# Patient Record
Sex: MALE | Race: WHITE | NOT HISPANIC OR LATINO | Employment: FULL TIME | URBAN - NONMETROPOLITAN AREA
[De-identification: names, ages, dates, MRNs, and addresses within clinical notes are randomized per-mention and may not be internally consistent; named-entity substitution may affect disease eponyms.]

---

## 2017-01-25 ENCOUNTER — APPOINTMENT (OUTPATIENT)
Dept: OCCUPATIONAL MEDICINE | Facility: OTHER | Age: 42
End: 2017-01-25

## 2017-01-25 PROCEDURE — 99000 SPECIMEN HANDLING OFFICE-LAB: CPT

## 2017-02-10 ENCOUNTER — AMBULATORY - GICH (OUTPATIENT)
Dept: SCHEDULING | Facility: OTHER | Age: 42
End: 2017-02-10

## 2017-10-02 ENCOUNTER — HISTORY (OUTPATIENT)
Dept: FAMILY MEDICINE | Facility: OTHER | Age: 42
End: 2017-10-02

## 2017-10-02 ENCOUNTER — OFFICE VISIT - GICH (OUTPATIENT)
Dept: FAMILY MEDICINE | Facility: OTHER | Age: 42
End: 2017-10-02

## 2017-10-02 DIAGNOSIS — J06.9 ACUTE UPPER RESPIRATORY INFECTION: ICD-10-CM

## 2017-12-27 NOTE — PROGRESS NOTES
"Patient Information     Patient Name MRN Sex DOB Goodell, Dennis J [id number] Male 1975      Progress Notes by Zheng Chakraborty MD at 10/2/2017  9:45 AM     Author:  Zheng Chakraborty MD Service:  (none) Author Type:  Physician     Filed:  10/2/2017  9:57 AM Encounter Date:  10/2/2017 Status:  Signed     :  Zheng Chakraborty MD (Physician)            SUBJECTIVE:  Dennis J Goodell is a 42 y.o. male here for cold symptoms. Patient reports the last 2 weeks he has had head congestion, chest congestion, productive cough and sore throat. He's had no fevers or chills he has a history of testicular cancer and is worried that his immune system is decreased. He states that he \"gets pneumonia once or twice a year.\"    Allergies:  No Known Allergies    ROS:    As above otherwise ROS is unremarkable.    OBJECTIVE:  /68  Pulse 86  Temp 97.4  F (36.3  C) (Tympanic)  Ht 1.791 m (5' 10.5\")  Wt 76.5 kg (168 lb 9.6 oz)  BMI 23.85 kg/m2    EXAM:  General Appearance: Pleasant, alert, appropriate appearance for age. No acute distress  Head: Normal. Normocephalic, atraumatic.  Eyes: PERRL, EOMI  Ears: Normal TM's bilaterally. Normal auditory canals and external ears.   OroPharynx: Dental hygiene adequate. Normal buccal mucosa. Normal pharynx.  Neck: Supple, no masses or nodes, no lymphadenopathy.  No thyromegaly.  Lungs: Normal chest wall and respirations. Clear to auscultation, no wheezes or crackles.  Cardiovascular: Regular rate and rhythm. S1, S2, no murmurs.  Musculoskeletal: No edema.  Skin: no concerning or new rashes.    ASSESSEMENT AND PLAN:    Reggie was seen today for cough.    Diagnoses and all orders for this visit:    Upper respiratory tract infection, unspecified type  -     azithromycin (ZITHROMAX) 250 mg tablet; Take 500 mg (2 tabs) by mouth on day 1, then 250 mg (1 tab) daily for days 2-5.     his exam is overall reassuring. However given the duration of symptoms and his history will treat with " azithromycin for 5 days. Encouraged handwashing, fluids, salt water gargles and cough drops with zinc.    He is due for a repeat pneumonia shot as it has been greater than 5 years and he'll follow up for that as well as a flu shot.      Bert Chakraborty MD    This document was prepared using voice generated software.  While every attempt was made for accuracy, grammatical errors may exist.

## 2017-12-30 NOTE — NURSING NOTE
Patient Information     Patient Name MRN Sex DOB Goodell, Dennis J [id number] Male 1975      Nursing Note by Tamiko Tatum at 10/2/2017  9:45 AM     Author:  Tamiko Tatum Service:  (none) Author Type:  (none)     Filed:  10/2/2017  9:52 AM Encounter Date:  10/2/2017 Status:  Signed     :  Tamiko Tatum            Patient presents today with complaints of cough, chest and nasal congestion, and fatigue that has been present for 1-2 weeks.  Tamiko Tatum LPN .............10/2/2017  9:44 AM

## 2018-01-08 ENCOUNTER — COMMUNICATION - GICH (OUTPATIENT)
Dept: FAMILY MEDICINE | Facility: OTHER | Age: 43
End: 2018-01-08

## 2018-01-08 DIAGNOSIS — F34.1 DYSTHYMIC DISORDER: ICD-10-CM

## 2018-01-09 ENCOUNTER — COMMUNICATION - GICH (OUTPATIENT)
Dept: FAMILY MEDICINE | Facility: OTHER | Age: 43
End: 2018-01-09

## 2018-01-10 ENCOUNTER — COMMUNICATION - GICH (OUTPATIENT)
Dept: FAMILY MEDICINE | Facility: OTHER | Age: 43
End: 2018-01-10

## 2018-01-10 DIAGNOSIS — I73.00 RAYNAUD'S SYNDROME WITHOUT GANGRENE: ICD-10-CM

## 2018-01-10 DIAGNOSIS — F34.1 DYSTHYMIC DISORDER: ICD-10-CM

## 2018-01-11 ENCOUNTER — COMMUNICATION - GICH (OUTPATIENT)
Dept: FAMILY MEDICINE | Facility: OTHER | Age: 43
End: 2018-01-11

## 2018-01-12 ENCOUNTER — COMMUNICATION - GICH (OUTPATIENT)
Dept: FAMILY MEDICINE | Facility: OTHER | Age: 43
End: 2018-01-12

## 2018-01-16 ENCOUNTER — OFFICE VISIT - GICH (OUTPATIENT)
Dept: FAMILY MEDICINE | Facility: OTHER | Age: 43
End: 2018-01-16

## 2018-01-16 ENCOUNTER — HISTORY (OUTPATIENT)
Dept: FAMILY MEDICINE | Facility: OTHER | Age: 43
End: 2018-01-16

## 2018-01-16 DIAGNOSIS — I73.00 RAYNAUD'S SYNDROME WITHOUT GANGRENE: ICD-10-CM

## 2018-01-16 ASSESSMENT — ANXIETY QUESTIONNAIRES
1. FEELING NERVOUS, ANXIOUS, OR ON EDGE: SEVERAL DAYS
6. BECOMING EASILY ANNOYED OR IRRITABLE: NOT AT ALL
5. BEING SO RESTLESS THAT IT IS HARD TO SIT STILL: NOT AT ALL
7. FEELING AFRAID AS IF SOMETHING AWFUL MIGHT HAPPEN: NOT AT ALL
4. TROUBLE RELAXING: NOT AT ALL
3. WORRYING TOO MUCH ABOUT DIFFERENT THINGS: NOT AT ALL
2. NOT BEING ABLE TO STOP OR CONTROL WORRYING: NOT AT ALL
GAD7 TOTAL SCORE: 1

## 2018-01-16 ASSESSMENT — PATIENT HEALTH QUESTIONNAIRE - PHQ9: SUM OF ALL RESPONSES TO PHQ QUESTIONS 1-9: 0

## 2018-01-27 VITALS
HEART RATE: 86 BPM | HEIGHT: 71 IN | SYSTOLIC BLOOD PRESSURE: 110 MMHG | BODY MASS INDEX: 23.6 KG/M2 | TEMPERATURE: 97.4 F | DIASTOLIC BLOOD PRESSURE: 68 MMHG | WEIGHT: 168.6 LBS

## 2018-02-06 ENCOUNTER — TRANSFERRED RECORDS (OUTPATIENT)
Dept: HEALTH INFORMATION MANAGEMENT | Facility: OTHER | Age: 43
End: 2018-02-06

## 2018-02-09 ENCOUNTER — DOCUMENTATION ONLY (OUTPATIENT)
Dept: FAMILY MEDICINE | Facility: OTHER | Age: 43
End: 2018-02-09

## 2018-02-09 VITALS
BODY MASS INDEX: 23.94 KG/M2 | SYSTOLIC BLOOD PRESSURE: 122 MMHG | DIASTOLIC BLOOD PRESSURE: 80 MMHG | WEIGHT: 171 LBS | HEIGHT: 71 IN

## 2018-02-09 PROBLEM — L70.9 ACNE, MILD: Status: ACTIVE | Noted: 2018-02-09

## 2018-02-09 PROBLEM — I73.00 RAYNAUD PHENOMENON: Status: ACTIVE | Noted: 2018-02-09

## 2018-02-09 PROBLEM — F34.1 DYSTHYMIC DISORDER: Status: ACTIVE | Noted: 2018-02-09

## 2018-02-09 RX ORDER — ALBUTEROL SULFATE 90 UG/1
2 AEROSOL, METERED RESPIRATORY (INHALATION) EVERY 4 HOURS PRN
COMMUNITY
Start: 2015-12-20 | End: 2018-10-12

## 2018-02-09 RX ORDER — ALPRAZOLAM 0.5 MG
TABLET ORAL
COMMUNITY
Start: 2017-08-07 | End: 2019-01-07

## 2018-02-09 RX ORDER — NIFEDIPINE 90 MG/1
90 TABLET, EXTENDED RELEASE ORAL DAILY
COMMUNITY
Start: 2016-10-04 | End: 2018-04-29

## 2018-02-09 RX ORDER — OMEGA-3 FATTY ACIDS/FISH OIL 300-1000MG
200 CAPSULE ORAL 4 TIMES DAILY PRN
COMMUNITY
End: 2018-09-23

## 2018-02-09 RX ORDER — PSEUDOEPHEDRINE HCL 30 MG
30 TABLET ORAL EVERY 8 HOURS PRN
COMMUNITY
Start: 2014-12-17 | End: 2018-09-23

## 2018-02-09 RX ORDER — AZITHROMYCIN 250 MG/1
TABLET, FILM COATED ORAL
COMMUNITY
Start: 2017-10-02 | End: 2018-04-29

## 2018-02-09 RX ORDER — NAPROXEN SODIUM 220 MG
440 TABLET ORAL 2 TIMES DAILY WITH MEALS
COMMUNITY
End: 2018-09-23

## 2018-02-09 RX ORDER — CLONAZEPAM 0.5 MG/1
TABLET ORAL
COMMUNITY
Start: 2017-08-31 | End: 2018-04-29

## 2018-02-11 ASSESSMENT — ANXIETY QUESTIONNAIRES: GAD7 TOTAL SCORE: 1

## 2018-02-11 ASSESSMENT — PATIENT HEALTH QUESTIONNAIRE - PHQ9: SUM OF ALL RESPONSES TO PHQ QUESTIONS 1-9: 0

## 2018-02-12 NOTE — TELEPHONE ENCOUNTER
Patient Information     Patient Name MRN Sex DOB Goodell, Dennis J [id number] Male 1975      Telephone Encounter by Genie Bonilla MD at 2018 12:46 PM     Author:  Genie Bonilla MD Service:  (none) Author Type:  Physician     Filed:  2018 12:47 PM Encounter Date:  2018 Status:  Signed     :  Genie Bonilla MD (Physician)            I believe he is seeing psychiatry now for medication management.  Refer prescription there please.  Genie Sutton MD

## 2018-02-12 NOTE — TELEPHONE ENCOUNTER
Patient Information     Patient Name MRN Sex DOB Goodell, Dennis J [id number] Male 1975      Telephone Encounter by Delores Garza RN at 2018 12:19 PM     Author:  Delores Garza RN Service:  (none) Author Type:  NURS- Registered Nurse     Filed:  2018 12:29 PM Encounter Date:  2018 Status:  Signed     :  Delores Garza RN (NURS- Registered Nurse)            Message left for patient requesting a return call to clarify/verify refill request and to schedule an annual OV with PCP.     This is a Refill request from: [facility]  Name of Medication: (PRISTIQ) 100 mg extended release tablet  Quantity requested: 90  Last fill date: 10/6/17 per pharmacy  Due for refill: located in history with a discontinue date of 10/2/17-patient states no longer taking  Last visit with OUMOU FERRELL was on: 2016   Diagnosis r/t this medication request: Dysthymic disorder    PHQ Depression Screening 2016 10/2/2017   Date of PHQ exam (doc flow) 2016 10/2/2017   1. Lack of interest/pleasure 2 - More than half the days -   2. Feeling down/depressed 2 - More than half the days -   PHQ-2 TOTAL SCORE 4 -   3. Trouble sleeping 2 - More than half the days -   4. Decreased energy 2 - More than half the days -   5. Appetite change 1 - Several days -   6. Feelings of failure 1 - Several days -   7. Trouble concentrating 2 - More than half the days -   8. Activity level 2 - More than half the days -   9. Hurting yourself 0 - Not at all -   PHQ-9 TOTAL SCORE 14 -   PHQ-9 Severity Level moderate -   Functional Impairment extremely difficult -   Some recent data might be hidden            Unable to complete prescription refill per RN Medication Refill Policy.................... Delores Garza RN ....................  2018   12:20 PM

## 2018-02-12 NOTE — TELEPHONE ENCOUNTER
Patient Information     Patient Name MRN Sex DOB Goodell, Dennis J [id number] Male 1975      Telephone Encounter by Genie Jackson at 2018  3:00 PM     Author:  Genie Jackson Service:  (none) Author Type:  (none)     Filed:  2018  3:01 PM Encounter Date:  2018 Status:  Signed     :  Genie Jackson            Patient requesting refill for Pristique, please call.

## 2018-02-13 NOTE — TELEPHONE ENCOUNTER
Patient Information     Patient Name MRN Sex DOB Goodell, Dennis J [id number] Male 1975      Telephone Encounter by Fiona Buitrago at 2018  2:27 PM     Author:  Fiona Buitrago Service:  (none) Author Type:  (none)     Filed:  2018  2:29 PM Encounter Date:  2018 Status:  Signed     :  Fiona Buitrago            Patient wants a work  in with [facility] as soon as possible for a med refill. He said she would work him in, please call.

## 2018-02-13 NOTE — PATIENT INSTRUCTIONS
Patient Information     Patient Name MRN Sex DOB Goodell, Dennis J [id number] Male 1975      Patient Instructions by Genie Bonilla MD at 2018 12:36 PM     Author:  Genie Bonilla MD  Service:  (none) Author Type:  Physician     Filed:  2018 12:36 PM  Encounter Date:  2018 Status:  Addendum     :  Genie Bonilla MD (Physician)        Related Notes: Original Note by Genie Bonilla MD (Physician) filed at 2018 12:36 PM               Index Related topics   Raynaud's Disease   ________________________________________________________________________  THRASHER POINTS    Raynaud s disease is a problem with your blood vessels that temporarily causes decreased blood flow to your hands or feet.    To reduce symptoms, protect yourself from cold and keep all parts of your body warm and dry.    Exercising and stopping smoking can also help reduce symptoms.  ________________________________________________________________________  What is Raynaud's disease?  Raynaud's disease is a problem that affects your blood vessels when you get cold or feel stressed. Your blood vessels get narrower, which means that less blood flows to your skin. It most often affects hands and feet, but can affect other areas such as the nose and ears.  Women over the age of 30 are most likely to have the problem, but it can affect anyone.  What is the cause?  Most people who have this disease have what is called primary Raynaud s. That means the cause is not known.  You may also have Raynaud s along with other diseases or conditions, such as:    Scleroderma, which is thickening and hardening of the skin and other body tissues    Conditions that cause your immune system (your body s defense against infection) to attack your own tissue, such as lupus and Sjögren s syndrome    Rheumatoid arthritis, which is pain, stiffness, swelling, and loss of movement in your joints    Hardening of the  arteries or other problems that affect blood flow    High blood pressure in the lungs    Surgery or injury, such as frostbite (freezing of skin and tissues just below the skin), or a broken wrist or foot  Raynaud s disease may also be caused by:    Smoking cigarettes    Being exposed to vinyl chloride on the job    Activities or tasks that cause repeated stress to the hands including typing, playing piano, or regular use of machinery such as chain saws, jackhammers, and vibrating drills    Some medicines for heart problems, cancer treatment, or migraine headaches  What are the symptoms?  Symptoms may include:    Changes in skin color from pale to blue to red    Changes in skin temperature making the area feel cooler  It is common for the area to throb or feel numb, tingly, or painful as it warms up again or as stress is relieved. Each attack may last a few minutes to an hour.  In severe cases, your skin may develop painful sores or turn black.  How is it diagnosed?  Your healthcare provider will ask about your symptoms and medical history and examine you. You may have blood tests. You may have tests or scans to check for other diseases or conditions that may cause your symptoms.  How is it treated?  Your healthcare provider may recommend that you:    Protect yourself from cold and keep all parts of your body warm and dry. When you are outdoors in the winter, wear scarves, warm socks, boots, and mittens or gloves. Make sure your wrists are covered and that the material keeps your hands dry. When you are indoors, wear socks and comfortable shoes or slippers. When you take food out of the refrigerator or freezer, use mittens, oven mitts, or potholders. Avoid very cold air conditioning.    Avoid getting cuts, bruises, and other injuries to the areas affected by Raynaud's.    If you smoke, try to quit. When you smoke, even less blood will reach your fingers. Talk to your healthcare provider about ways to quit  smoking.    Limit caffeine, which can make Raynaud s worse for some people.    Learn ways to manage stress, which can decrease blood flow to your hands and feet. Relaxation techniques or biofeedback training may help.    Stay physically active as advised by your provider. Wiggle your fingers and toes often to increase blood flow.  If you have severe symptoms, your provider may prescribe medicine to improve blood flow. You may also need medicine to put on your skin to heal sores. If you are pregnant or are trying to become pregnant, talk with your healthcare provider before taking these medicines.  How can I take care of myself?  Follow the full course of treatment prescribed by your healthcare provider. In addition:    Take care of your health. Try to get at least 7 to 9 hours of sleep each night. If you want to drink alcohol, ask your healthcare provider how much is safe for you to drink.    If you are taking a medicine that seems to be causing Raynaud's disease or making your existing Raynaud s disease worse, let your provider know. Your provider may need to change your medicine or dosage.  Ask your provider:    How and when you will get your test results    How long it will take to recover    If there are activities you should avoid and when you can return to your normal activities    How to take care of yourself at home    What symptoms or problems you should watch for and what to do if you have them  Make sure you know when you should come back for a checkup. Keep all appointments for provider visits or tests.  You can get more information from:    National [place] of Arthritis and Musculoskeletal and Skin Diseases  989.901.9920  http://www.niams.nih.gov  Developed by [employer]  Adult Advisor 2017.2 published by [employer]  Last modified: 2017-03-10  Last reviewed: 2017-03-02  This content is reviewed periodically and is subject to change as new health information becomes available. The information is  intended to inform and educate and is not a replacement for medical evaluation, advice, diagnosis or treatment by a healthcare professional.  References   Adult Advisor 2017.2 Index    Copyright   2017 [employer], a division of [employer] All rights reserved.

## 2018-02-13 NOTE — TELEPHONE ENCOUNTER
Patient Information     Patient Name MRN Sex DOB Goodell, Dennis J [id number] Male 1975      Telephone Encounter by Patt Song at 2018  1:41 PM     Author:  Patt Song Service:  (none) Author Type:  (none)     Filed:  2018  1:42 PM Encounter Date:  2018 Status:  Signed     :  Patt Song            Patient called back and wanted to speak with you in regards to some additional questions he has in regards to his medication.  He is wondering if you would be willing to give him a call back.  Thank you!     Patt Song ....................  2018   1:41 PM

## 2018-02-13 NOTE — TELEPHONE ENCOUNTER
Patient Information     Patient Name MRN Sex DOB Goodell, Dennis J [id number] Male 1975      Telephone Encounter by Genie Bonilla MD at 2018  1:26 PM     Author:  Genie Bonilla MD Service:  (none) Author Type:  Physician     Filed:  2018  1:27 PM Encounter Date:  2018 Status:  Signed     :  Genie Bonilla MD (Physician)            Hasn't been seen in over a year.  Genie Sutton MD

## 2018-02-13 NOTE — TELEPHONE ENCOUNTER
Patient Information     Patient Name MRN Sex DOB Goodell, Dennis J [id number] Male 1975      Telephone Encounter by Janet Harding at 2018  2:22 PM     Author:  Janet Harding Service:  (none) Author Type:  (none)     Filed:  2018  2:22 PM Encounter Date:  2018 Status:  Signed     :  Janet Harding            After patients name and date of birth verified, patient given below  Information.  Janet Harding LPN 2018 2:22 PM........2018 2:22 PM

## 2018-02-13 NOTE — TELEPHONE ENCOUNTER
Patient Information     Patient Name MRN Sex DOB Goodell, Dennis J [id number] Male 1975      Telephone Encounter by Alva Greene MD at 2018  4:32 PM     Author:  Alva Greene MD Service:  (none) Author Type:  Physician     Filed:  2018  4:33 PM Encounter Date:  2018 Status:  Signed     :  Alva Greene MD (Physician)            HE is [facility] patient .

## 2018-02-13 NOTE — TELEPHONE ENCOUNTER
Patient Information     Patient Name MRN Sex DOB Goodell, Dennis J [id number] Male 1975      Telephone Encounter by Ines Armenta at 2018  3:41 PM     Author:  Ines Armenta Service:  (none) Author Type:  (none)     Filed:  2018  3:41 PM Encounter Date:  2018 Status:  Signed     :  Ines Armenta            See phone note.  Ines Armenta LPN.................. 2018 3:41 PM

## 2018-02-13 NOTE — TELEPHONE ENCOUNTER
"Patient Information     Patient Name MRN Sex DOB Goodell, Dennis J [id number] Male 1975      Telephone Encounter by Delores Garza RN at 2018 11:53 AM     Author:  Delores Garza RN Service:  (none) Author Type:  NURS- Registered Nurse     Filed:  2018 11:56 AM Encounter Date:  2018 Status:  Signed     :  Delores Garza RN (NURS- Registered Nurse)            Patient calls, \" I am wondering why my refill requests have been denied'. Will route to Dr. Mary Murguia as requested by patient.     Pt requests physician consideration and a callback today please      Delores Garza RN ....................  2018   11:55 AM          "

## 2018-02-13 NOTE — TELEPHONE ENCOUNTER
Patient Information     Patient Name MRN Sex DOB Goodell, Dennis J [id number] Male 1975      Telephone Encounter by Ines Armenta at 2018  3:37 PM     Author:  Ines Armenta Service:  (none) Author Type:  (none)     Filed:  2018  3:40 PM Encounter Date:  2018 Status:  Signed     :  Ines Armenta            Spoke with patient in regards to his prescription for Pristique. He states he has been on it for years. he states that if it's filled at [facility], he qualifies for the community care act, and saves a significant amount of money. He will check to see if [facility] can fill, and if he will still qualify for the program. He will call back if there is any problems. Patient states community care act.     Ines Armenta LPN.................. 2018 3:37 PM

## 2018-02-13 NOTE — TELEPHONE ENCOUNTER
"Patient Information     Patient Name MRN Sex DOB Goodell, Dennis J [id number] Male 1975      Telephone Encounter by Delores Garza RN at 2018  9:12 AM     Author:  Delores Garza RN Service:  (none) Author Type:  NURS- Registered Nurse     Filed:  2018  9:51 AM Encounter Date:  1/10/2018 Status:  Signed     :  Delores Garza RN (NURS- Registered Nurse)            Please refer to 18 telephone encounter. Contacted pharmacy who verified patient is qualified for community care act if filled by an in house provider. Message left for patient requesting a return call to update and remind he is due for an annual office visit with Genie Ferrell MD. Will also send reminder letter.     Patient returns call and updated. Patient states he has, \" only 3 days remaining on Pristiq. I hope she will fill this. Her name is bottle. It will cost me like $400-500 more if I can't get it through community care'. Patient did agree to be transferred to scheduling to set up next available OV.       This is a Refill request from: [facility]  Name of Medication: Pristiq 100 mg  Quantity requested: 90  Last fill date: located in history with a discontinued date of 10/2/17. Patient states no longer taking  Due for refill: yes  Diagnosis r/t this medication request: Depression/Anxiety    Name of Medication: NIFEdipine (PROCARDIA XL) 90 mg Extended-Release tablet  Quantity requested: 90 x 3  Last fill date: 10/04/2016  Due for refill: yes  Diagnosis r/t this medication request: Raynaud's    Last visit with GENIE FERRELL was on: 2016        Unable to complete prescription refill per RN Medication Refill Policy.................... Delores Garza RN ....................  2018   9:14 AM          "

## 2018-02-13 NOTE — TELEPHONE ENCOUNTER
"Patient Information     Patient Name MRN Sex DOB Goodell, Dennis J [id number] Male 1975      Telephone Encounter by Delores Garza RN at 2018  2:04 PM     Author:  Delores Garza RN Service:  (none) Author Type:  NURS- Registered Nurse     Filed:  2018  2:13 PM Encounter Date:  2018 Status:  Signed     :  Delores Garza RN (NURS- Registered Nurse)            Returned call as requested. Patient requesting clarification of reminder letter sent today. \"Does that mean she won't refill my medications until I see her?  I want to let you know I was able to get an appointment with Dr. Mary Murguia for the . This writer reassured patient PCP will fill medications needed to get him through until seen. This writer also encouraged patient to call back anytime with any questions. \"Thank you so much for your time'. Delores Garza RN ....................  2018   2:10 PM           "

## 2018-02-13 NOTE — NURSING NOTE
Patient Information     Patient Name MRN Sex DOB Goodell, Dennis J [id number] Male 1975      Nursing Note by Ines Armenta at 2018 11:00 AM     Author:  Ines Armenta Service:  (none) Author Type:  (none)     Filed:  2018 11:18 AM Encounter Date:  2018 Status:  Signed     :  Ines Armenta            Patient presents to the clinic today for a med check. He would like both hands checked today.       Ines Armenta ....................  2018   11:06 AM

## 2018-04-11 DIAGNOSIS — M77.02 MEDIAL EPICONDYLITIS OF ELBOW, LEFT: Primary | ICD-10-CM

## 2018-04-25 ENCOUNTER — HOSPITAL ENCOUNTER (OUTPATIENT)
Dept: OCCUPATIONAL THERAPY | Facility: OTHER | Age: 43
Setting detail: THERAPIES SERIES
End: 2018-04-25
Attending: NURSE PRACTITIONER
Payer: COMMERCIAL

## 2018-04-25 PROCEDURE — 97035 APP MDLTY 1+ULTRASOUND EA 15: CPT | Mod: GO

## 2018-04-25 PROCEDURE — 40000125 ZZHC STATISTIC OT OUTPT VISIT

## 2018-04-25 PROCEDURE — 97140 MANUAL THERAPY 1/> REGIONS: CPT | Mod: GO

## 2018-04-25 PROCEDURE — 97110 THERAPEUTIC EXERCISES: CPT | Mod: GO

## 2018-04-25 PROCEDURE — 97165 OT EVAL LOW COMPLEX 30 MIN: CPT | Mod: GO

## 2018-04-25 NOTE — PROGRESS NOTES
[facility]          OUTPATIENT OCCUPATIONAL THERAPY  EVALUATION  PLAN OF TREATMENT FOR OUTPATIENT REHABILITATION  (COMPLETE FOR INITIAL CLAIMS ONLY)  Patient's Last Name, First Name, M.I.  YOB: 1975  Goodell,Dennis J                        Provider's Name  [facility] Medical Record No.  [id number]                               Onset Date:   January 2018      Start of Care Date:     04/25/18   Type:     ___PT   _X_OT   ___SLP Medical Diagnosis:     (P) R medial epicondylitis/UCL strain                           OT Diagnosis:     Decreased performance with ADLs  Visits from SOC:  1   _________________________________________________________________________________  Plan of Treatment/Functional Goals:  ROM, Orthotic fitting/training, Manual therapy, Stretching, Strengthening, Self care/Home management, Coordination training  Electrical stimulation, Hot/cold packs, Iontophoresis, Ultrasound  10% ketoprofen with Phonophoresis, 4% dexamethasone with iontophoresis               Goals  Goal Identifier: STG 1   Goal Description: Patient will report improved ability to open a tight new jar from current rating of 3 to a rating of 7/10 according to the PSFS  Target Date: 06/20/18     Goal Identifier: STG 2   Goal Description: Patient will report improved ability to carry a grocery bag (10#) or more from current rating of 3 to a rating of 7/10 according to the PSFS  Target Date: 06/20/18     Goal Identifier: STG 3   Goal Description: Patient will report improved ability to rake the lawn from a current rating of 2 to at least 7/10 according to the PSFS  Target Date: 06/20/18     Goal Identifier: STG 4   Goal Description: Patient will rate improved participation in social activities with friends or family from a current rating og 3 (moderate interference) to 1-2 (none or slight interference)  as measred by the [employer]  Target Date: 06/20/18     Goal Identifier: LTG 1   Goal Description: Patient will demonstrate pain free objective improvements in strength and ROM as measured by standardized testing   Target Date: 07/18/18                                                    Predicted Duration of Therapy Intervention (days/wks): 8 weeks   Luciana Green OTR/L         I CERTIFY THE NEED FOR THESE SERVICES FURNISHED UNDER        THIS PLAN OF TREATMENT AND WHILE UNDER MY CARE .             Physician Signature               Date    X_____________________________________________________             ,     ,                 Referring Physician: DONTRELL Hanley      Initial Assessment        See Epic Evaluation      Start Of Care Date: 04/25/18

## 2018-04-25 NOTE — PROGRESS NOTES
04/25/18 1300   Quick Adds   Type of Visit Initial Outpatient Occupational Therapy Evaluation   General Information   Start Of Care Date 04/25/18   Referring Physician DONTRELL Hanley    Orders Date 04/19/18   Medical Diagnosis R medial epicondylitis/UCL strain    Surgical/Medical History Reviewed Yes   Additional Occupational Profile Info/Pertinent History of Current Problem Patient is a 42 year old male who has been referred to OT with R medial elbow pain.  He reports that this has been bothering him for a few months now.  Patient reports that he has a wrist brace, but does not always wear it.  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    Role/Living Environment   Patient/family Goals Statement Patient reports he would like to be able to use his arm again without pain    Pain   Patient currently in pain Yes   Pain location R elbow medial    Pain rating 3-4/10   Pain description Throbbing;Sharp;Deep;Burning  (radiating )   Fall Risk Screen   Fall screen completed by OT   Have you fallen 2 or more times in the past year? Yes   Have you fallen and had an injury in the past year? No   Is patient a fall risk? No   Range of Motion (ROM)   ROM Comments bilateral elbow flexion WNL  bilateral elbow extension slightly hyperextends    Left Wrist Flexion ROM   Left Wrist Flexion AROM - degrees 75   Left Wrist Extension ROM   Left Wrist Extension AROM - degrees 72   Right Wrist Flexion ROM   Right Wrist Flexion AROM - degrees 70   Right Wrist Extension ROM   Right Wrist Extension AROM - degrees 61   Hand Strength   Hand Dominance Right   Left Hand  (pounds) 107 pounds   Right Hand  (pounds) 96 pounds   Left Lateral Pinch (pounds) 18 pounds   Right Lateral Pinch (pounds) 17 pounds   Left Three Point Pinch (pounds) 15 pounds   Right Three Point Pinch (pounds) 15 pounds   Hand Strength Comments Pain with R hand testing    Planned Therapy Interventions   Planned Therapy  Interventions ROM;Orthotic fitting/training;Manual therapy;Stretching;Strengthening;Self care/Home management;Coordination training   Planned Modalities Electrical stimulation;Hot/cold packs;Iontophoresis;Ultrasound   Intervention Comments 10% ketoprofen with Phonophoresis, 4% dexamethasone with iontophoresis    Adult OT Eval Goals   OT Eval Goals (Adult) 1;2;3;4;5   OT Goal 1   Goal Identifier STG 1    Goal Description Patient will report improved ability to open a tight new jar from current rating of 3 to a rating of 7/10 according to the PSFS   Target Date 06/20/18   OT Goal 2   Goal Identifier STG 2    Goal Description Patient will report improved ability to carry a grocery bag (10#) or more from current rating of 3 to a rating of 7/10 according to the PSFS   Target Date 06/20/18   OT Goal 3   Goal Identifier STG 3    Goal Description Patient will report improved ability to rake the lawn from a current rating of 2 to at least 7/10 according to the PSFS   Target Date 06/20/18   OT Goal 4   Goal Identifier STG 4    Goal Description Patient will rate improved participation in social activities with friends or family from a current rating og 3 (moderate interference) to 1-2 (none or slight interference) as measred by the [employer]ASH   Target Date 06/20/18   OT Goal 5   Goal Identifier LTG 1    Goal Description Patient will demonstrate pain free objective improvements in strength and ROM as measured by standardized testing    Target Date 07/18/18   Clinical Impression   Criteria for Skilled Therapeutic Interventions Met Yes, treatment indicated   OT Diagnosis Decreased performance with ADLs    Influenced by the following impairments decreased strength, increased pain, decreased ROM    Assessment of Occupational Performance 1-3 Performance Deficits   Identified Performance Deficits home management, work tasks   Clinical Decision Making (Complexity) Low complexity   Predicted Duration of Therapy Intervention (days/wks) 8  weeks    Risks and Benefits of Treatment have been explained. Yes   Patient, Family & other staff in agreement with plan of care Yes   Clinical Impression Comments Patient demonstrates knowledge and acceptance to current treatment techniques.  He is an active participant in the therapuetic process.    Total Evaluation Time   Total Evaluation Time 15

## 2018-04-26 ENCOUNTER — HOSPITAL ENCOUNTER (OUTPATIENT)
Dept: OCCUPATIONAL THERAPY | Facility: OTHER | Age: 43
Setting detail: THERAPIES SERIES
End: 2018-04-26
Attending: NURSE PRACTITIONER
Payer: COMMERCIAL

## 2018-04-26 PROCEDURE — 97035 APP MDLTY 1+ULTRASOUND EA 15: CPT | Mod: GO

## 2018-04-26 PROCEDURE — 97140 MANUAL THERAPY 1/> REGIONS: CPT | Mod: GO

## 2018-04-26 PROCEDURE — 40000125 ZZHC STATISTIC OT OUTPT VISIT

## 2018-04-26 PROCEDURE — 97110 THERAPEUTIC EXERCISES: CPT | Mod: GO

## 2018-04-29 ENCOUNTER — OFFICE VISIT (OUTPATIENT)
Dept: FAMILY MEDICINE | Facility: OTHER | Age: 43
End: 2018-04-29
Payer: COMMERCIAL

## 2018-04-29 VITALS
WEIGHT: 175 LBS | HEART RATE: 80 BPM | DIASTOLIC BLOOD PRESSURE: 92 MMHG | RESPIRATION RATE: 16 BRPM | TEMPERATURE: 98.3 F | BODY MASS INDEX: 24.75 KG/M2 | SYSTOLIC BLOOD PRESSURE: 126 MMHG

## 2018-04-29 DIAGNOSIS — J02.0 STREP THROAT: Primary | ICD-10-CM

## 2018-04-29 DIAGNOSIS — R07.0 THROAT PAIN: ICD-10-CM

## 2018-04-29 LAB
DEPRECATED S PYO AG THROAT QL EIA: ABNORMAL
SPECIMEN SOURCE: ABNORMAL

## 2018-04-29 PROCEDURE — 99213 OFFICE O/P EST LOW 20 MIN: CPT | Performed by: FAMILY MEDICINE

## 2018-04-29 PROCEDURE — 87880 STREP A ASSAY W/OPTIC: CPT | Performed by: FAMILY MEDICINE

## 2018-04-29 RX ORDER — CLONAZEPAM 1 MG/1
0.5 TABLET ORAL
COMMUNITY
Start: 2018-01-16 | End: 2019-03-20

## 2018-04-29 RX ORDER — AMOXICILLIN 500 MG/1
500 CAPSULE ORAL 3 TIMES DAILY
Qty: 30 CAPSULE | Refills: 0 | Status: SHIPPED | OUTPATIENT
Start: 2018-04-29 | End: 2018-07-31

## 2018-04-29 RX ORDER — NIFEDIPINE 90 MG/1
90 TABLET, EXTENDED RELEASE ORAL
COMMUNITY
Start: 2018-01-16 | End: 2019-03-20

## 2018-04-29 RX ORDER — DESVENLAFAXINE 100 MG/1
100 TABLET, EXTENDED RELEASE ORAL DAILY
COMMUNITY
Start: 2018-01-12

## 2018-04-29 ASSESSMENT — PAIN SCALES - GENERAL: PAINLEVEL: MODERATE PAIN (4)

## 2018-04-29 ASSESSMENT — ENCOUNTER SYMPTOMS
COUGH: 1
SORE THROAT: 1

## 2018-04-29 NOTE — PROGRESS NOTES
SUBJECTIVE:   Dennis J Goodell is a 42 year old male who presents to clinic today for the following health issues:sore throat     Pharyngitis    This is a new problem. The current episode started more than 2 days ago. The problem has been gradually worsening. There has been no fever. Associated symptoms include ear pain and cough. Pertinent negatives include no ear discharge. He has had exposure to strep. He has had no exposure to mono. Exposure to: at the day care [facility] 2 days ago .         Patient Active Problem List    Diagnosis Date Noted     Acne, mild 02/09/2018     Priority: Medium     Dysthymic disorder 02/09/2018     Priority: Medium     Raynaud phenomenon 02/09/2018     Priority: Medium     Benzodiazepine contract exists 11/27/2016     Priority: Medium     History of primary testicular cancer 08/12/2016     Priority: Medium     Drug abuse, opioid type 07/21/2016     Priority: Medium     Carpal tunnel syndrome 03/20/2013     Priority: Medium     Alcohol abuse 07/25/2012     Priority: Medium     Cervicalgia 11/01/2010     Priority: Medium     Past Medical History:   Diagnosis Date     Dysthymic disorder     No Comments Provided     Encounter for other administrative examinations     11/27/2016     Malignant neoplasm of testis (H)     2005,teratoma     Raynaud's syndrome without gangrene     No Comments Provided     Uncomplicated alcohol abuse     7/25/2012      Past Surgical History:   Procedure Laterality Date     OTHER SURGICAL HISTORY      ,HERNIA REPAIR     OTHER SURGICAL HISTORY      12/30/2005,659334,OTHER,Right     OTHER SURGICAL HISTORY      03/25/08,[id number],OTHER,Left     OTHER SURGICAL HISTORY      9/05/08,[id number],OTHER     OTHER SURGICAL HISTORY      9/05/08,[id number],OTHER,at [facility]. 37 lymph nodes excised     Social History     Social History Narrative    He is , 4  girls.     Current Outpatient Prescriptions   Medication Sig Dispense Refill     clonazePAM  (KLONOPIN) 1 MG tablet Take 0.5 mg by mouth       desvenlafaxine succinate (PRISTIQ) 100 MG 24 hr tablet Take 100 mg by mouth       NIFEdipine ER osmotic (PROCARDIA XL) 90 MG 24 hr tablet Take 90 mg by mouth       albuterol (PROAIR HFA/PROVENTIL HFA/VENTOLIN HFA) 108 (90 BASE) MCG/ACT Inhaler Inhale 2 puffs into the lungs every 4 hours as needed       ALPRAZolam (XANAX) 0.5 MG tablet TK 1 T PO UP TO TID       aspirin-acetaminophen-caffeine (EXCEDRIN MIGRAINE) 250-250-65 MG per tablet Take 2 tablets by mouth every 6 hours as needed for headaches Max acetaminophen dose: 4000 mg in 24 hours       ibuprofen 200 MG capsule Take 200 mg by mouth 4 times daily as needed       naproxen sodium (ANAPROX) 220 MG tablet Take 440 mg by mouth 2 times daily (with meals)       pseudoePHEDrine (SUDAFED) 30 MG tablet Take 30 mg by mouth every 8 hours as needed for congestion       [DISCONTINUED] clonazePAM (KLONOPIN) 0.5 MG tablet TK TWO TS PO TID       [DISCONTINUED] NIFEdipine ER osmotic (PROCARDIA XL) 90 MG 24 hr tablet Take 90 mg by mouth daily Before a meal  Indications: Raynaud's Phenomenon       No Known Allergies    Review of Systems   HENT: Positive for ear pain and sore throat. Negative for ear discharge.    Respiratory: Positive for cough.         OBJECTIVE:     BP (!) 126/92 (BP Location: Right arm, Patient Position: Sitting, Cuff Size: Adult Regular)  Pulse 80  Temp 98.3  F (36.8  C) (Tympanic)  Resp 16  Wt 175 lb (79.4 kg)  BMI 24.75 kg/m2  Body mass index is 24.75 kg/(m^2).  Physical Exam   Constitutional: He appears well-developed.   HENT:   Mouth/Throat: Posterior oropharyngeal edema and posterior oropharyngeal erythema present. No oropharyngeal exudate.       Diagnostic Test Results:  Results for orders placed or performed in visit on 04/29/18 (from the past 24 hour(s))   Rapid strep screen   Result Value Ref Range    Specimen Description Throat     Rapid Strep A Screen (A)      POSITIVE: Group A Streptococcal  antigen detected by immunoassay.       ASSESSMENT/PLAN:           ICD-10-CM    1. Strep throat J02.0 amoxicillin (AMOXIL) 500 MG capsule   2. Throat pain R07.0 Rapid strep screen     Fu if not improving     Samir Melton MD  [facility]

## 2018-04-29 NOTE — NURSING NOTE
Patient presents to clinic for complaint of sore throat and swollen neck. These symptoms started 3 days ago. Patient would like to be tested for strep.  Gigi Sethi LPN...... 10:40 AM 4/29/2018

## 2018-04-29 NOTE — MR AVS SNAPSHOT
After Visit Summary   4/29/2018    Dennis J Goodell    MRN: [id number]           Patient Information     Date Of Birth          1975        Visit Information        Provider Department      4/29/2018 10:30 AM Samir Melton MD [facility]        Today's Diagnoses     Strep throat    -  1    Throat pain           Follow-ups after your visit        Your next 10 appointments already scheduled     Apr 30, 2018  9:45 AM CDT   [facility], OT   [facility]    [address] [address]   [phone]            May 03, 2018  9:45 AM CDT   Treatment with Luciana Green, OT   [facility]    [address] [address]   [phone]            May 07, 2018  9:45 AM CDT   Treatment with Luciana Green, OT   [facility]    [address] [address]   [phone]            May 10, 2018  9:45 AM CDT   Treatment with Luciana Green, OT   [facility]    [address] [address]   [phone]              Who to contact     If you have questions or need follow up information about today's clinic visit or your schedule please contact [facility] directly at [phone].  Normal or non-critical lab and imaging results will be communicated to you by MyChart, letter or phone within 4 business days after the clinic has received the results. If you do not hear from us within 7 days, please contact the clinic through [employer] or phone. If you have a critical or abnormal lab result, we will notify you by phone as soon as possible.  Submit refill requests through [employer] or call your pharmacy and they will forward the refill request to us. Please allow 3 business days  "for your refill to be completed.          Additional Information About Your Visit        [employer] Information     [employer] lets you send messages to your doctor, view your test results, renew your prescriptions, schedule appointments and more. To sign up, go to [email or web address] . Click on \"Log in\" on the left side of the screen, which will take you to the Welcome page. Then click on \"Sign up Now\" on the right side of the page.     You will be asked to enter the access code listed below, as well as some personal information. Please follow the directions to create your username and password.     Your access code is: [email or web address]  Expires: 2018 11:14 AM     Your access code will  in 90 days. If you need help or a new code, please call your [facility] clinic or [phone].        Care EveryWhere ID     This is your Care EveryWhere ID. This could be used by other organizations to access your [facility] medical records  [id number]        Your Vitals Were     Pulse Temperature Respirations BMI (Body Mass Index)          80 98.3  F (36.8  C) (Tympanic) 16 24.75 kg/m2         Blood Pressure from Last 3 Encounters:   18 (!) 126/92   18 122/80   10/02/17 110/68    Weight from Last 3 Encounters:   18 175 lb (79.4 kg)   18 171 lb (77.6 kg)   10/02/17 168 lb 9.6 oz (76.5 kg)              We Performed the Following     Rapid strep screen          Today's Medication Changes          These changes are accurate as of 18 11:14 AM.  If you have any questions, ask your nurse or doctor.               Start taking these medicines.        Dose/Directions    amoxicillin 500 MG capsule   Commonly known as:  AMOXIL   Used for:  Strep throat   Started by:  Samir Melton MD        Dose:  500 mg   Take 1 capsule (500 mg) by mouth 3 times daily   Quantity:  30 capsule   Refills:  0         These medicines have changed or have updated prescriptions.        Dose/Directions    clonazePAM 1 MG tablet "   Commonly known as:  klonoPIN   This may have changed:  Another medication with the same name was removed. Continue taking this medication, and follow the directions you see here.   Changed by:  Samir Melton MD        Dose:  0.5 mg   Take 0.5 mg by mouth   Refills:  0       NIFEdipine ER osmotic 90 MG 24 hr tablet   Commonly known as:  PROCARDIA XL   This may have changed:  Another medication with the same name was removed. Continue taking this medication, and follow the directions you see here.   Changed by:  Samir Melton MD        Dose:  90 mg   Take 90 mg by mouth   Refills:  0         Stop taking these medicines if you haven't already. Please contact your care team if you have questions.     azithromycin 250 MG tablet   Commonly known as:  ZITHROMAX   Stopped by:  Samir Melton MD                Where to get your medicines      These medications were sent to [facility] [address] [place], [place] - [address]  [address], [facility] [address]     Phone:  [phone]     amoxicillin 500 MG capsule                Primary Care Provider Office Phone # Fax #    Genie RAMIREZ Donny Murguia [phone] [address] [facility] [address]        Equal Access to Services     [facility] : Hadii kristen amanda hadasho Soomaali, waaxda luqadaha, qaybta kaalmada adeegyada, shannon de jesus. So [facility] [phone].    ATENCIÓN: Si habla español, tiene a sprague disposición servicios gratuitos de asistencia lingüística. Llame al [phone].    We comply with applicable federal civil rights laws and [place] laws. We do not discriminate on the basis of race, color, national origin, age, disability, sex, sexual orientation, or gender identity.            Thank you!     Thank you for choosing [facility] AND [facility]  for your care. Our goal is always to provide you with excellent care. Hearing back from our patients  is one way we can continue to improve our services. Please take a few minutes to complete the written survey that you may receive in the mail after your visit with us. Thank you!             Your Updated Medication List - Protect others around you: Learn how to safely use, store and throw away your medicines at www.disposemymeds.org.          This list is accurate as of 4/29/18 11:14 AM.  Always use your most recent med list.                   Brand Name Dispense Instructions for use Diagnosis    albuterol 108 (90 Base) MCG/ACT Inhaler    PROAIR HFA/PROVENTIL HFA/VENTOLIN HFA     Inhale 2 puffs into the lungs every 4 hours as needed        ALPRAZolam 0.5 MG tablet    XANAX     TK 1 T PO UP TO TID        amoxicillin 500 MG capsule    AMOXIL    30 capsule    Take 1 capsule (500 mg) by mouth 3 times daily    Strep throat       aspirin-acetaminophen-caffeine 250-250-65 MG per tablet    EXCEDRIN MIGRAINE     Take 2 tablets by mouth every 6 hours as needed for headaches Max acetaminophen dose: 4000 mg in 24 hours        clonazePAM 1 MG tablet    klonoPIN     Take 0.5 mg by mouth        desvenlafaxine succinate 100 MG 24 hr tablet    PRISTIQ     Take 100 mg by mouth        ibuprofen 200 MG capsule      Take 200 mg by mouth 4 times daily as needed        naproxen sodium 220 MG tablet    ANAPROX     Take 440 mg by mouth 2 times daily (with meals)        NIFEdipine ER osmotic 90 MG 24 hr tablet    PROCARDIA XL     Take 90 mg by mouth        SUDAFED 30 MG tablet   Generic drug:  pseudoePHEDrine      Take 30 mg by mouth every 8 hours as needed for congestion

## 2018-05-01 ENCOUNTER — TELEPHONE (OUTPATIENT)
Dept: FAMILY MEDICINE | Facility: OTHER | Age: 43
End: 2018-05-01

## 2018-05-01 DIAGNOSIS — J02.0 STREP THROAT: Primary | ICD-10-CM

## 2018-05-01 RX ORDER — PREDNISONE 20 MG/1
20 TABLET ORAL 2 TIMES DAILY
Qty: 10 TABLET | Refills: 0 | Status: SHIPPED | OUTPATIENT
Start: 2018-05-01 | End: 2018-09-23

## 2018-05-01 NOTE — TELEPHONE ENCOUNTER
Spoke with patient and notified per Genie Sutton MD.Donna Kwon LPN......................5/1/2018 12:55 PM

## 2018-05-01 NOTE — TELEPHONE ENCOUNTER
Spoke with patient. He states that he was seen in [facility] on 4/29/18, and was started on Amoxicillin for strep. He states it throat is still very swollen, and is wondering if he could get a prescription for Prednisone?    Ines Armenta LPN.................. 5/1/2018 11:50 AM

## 2018-05-04 ENCOUNTER — HOSPITAL ENCOUNTER (OUTPATIENT)
Dept: OCCUPATIONAL THERAPY | Facility: OTHER | Age: 43
Setting detail: THERAPIES SERIES
End: 2018-05-04
Attending: NURSE PRACTITIONER
Payer: COMMERCIAL

## 2018-05-04 PROCEDURE — 97035 APP MDLTY 1+ULTRASOUND EA 15: CPT | Mod: GO

## 2018-05-04 PROCEDURE — 97110 THERAPEUTIC EXERCISES: CPT | Mod: GO

## 2018-05-04 PROCEDURE — 97140 MANUAL THERAPY 1/> REGIONS: CPT | Mod: GO

## 2018-05-04 PROCEDURE — 40000125 ZZHC STATISTIC OT OUTPT VISIT

## 2018-07-05 ENCOUNTER — RECORDS - HEALTHEAST (OUTPATIENT)
Dept: LAB | Facility: CLINIC | Age: 43
End: 2018-07-05

## 2018-07-09 LAB
QTF INTERPRETATION: NORMAL
QTF MITOGEN - NIL: >10 IU/ML
QTF NIL: 0.09 IU/ML
QTF RESULT: NEGATIVE
QTF TB ANTIGEN - NIL: -0.03 IU/ML

## 2018-07-26 DIAGNOSIS — Z85.47 HISTORY OF PRIMARY TESTICULAR CANCER: Primary | ICD-10-CM

## 2018-07-31 ENCOUNTER — OFFICE VISIT (OUTPATIENT)
Dept: FAMILY MEDICINE | Facility: OTHER | Age: 43
End: 2018-07-31
Attending: PHYSICIAN ASSISTANT
Payer: COMMERCIAL

## 2018-07-31 ENCOUNTER — HOSPITAL ENCOUNTER (OUTPATIENT)
Dept: GENERAL RADIOLOGY | Facility: OTHER | Age: 43
Discharge: HOME | End: 2018-07-31
Attending: UROLOGY | Admitting: PHYSICIAN ASSISTANT
Payer: COMMERCIAL

## 2018-07-31 VITALS
SYSTOLIC BLOOD PRESSURE: 112 MMHG | HEIGHT: 70 IN | TEMPERATURE: 98.5 F | HEART RATE: 82 BPM | DIASTOLIC BLOOD PRESSURE: 60 MMHG | WEIGHT: 172.4 LBS | BODY MASS INDEX: 24.68 KG/M2 | RESPIRATION RATE: 18 BRPM

## 2018-07-31 DIAGNOSIS — J02.0 ACUTE STREPTOCOCCAL PHARYNGITIS: Primary | ICD-10-CM

## 2018-07-31 DIAGNOSIS — R07.0 THROAT PAIN: ICD-10-CM

## 2018-07-31 DIAGNOSIS — Z85.47 HISTORY OF PRIMARY TESTICULAR CANCER: ICD-10-CM

## 2018-07-31 DIAGNOSIS — Z20.818 STREP THROAT EXPOSURE: ICD-10-CM

## 2018-07-31 LAB
AFP SERPL-MCNC: 3.3 UG/L (ref 0–8)
ALBUMIN UR-MCNC: NEGATIVE MG/DL
APPEARANCE UR: CLEAR
BACTERIA SPEC CULT: NORMAL
BILIRUB UR QL STRIP: NEGATIVE
COLOR UR AUTO: YELLOW
DEPRECATED S PYO AG THROAT QL EIA: NORMAL
GLUCOSE UR STRIP-MCNC: NEGATIVE MG/DL
HGB UR QL STRIP: NEGATIVE
KETONES UR STRIP-MCNC: NEGATIVE MG/DL
LEUKOCYTE ESTERASE UR QL STRIP: NEGATIVE
NITRATE UR QL: NEGATIVE
PH UR STRIP: 6.5 PH (ref 5–9)
SOURCE: NORMAL
SP GR UR STRIP: 1.02 (ref 1–1.03)
SPECIMEN SOURCE: NORMAL
SPECIMEN SOURCE: NORMAL
UROBILINOGEN UR STRIP-ACNC: 0.2 EU/DL (ref 0.2–1)

## 2018-07-31 PROCEDURE — 81003 URINALYSIS AUTO W/O SCOPE: CPT | Performed by: UROLOGY

## 2018-07-31 PROCEDURE — 36415 COLL VENOUS BLD VENIPUNCTURE: CPT | Performed by: UROLOGY

## 2018-07-31 PROCEDURE — 82105 ALPHA-FETOPROTEIN SERUM: CPT | Performed by: UROLOGY

## 2018-07-31 PROCEDURE — 99213 OFFICE O/P EST LOW 20 MIN: CPT | Performed by: PHYSICIAN ASSISTANT

## 2018-07-31 PROCEDURE — 84702 CHORIONIC GONADOTROPIN TEST: CPT | Performed by: UROLOGY

## 2018-07-31 PROCEDURE — 87880 STREP A ASSAY W/OPTIC: CPT | Performed by: PHYSICIAN ASSISTANT

## 2018-07-31 PROCEDURE — 71046 X-RAY EXAM CHEST 2 VIEWS: CPT

## 2018-07-31 RX ORDER — AMOXICILLIN 500 MG/1
500 CAPSULE ORAL 2 TIMES DAILY
Qty: 20 CAPSULE | Refills: 0 | Status: SHIPPED | OUTPATIENT
Start: 2018-07-31 | End: 2018-08-10

## 2018-07-31 ASSESSMENT — PAIN SCALES - GENERAL: PAINLEVEL: NO PAIN (0)

## 2018-07-31 NOTE — PATIENT INSTRUCTIONS
Sore throat, presumed strep positive based on presentation and exposure  Rapid strep is negative  Chest xray today negative, Urinalysis negative - cancer screening  Start amoxicillin 500 mg oral tablet, take twice daily for 10 days  Rest and fluids  Symptomatic treatments: salt water gargles, humidified air, ibuprofen as directed for pain or fever, OTC  throat lozenges, OTC throat sprays, warm fluids, cold soft foods.   Can return to clinic if symptoms persist past 7 days for possible mono screen  Return to clinic if symptoms persist or worsen  Seek immediate care for     Fever as directed by your healthcare provider    New or worse ear pain, sinus pain, or headache    Painful lumps in the back of neck    Stiff neck    Lymph nodes that get larger    Can t swallow liquids, a lot of drooling, or can t open mouth wide due to throat pain    Signs of dehydration, such as very dark urine or no urine, sunken eyes, dizziness    Trouble breathing or noisy breathing    Muffled voice    New rash      Pharyngitis: Strep (Presumed)    You have pharyngitis (sore throat). The healthcare staff think your sore throat is caused by streptococcus (strep) bacteria. This is often called strep throat. Strep throat can cause throat pain that is worse when swallowing, aching all over, headache, and fever. The infection is contagious. It may be spread by coughing, kissing, or touching others after touching your mouth or nose. Antibiotic medicine is given to treat the infection.  Home care    Rest at home. Drink plenty of fluids so you won t get dehydrated.    Stay home from work or school for the first 2 days of taking the antibiotics. After this time, you will not be contagious. You can then return to work or school if you are feeling better.     Take the antibiotic medicine for the full 10 days, even when you feel better. This is very important to make sure the infection is fully treated. It is also important to prevent medicine-resistant  germs from growing. If you were given an antibiotic shot, no more antibiotics are needed.    You may use acetaminophen or ibuprofen to control pain or fever, unless another medicine was prescribed for this. If you have chronic liver or kidney disease or ever had a stomach ulcer or GI bleeding, talk with your healthcare provider before using these medicines.    Use throat lozenges or a throat-numbing spray to help reduce throat pain. Gargling with warm salt water can also help reduce throat pain. Dissolve 1/2 teaspoon of salt in 1 glass of warm water.     Don t eat salty or spicy foods. These can irritate the throat.  Follow-up care  Follow up with your healthcare provider or our staff if you don't get better over the next week.  When to seek medical advice  Call your healthcare provider right away if any of these occur:    Fever as directed by your healthcare provider    New or worse ear pain, sinus pain, or headache    Painful lumps in the back of neck    Stiff neck    Lymph nodes that get larger    Can t swallow liquids, a lot of drooling, or can t open mouth wide due to throat pain    Signs of dehydration, such as very dark urine or no urine, sunken eyes, dizziness    Trouble breathing or noisy breathing    Muffled voice    New rash  Prevention  Here are steps you can take to help prevent an infection:    Keep good hand washing habits.    Don t have close contact with people who have sore throats, colds, or other upper respiratory infections.    Don t smoke, and stay away from secondhand smoke.    Stay up to date with of your vaccines.  Date Last Reviewed: 11/1/2017 2000-2017 [employer]. [address] [place] [address], [place] [address]. All rights reserved. This information is not intended as a substitute for professional medical care. Always follow your healthcare professional's instructions.

## 2018-07-31 NOTE — PROGRESS NOTES
"Nursing Notes:   Inés Schroeder LPN  7/31/2018 10:39 AM  Signed  Patient presents to the clinic for wanting strep testing. States strep has been running through his . Hasn't been feeling well the past couple days. Woke up this morning with a fever of 102.  Inés Schroeder LPN............. July 31, 2018 10:16 AM     SUBJECTIVE:  Dennis J Goodell is a 42 year old male who presents with a 1 day day history of fever. When he woke up this AM he had swollen glands.   Max temperature was 102 degrees    Associated symptoms: body aches, sweats, chills, fatigue, joint aches  He has history of testicular cancer with 9 weeks of chemo in 2009.   He is only on depression and anxiety medications  He has no history of ticks and doesn't suspect tick born illness  Exposures: a niece had strep, and she is also in day care in their home  Treatments: tylenol and ibuprofen this AM    ROS:  Review Of Systems  Skin: negative  Eyes: negative  Ears/Nose/Throat: swollen glands, hoarse  Respiratory: denies cough  Cardiovascular: negative  Gastrointestinal: negative  Genitourinary: negative  Musculoskeletal: body aches    Results for orders placed or performed in visit on 07/31/18   Rapid strep screen   Result Value Ref Range    Specimen Description Throat     Rapid Strep A Screen       Negative presumptive for Group A Beta Streptococcus   Beta strep group A culture   Result Value Ref Range    Specimen Description Throat     Culture Micro       Canceled, Test credited  Test canceled - Lab  error           EXAM:  /60 (BP Location: Right arm, Patient Position: Sitting, Cuff Size: Adult Regular)  Pulse 82  Temp 98.5  F (36.9  C) (Tympanic)  Resp 18  Ht 5' 10\" (1.778 m)  Wt 172 lb 6.4 oz (78.2 kg)  BMI 24.74 kg/m2     General: alert and active, NAD  Skin: no rashes  Eyes: conjunctiva is clear.    HENT: Head is atraumatic, normocephalic. Ears: pearly gray. Nose: clear rhinorrhea.  Mouth: normal mucosa. " Neck: soft, no adenopathy.   Cardiac: normal RR, no murmur  Respiratory: normal respiration, clear to ausculation  Abdomen: soft, non tender, normal bowel sounds    Results for orders placed or performed in visit on 07/31/18 (from the past 24 hour(s))   Rapid strep screen   Result Value Ref Range    Specimen Description Throat     Rapid Strep A Screen       Negative presumptive for Group A Beta Streptococcus   Beta strep group A culture   Result Value Ref Range    Specimen Description Throat     Culture Micro       Canceled, Test credited  Test canceled - Lab  error           ASSESSMENT/PLAN:   (J02.0) Acute streptococcal pharyngitis  (primary encounter diagnosis)  (Z20.818) Strep throat exposure    Plan: amoxicillin (AMOXIL) 500 MG capsule      (R07.0) Throat pain  Plan: Rapid strep screen, Beta strep group A culture    Rapid strep test is negative  Household exposures and marked erythema with fever. Will treat as a presumed strep positive    RX per EPIC   Discussed symptomatic treatments per AVS  Return to clinic if symptoms persist or worsen  Patient received verbal and written instruction including review of warning signs  Rosa Moreau PA-C on 7/31/2018 at 2:47 PM

## 2018-07-31 NOTE — MR AVS SNAPSHOT
After Visit Summary   7/31/2018    Dennis J Goodell    MRN: [id number]           Patient Information     Date Of Birth          1975        Visit Information        Provider Department      7/31/2018 10:15 AM Rosa Moreau PA-C [facility]        Today's Diagnoses     Acute streptococcal pharyngitis    -  1    Throat pain        Strep throat exposure          Care Instructions    Sore throat, presumed strep positive based on presentation and exposure  Rapid strep is negative  Chest xray today negative, Urinalysis negative - cancer screening  Start amoxicillin 500 mg oral tablet, take twice daily for 10 days  Rest and fluids  Symptomatic treatments: salt water gargles, humidified air, ibuprofen as directed for pain or fever, OTC  throat lozenges, OTC throat sprays, warm fluids, cold soft foods.   Can return to clinic if symptoms persist past 7 days for possible mono screen  Return to clinic if symptoms persist or worsen  Seek immediate care for     Fever as directed by your healthcare provider    New or worse ear pain, sinus pain, or headache    Painful lumps in the back of neck    Stiff neck    Lymph nodes that get larger    Can t swallow liquids, a lot of drooling, or can t open mouth wide due to throat pain    Signs of dehydration, such as very dark urine or no urine, sunken eyes, dizziness    Trouble breathing or noisy breathing    Muffled voice    New rash      Pharyngitis: Strep (Presumed)    You have pharyngitis (sore throat). The healthcare staff think your sore throat is caused by streptococcus (strep) bacteria. This is often called strep throat. Strep throat can cause throat pain that is worse when swallowing, aching all over, headache, and fever. The infection is contagious. It may be spread by coughing, kissing, or touching others after touching your mouth or nose. Antibiotic medicine is given to treat the infection.  Home care    Rest at home. Drink plenty of fluids  so you won t get dehydrated.    Stay home from work or school for the first 2 days of taking the antibiotics. After this time, you will not be contagious. You can then return to work or school if you are feeling better.     Take the antibiotic medicine for the full 10 days, even when you feel better. This is very important to make sure the infection is fully treated. It is also important to prevent medicine-resistant germs from growing. If you were given an antibiotic shot, no more antibiotics are needed.    You may use acetaminophen or ibuprofen to control pain or fever, unless another medicine was prescribed for this. If you have chronic liver or kidney disease or ever had a stomach ulcer or GI bleeding, talk with your healthcare provider before using these medicines.    Use throat lozenges or a throat-numbing spray to help reduce throat pain. Gargling with warm salt water can also help reduce throat pain. Dissolve 1/2 teaspoon of salt in 1 glass of warm water.     Don t eat salty or spicy foods. These can irritate the throat.  Follow-up care  Follow up with your healthcare provider or our staff if you don't get better over the next week.  When to seek medical advice  Call your healthcare provider right away if any of these occur:    Fever as directed by your healthcare provider    New or worse ear pain, sinus pain, or headache    Painful lumps in the back of neck    Stiff neck    Lymph nodes that get larger    Can t swallow liquids, a lot of drooling, or can t open mouth wide due to throat pain    Signs of dehydration, such as very dark urine or no urine, sunken eyes, dizziness    Trouble breathing or noisy breathing    Muffled voice    New rash  Prevention  Here are steps you can take to help prevent an infection:    Keep good hand washing habits.    Don t have close contact with people who have sore throats, colds, or other upper respiratory infections.    Don t smoke, and stay away from secondhand  "smoke.    Stay up to date with of your vaccines.  Date Last Reviewed: 11/1/2017 2000-2017 [employer]. [address] [place] [address], [place] [address]. All rights reserved. This information is not intended as a substitute for professional medical care. Always follow your healthcare professional's instructions.                Follow-ups after your visit        Follow-up notes from your care team     Return if symptoms worsen or fail to improve.      Your next 10 appointments already scheduled     Aug 24, 2018 11:30 AM CDT   Return Visit with Satish Harmon MD   [facility]    [address]  [place] [address] [phone]              Who to contact     If you have questions or need follow up information about today's clinic visit or your schedule please contact [facility] directly at [phone].  Normal or non-critical lab and imaging results will be communicated to you by MyChart, letter or phone within 4 business days after the clinic has received the results. If you do not hear from us within 7 days, please contact the clinic through MyChart or phone. If you have a critical or abnormal lab result, we will notify you by phone as soon as possible.  Submit refill requests through [employer] or call your pharmacy and they will forward the refill request to us. Please allow 3 business days for your refill to be completed.          Additional Information About Your Visit        Care EveryWhere ID     This is your Care EveryWhere ID. This could be used by other organizations to access your [place] medical records  [id number]        Your Vitals Were     Pulse Temperature Respirations Height BMI (Body Mass Index)       82 98.5  F (36.9  C) (Tympanic) 18 5' 10\" (1.778 m) 24.74 kg/m2        Blood Pressure from Last 3 Encounters:   07/31/18 112/60   04/29/18 (!) 126/92   01/16/18 122/80    Weight from Last 3 Encounters: "   07/31/18 172 lb 6.4 oz (78.2 kg)   04/29/18 175 lb (79.4 kg)   01/16/18 171 lb (77.6 kg)              We Performed the Following     Beta strep group A culture     Rapid strep screen          Today's Medication Changes          These changes are accurate as of 7/31/18 10:53 AM.  If you have any questions, ask your nurse or doctor.               These medicines have changed or have updated prescriptions.        Dose/Directions    * amoxicillin 500 MG capsule   Commonly known as:  AMOXIL   This may have changed:  Another medication with the same name was added. Make sure you understand how and when to take each.   Used for:  Strep throat   Changed by:  Rosa Moraeu PA-C        Dose:  500 mg   Take 1 capsule (500 mg) by mouth 3 times daily   Quantity:  30 capsule   Refills:  0       * amoxicillin 500 MG capsule   Commonly known as:  AMOXIL   This may have changed:  You were already taking a medication with the same name, and this prescription was added. Make sure you understand how and when to take each.   Used for:  Strep throat exposure, Acute streptococcal pharyngitis   Changed by:  Rosa Moreau PA-C        Dose:  500 mg   Take 1 capsule (500 mg) by mouth 2 times daily for 10 days   Quantity:  20 capsule   Refills:  0       * Notice:  This list has 2 medication(s) that are the same as other medications prescribed for you. Read the directions carefully, and ask your doctor or other care provider to review them with you.         Where to get your medicines      These medications were sent to [id number] - [place], [place] - [address]  [address], [facility] [address]     Phone:  [phone]     amoxicillin 500 MG capsule                Primary Care Provider Office Phone # Fax #    Genie Murguia [phone] [address]  [facility] [address]        Equal Access to Services     NADEEM WEAVER AH: Eros perez  Angiekris, daishada luqadaha, qajamta kaaljojo caraballo, shannon lama chitrapapo lasiripina liza. So [facility] [phone].    ATENCIÓN: Si franco kline, tiene a sprague disposición servicios gratuitos de asistencia lingüística. Piedad al [phone].    We comply with applicable federal civil rights laws and [place] laws. We do not discriminate on the basis of race, color, national origin, age, disability, sex, sexual orientation, or gender identity.            Thank you!     Thank you for choosing [facility] AND [facility]  for your care. Our goal is always to provide you with excellent care. Hearing back from our patients is one way we can continue to improve our services. Please take a few minutes to complete the written survey that you may receive in the mail after your visit with us. Thank you!             Your Updated Medication List - Protect others around you: Learn how to safely use, store and throw away your medicines at www.disposemymeds.org.          This list is accurate as of 7/31/18 10:53 AM.  Always use your most recent med list.                   Brand Name Dispense Instructions for use Diagnosis    albuterol 108 (90 Base) MCG/ACT Inhaler    PROAIR HFA/PROVENTIL HFA/VENTOLIN HFA     Inhale 2 puffs into the lungs every 4 hours as needed        ALPRAZolam 0.5 MG tablet    XANAX     TK 1 T PO UP TO TID        * amoxicillin 500 MG capsule    AMOXIL    30 capsule    Take 1 capsule (500 mg) by mouth 3 times daily    Strep throat       * amoxicillin 500 MG capsule    AMOXIL    20 capsule    Take 1 capsule (500 mg) by mouth 2 times daily for 10 days    Strep throat exposure, Acute streptococcal pharyngitis       aspirin-acetaminophen-caffeine 250-250-65 MG per tablet    EXCEDRIN MIGRAINE     Take 2 tablets by mouth every 6 hours as needed for headaches Max acetaminophen dose: 4000 mg in 24 hours        clonazePAM 1 MG tablet    klonoPIN     Take 0.5 mg by mouth        desvenlafaxine succinate 100 MG 24 hr  tablet    PRISTIQ     Take 100 mg by mouth        ibuprofen 200 MG capsule      Take 200 mg by mouth 4 times daily as needed        naproxen sodium 220 MG tablet    ANAPROX     Take 440 mg by mouth 2 times daily (with meals)        NIFEdipine ER osmotic 90 MG 24 hr tablet    PROCARDIA XL     Take 90 mg by mouth        predniSONE 20 MG tablet    DELTASONE    10 tablet    Take 1 tablet (20 mg) by mouth 2 times daily    Strep throat       SUDAFED 30 MG tablet   Generic drug:  pseudoePHEDrine      Take 30 mg by mouth every 8 hours as needed for congestion        * Notice:  This list has 2 medication(s) that are the same as other medications prescribed for you. Read the directions carefully, and ask your doctor or other care provider to review them with you.

## 2018-07-31 NOTE — NURSING NOTE
Patient presents to the clinic for wanting strep testing. States strep has been running through his . Hasn't been feeling well the past couple days. Woke up this morning with a fever of 102.  Inés Schroeder LPN............. July 31, 2018 10:16 AM

## 2018-08-01 LAB — HCG-TM SERPL-ACNC: <3 IU/L

## 2018-08-07 ENCOUNTER — TELEPHONE (OUTPATIENT)
Dept: UROLOGY | Facility: OTHER | Age: 43
End: 2018-08-07

## 2018-08-08 NOTE — TELEPHONE ENCOUNTER
After name and date of birth verified, patient notified that testicular tests where all negative  Janet Harding LPN.......8/8/2018 8:23 AM'

## 2018-08-27 ENCOUNTER — TELEPHONE (OUTPATIENT)
Dept: UROLOGY | Facility: OTHER | Age: 43
End: 2018-08-27

## 2018-08-27 ENCOUNTER — TELEPHONE (OUTPATIENT)
Dept: FAMILY MEDICINE | Facility: OTHER | Age: 43
End: 2018-08-27

## 2018-08-27 DIAGNOSIS — Z85.47 HISTORY OF PRIMARY TESTICULAR CANCER: Primary | ICD-10-CM

## 2018-08-27 NOTE — TELEPHONE ENCOUNTER
Pt missed appt on Friday, trying to get reschedule.  Pt needs note for missed work today due to high anxiety.

## 2018-08-27 NOTE — TELEPHONE ENCOUNTER
Writer spoke with RTN, he recommends that patient be seen before he orders anything.  Writer spoke with patient, patient does not agree and will be seeking a urologist in the [facility]  Janet Harding LPN.......8/27/2018 1:18 PM

## 2018-08-27 NOTE — TELEPHONE ENCOUNTER
Patient no longer feels it necessary for him to see a Urologist when PCJ does the same thing for him that he does. He would like to continue to see PCJ for his care regarding the cancer. He is coming in October 5th for a follow up with her. He would like to have a CT completed prior to that visit with her if possible. He states everything else he normally has done before meeting with her is done other than that one test.

## 2018-08-27 NOTE — TELEPHONE ENCOUNTER
Pt requesting a full body scan and want RTN to order.  Pt was to see RTN after recent lab and xray done and has cancelled numerous of time  Writer will speak with  RTN and ask for his recommendation  Janet Harding LPN.......8/27/2018 1:16 PM

## 2018-08-28 NOTE — TELEPHONE ENCOUNTER
Informed Reggie of order placed.  Hetal Fernandes............................... 8/28/2018 10:36 AM

## 2018-09-07 ENCOUNTER — HOSPITAL ENCOUNTER (OUTPATIENT)
Dept: CT IMAGING | Facility: OTHER | Age: 43
End: 2018-09-07
Attending: FAMILY MEDICINE
Payer: COMMERCIAL

## 2018-09-07 ENCOUNTER — HOSPITAL ENCOUNTER (OUTPATIENT)
Dept: CT IMAGING | Facility: OTHER | Age: 43
Discharge: HOME | End: 2018-09-07
Attending: FAMILY MEDICINE | Admitting: FAMILY MEDICINE
Payer: COMMERCIAL

## 2018-09-07 DIAGNOSIS — Z85.47 HISTORY OF PRIMARY TESTICULAR CANCER: ICD-10-CM

## 2018-09-07 PROCEDURE — 74177 CT ABD & PELVIS W/CONTRAST: CPT

## 2018-09-07 PROCEDURE — 70491 CT SOFT TISSUE NECK W/DYE: CPT

## 2018-09-07 PROCEDURE — 25500064 ZZH RX 255 OP 636: Performed by: FAMILY MEDICINE

## 2018-09-07 PROCEDURE — 71260 CT THORAX DX C+: CPT

## 2018-09-07 RX ADMIN — IOHEXOL 100 ML: 350 INJECTION, SOLUTION INTRAVENOUS at 10:24

## 2018-09-19 RX ORDER — NIFEDIPINE 90 MG/1
TABLET, EXTENDED RELEASE ORAL
Qty: 90 TABLET | Refills: 0 | OUTPATIENT
Start: 2018-09-19

## 2018-09-19 NOTE — TELEPHONE ENCOUNTER
Filled 1/16/18 #90 x 3 to [facility]. [facility] alerted. Unable to complete prescription refill per RNMedication Refill Policy.................... Delores Garza ....................  9/19/2018   9:49 AM      NIFEdipine (PROCARDIA XL) 90 mg Extended-Release tablet 90 tablet 3 1/16/2018  No   Sig: Take 1 tablet by mouth once daily before a meal. Indications: Raynaud's Phenomenon   Sent to pharmacy as: NIFEdipine ER 90 mg tablet,extended release 24 hr   Class: eRx   Route: Oral   E-Prescribing Status: Receipt confirmed by pharmacy (1/16/2018 11:28 AM CST)   Associated Diagnoses     Raynaud's phenomenon without gangrene  - Primary       Pharmacy     [facility], - [place], [place] - [address]

## 2018-09-23 ENCOUNTER — OFFICE VISIT (OUTPATIENT)
Dept: FAMILY MEDICINE | Facility: OTHER | Age: 43
End: 2018-09-23
Attending: FAMILY MEDICINE
Payer: COMMERCIAL

## 2018-09-23 VITALS
HEART RATE: 80 BPM | BODY MASS INDEX: 24.35 KG/M2 | SYSTOLIC BLOOD PRESSURE: 112 MMHG | WEIGHT: 169.7 LBS | TEMPERATURE: 97.7 F | DIASTOLIC BLOOD PRESSURE: 72 MMHG

## 2018-09-23 DIAGNOSIS — R07.0 THROAT PAIN: ICD-10-CM

## 2018-09-23 DIAGNOSIS — J06.9 VIRAL UPPER RESPIRATORY TRACT INFECTION: Primary | ICD-10-CM

## 2018-09-23 DIAGNOSIS — R05.9 COUGH: ICD-10-CM

## 2018-09-23 LAB
DEPRECATED S PYO AG THROAT QL EIA: NORMAL
DEPRECATED S PYO AG THROAT QL EIA: NORMAL
SPECIMEN SOURCE: NORMAL

## 2018-09-23 PROCEDURE — 99213 OFFICE O/P EST LOW 20 MIN: CPT | Performed by: FAMILY MEDICINE

## 2018-09-23 PROCEDURE — 87880 STREP A ASSAY W/OPTIC: CPT | Performed by: FAMILY MEDICINE

## 2018-09-23 RX ORDER — CODEINE PHOSPHATE AND GUAIFENESIN 10; 100 MG/5ML; MG/5ML
1-2 SOLUTION ORAL EVERY 4 HOURS PRN
Qty: 120 ML | Refills: 2 | Status: SHIPPED | OUTPATIENT
Start: 2018-09-23 | End: 2020-06-08

## 2018-09-23 ASSESSMENT — ANXIETY QUESTIONNAIRES
7. FEELING AFRAID AS IF SOMETHING AWFUL MIGHT HAPPEN: NOT AT ALL
IF YOU CHECKED OFF ANY PROBLEMS ON THIS QUESTIONNAIRE, HOW DIFFICULT HAVE THESE PROBLEMS MADE IT FOR YOU TO DO YOUR WORK, TAKE CARE OF THINGS AT HOME, OR GET ALONG WITH OTHER PEOPLE: NOT DIFFICULT AT ALL
6. BECOMING EASILY ANNOYED OR IRRITABLE: NOT AT ALL
3. WORRYING TOO MUCH ABOUT DIFFERENT THINGS: NOT AT ALL
GAD7 TOTAL SCORE: 0
5. BEING SO RESTLESS THAT IT IS HARD TO SIT STILL: NOT AT ALL
2. NOT BEING ABLE TO STOP OR CONTROL WORRYING: NOT AT ALL
1. FEELING NERVOUS, ANXIOUS, OR ON EDGE: NOT AT ALL

## 2018-09-23 ASSESSMENT — PATIENT HEALTH QUESTIONNAIRE - PHQ9: 5. POOR APPETITE OR OVEREATING: NOT AT ALL

## 2018-09-23 ASSESSMENT — PAIN SCALES - GENERAL: PAINLEVEL: WORST PAIN (10)

## 2018-09-23 NOTE — PROGRESS NOTES
SUBJECTIVE:   Dennis J Goodell is a 43 year old male who presents to clinic today for the following health issues: Cough cold    HPI Comments: Patient arrives here for cough cold.  Also reports eye itching ear pain chest cold sore throat lungs burn.  He denies any exposures.  States symptoms started on Thursday.  Is also been running a nose and losing his voice.  He is used over-the-counter medications but reports they do not work.    URI           Patient Active Problem List    Diagnosis Date Noted     Acne, mild 02/09/2018     Priority: Medium     Dysthymic disorder 02/09/2018     Priority: Medium     Raynaud phenomenon 02/09/2018     Priority: Medium     Benzodiazepine contract exists 11/27/2016     Priority: Medium     History of primary testicular cancer 08/12/2016     Priority: Medium     Drug abuse, opioid type 07/21/2016     Priority: Medium     Carpal tunnel syndrome 03/20/2013     Priority: Medium     Alcohol abuse 07/25/2012     Priority: Medium     Cervicalgia 11/01/2010     Priority: Medium     Past Medical History:   Diagnosis Date     Dysthymic disorder     No Comments Provided     Encounter for other administrative examinations     11/27/2016     Malignant neoplasm of testis (H)     2005,teratoma     Raynaud's syndrome without gangrene     No Comments Provided     Uncomplicated alcohol abuse     7/25/2012      Past Surgical History:   Procedure Laterality Date     OTHER SURGICAL HISTORY      ,HERNIA REPAIR     OTHER SURGICAL HISTORY      12/30/2005,763032,OTHER,Right     OTHER SURGICAL HISTORY      03/25/08,404671,OTHER,Left     OTHER SURGICAL HISTORY      9/05/08,934315,OTHER     OTHER SURGICAL HISTORY      9/05/08,069938,OTHER,at [facility]. 37 lymph nodes excised     Current Outpatient Prescriptions   Medication Sig Dispense Refill     guaiFENesin-codeine (ROBITUSSIN AC) 100-10 MG/5ML SOLN solution Take 5-10 mLs by mouth every 4 hours as needed for congestion 120 mL 2     NIFEdipine  ER osmotic (PROCARDIA XL) 90 MG 24 hr tablet Take 90 mg by mouth       albuterol (PROAIR HFA/PROVENTIL HFA/VENTOLIN HFA) 108 (90 BASE) MCG/ACT Inhaler Inhale 2 puffs into the lungs every 4 hours as needed       ALPRAZolam (XANAX) 0.5 MG tablet TK 1 T PO UP TO TID       aspirin-acetaminophen-caffeine (EXCEDRIN MIGRAINE) 250-250-65 MG per tablet Take 2 tablets by mouth every 6 hours as needed for headaches Max acetaminophen dose: 4000 mg in 24 hours       clonazePAM (KLONOPIN) 1 MG tablet Take 0.5 mg by mouth       desvenlafaxine succinate (PRISTIQ) 100 MG 24 hr tablet Take 100 mg by mouth       No Known Allergies    Review of Systems     OBJECTIVE:     /72 (BP Location: Right arm, Patient Position: Sitting, Cuff Size: Adult Regular)  Pulse 80  Temp 97.7  F (36.5  C) (Tympanic)  Wt 169 lb 11.2 oz (77 kg)  BMI 24.35 kg/m2  Body mass index is 24.35 kg/(m^2).  Physical Exam   Constitutional: He appears well-developed.   HENT:   Head: Normocephalic and atraumatic.   Right Ear: External ear normal.   Left Ear: External ear normal.   Mouth/Throat: Oropharynx is clear and moist. No oropharyngeal exudate.   Cardiovascular: Normal rate, regular rhythm and normal heart sounds.    No murmur heard.  Pulmonary/Chest: Effort normal and breath sounds normal. No respiratory distress.       Results for orders placed or performed in visit on 09/23/18   Strep, Rapid Screen   Result Value Ref Range    Specimen Description Throat     Rapid Strep A Screen       Negative presumptive for Group A Beta Streptococcus    Rapid Strep A Screen Internal QC OK          ASSESSMENT/PLAN:         1. Viral upper respiratory tract infection  Support    2. Throat pain    - Strep, Rapid Screen    3. Cough  Start  - guaiFENesin-codeine (ROBITUSSIN AC) 100-10 MG/5ML SOLN solution; Take 5-10 mLs by mouth every 4 hours as needed for congestion  Dispense: 120 mL; Refill: 2      Samir Melton MD  [facility]

## 2018-09-23 NOTE — MR AVS SNAPSHOT
After Visit Summary   9/23/2018    Dennis J Goodell    MRN: [id number]           Patient Information     Date Of Birth          1975        Visit Information        Provider Department      9/23/2018 11:45 AM Samir Melton MD [facility]        Today's Diagnoses     Throat pain    -  1    Cough           Follow-ups after your visit        Your next 10 appointments already scheduled     Oct 05, 2018  8:15 AM CDT   Office Visit with Genie Murguia MD   [facility] ([facility])    [address]  [place] [address] [phone]           Bring a current list of meds and any records pertaining to this visit. For Physicals, please bring immunization records and any forms needing to be filled out. Please arrive 10 minutes early to complete paperwork.              Who to contact     If you have questions or need follow up information about today's clinic visit or your schedule please contact [facility] directly at [phone].  Normal or non-critical lab and imaging results will be communicated to you by [employer], letter or phone within 4 business days after the clinic has received the results. If you do not hear from us within 7 days, please contact the clinic through [employer] or phone. If you have a critical or abnormal lab result, we will notify you by phone as soon as possible.  Submit refill requests through [employer] or call your pharmacy and they will forward the refill request to us. Please allow 3 business days for your refill to be completed.          Additional Information About Your Visit        [employer] Information     [employer] gives you secure access to your electronic health record. If you see a primary care provider, you can also send messages to your care team and make appointments. If you have questions, please call your primary care clinic.  If you do not have a primary care  provider, please call [phone] and they will assist you.        Care EveryWhere ID     This is your Care EveryWhere ID. This could be used by other organizations to access your [place] medical records  [id number]        Your Vitals Were     Pulse Temperature BMI (Body Mass Index)             80 97.7  F (36.5  C) (Tympanic) 24.35 kg/m2          Blood Pressure from Last 3 Encounters:   09/23/18 112/72   07/31/18 112/60   04/29/18 (!) 126/92    Weight from Last 3 Encounters:   09/23/18 169 lb 11.2 oz (77 kg)   07/31/18 172 lb 6.4 oz (78.2 kg)   04/29/18 175 lb (79.4 kg)              We Performed the Following     Strep, Rapid Screen          Today's Medication Changes          These changes are accurate as of 9/23/18 12:16 PM.  If you have any questions, ask your nurse or doctor.               Start taking these medicines.        Dose/Directions    guaiFENesin-codeine 100-10 MG/5ML Soln solution   Commonly known as:  ROBITUSSIN AC   Used for:  Cough   Started by:  Samir Melton MD        Dose:  1-2 tsp.   Take 5-10 mLs by mouth every 4 hours as needed for congestion   Quantity:  120 mL   Refills:  2            Where to get your medicines      Some of these will need a paper prescription and others can be bought over the counter.  Ask your nurse if you have questions.     Bring a paper prescription for each of these medications     guaiFENesin-codeine 100-10 MG/5ML Soln solution                Primary Care Provider Office Phone # Fax #    Genie RAMIREZ Donny Murguia [phone] [address] [facility] [address]        Equal Access to Services     [facility] OWEN : Hadtoby Yi, patricia new, shannon reynolds. So [facility] [phone].    ATENCIÓN: Si habla español, tiene a sprague disposición servicios gratuitos de asistencia lingüística. Llame al [phone].    We comply with applicable federal civil rights laws  and [place] laws. We do not discriminate on the basis of race, color, national origin, age, disability, sex, sexual orientation, or gender identity.            Thank you!     Thank you for choosing [facility]  for your care. Our goal is always to provide you with excellent care. Hearing back from our patients is one way we can continue to improve our services. Please take a few minutes to complete the written survey that you may receive in the mail after your visit with us. Thank you!             Your Updated Medication List - Protect others around you: Learn how to safely use, store and throw away your medicines at www.disposemymeds.org.          This list is accurate as of 9/23/18 12:16 PM.  Always use your most recent med list.                   Brand Name Dispense Instructions for use Diagnosis    albuterol 108 (90 Base) MCG/ACT inhaler    PROAIR HFA/PROVENTIL HFA/VENTOLIN HFA     Inhale 2 puffs into the lungs every 4 hours as needed        ALPRAZolam 0.5 MG tablet    XANAX     TK 1 T PO UP TO TID        aspirin-acetaminophen-caffeine 250-250-65 MG per tablet    EXCEDRIN MIGRAINE     Take 2 tablets by mouth every 6 hours as needed for headaches Max acetaminophen dose: 4000 mg in 24 hours        clonazePAM 1 MG tablet    klonoPIN     Take 0.5 mg by mouth        desvenlafaxine succinate 100 MG 24 hr tablet    PRISTIQ     Take 100 mg by mouth        guaiFENesin-codeine 100-10 MG/5ML Soln solution    ROBITUSSIN AC    120 mL    Take 5-10 mLs by mouth every 4 hours as needed for congestion    Cough       NIFEdipine ER osmotic 90 MG 24 hr tablet    PROCARDIA XL     Take 90 mg by mouth

## 2018-09-23 NOTE — NURSING NOTE
"Chief Complaint   Patient presents with     URI   Pt present to clinic today for throat pain, fevers and head cold. He has had this since Thursday.    Initial /72 (BP Location: Right arm, Patient Position: Sitting, Cuff Size: Adult Regular)  Pulse 80  Temp 97.7  F (36.5  C) (Tympanic)  Wt 169 lb 11.2 oz (77 kg)  BMI 24.35 kg/m2 Estimated body mass index is 24.35 kg/(m^2) as calculated from the following:    Height as of 7/31/18: 5' 10\" (1.778 m).    Weight as of this encounter: 169 lb 11.2 oz (77 kg).  Medication Reconciliation: complete    Holley Mendez LPN  "

## 2018-09-24 ASSESSMENT — PATIENT HEALTH QUESTIONNAIRE - PHQ9: SUM OF ALL RESPONSES TO PHQ QUESTIONS 1-9: 0

## 2018-09-24 ASSESSMENT — ANXIETY QUESTIONNAIRES: GAD7 TOTAL SCORE: 0

## 2018-10-12 DIAGNOSIS — R06.2 WHEEZING: Primary | ICD-10-CM

## 2018-10-16 RX ORDER — ALBUTEROL SULFATE 90 UG/1
AEROSOL, METERED RESPIRATORY (INHALATION)
Qty: 18 G | Refills: 0 | Status: SHIPPED | OUTPATIENT
Start: 2018-10-16 | End: 2021-04-28

## 2018-10-16 NOTE — TELEPHONE ENCOUNTER
[facility] requested refill VENTOLIN  (90 Base) MCG/ACT inhaler, patient doesn't have asthma as dx on chart, history shows he's used inhaler for cough.  Routing to PCP to address refill at this time.  Unable to complete prescription refill per RN Medication Refill Policy. Breann Méndez 10/16/2018 2:26 PM

## 2018-12-18 ENCOUNTER — ALLIED HEALTH/NURSE VISIT (OUTPATIENT)
Dept: FAMILY MEDICINE | Facility: OTHER | Age: 43
End: 2018-12-18
Attending: FAMILY MEDICINE
Payer: COMMERCIAL

## 2018-12-18 DIAGNOSIS — Z23 NEED FOR PROPHYLACTIC VACCINATION AND INOCULATION AGAINST INFLUENZA: Primary | ICD-10-CM

## 2018-12-18 PROCEDURE — 90686 IIV4 VACC NO PRSV 0.5 ML IM: CPT

## 2018-12-18 PROCEDURE — 90471 IMMUNIZATION ADMIN: CPT

## 2018-12-18 NOTE — PROGRESS NOTES
Injectable Influenza Immunization Documentation    1.  Is the person to be vaccinated sick today?   No    2. Does the person to be vaccinated have an allergy to a component   of the vaccine?   No  Egg Allergy Algorithm Link    3. Has the person to be vaccinated ever had a serious reaction   to influenza vaccine in the past?   No    4. Has the person to be vaccinated ever had Guillain-Barré syndrome?   No    Form completed by Emily Garg CMA ([facility])......................12/18/2018  1:15 PM

## 2019-01-07 ENCOUNTER — OFFICE VISIT (OUTPATIENT)
Dept: FAMILY MEDICINE | Facility: OTHER | Age: 44
End: 2019-01-07
Attending: FAMILY MEDICINE
Payer: COMMERCIAL

## 2019-01-07 VITALS
DIASTOLIC BLOOD PRESSURE: 80 MMHG | WEIGHT: 176 LBS | HEART RATE: 88 BPM | OXYGEN SATURATION: 98 % | BODY MASS INDEX: 25.25 KG/M2 | RESPIRATION RATE: 18 BRPM | TEMPERATURE: 97.6 F | SYSTOLIC BLOOD PRESSURE: 122 MMHG

## 2019-01-07 DIAGNOSIS — G56.22 CUBITAL TUNNEL SYNDROME ON LEFT: ICD-10-CM

## 2019-01-07 DIAGNOSIS — Z01.818 PRE-OP EXAM: Primary | ICD-10-CM

## 2019-01-07 LAB — HGB BLD-MCNC: 14.8 G/DL (ref 13.3–17.7)

## 2019-01-07 PROCEDURE — 85018 HEMOGLOBIN: CPT | Performed by: FAMILY MEDICINE

## 2019-01-07 PROCEDURE — 36415 COLL VENOUS BLD VENIPUNCTURE: CPT | Performed by: FAMILY MEDICINE

## 2019-01-07 PROCEDURE — 99214 OFFICE O/P EST MOD 30 MIN: CPT | Performed by: FAMILY MEDICINE

## 2019-01-07 RX ORDER — ALPRAZOLAM 1 MG
TABLET ORAL
Refills: 4 | COMMUNITY
Start: 2018-10-05 | End: 2022-11-18

## 2019-01-07 RX ORDER — CLONAZEPAM 1 MG/1
1 TABLET ORAL 3 TIMES DAILY
COMMUNITY
Start: 2018-01-16

## 2019-01-07 RX ORDER — NAPROXEN SODIUM 220 MG
440 TABLET ORAL
COMMUNITY
End: 2019-03-26

## 2019-01-07 ASSESSMENT — PATIENT HEALTH QUESTIONNAIRE - PHQ9: SUM OF ALL RESPONSES TO PHQ QUESTIONS 1-9: 8

## 2019-01-07 ASSESSMENT — PAIN SCALES - GENERAL: PAINLEVEL: NO PAIN (0)

## 2019-01-07 NOTE — PROGRESS NOTES
----------------- PREOPERATIVE EXAM ------------------  1/7/2019    SUBJECTIVE:  Dennis J Goodell is a 43 year old male here for preoperative optimization.    I was asked to see Dennis J Goodell by Dr. Aguilera  for preoperative evaluation and surgical clearance    Date of Surgery: 01/22  Type of Surgery: cubital tunnel release possible transposition   Hospital:  [facility]     HPI: Patient is being seen for preop.  Patient reports he will have a left CUTR and possible transposition of the ulnar nerve to his left arm.  Patient reports he is been having trouble over the last year.  Tingling going down his fingers.  He has been seen by neurology EMG done.  He wakes up with burning this in his hands and arms.  He denies any injury.  It has gotten quite worse over the last 3-4 months.  [id number]    Nursing Notes:   Mely Parker LPN  1/7/2019  4:14 PM  Signed  Patient presents in the clinic for a pre-op exam.  Jolene Parker LPN 1/7/2019 3:44 PM    Date of Surgery: 1/22/18   Type of Surgery: Left Arm Ulnar Nerve at Elbow  Surgeon: Dr. Samir Aguilera   Hospital:  [facility]   Fax: [phone]    Fever/Chills or other infectious symptoms in past month: no  >10lb weight loss in past two months: no  O2 SAT: 98%    Health Care Directive/Code status:  No  Hx of blood transfusions:   no  Td up to date:  yes  History of VRE/MRSA:  no     Preoperative Evaluation: Obstructive Sleep Apnea screening    S: Snore -  Do you snore loudly? (louder than talking or loud enough to be heard through closed doors)no  T: Tired - Do you often feel tired, fatigued, or sleepy during the daytime?no  O: Observed - Has anyone ever observed you stop breathing during your sleep?no  P: Pressure - Do you have or are you being treated for high blood pressure?no  B: BMI - BMI greater than 35kg/m2?no  A: Age - Age over 50 years old?no  N: Neck - Neck circumference greater than 40 cm?no  G: Gender - Gender:  "Male?yes    Total number of \"YES\" responses:  1    Scoring: Low risk of ROHINI 0-2  At Risk of ROHINI: >3 High Risk of ROHINI: 5-8    Mely Parker 1/7/2019 3:44 PM  Chief Complaint   Patient presents with     Pre-Op Exam       Initial /80 (BP Location: Right arm, Patient Position: Sitting, Cuff Size: Adult Regular)   Pulse 88   Temp 97.6  F (36.4  C) (Tympanic)   Resp 18   Wt 79.8 kg (176 lb)   SpO2 98%   BMI 25.25 kg/m    Estimated body mass index is 25.25 kg/m  as calculated from the following:    Height as of 7/31/18: 1.778 m (5' 10\").    Weight as of this encounter: 79.8 kg (176 lb).  Medication Reconciliation: complete    Mely Parker LPN    Patient Active Problem List    Diagnosis Date Noted     Pre-op exam 01/07/2019     Priority: Medium     Cubital tunnel syndrome on left 01/07/2019     Priority: Medium     Acne, mild 02/09/2018     Priority: Medium     Dysthymic disorder 02/09/2018     Priority: Medium     Raynaud phenomenon 02/09/2018     Priority: Medium     Benzodiazepine contract exists 11/27/2016     Priority: Medium     History of primary testicular cancer 08/12/2016     Priority: Medium     Drug abuse, opioid type (H) 07/21/2016     Priority: Medium     Carpal tunnel syndrome 03/20/2013     Priority: Medium     Alcohol abuse 07/25/2012     Priority: Medium     Cervicalgia 11/01/2010     Priority: Medium       Past Medical History:   Diagnosis Date     Dysthymic disorder     No Comments Provided     Encounter for other administrative examinations     11/27/2016     Malignant neoplasm of testis (H)     2005,teratoma     Raynaud's syndrome without gangrene     No Comments Provided     Uncomplicated alcohol abuse     7/25/2012       Past Surgical History:   Procedure Laterality Date     OTHER SURGICAL HISTORY      ,HERNIA REPAIR     OTHER SURGICAL HISTORY      12/30/2005,[id number],OTHER,Right     OTHER SURGICAL HISTORY      03/25/08,[id number],OTHER,Left     OTHER SURGICAL HISTORY      " [id number],OTHER     OTHER SURGICAL HISTORY      [id number],OTHER,at [facility]. 37 lymph nodes excised       Family History   Problem Relation Age of Onset     Other - See Comments Daughter         recurrent OM     Other - See Comments Other         Suicidality,maternal uncle       Social History     Tobacco Use     Smoking status: Never Smoker     Smokeless tobacco: Current User     Types: Chew     Tobacco comment: Quit smokin tin lasts 2 weeks   Substance Use Topics     Alcohol use: No     Alcohol/week: 4.8 oz     Comment: Alcoholic Drinks/day: sober for 35 days as of 18     Drug use: No       Current Outpatient Medications   Medication Sig Dispense Refill     ALPRAZolam (XANAX) 1 MG tablet TK ONE T PO QD PRF PANIC ATTACK  4     aspirin-acetaminophen-caffeine (EXCEDRIN MIGRAINE) 250-250-65 MG per tablet Take 2 tablets by mouth every 6 hours as needed for headaches Max acetaminophen dose: 4000 mg in 24 hours       clonazePAM (KLONOPIN) 1 MG tablet Take 1 mg by mouth 3 times daily       clonazePAM (KLONOPIN) 1 MG tablet Take 0.5 mg by mouth       desvenlafaxine succinate (PRISTIQ) 100 MG 24 hr tablet Take 100 mg by mouth       guaiFENesin-codeine (ROBITUSSIN AC) 100-10 MG/5ML SOLN solution Take 5-10 mLs by mouth every 4 hours as needed for congestion 120 mL 2     naproxen sodium (ANAPROX) 220 MG tablet Take 440 mg by mouth       NIFEdipine ER osmotic (PROCARDIA XL) 90 MG 24 hr tablet Take 90 mg by mouth       VENTOLIN  (90 Base) MCG/ACT inhaler INHALE 1-2 PUFFS EVERY 4-6 HOURS AS NEEDED 18 g 0       Allergies:  No Known Allergies    ROS:    Surgical:  patient denies previous complications from prior surgeries including but not limited to prolonged bleeding, anesthesia complications, dysrhythmias, surgical wound infections, or prolonged hospital stay.    Denies family hx of bleeding tendencies, anesthesia complications, or other problems with surgery.    Review of systems includes  daytime drowsiness frequent headaches night sweats and cold intolerances.  Please see copied review of systems.     -------------------------------------------------------------    PHYSICAL EXAM:  /80 (BP Location: Right arm, Patient Position: Sitting, Cuff Size: Adult Regular)   Pulse 88   Temp 97.6  F (36.4  C) (Tympanic)   Resp 18   Wt 79.8 kg (176 lb)   SpO2 98%   BMI 25.25 kg/m      EXAM:  General Appearance: Pleasant, alert, appropriate appearance for age. No acute distress  Head Exam: Normal. Normocephalic, atraumatic.  Eyes: PERRL, EOMI  Ears: Normal TM's bilaterally. Normal auditory canals and external ears.   OroPharynx: Normal buccal mucosa. Normal pharynx.  Neck: Supple, no masses or nodes, no lymphadenopathy.  No thyromegaly.  Lungs: Normal chest wall and respirations. Clear to auscultation, no wheezes or crackles.  Cardiovascular: Regular rate and rhythm. S1, S2, no murmurs.  Gastrointestinal: Soft, nontender, no abnormal masses or organomegaly. BS normal   Musculoskeletal: No edema.  Skin: no concerning or new rashes.  Neurologic Exam:No tremor.  Psychiatric Exam: Alert and oriented, appropriate affect.      Results for orders placed or performed in visit on 01/07/19   Hemoglobin   Result Value Ref Range    Hemoglobin 14.8 13.3 - 17.7 g/dL       ---------------------------------------------------------------    ASSESSEMENT AND PLAN:    (Z01.818) Pre-op exam  (primary encounter diagnosis)      (G56.22) Cubital tunnel syndrome on left        PRE OP RECOMMENDATIONS:  Patient is on chronic pain medications none   Patient is on antiplatlet/anticoagulation medication aleve and Excedrin migraine patient advised to stop 1 week before  Other medications that need adjustment perioperatively none     Other:  Patient was advised to call our office and the surgical services with any change in condition or new symptoms if they were to develop between today and their surgical date.  Especially any  cardiopulmonary symptoms or symptoms concerning for an infection.  Patient is medically cleared to proceed with surgery patient will be having surgery at [facility] fax number    Samir Melton 1/7/2019

## 2019-01-07 NOTE — NURSING NOTE
"Patient presents in the clinic for a pre-op exam.  Jolene Parker LPN 1/7/2019 3:44 PM    Date of Surgery: 1/22/18   Type of Surgery: Left Arm Ulnar Nerve at Elbow  Surgeon: Dr. Samir Aguilera   [facility]   Fax: [phone]    Fever/Chills or other infectious symptoms in past month: no  >10lb weight loss in past two months: no  O2 SAT: 98%    Health Care Directive/Code status:  No  Hx of blood transfusions:   no  Td up to date:  yes  History of VRE/MRSA:  no     Preoperative Evaluation: Obstructive Sleep Apnea screening    S: Snore -  Do you snore loudly? (louder than talking or loud enough to be heard through closed doors)no  T: Tired - Do you often feel tired, fatigued, or sleepy during the daytime?no  O: Observed - Has anyone ever observed you stop breathing during your sleep?no  P: Pressure - Do you have or are you being treated for high blood pressure?no  B: BMI - BMI greater than 35kg/m2?no  A: Age - Age over 50 years old?no  N: Neck - Neck circumference greater than 40 cm?no  G: Gender - Gender: Male?yes    Total number of \"YES\" responses:  1    Scoring: Low risk of ROHINI 0-2  At Risk of ROHINI: >3 High Risk of ROHINI: 5-8    Mely Parker 1/7/2019 3:44 PM  Chief Complaint   Patient presents with     Pre-Op Exam       Initial /80 (BP Location: Right arm, Patient Position: Sitting, Cuff Size: Adult Regular)   Pulse 88   Temp 97.6  F (36.4  C) (Tympanic)   Resp 18   Wt 79.8 kg (176 lb)   SpO2 98%   BMI 25.25 kg/m   Estimated body mass index is 25.25 kg/m  as calculated from the following:    Height as of 7/31/18: 1.778 m (5' 10\").    Weight as of this encounter: 79.8 kg (176 lb).  Medication Reconciliation: complete    Mely Parker LPN    "

## 2019-01-07 NOTE — LETTER
January 16, 2019      Dennis J Goodell  [address] [facility] [address]        Dear Mr.Goodell,    We are writing to inform you of your test results.    Your test results fall within the expected range(s) or remain unchanged from previous results.  Please continue with current treatment plan.    Resulted Orders   Hemoglobin   Result Value Ref Range    Hemoglobin 14.8 13.3 - 17.7 g/dL       If you have any questions or concerns, please call the clinic at the number listed above.       Sincerely,        Samir Melton MD

## 2019-03-20 ENCOUNTER — OFFICE VISIT (OUTPATIENT)
Dept: FAMILY MEDICINE | Facility: OTHER | Age: 44
End: 2019-03-20
Attending: FAMILY MEDICINE
Payer: COMMERCIAL

## 2019-03-20 VITALS
DIASTOLIC BLOOD PRESSURE: 76 MMHG | SYSTOLIC BLOOD PRESSURE: 112 MMHG | WEIGHT: 177 LBS | HEART RATE: 84 BPM | RESPIRATION RATE: 16 BRPM | HEIGHT: 70 IN | BODY MASS INDEX: 25.34 KG/M2

## 2019-03-20 DIAGNOSIS — Z85.47 HISTORY OF PRIMARY TESTICULAR CANCER: ICD-10-CM

## 2019-03-20 DIAGNOSIS — I73.00 RAYNAUD'S PHENOMENON WITHOUT GANGRENE: ICD-10-CM

## 2019-03-20 DIAGNOSIS — Z00.00 PHYSICAL EXAM, ANNUAL: Primary | ICD-10-CM

## 2019-03-20 DIAGNOSIS — F34.1 DYSTHYMIC DISORDER: ICD-10-CM

## 2019-03-20 LAB
ALBUMIN SERPL-MCNC: 4.6 G/DL (ref 3.5–5.7)
ALP SERPL-CCNC: 55 U/L (ref 34–104)
ALT SERPL W P-5'-P-CCNC: 10 U/L (ref 7–52)
ANION GAP SERPL CALCULATED.3IONS-SCNC: 7 MMOL/L (ref 3–14)
AST SERPL W P-5'-P-CCNC: 14 U/L (ref 13–39)
BILIRUB SERPL-MCNC: 0.3 MG/DL (ref 0.3–1)
BUN SERPL-MCNC: 26 MG/DL (ref 7–25)
CALCIUM SERPL-MCNC: 9.5 MG/DL (ref 8.6–10.3)
CHLORIDE SERPL-SCNC: 103 MMOL/L (ref 98–107)
CHOLEST SERPL-MCNC: 164 MG/DL
CO2 SERPL-SCNC: 28 MMOL/L (ref 21–31)
CREAT SERPL-MCNC: 1.55 MG/DL (ref 0.7–1.3)
GFR SERPL CREATININE-BSD FRML MDRD: 50 ML/MIN/{1.73_M2}
GLUCOSE SERPL-MCNC: 69 MG/DL (ref 70–105)
HDLC SERPL-MCNC: 36 MG/DL (ref 23–92)
LDLC SERPL CALC-MCNC: 103 MG/DL
NONHDLC SERPL-MCNC: 128 MG/DL
POTASSIUM SERPL-SCNC: 4.9 MMOL/L (ref 3.5–5.1)
PROT SERPL-MCNC: 7.3 G/DL (ref 6.4–8.9)
SODIUM SERPL-SCNC: 138 MMOL/L (ref 134–144)
TRIGL SERPL-MCNC: 123 MG/DL

## 2019-03-20 PROCEDURE — 80061 LIPID PANEL: CPT | Performed by: FAMILY MEDICINE

## 2019-03-20 PROCEDURE — 99396 PREV VISIT EST AGE 40-64: CPT | Performed by: FAMILY MEDICINE

## 2019-03-20 PROCEDURE — 36415 COLL VENOUS BLD VENIPUNCTURE: CPT | Performed by: FAMILY MEDICINE

## 2019-03-20 PROCEDURE — 80053 COMPREHEN METABOLIC PANEL: CPT | Performed by: FAMILY MEDICINE

## 2019-03-20 RX ORDER — NIFEDIPINE 90 MG/1
90 TABLET, EXTENDED RELEASE ORAL DAILY
Qty: 90 TABLET | Refills: 3 | Status: SHIPPED | OUTPATIENT
Start: 2019-03-20 | End: 2020-09-14

## 2019-03-20 ASSESSMENT — MIFFLIN-ST. JEOR: SCORE: 1714.12

## 2019-03-20 ASSESSMENT — PAIN SCALES - GENERAL: PAINLEVEL: NO PAIN (0)

## 2019-03-20 NOTE — NURSING NOTE
Patient presents to the clinic today for a px.   Medication Reconciliation: complete     Ines Armenta LPN.................. 3/20/2019 10:07 AM

## 2019-03-20 NOTE — PROGRESS NOTES
SUBJECTIVE:  Nursing Notes:   Ines Armenta LPN  3/20/2019 10:12 AM  Signed  Patient presents to the clinic today for a px.   Medication Reconciliation: complete     Ines Armenta LPN.................. 3/20/2019 10:07 AM    Dennis J Goodell is a 43 year old male who presents for physical     HPI: Dennis J Goodell is a 43 year old male presents for physical.  He gets reimbursed from his insurance for this visit and that is his main motivation for coming in today.    This summer will be three years since he stopped drinking.  Sees Sammy Stover for medication management - just yesterday and has follow up visits every 6 months.  This is for treatment of ongoing anxiety symptoms.  Sleep has been better since elbow surgery -his elbow surgery had been keeping him awake at night.    Left elbow cubital tunnel surgery in January.   Going back to work the end of this month.        Immunizations:  Reviewed and are up to date   Colon cancer screening:  Age 47      FH and PMHreviewed and updated as needed.          PROBLEM LIST:  Patient Active Problem List   Diagnosis     Acne, mild     Alcohol abuse     Benzodiazepine contract exists     Carpal tunnel syndrome     Dysthymic disorder     Drug abuse, opioid type (H)     History of primary testicular cancer     Cervicalgia     Raynaud phenomenon     Pre-op exam     Cubital tunnel syndrome on left     PAST MEDICAL HISTORY:  Past Medical History:   Diagnosis Date     Anxiety disorder 2012     Dysthymic disorder     No Comments Provided     Malignant neoplasm of testis (H) 2005 2005,teratoma     Raynaud's syndrome without gangrene     No Comments Provided     Uncomplicated alcohol abuse     7/25/2012     SURGICAL HISTORY:  Past Surgical History:   Procedure Laterality Date     DECOMPRESSION CUBITAL TUNNEL Left 01/2019     HERNIA REPAIR      ,HERNIA REPAIR     IR CHEST PORT PLACEMENT > 5 YRS OF AGE Left 04/01/2008     LYMPH NODE BIOPSY  09/05/2008     [facility].  37 lymph nodes excised     ORCHIECTOMY SCROTAL Right 2005    teratoma     OTHER SURGICAL HISTORY      08,[id number],OTHER     RELEASE CARPAL TUNNEL  2013       SOCIAL HISTORY:  Social History     Socioeconomic History     Marital status:      Spouse name: Jillian     Number of children: 4     Years of education: 13     Highest education level: Not on file   Occupational History     Not on file   Social Needs     Financial resource strain: Not on file     Food insecurity:     Worry: Not on file     Inability: Not on file     Transportation needs:     Medical: Not on file     Non-medical: Not on file   Tobacco Use     Smoking status: Never Smoker     Smokeless tobacco: Current User     Types: Chew     Tobacco comment: Quit smokin tin lasts 2 weeks   Substance and Sexual Activity     Alcohol use: No     Alcohol/week: 4.8 oz     Comment: Alcoholic Drinks/day: sober for 35 days as of 18     Drug use: No     Sexual activity: Yes     Partners: Female     Birth control/protection: Surgical   Lifestyle     Physical activity:     Days per week: Not on file     Minutes per session: Not on file     Stress: Not on file   Relationships     Social connections:     Talks on phone: Not on file     Gets together: Not on file     Attends [employer] service: Not on file     Active member of club or organization: Not on file     Attends meetings of clubs or organizations: Not on file     Relationship status: Not on file     Intimate partner violence:     Fear of current or ex partner: Not on file     Emotionally abused: Not on file     Physically abused: Not on file     Forced sexual activity: Not on file   Other Topics Concern     Parent/sibling w/ CABG, MI or angioplasty before 65F 55M? Not Asked   Social History Narrative    He is , 4  girls.     FAMILY HISTORY:  Family History   Problem Relation Age of Onset     Pulmonary Embolism Mother      Other - See Comments Daughter         recurrent OM      "Other - See Comments Other         Suicidality,maternal uncle     No Known Problems Father      CURRENT MEDICATIONS:   Current Outpatient Medications   Medication Sig Dispense Refill     NIFEdipine ER OSMOTIC (PROCARDIA XL) 90 MG 24 hr tablet Take 1 tablet (90 mg) by mouth daily 90 tablet 3     ALPRAZolam (XANAX) 1 MG tablet TK ONE T PO QD PRF PANIC ATTACK  4     aspirin-acetaminophen-caffeine (EXCEDRIN MIGRAINE) 250-250-65 MG per tablet Take 2 tablets by mouth every 6 hours as needed for headaches Max acetaminophen dose: 4000 mg in 24 hours       clonazePAM (KLONOPIN) 1 MG tablet Take 1 mg by mouth 3 times daily       desvenlafaxine succinate (PRISTIQ) 100 MG 24 hr tablet Take 100 mg by mouth       guaiFENesin-codeine (ROBITUSSIN AC) 100-10 MG/5ML SOLN solution Take 5-10 mLs by mouth every 4 hours as needed for congestion 120 mL 2     VENTOLIN  (90 Base) MCG/ACT inhaler INHALE 1-2 PUFFS EVERY 4-6 HOURS AS NEEDED 18 g 0     ALLERGIES:  Patient has no known allergies.    REVIEW OF SYSTEMS:  General: denies any general problems.  Eyes: denies problems - recent eye exam  Ears/Nose/Throat: denies problems; last dentist visit up to date last month   Cardiovascular: raynauds - takes calcium channel blocker   Respiratory: denies problems  Gastrointestinal: denies problems  Genitourinary: denies problems  Musculoskeletal: recent elbow surgery  ; massage therapy for neck pain - about 6 times last year    Takes nsaids for his neck symptoms   Skin: fingers with dry skin splits  Neurologic: deniesproblems  Psychiatric: chronic anxiety   Endocrine: denies problems  Heme/Lymphatic: denies problems  Allergic/Immunologic: denies problems  No flowsheet data found.   OBJECTIVE:  /76   Pulse 84   Resp 16   Ht 1.778 m (5' 10\")   Wt 80.3 kg (177 lb)   BMI 25.40 kg/m     Wt Readings from Last 3 Encounters:   03/20/19 80.3 kg (177 lb)   01/07/19 79.8 kg (176 lb)   09/23/18 77 kg (169 lb 11.2 oz)     EXAM:   General " Appearance:Pleasant, alert, appropriate appearance for age. No acute distress  Head Exam: Normal. Normocephalic, atraumatic.  Eye Exam:  Normal external eye, conjunctiva, lids, cornea. KEYANA.  Ear Exam: Normal TM's bilaterally.Normal auditory canals and external ears. Non-tender.  Nose Exam: Normal external nose, mucus membranes, and septum.  OroPharynx Exam:  Dental hygiene adequate. Normal buccal mucosa. Normal pharynx.  Neck Exam:Supple, no masses or nodes.  Thyroid Exam: No nodules or enlargement.  Chest/Respiratory Exam: Normal chest wall and respirations. Clear to auscultation.  Breast Exam: No dimpling, nipple retraction or discharge. Nomasses or nodes.  Cardiovascular Exam: Regular rate and rhythm. S1, S2, no murmur, click, gallop, or rubs.  Gastrointestinal Exam: Soft, non-tender, no masses or organomegaly.  Lymphatic Exam: Non-palpable nodes in neck, clavicular, axillary, or inguinal regions.  Musculoskeletal Exam: Back is straight and non-tender, full ROM of upper and lower extremities.  Foot Exam: Left and right foot: good pedal pulses, no lesions, nail hygiene good.  Skin: Splitting of skin of the end of his fingers  Neurologic Exam: Nonfocal, symmetric DTRs, normal gross motor, tone coordination and no tremor.  Psychiatric Exam: Alert and oriented - appropriate affect.    Office Visit on 01/07/2019   Component Date Value Ref Range Status     Hemoglobin 01/07/2019 14.8  13.3 - 17.7 g/dL Final         ASSESSMENT/PLAN:    ICD-10-CM    1. Physical exam, annual Z00.00 NIFEdipine ER OSMOTIC (PROCARDIA XL) 90 MG 24 hr tablet   2. Raynaud's phenomenon without gangrene I73.00 Lipid Panel     Lipid Panel   3. History of primary testicular cancer Z85.47 Comprehensive Metabolic Panel   4. Dysthymic disorder F34.1      Lipid panel and metabolic panel will be checked today.  Refill of nifedipine which she takes for raynauds phenomenon.  Encouraged him to discontinue chewing tobacco use.  He will continue regular  psychiatric care follow-up.  Discussed risk versus benefits of long-term follow-up imaging for his history of metastatic testicular cancer, in particular radiation exposure risk.    OUMOU PHILLIP....................  3/20/2019

## 2019-03-26 DIAGNOSIS — R79.89 ELEVATED SERUM CREATININE: Primary | ICD-10-CM

## 2020-01-14 ENCOUNTER — TRANSFERRED RECORDS (OUTPATIENT)
Dept: HEALTH INFORMATION MANAGEMENT | Facility: OTHER | Age: 45
End: 2020-01-14

## 2020-02-05 ENCOUNTER — TRANSFERRED RECORDS (OUTPATIENT)
Dept: HEALTH INFORMATION MANAGEMENT | Facility: OTHER | Age: 45
End: 2020-02-05

## 2020-03-02 ENCOUNTER — TRANSFERRED RECORDS (OUTPATIENT)
Dept: HEALTH INFORMATION MANAGEMENT | Facility: OTHER | Age: 45
End: 2020-03-02

## 2020-03-02 ENCOUNTER — HEALTH MAINTENANCE LETTER (OUTPATIENT)
Age: 45
End: 2020-03-02

## 2020-04-08 ENCOUNTER — TRANSFERRED RECORDS (OUTPATIENT)
Dept: HEALTH INFORMATION MANAGEMENT | Facility: OTHER | Age: 45
End: 2020-04-08

## 2020-04-08 LAB
ALT SERPL-CCNC: 20 U/L (ref 10–47)
AST SERPL-CCNC: 29 U/L (ref 11–38)
CREAT SERPL-MCNC: 0.8 MG/DL (ref 0.6–1.2)
GFR SERPL CREATININE-BSD FRML MDRD: >60 ML/MIN
GLUCOSE SERPL-MCNC: 92 MG/DL (ref 73–118)
POTASSIUM SERPL-SCNC: 4.5 MMOL/L (ref 3.6–5.1)

## 2020-05-19 ENCOUNTER — TRANSFERRED RECORDS (OUTPATIENT)
Dept: HEALTH INFORMATION MANAGEMENT | Facility: OTHER | Age: 45
End: 2020-05-19

## 2020-06-02 ENCOUNTER — TRANSFERRED RECORDS (OUTPATIENT)
Dept: HEALTH INFORMATION MANAGEMENT | Facility: OTHER | Age: 45
End: 2020-06-02

## 2020-06-08 ENCOUNTER — OFFICE VISIT (OUTPATIENT)
Dept: PEDIATRICS | Facility: OTHER | Age: 45
End: 2020-06-08
Attending: INTERNAL MEDICINE
Payer: COMMERCIAL

## 2020-06-08 VITALS
OXYGEN SATURATION: 95 % | WEIGHT: 170.6 LBS | HEIGHT: 71 IN | HEART RATE: 84 BPM | RESPIRATION RATE: 16 BRPM | TEMPERATURE: 98 F | BODY MASS INDEX: 23.88 KG/M2 | DIASTOLIC BLOOD PRESSURE: 72 MMHG | SYSTOLIC BLOOD PRESSURE: 118 MMHG

## 2020-06-08 DIAGNOSIS — K29.70 GASTRITIS WITHOUT BLEEDING, UNSPECIFIED CHRONICITY, UNSPECIFIED GASTRITIS TYPE: Primary | ICD-10-CM

## 2020-06-08 DIAGNOSIS — Z85.47 HISTORY OF TESTICULAR CANCER: ICD-10-CM

## 2020-06-08 DIAGNOSIS — F41.1 GAD (GENERALIZED ANXIETY DISORDER): ICD-10-CM

## 2020-06-08 PROCEDURE — 99214 OFFICE O/P EST MOD 30 MIN: CPT | Performed by: INTERNAL MEDICINE

## 2020-06-08 RX ORDER — SUCRALFATE 1 G/1
1 TABLET ORAL 4 TIMES DAILY
Qty: 300 TABLET | Refills: 1 | Status: SHIPPED | OUTPATIENT
Start: 2020-06-08 | End: 2021-04-28

## 2020-06-08 ASSESSMENT — ANXIETY QUESTIONNAIRES
7. FEELING AFRAID AS IF SOMETHING AWFUL MIGHT HAPPEN: SEVERAL DAYS
IF YOU CHECKED OFF ANY PROBLEMS ON THIS QUESTIONNAIRE, HOW DIFFICULT HAVE THESE PROBLEMS MADE IT FOR YOU TO DO YOUR WORK, TAKE CARE OF THINGS AT HOME, OR GET ALONG WITH OTHER PEOPLE: EXTREMELY DIFFICULT
3. WORRYING TOO MUCH ABOUT DIFFERENT THINGS: MORE THAN HALF THE DAYS
6. BECOMING EASILY ANNOYED OR IRRITABLE: MORE THAN HALF THE DAYS
2. NOT BEING ABLE TO STOP OR CONTROL WORRYING: MORE THAN HALF THE DAYS
5. BEING SO RESTLESS THAT IT IS HARD TO SIT STILL: MORE THAN HALF THE DAYS
GAD7 TOTAL SCORE: 14
1. FEELING NERVOUS, ANXIOUS, OR ON EDGE: NEARLY EVERY DAY

## 2020-06-08 ASSESSMENT — PATIENT HEALTH QUESTIONNAIRE - PHQ9
5. POOR APPETITE OR OVEREATING: MORE THAN HALF THE DAYS
SUM OF ALL RESPONSES TO PHQ QUESTIONS 1-9: 6

## 2020-06-08 ASSESSMENT — PAIN SCALES - GENERAL: PAINLEVEL: SEVERE PAIN (6)

## 2020-06-08 ASSESSMENT — MIFFLIN-ST. JEOR: SCORE: 1695.97

## 2020-06-08 NOTE — PATIENT INSTRUCTIONS
-- You should quit chewing tobacco   -- Obtain records from [place] including CT report, and doctors notes     -- Restart Nexium   -- Use sucralfate (Carafate) 2-3 times per day to allow stomach lining to heal   -- Antacids are okay to use as well as needed (eg Tums)   -- Avoid trigger foods (eg spicy foods, caffeine, alcohol -- see longer list below)   -- Avoid NSAIDs (eg ibuprofen/Advil, naproxen/Aleve)   -- Work on healthy weight   -- Reduce stress in your life   -- After your symptoms have improved (around 30 days) consider stopping omeprazole   -- If symptoms persist or worsen, return as we would consider obtaining endoscopy           Lifestyle Changes for Controlling GERD  When you have GERD, stomach acid feels as if it s backing up toward your mouth. Whether or not you take medicine to control your GERD, your symptoms can often be improved with lifestyle changes. Talk to your healthcare provider about the following suggestions. These suggestions may help you get relief from your symptoms.      Raise your head  Reflux is more likely to strike when you re lying down flat, because stomach fluid can flow backward more easily. Raising the head of your bed 4 to 6 inches can help. To do this:    Slide blocks or books under the legs at the head of your bed. Or, place a wedge under the mattress. Many "[employer] can make a suitable wedge for you. The wedge should run from your waist to the top of your head.    Don t just prop your head on several pillows. This increases pressure on your stomach. It can make GERD worse.  Watch your eating habits  Certain foods may increase the acid in your stomach or relax the lower esophageal sphincter. This makes GERD more likely. It s best to avoid the following if they cause you symptoms:    Coffee, tea, and carbonated drinks (with and without caffeine)    Fatty, fried, or spicy food    Mint, chocolate, onions, and tomatoes    Peppermint    Any other foods that seem to irritate your  stomach or cause you pain  Relieve the pressure  Tips include the following:    Eat smaller meals, even if you have to eat more often.    Don t lie down right after you eat. Wait a few hours for your stomach to empty.    Avoid tight belts and tight-fitting clothes.    Lose excess weight.  Tobacco and alcohol  Avoid smoking tobacco and drinking alcohol. They can make GERD symptoms worse.  Date Last Reviewed: 7/1/2016 2000-2017 [employer]. [address] [place] [address], [place] [address]. All rights reserved. This information is not intended as a substitute for professional medical care. Always follow your healthcare professional's instructions.

## 2020-06-08 NOTE — NURSING NOTE
"Chief Complaint   Patient presents with     Abdominal Pain     Was seen in [place] for same issue over the course of 5-6 months      Patient is here for abdominal pain that he has had for a while now. He was seen while in [place] for it over the course of 5-6 months. He traveled from [place] to be seen. He took a lay-off from his job to come to [place]. He is needing answers quickly so he can get his job back.     Initial /72   Pulse 84   Temp 98  F (36.7  C) (Tympanic)   Resp 16   Ht 1.803 m (5' 11\")   Wt 77.4 kg (170 lb 9.6 oz)   SpO2 95%   BMI 23.79 kg/m   Estimated body mass index is 23.79 kg/m  as calculated from the following:    Height as of this encounter: 1.803 m (5' 11\").    Weight as of this encounter: 77.4 kg (170 lb 9.6 oz).  Medication Reconciliation: complete    Samaria Camejo MA  "

## 2020-06-09 PROBLEM — Z01.818 PRE-OP EXAM: Status: RESOLVED | Noted: 2019-01-07 | Resolved: 2020-06-09

## 2020-06-09 PROBLEM — F41.1 GAD (GENERALIZED ANXIETY DISORDER): Status: ACTIVE | Noted: 2020-06-09

## 2020-06-09 PROBLEM — F41.1 GAD (GENERALIZED ANXIETY DISORDER): Chronic | Status: ACTIVE | Noted: 2020-06-09

## 2020-06-09 ASSESSMENT — ANXIETY QUESTIONNAIRES: GAD7 TOTAL SCORE: 14

## 2020-06-09 NOTE — PROGRESS NOTES
Subjective  Dennis J Goodell is a 44 year old male who presents for valuation of right lower abdominal pain.  Has been bothering him since 2020.  He recently drove back to [place] all the way from [place] so he could see me.  He has been off of work because of this pain.  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  He describes the pain as a intermittent sharp sensation worse with extremely deep palpation, worse with rolling over and getting out of bed in the morning.  Better with Pepto-Bismol and Nexium.  When Nexium was discontinued the pain returned.  No change with eating or bowel movements.  He has been having a softer bowel movement 1-2 times a day.  It is black with Pepto-Bismol.  No constipation.  No trauma.  He does take some Aleve and Excedrin.  He has a history of testicular cancer diagnosed at age 32 with mets to the left side of his neck with treatment in [place].  He is worried this may have returned.  He was seen on 2 occasions by physicians in [place].  The imaging and doctors notes are not available for my review.  He says the CAT scan showed he had a kidney stone that was small and a small left inguinal hernia.  No nausea or vomiting.    Problem List/PMH: reviewed in EMR, and made relevant updates today.  Medications: reviewed in EMR, and made relevant updates today.  Allergies: reviewed in EMR, and made relevant updates today.    Social Hx:  Social History     Tobacco Use     Smoking status: Never Smoker     Smokeless tobacco: Current User     Types: Chew     Tobacco comment: Quit smokin tin lasts 2 weeks   Substance Use Topics     Alcohol use: No     Alcohol/week: 8.0 standard drinks     Comment: Alcoholic Drinks/day: sober for 35 days as of 18     Drug use: Never     Social History     Social History Narrative    He is , 4  girls.     I reviewed social history and made relevant updates today.    Family Hx:   Family History   Problem Relation Age of Onset  "    Pulmonary Embolism Mother      Other - See Comments Daughter         recurrent OM     Other - See Comments Other         Suicidality,maternal uncle     No Known Problems Father        Objective  Vitals: reviewed in EMR.  /72   Pulse 84   Temp 98  F (36.7  C) (Tympanic)   Resp 16   Ht 1.803 m (5' 11\")   Wt 77.4 kg (170 lb 9.6 oz)   SpO2 95%   BMI 23.79 kg/m      Gen: Pleasant male, NAD.  HEENT: MMM, no OP erythema.   Neck: Supple, no JVD, no bruits.  CV: RRR no m/r/g.   Pulm: CTAB no w/r/r  GI: Soft, mild TTP in area from epigastrium to periumbilical on right  Neuro: Grossly intact  Msk: No lower extremity edema.  Skin: No concerning lesions.  Psychiatric: Normal affect and insight. Does not appear anxious or depressed.    Outside laboratory data reviewed, see media tab.  Negative urinalysis.  WBC 6.3, hemoglobin 15, platelets 234.  BUN 15, creatinine 0.8.  LFTs normal.    Assessment    ICD-10-CM    1. Gastritis without bleeding, unspecified chronicity, unspecified gastritis type  K29.70 esomeprazole (NEXIUM) 20 MG DR capsule     sucralfate (CARAFATE) 1 GM tablet   2. History of testicular cancer  Z85.47    3. MARLEN (generalized anxiety disorder)  F41.1      Differential diagnosis includes gastritis, gastroesophageal reflux disease, peptic ulcer disease, inflammatory bowel disease, irritable bowel syndrome, choledocholithiasis, pancreatitis, diverticulitis, atypical coronary disease, occult malignancy, anxiety, others.  Given the location of discomfort with tenderness to deep palpation and improvement with PPIs I believe gastritis is at the top of the list.  Hopefully with restarting Nexium with the addition of sucralfate planning for at least 6 weeks of treatment if symptoms resolve this will be reassuring.  If not additional testing will be recommended including but not limited to EGD.  I know he drove 16 hours to come see me, but I see no reason why he cannot work at this time.    Plan   -- Expected " clinical course discussed   -- Medications and their side effects discussed  Patient Instructions      -- You should quit chewing tobacco   -- Obtain records from [place] including CT report, and doctors notes     -- Restart Nexium   -- Use sucralfate (Carafate) 2-3 times per day to allow stomach lining to heal   -- Antacids are okay to use as well as needed (eg Tums)   -- Avoid trigger foods (eg spicy foods, caffeine, alcohol -- see longer list below)   -- Avoid NSAIDs (eg ibuprofen/Advil, naproxen/Aleve)   -- Work on healthy weight   -- Reduce stress in your life   -- After your symptoms have improved (around 30 days) consider stopping omeprazole   -- If symptoms persist or worsen, return as we would consider obtaining endoscopy           Lifestyle Changes for Controlling GERD  When you have GERD, stomach acid feels as if it s backing up toward your mouth. Whether or not you take medicine to control your GERD, your symptoms can often be improved with lifestyle changes. Talk to your healthcare provider about the following suggestions. These suggestions may help you get relief from your symptoms.      Raise your head  Reflux is more likely to strike when you re lying down flat, because stomach fluid can flow backward more easily. Raising the head of your bed 4 to 6 inches can help. To do this:    Slide blocks or books under the legs at the head of your bed. Or, place a wedge under the mattress. Many [employer] can make a suitable wedge for you. The wedge should run from your waist to the top of your head.    Don t just prop your head on several pillows. This increases pressure on your stomach. It can make GERD worse.  Watch your eating habits  Certain foods may increase the acid in your stomach or relax the lower esophageal sphincter. This makes GERD more likely. It s best to avoid the following if they cause you symptoms:    Coffee, tea, and carbonated drinks (with and without caffeine)    Fatty, fried, or spicy  food    Mint, chocolate, onions, and tomatoes    Peppermint    Any other foods that seem to irritate your stomach or cause you pain  Relieve the pressure  Tips include the following:    Eat smaller meals, even if you have to eat more often.    Don t lie down right after you eat. Wait a few hours for your stomach to empty.    Avoid tight belts and tight-fitting clothes.    Lose excess weight.  Tobacco and alcohol  Avoid smoking tobacco and drinking alcohol. They can make GERD symptoms worse.  Date Last Reviewed: 7/1/2016 2000-2017 [employer]. [address] [place] [address]. All rights reserved. This information is not intended as a substitute for professional medical care. Always follow your healthcare professional's instructions.          Return if symptoms worsen or fail to improve.    Tod Graham MD, FAAP, FACP  Internal Medicine & Pediatrics

## 2020-06-10 ENCOUNTER — TELEPHONE (OUTPATIENT)
Dept: PEDIATRICS | Facility: OTHER | Age: 45
End: 2020-06-10

## 2020-06-10 ENCOUNTER — OFFICE VISIT (OUTPATIENT)
Dept: FAMILY MEDICINE | Facility: OTHER | Age: 45
End: 2020-06-10
Attending: FAMILY MEDICINE
Payer: COMMERCIAL

## 2020-06-10 VITALS
HEART RATE: 69 BPM | TEMPERATURE: 97.6 F | HEIGHT: 71 IN | OXYGEN SATURATION: 99 % | SYSTOLIC BLOOD PRESSURE: 120 MMHG | BODY MASS INDEX: 23.8 KG/M2 | RESPIRATION RATE: 16 BRPM | WEIGHT: 170 LBS | DIASTOLIC BLOOD PRESSURE: 80 MMHG

## 2020-06-10 DIAGNOSIS — J02.0 STREPTOCOCCAL SORE THROAT: Primary | ICD-10-CM

## 2020-06-10 PROCEDURE — 87651 STREP A DNA AMP PROBE: CPT | Mod: ZL | Performed by: FAMILY MEDICINE

## 2020-06-10 PROCEDURE — U0003 INFECTIOUS AGENT DETECTION BY NUCLEIC ACID (DNA OR RNA); SEVERE ACUTE RESPIRATORY SYNDROME CORONAVIRUS 2 (SARS-COV-2) (CORONAVIRUS DISEASE [COVID-19]), AMPLIFIED PROBE TECHNIQUE, MAKING USE OF HIGH THROUGHPUT TECHNOLOGIES AS DESCRIBED BY CMS-2020-01-R: HCPCS | Mod: ZL | Performed by: FAMILY MEDICINE

## 2020-06-10 PROCEDURE — C9803 HOPD COVID-19 SPEC COLLECT: HCPCS

## 2020-06-10 PROCEDURE — 99213 OFFICE O/P EST LOW 20 MIN: CPT | Performed by: FAMILY MEDICINE

## 2020-06-10 ASSESSMENT — MIFFLIN-ST. JEOR: SCORE: 1693.24

## 2020-06-10 ASSESSMENT — PAIN SCALES - GENERAL: PAINLEVEL: EXTREME PAIN (9)

## 2020-06-10 NOTE — NURSING NOTE
"Chief Complaint   Patient presents with     Pharyngitis     started last night         Initial /80   Pulse 69   Temp 97.6  F (36.4  C) (Temporal)   Resp 16   Ht 1.803 m (5' 11\")   Wt 77.1 kg (170 lb)   SpO2 99%   BMI 23.71 kg/m   Estimated body mass index is 23.71 kg/m  as calculated from the following:    Height as of this encounter: 1.803 m (5' 11\").    Weight as of this encounter: 77.1 kg (170 lb).    Medication Reconciliation: complete      Norma J. Gosselin, LPN  "

## 2020-06-10 NOTE — LETTER
June 13, 2020        Dennis J Goodell  [address] [place] [address]    This letter provides a written record that you were tested for COVID-19 on 6/10/20.   Your result was negative.    This means that we didn t find the virus that causes COVID-19 in your sample. A test may show negative when you do actually have the virus. This can happen when the virus is in the early stages of infection, before you feel illness symptoms.    Even if you don t have symptoms, they may still appear. For safety, it s very important to follow these rules.    Keep yourself away from others (self-isolation):      Stay home. Don t go to work, school or anywhere else.     Stay in your own room (and use your own bathroom), if you can.    Stay away from others in your home. No hugging, kissing or shaking hands. No visitors.    Clean  high touch  surfaces often (doorknobs, counters, handles, etc.). Use a household cleaning spray or wipes.    Cover your mouth and nose with a mask, tissue or washcloth to avoid spreading germs.    Wash your hands and face often with soap and water.    Stay in self-isolation until you meet ALL of the guidelines below:    1. You have had no fever for at least 72 hours (that is 3 full days of no fever without the use of medicine that reduces fevers), AND  2. other symptoms (such as cough, shortness of breath) have gotten better, AND  3. at least 10 days have passed since your symptoms first appeared.    Going back to work  Check with your employer for any guidelines to follow for going back to work.    Employers: This document serves as formal notice that your employee tested negative for COVID-19, as of the testing date shown above.    For questions regarding this letter or your Negative COVID-19 result, call [phone] between 8A to 6:30P (M-F) and 10A to 6:30P (weekends).

## 2020-06-10 NOTE — LETTER
My Depression Action Plan  Name: Dennis J Goodell   Date of Birth 1975  Date: 6/10/2020    My doctor: Genie Garcia   My clinic: [facility]  [address]  [place] [id number] [phone]          GREEN    ZONE   Good Control    What it looks like:     Things are going generally well. You have normal ups and downs. You may even feel depressed from time to time, but bad moods usually last less than a day.   What you need to do:  1. Continue to care for yourself (see self care plan)  2. Check your depression survival kit and update it as needed  3. Follow your physician s recommendations including any medication.  4. Do not stop taking medication unless you consult with your physician first.           YELLOW         ZONE Getting Worse    What it looks like:     Depression is starting to interfere with your life.     It may be hard to get out of bed; you may be starting to isolate yourself from others.    Symptoms of depression are starting to last most all day and this has happened for several days.     You may have suicidal thoughts but they are not constant.   What you need to do:     1. Call your care team. Your response to treatment will improve if you keep your care team informed of your progress. Yellow periods are signs an adjustment may need to be made.     2. Continue your self-care.  Just get dressed and ready for the day.  Don't give yourself time to talk yourself out of it.    3. Talk to someone in your support network.    4. Open up your Depression Self-Care Plan/Wellness Kit.           RED    ZONE Medical Alert - Get Help    What it looks like:     Depression is seriously interfering with your life.     You may experience these or other symptoms: You can t get out of bed most days, can t work or engage in other necessary activities, you have trouble taking care of basic hygiene, or basic responsibilities, thoughts of suicide or death that  will not go away, self-injurious behavior.     What you need to do:  1. Call your care team and request a same-day appointment. If they are not available (weekends or after hours) call your local crisis line, emergency room or 911.            Depression Self-Care Plan / Wellness Kit    Self-Care for Depression  Here s the deal. Your body and mind are really not as separate as most people think.  What you do and think affects how you feel and how you feel influences what you do and think. This means if you do things that people who feel good do, it will help you feel better.  Sometimes this is all it takes.  There is also a place for medication and therapy depending on how severe your depression is, so be sure to consult with your medical provider and/ or Behavioral Health Consultant if your symptoms are worsening or not improving.     In order to better manage my stress, I will:    Exercise  Get some form of exercise, every day. This will help reduce pain and release endorphins, the  feel good  chemicals in your brain. This is almost as good as taking antidepressants!  This is not the same as joining a gym and then never going! (they count on that by the way ) It can be as simple as just going for a walk or doing some gardening, anything that will get you moving.      Hygiene   Maintain good hygiene (get out of bed in the morning, make your bed, brush your teeth, take a shower, and get dressed like you were going to work, even if you are unemployed).  If your clothes don't fit try to get ones that do.    Diet  Strive to eat foods that are good for me, drink plenty of water, and avoid excessive sugar, caffeine, alcohol, and other mood-altering substances.  Some foods that are helpful in depression are: complex carbohydrates, B vitamins, flaxseed, fish or fish oil, fresh fruits and vegetables.    Psychotherapy  Agree to participate in Individual Therapy (if recommended).    Medication  If prescribed medications, I  agree to take them.  Missing doses can result in serious side effects.  I understand that drinking alcohol, or other illicit drug use, may cause potential side effects.  I will not stop my medication abruptly without first discussing it with my provider.    Staying Connected With Others  Stay in touch with my friends, family members, and my primary care provider/team.    Use your imagination  Be creative.  We all have a creative side; it doesn t matter if it s oil painting, sand castles, or mud pies! This will also kick up the endorphins.    Witness Beauty  (AKA stop and smell the roses) Take a look outside, even in mid-winter. Notice colors, textures. Watch the squirrels and birds.     Service to others  Be of service to others.  There is always someone else in need.  By helping others we can  get out of ourselves  and remember the really important things.  This also provides opportunities for practicing all the other parts of the program.    Humor  Laugh and be silly!  Adjust your TV habits for less news and crime-drama and more comedy.    Control your stress  Try breathing deep, massage therapy, biofeedback, and meditation. Find time to relax each day.     Crisis Text Line  http://www.crisistextline.org    The Crisis Text Line serves anyone, in any type of crisis, providing access to free, 24/7 support and information via the medium people already use and trust:    Here's how it works:  1.  Text 635-049 from anywhere in the [place], anytime, about any type of crisis.  2.  A live, trained Crisis Counselor receives the text and responds quickly.  3.  The volunteer Crisis Counselor will help you move from a 'hot moment to a cool moment'.    My support system    Clinic Contact:  Phone number:    Contact 1:  Phone number:    Contact 2:  Phone number:    Latter-day/:  Phone number:    Therapist:  Phone number:    Local crisis center:    Phone number:    Other community support:  Phone number:

## 2020-06-10 NOTE — TELEPHONE ENCOUNTER
Patient is wondering if we have received the records from [facility] in [place], [place]. Please advise.      Silva King on 6/10/2020 at 4:57 PM

## 2020-06-11 LAB
SPECIMEN SOURCE: NORMAL
STREP GROUP A PCR: NOT DETECTED

## 2020-06-11 NOTE — TELEPHONE ENCOUNTER
Pt notified no records were received.  I told him sometimes it takes a couple weeks.  Pt stated he will call [facility] [place] and see if they have sent them yet.  He will call me back.    Emily Garg CMA ([facility])......................6/11/2020  4:41 PM

## 2020-06-11 NOTE — TELEPHONE ENCOUNTER
Left message to call back.  Emily Marquez CMA ([facility])   6/11/2020   8:13 AM

## 2020-06-11 NOTE — PROGRESS NOTES
"  SUBJECTIVE:   Dennis J Goodell is a 44 year old male who presents to clinic today for the following health issues: Sore throat patient here for sore throat.  Recently    Returned back from [place] working.  Is concerned about strep.  No fevers or chills.  Or other complaints.        Patient Active Problem List    Diagnosis Date Noted     MARLEN (generalized anxiety disorder) 06/09/2020     Priority: Medium     Cubital tunnel syndrome on left 01/07/2019     Priority: Medium     Acne, mild 02/09/2018     Priority: Medium     Dysthymic disorder 02/09/2018     Priority: Medium     Raynaud phenomenon 02/09/2018     Priority: Medium     History of testicular cancer 08/12/2016     Priority: Medium     Mets to left neck  Diagnosed in [place]  In remission       Carpal tunnel syndrome 03/20/2013     Priority: Medium     Past Medical History:   Diagnosis Date     Anxiety disorder 2012     Dysthymic disorder     No Comments Provided     Malignant neoplasm of testis (H) 2005 2005,teratoma     Raynaud's syndrome without gangrene     No Comments Provided     Uncomplicated alcohol abuse     7/25/2012      Past Surgical History:   Procedure Laterality Date     DECOMPRESSION CUBITAL TUNNEL Left 01/2019     HERNIA REPAIR      ,HERNIA REPAIR     IR CHEST PORT PLACEMENT > 5 YRS OF AGE Left 04/01/2008     LYMPH NODE BIOPSY  09/05/2008     [facility]. 37 lymph nodes excised     ORCHIECTOMY SCROTAL Right 12/30/2005    teratoma     OTHER SURGICAL HISTORY      9/05/08,611603,OTHER     RELEASE CARPAL TUNNEL  04/09/2013     No Known Allergies    Review of Systems     OBJECTIVE:     /80   Pulse 69   Temp 97.6  F (36.4  C) (Temporal)   Resp 16   Ht 1.803 m (5' 11\")   Wt 77.1 kg (170 lb)   SpO2 99%   BMI 23.71 kg/m    Body mass index is 23.71 kg/m .  Physical Exam  Constitutional:       Appearance: Normal appearance.   HENT:      Mouth/Throat:      Comments: Throat is quite red.  No exudate.  He does appear to be " greenish exudate coming back from the back of his throat in the pharynx.  Cardiovascular:      Rate and Rhythm: Normal rate.   Pulmonary:      Effort: Pulmonary effort is normal.      Breath sounds: Normal breath sounds.   Neurological:      Mental Status: He is alert.         Diagnostic Test Results:  No results found for any visits on 06/10/20.    ASSESSMENT/PLAN:         1. Streptococcal sore throat  Proceed with strep.  Patient reports he does have some amoxicillin at home which she can use pending the results of the strep test.  Also check for COVID.  - Symptomatic COVID-19 Virus (Coronavirus) by PCR  - Group A Streptococcus PCR Throat Swab      Samir Melton MD  [facility]

## 2020-06-11 NOTE — TELEPHONE ENCOUNTER
Left message to call back.  Emily Marquez CMA ([facility])   6/11/2020   4:35 PM

## 2020-06-12 LAB
SARS-COV-2 RNA SPEC QL NAA+PROBE: NOT DETECTED
SPECIMEN SOURCE: NORMAL

## 2020-06-12 NOTE — TELEPHONE ENCOUNTER
Pt called back at 4:46 on 6/11/2020 and said he talked to someone in the records dept and they told him they sent the records on Tuesday 6/9/2020.  I told Reggie that I see no records in his chart as of right now.  I told him I would check tomorrow.  I told him that Tod Holden MD is out of the office until Monday, June 15th.  He asked me to see if someone could at least look at the CT scan. I told him I would see what I could do if I get his records.  Otherwise it would have to wait until Tod Holden MD returns.  Emily Garg CMA ([facility])......................6/12/2020  8:29 AM

## 2020-06-12 NOTE — TELEPHONE ENCOUNTER
I spoke with [facility] and they said records were received but were not legible so they re-requested the records.  Pt notified.  Emily Garg CMA ([facility])......................6/12/2020  11:40 AM

## 2020-09-11 DIAGNOSIS — Z00.00 PHYSICAL EXAM, ANNUAL: ICD-10-CM

## 2020-09-14 ENCOUNTER — TELEPHONE (OUTPATIENT)
Dept: FAMILY MEDICINE | Facility: OTHER | Age: 45
End: 2020-09-14

## 2020-09-14 DIAGNOSIS — Z00.00 PHYSICAL EXAM, ANNUAL: ICD-10-CM

## 2020-09-14 RX ORDER — NIFEDIPINE 90 MG/1
TABLET, EXTENDED RELEASE ORAL
Qty: 90 TABLET | Refills: 3 | OUTPATIENT
Start: 2020-09-14

## 2020-09-14 RX ORDER — NIFEDIPINE 90 MG/1
90 TABLET, EXTENDED RELEASE ORAL DAILY
Qty: 30 TABLET | Refills: 0 | Status: SHIPPED | OUTPATIENT
Start: 2020-09-14 | End: 2020-12-01

## 2020-09-14 NOTE — TELEPHONE ENCOUNTER
"Patient is needing a refill on Niphedodine. Refill was denied 9/11/20 due \"being too early\" but last refill that was sent was 3/20/2019. He state she is currently working in [place], and isn't able to come in for an appointment.     Ines Armenta LPN.................. 9/14/2020 11:40 AM     "

## 2020-09-14 NOTE — TELEPHONE ENCOUNTER
[facility] sent Rx request for the following:   NIFEdipine ER OSMOTIC (PROCARDIA XL) 90 MG 24 hr tablet  Sig:TAKE 1 TABLET(90 MG) BY MOUTH DAILY    Last Prescription Date:   03/20/2019  Last Fill Qty/Refills:         90, R-3    Last Office Visit:              06/10/2020 (Geronimo)   Future Office visit:           None noted    Redundant refill request refused: Too soon:    Adeola Brush RN ....................  9/14/2020   10:42 AM

## 2020-09-14 NOTE — TELEPHONE ENCOUNTER
[facility]-refill needed for Niphedodine? Says was denied by pharmacy. Please call. Thank you.  Soco Castaneda

## 2020-09-24 ENCOUNTER — OFFICE VISIT (OUTPATIENT)
Dept: FAMILY MEDICINE | Facility: OTHER | Age: 45
End: 2020-09-24
Attending: FAMILY MEDICINE
Payer: COMMERCIAL

## 2020-09-24 VITALS
RESPIRATION RATE: 16 BRPM | DIASTOLIC BLOOD PRESSURE: 68 MMHG | OXYGEN SATURATION: 98 % | TEMPERATURE: 98.1 F | HEART RATE: 87 BPM | BODY MASS INDEX: 23.31 KG/M2 | WEIGHT: 167.1 LBS | SYSTOLIC BLOOD PRESSURE: 100 MMHG

## 2020-09-24 DIAGNOSIS — R50.9 FEVER AND CHILLS: ICD-10-CM

## 2020-09-24 DIAGNOSIS — J02.9 SORE THROAT: Primary | ICD-10-CM

## 2020-09-24 PROBLEM — L70.9 ACNE, MILD: Status: RESOLVED | Noted: 2018-02-09 | Resolved: 2020-09-24

## 2020-09-24 PROBLEM — G56.22 CUBITAL TUNNEL SYNDROME ON LEFT: Status: RESOLVED | Noted: 2019-01-07 | Resolved: 2020-09-24

## 2020-09-24 LAB
SPECIMEN SOURCE: NORMAL
STREP GROUP A PCR: NOT DETECTED

## 2020-09-24 PROCEDURE — C9803 HOPD COVID-19 SPEC COLLECT: HCPCS

## 2020-09-24 PROCEDURE — 99213 OFFICE O/P EST LOW 20 MIN: CPT | Performed by: FAMILY MEDICINE

## 2020-09-24 PROCEDURE — U0003 INFECTIOUS AGENT DETECTION BY NUCLEIC ACID (DNA OR RNA); SEVERE ACUTE RESPIRATORY SYNDROME CORONAVIRUS 2 (SARS-COV-2) (CORONAVIRUS DISEASE [COVID-19]), AMPLIFIED PROBE TECHNIQUE, MAKING USE OF HIGH THROUGHPUT TECHNOLOGIES AS DESCRIBED BY CMS-2020-01-R: HCPCS | Mod: ZL | Performed by: FAMILY MEDICINE

## 2020-09-24 PROCEDURE — 87651 STREP A DNA AMP PROBE: CPT | Mod: ZL | Performed by: FAMILY MEDICINE

## 2020-09-24 ASSESSMENT — PAIN SCALES - GENERAL: PAINLEVEL: MILD PAIN (3)

## 2020-09-24 NOTE — PATIENT INSTRUCTIONS
Patient Education     When You Have a Sore Throat    A sore throat can be painful. There are many reasons why you may have a sore throat. Your healthcare provider will work with you to find the cause of your sore throat. He or she will also find the best treatment for you.  What causes a sore throat?  Sore throats can be caused or worsened by:    Cold or flu viruses    Bacteria    Irritants such as tobacco smoke or air pollution    Acid reflux  A healthy throat  The tonsils are on the sides of the throat near the base of the tongue. They collect viruses and bacteria and help fight infection. The throat (pharynx) is the passage for air. Mucus from the nasal cavity also moves down the passage.  An inflamed throat  The tonsils and pharynx can become inflamed due to a cold or flu virus. Postnasal drip (excess mucus draining from the nasal cavity) can irritate the throat. It can also make the throat or tonsils more likely to be infected by bacteria. Severe, untreated tonsillitis in children or adults can cause a pocket of pus (abscess) to form near the tonsil.  Your evaluation  A medical evaluation can help find the cause of your sore throat. It can also help your healthcare provider choose the best treatment for you. The evaluation may include a health history, physical exam, and diagnostic tests.  Health history  Your healthcare provider may ask you the following:    How long has the sore throat lasted and how have you been treating it?    Do you have any other symptoms, such as body aches, fever, or cough?    Does your sore throat recur? If so, how often? How many days of school or work have you missed because of a sore throat?    Do you have trouble eating or swallowing?    Have you been told that you snore or have other sleep problems?    Do you have bad breath?    Do you cough up bad-tasting mucus?  Physical exam  During the exam, your healthcare provider checks your ears, nose, and throat for problems. He or she  "also checks for swelling in the neck, and may listen to your chest.  Possible tests  Other tests your healthcare provider may perform include:    A throat swab to check for bacteria such as streptococcus (the bacteria that causes strep throat)    A blood test to check for mononucleosis (a viral infection)    A chest X-ray to rule out pneumonia, especially if you have a cough  Treating a sore throat  Treatment depends on many factors. What is the likely cause? Is the problem recent? Does it keep coming back? In many cases, the best thing to do is to treat the symptoms, rest, and let the problem heal itself. Antibiotics may help clear up some bacterial infections. For cases of severe or recurring tonsillitis, the tonsils may need to be removed.  Relieving your symptoms    Don t smoke, and avoid secondhand smoke.    For children, try throat sprays or Popsicles. Adults and older children may try lozenges.    Drink warm liquids to soothe the throat and help thin mucus. Avoid alcohol, spicy foods, and acidic drinks such as orange juice. These can irritate the throat.    Gargle with warm saltwater (1 teaspoon of salt to 8 ounces of warm water).    Use a humidifier to keep air moist and relieve throat dryness.    Try over-the-counter pain relievers such as acetaminophen or ibuprofen. Use as directed, and don t exceed the recommended dose. Don t give aspirin to children.   Are antibiotics needed?  If your sore throat is due to a bacterial infection, antibiotics may speed healing and prevent complications. Although group A streptococcus (\"strep throat\" or GAS) is the major treatable infection for a sore throat, GAS causes only 5% to 15% of sore throats in adults who seek medical care. Most sore throats are caused by cold or flu viruses. And antibiotics don t treat viral illness. In fact, using antibiotics when they re not needed may produce bacteria that are harder to kill. Your healthcare provider will prescribe antibiotics " only if he or she thinks they are likely to help.  If antibiotics are prescribed  Take the medicine exactly as directed. Be sure to finish your prescription even if you re feeling better. And be sure to ask your healthcare provider or pharmacist what side effects are common and what to do about them.  Is surgery needed?  In some cases, tonsils need to be removed. This is often done as outpatient (same-day) surgery. Your healthcare provider may advise removing the tonsils in cases of:    Several severe bouts of tonsillitis in a year.  Severe  episodes include those that lead to missed days of school or work, or that need to be treated with antibiotics.    Tonsillitis that causes breathing problems during sleep    Tonsillitis caused by food particles collecting in pouches in the tonsils (cryptic tonsillitis)  Call your healthcare provider if any of the following occur:    Symptoms worsen, or new symptoms develop.    Swollen tonsils make breathing difficult.    The pain is severe enough to keep you from drinking liquids.    A skin rash, hives, or wheezing develops. Any of these could signal an allergic reaction to antibiotics.    Symptoms don t improve within a week.    Symptoms don t improve within 2 to 3 days of starting antibiotics.   Date Last Reviewed: 10/1/2016    1300-0703 [employer]. [address] [place] [address], [place] [address]. All rights reserved. This information is not intended as a substitute for professional medical care. Always follow your healthcare professional's instructions.

## 2020-09-24 NOTE — NURSING NOTE
Patient here of vomitting, chills, diarhhea and fever for there past 4 days. Medication Reconciliation: complete.    Saige Parsons LPN  9/24/2020 4:36 PM

## 2020-09-24 NOTE — PROGRESS NOTES
"Nursing Notes:   Saige Parsons LPN  2020  4:37 PM  Signed  Patient here of vomitting, chills, diarhhea and fever for there past 4 days. Medication Reconciliation: complete.    Saige Parsons LPN  2020 4:36 PM    SUBJECTIVE:   Dennis J Goodell is a 45 year old male who presents to clinic today for the following health issues:    Here with vomiting, diarrhea and thinks he had a fever. Symptoms started am of 2020 and has now had less vomiting.   Felt hot with \"sweats\" at night but no temps taken and no fevers documented. Has had these symptoms with strep in the past.   Mild sore throat today. No SOB. Has some nasal congestion and does also have fall allergies.    Does not really want a COVID test but realizes may have to have one for return to work. He was tested in .  Also relates \"lots of stress\".   Drinking enough and no emesis now.     HPI    Patient Active Problem List   Diagnosis     Dysthymic disorder     History of testicular cancer     Raynaud phenomenon     MARLEN (generalized anxiety disorder)     Past Surgical History:   Procedure Laterality Date     DECOMPRESSION CUBITAL TUNNEL Left 2019     HERNIA REPAIR      ,HERNIA REPAIR     IR CHEST PORT PLACEMENT > 5 YRS OF AGE Left 2008     LYMPH NODE BIOPSY  2008     [facility]. 37 lymph nodes excised     ORCHIECTOMY SCROTAL Right 2005    teratoma     OTHER SURGICAL HISTORY      [id number],OTHER     RELEASE CARPAL TUNNEL  2013       Social History     Tobacco Use     Smoking status: Never Smoker     Smokeless tobacco: Current User     Types: Chew     Tobacco comment: Quit smokin tin lasts 2 weeks   Substance Use Topics     Alcohol use: No     Alcohol/week: 8.0 standard drinks     Comment: Alcoholic Drinks/day: sober for 35 days as of 18     Family History   Problem Relation Age of Onset     Pulmonary Embolism Mother      Other - See Comments Daughter         recurrent OM     Other - See " Comments Other         Suicidality,maternal uncle     No Known Problems Father          Current Outpatient Medications   Medication Sig Dispense Refill     ALPRAZolam (XANAX) 1 MG tablet TK ONE T PO QD PRF PANIC ATTACK  4     aspirin-acetaminophen-caffeine (EXCEDRIN MIGRAINE) 250-250-65 MG per tablet Take 2 tablets by mouth every 6 hours as needed for headaches Max acetaminophen dose: 4000 mg in 24 hours       clonazePAM (KLONOPIN) 1 MG tablet Take 1 mg by mouth 3 times daily       desvenlafaxine succinate (PRISTIQ) 100 MG 24 hr tablet Take 100 mg by mouth       NIFEdipine ER OSMOTIC (PROCARDIA XL) 90 MG 24 hr tablet Take 1 tablet (90 mg) by mouth daily 30 tablet 0     sucralfate (CARAFATE) 1 GM tablet Take 1 tablet (1 g) by mouth 4 times daily 300 tablet 1     VENTOLIN  (90 Base) MCG/ACT inhaler INHALE 1-2 PUFFS EVERY 4-6 HOURS AS NEEDED 18 g 0     No Known Allergies    Review of Systems     OBJECTIVE:     /68   Pulse 87   Temp 98.1  F (36.7  C)   Resp 16   Wt 75.8 kg (167 lb 1.6 oz)   SpO2 98%   BMI 23.31 kg/m    Body mass index is 23.31 kg/m .  Physical Exam  Vitals signs and nursing note reviewed.   Constitutional:       General: He is not in acute distress.     Appearance: Normal appearance. He is not toxic-appearing.   HENT:      Mouth/Throat:      Mouth: Mucous membranes are moist.      Comments: Mild redness  Eyes:      Conjunctiva/sclera: Conjunctivae normal.   Neck:      Musculoskeletal: Neck supple. No muscular tenderness.   Lymphadenopathy:      Cervical: No cervical adenopathy.   Neurological:      Mental Status: He is alert.         Diagnostic Test Results:  Results for orders placed or performed in visit on 09/24/20   Group A Streptococcus PCR Throat Swab     Status: None    Specimen: Throat   Result Value Ref Range    Specimen Description Throat     Strep Group A PCR Not Detected NDET^Not Detected     COVID pending.    ASSESSMENT/PLAN:           ICD-10-CM    1. Sore throat  J02.9  Group A Streptococcus PCR Throat Swab   2. Fever and chills  R50.9 Symptomatic COVID-19 Virus (Coronavirus) by PCR     Plan:  Push fluids, rest.  Home until COVID test resulted.    Trini Menjivar MD  [facility] AND hospitals

## 2020-09-26 LAB
SARS-COV-2 RNA SPEC QL NAA+PROBE: NOT DETECTED
SPECIMEN SOURCE: NORMAL

## 2020-09-30 ENCOUNTER — TELEPHONE (OUTPATIENT)
Dept: FAMILY MEDICINE | Facility: OTHER | Age: 45
End: 2020-09-30

## 2020-09-30 DIAGNOSIS — R50.9 FEVER AND CHILLS: Primary | ICD-10-CM

## 2020-09-30 RX ORDER — ALBUTEROL SULFATE 90 UG/1
2 AEROSOL, METERED RESPIRATORY (INHALATION) EVERY 6 HOURS
Qty: 1 INHALER | Refills: 0 | Status: SHIPPED | OUTPATIENT
Start: 2020-09-30 | End: 2022-11-18

## 2020-09-30 NOTE — TELEPHONE ENCOUNTER
Patient is wanting albuterol inhaler sent to [facility], states he uses it as needed and its been many years since he needed it.    Bridget Schultz on 9/30/2020 at 10:07 AM

## 2020-11-30 DIAGNOSIS — Z00.00 PHYSICAL EXAM, ANNUAL: ICD-10-CM

## 2020-12-01 RX ORDER — NIFEDIPINE 90 MG/1
TABLET, EXTENDED RELEASE ORAL
Qty: 30 TABLET | Refills: 0 | Status: SHIPPED | OUTPATIENT
Start: 2020-12-01 | End: 2021-10-18

## 2020-12-01 NOTE — TELEPHONE ENCOUNTER
[facility] sent Rx request for the following:      NIFEDIPINE 90MG ER (XL/OSM) TABLET  Sig: TAKE 1 TABLET(90 MG) BY MOUTH DAILY      Last Prescription Date:   9/14/2020  Last Fill Qty/Refills:         30, R-0    Last Office Visit:              6/8/2020   Future Office visit:           none    Prescription refilled per RN Medication Refill Policy.................... Chintan Peterson RN ....................  12/1/2020   12:02 PM

## 2020-12-07 ENCOUNTER — ALLIED HEALTH/NURSE VISIT (OUTPATIENT)
Dept: FAMILY MEDICINE | Facility: OTHER | Age: 45
End: 2020-12-07
Attending: FAMILY MEDICINE
Payer: COMMERCIAL

## 2020-12-07 DIAGNOSIS — R05.9 COUGH: Primary | ICD-10-CM

## 2020-12-07 PROCEDURE — U0003 INFECTIOUS AGENT DETECTION BY NUCLEIC ACID (DNA OR RNA); SEVERE ACUTE RESPIRATORY SYNDROME CORONAVIRUS 2 (SARS-COV-2) (CORONAVIRUS DISEASE [COVID-19]), AMPLIFIED PROBE TECHNIQUE, MAKING USE OF HIGH THROUGHPUT TECHNOLOGIES AS DESCRIBED BY CMS-2020-01-R: HCPCS | Mod: ZL | Performed by: FAMILY MEDICINE

## 2020-12-07 PROCEDURE — C9803 HOPD COVID-19 SPEC COLLECT: HCPCS

## 2020-12-07 PROCEDURE — 99207 PR NO CHARGE NURSE ONLY: CPT

## 2020-12-09 LAB
SARS-COV-2 RNA SPEC QL NAA+PROBE: NOT DETECTED
SPECIMEN SOURCE: NORMAL

## 2020-12-14 ENCOUNTER — ALLIED HEALTH/NURSE VISIT (OUTPATIENT)
Dept: FAMILY MEDICINE | Facility: OTHER | Age: 45
End: 2020-12-14
Attending: FAMILY MEDICINE
Payer: COMMERCIAL

## 2020-12-14 DIAGNOSIS — R05.9 COUGH: Primary | ICD-10-CM

## 2020-12-14 PROCEDURE — U0003 INFECTIOUS AGENT DETECTION BY NUCLEIC ACID (DNA OR RNA); SEVERE ACUTE RESPIRATORY SYNDROME CORONAVIRUS 2 (SARS-COV-2) (CORONAVIRUS DISEASE [COVID-19]), AMPLIFIED PROBE TECHNIQUE, MAKING USE OF HIGH THROUGHPUT TECHNOLOGIES AS DESCRIBED BY CMS-2020-01-R: HCPCS | Mod: ZL | Performed by: FAMILY MEDICINE

## 2020-12-14 PROCEDURE — C9803 HOPD COVID-19 SPEC COLLECT: HCPCS

## 2020-12-15 LAB
SARS-COV-2 RNA SPEC QL NAA+PROBE: NOT DETECTED
SPECIMEN SOURCE: NORMAL

## 2020-12-20 ENCOUNTER — HEALTH MAINTENANCE LETTER (OUTPATIENT)
Age: 45
End: 2020-12-20

## 2021-03-29 ENCOUNTER — TRANSFERRED RECORDS (OUTPATIENT)
Dept: HEALTH INFORMATION MANAGEMENT | Facility: OTHER | Age: 46
End: 2021-03-29

## 2021-03-30 ENCOUNTER — HOSPITAL ENCOUNTER (OUTPATIENT)
Dept: MRI IMAGING | Facility: OTHER | Age: 46
Discharge: HOME | End: 2021-03-30
Attending: NURSE PRACTITIONER | Admitting: FAMILY MEDICINE
Payer: COMMERCIAL

## 2021-03-30 DIAGNOSIS — G89.29 CHRONIC PAIN OF BOTH UPPER EXTREMITIES: ICD-10-CM

## 2021-03-30 DIAGNOSIS — M79.602 CHRONIC PAIN OF BOTH UPPER EXTREMITIES: ICD-10-CM

## 2021-03-30 DIAGNOSIS — M79.601 CHRONIC PAIN OF BOTH UPPER EXTREMITIES: ICD-10-CM

## 2021-03-30 PROCEDURE — 72141 MRI NECK SPINE W/O DYE: CPT

## 2021-04-13 ENCOUNTER — TELEPHONE (OUTPATIENT)
Dept: FAMILY MEDICINE | Facility: OTHER | Age: 46
End: 2021-04-13

## 2021-04-13 NOTE — TELEPHONE ENCOUNTER
Patient has had an MRI done a week ago  Patient is returning call to nurse  Please call back  Thank you   Emily Lindsay on 4/13/2021 at 11:25 AM

## 2021-04-13 NOTE — TELEPHONE ENCOUNTER
Patient saw Damian Jean in regards to cervical pain. He states an xray was done and it showed no disc between C5-6. He also had an MRI done (results in Epic). Damian Jean referred patient to [facility]. His appointment is either later this week or next week- he cannot remember. He states that pain is so bad he hasn't been able to sleep. Muscle relaxers do not help. Patient contacted Damian Jean's office requesting a pain medication. He was told to contact his PCP.     Samaria Camejo CMA on 4/13/2021 at 12:29 PM

## 2021-04-14 ENCOUNTER — OFFICE VISIT (OUTPATIENT)
Dept: FAMILY MEDICINE | Facility: OTHER | Age: 46
End: 2021-04-14
Attending: PHYSICIAN ASSISTANT
Payer: COMMERCIAL

## 2021-04-14 VITALS
DIASTOLIC BLOOD PRESSURE: 68 MMHG | BODY MASS INDEX: 26.48 KG/M2 | SYSTOLIC BLOOD PRESSURE: 132 MMHG | TEMPERATURE: 98.2 F | WEIGHT: 185 LBS | HEART RATE: 55 BPM | RESPIRATION RATE: 14 BRPM | OXYGEN SATURATION: 97 % | HEIGHT: 70 IN

## 2021-04-14 DIAGNOSIS — M54.2 CERVICALGIA: ICD-10-CM

## 2021-04-14 DIAGNOSIS — M48.02 CERVICAL STENOSIS OF SPINAL CANAL: Primary | ICD-10-CM

## 2021-04-14 PROCEDURE — 99212 OFFICE O/P EST SF 10 MIN: CPT | Performed by: PHYSICIAN ASSISTANT

## 2021-04-14 ASSESSMENT — MIFFLIN-ST. JEOR: SCORE: 1740.4

## 2021-04-14 ASSESSMENT — PAIN SCALES - GENERAL: PAINLEVEL: MODERATE PAIN (5)

## 2021-04-14 NOTE — NURSING NOTE
Patient presents to clinic with neck pain.  Alisha Levi LPN ....................  4/14/2021   10:25 AM

## 2021-04-14 NOTE — PROGRESS NOTES
"  Assessment & Plan     1. Cervical stenosis of spinal canal    2. Cervicalgia      Initially discussed with patient potential for a 1-2 day course of narcotic pain medications, however upon review of patient's chart he does have a history of abuse that he did not disclose. Given patient's known history of alcohol and prescription medication abuse, I consulted with patient's PCP who is in agreement with no narcotics at this time. Recommend scheduled Tylenol and/or ibuprofen and following up with spine specialist at appointment scheduled for tomorrow afternoon.       Return if symptoms worsen or fail to improve.    Yisel Liu PA-C  [facility]      Subjective   Reggie is a 45 year old who presents for the following health issues     HPI   Here for discussion regarding neck pain and pain management. States he has a history neck pain that has been going on for some time. Has been following with Damian Jean through [facility].  Recent MRI completed on 3/30/2021 showed \"Severe foraminal stenoses are present bilaterally at C5-6 and on the left at C4-5.\" Referral to [facility] was placed by ortho provider and patient states he has an appointment scheduled with them tomorrow. Notes he has been having increased pain in neck the past few days and OTC analgesics are not providing any relief. He is having some numbness and tingling down left arm to first through third digits. He is requesting a short term prescription of Percocet to get him through until he meets with the spine specialist tomorrow.     PAST MEDICAL HISTORY:   Past Medical History:   Diagnosis Date     Anxiety disorder 2012     Dysthymic disorder     No Comments Provided     Malignant neoplasm of testis (H) 2005 2005,teratoma     Raynaud's syndrome without gangrene     No Comments Provided     Uncomplicated alcohol abuse     7/25/2012       PAST SURGICAL HISTORY:   Past Surgical History:   Procedure " "Laterality Date     DECOMPRESSION CUBITAL TUNNEL Left 2019     HERNIA REPAIR      ,HERNIA REPAIR     IR CHEST PORT PLACEMENT > 5 YRS OF AGE Left 2008     LYMPH NODE BIOPSY  2008     [facility]. 37 lymph nodes excised     ORCHIECTOMY SCROTAL Right 2005    teratoma     OTHER SURGICAL HISTORY      [id number],OTHER     RELEASE CARPAL TUNNEL  2013       FAMILY HISTORY:   Family History   Problem Relation Age of Onset     Pulmonary Embolism Mother      Other - See Comments Daughter         recurrent OM     Other - See Comments Other         Suicidality,maternal uncle     No Known Problems Father        SOCIAL HISTORY:   Social History     Tobacco Use     Smoking status: Never Smoker     Smokeless tobacco: Current User     Types: Chew     Tobacco comment: Quit smokin tin lasts 2 weeks   Substance Use Topics     Alcohol use: No     Alcohol/week: 8.0 standard drinks     Comment: Alcoholic Drinks/day: sober for 35 days as of 18      No Known Allergies  Current Outpatient Medications   Medication     albuterol (PROAIR HFA/PROVENTIL HFA/VENTOLIN HFA) 108 (90 Base) MCG/ACT inhaler     ALPRAZolam (XANAX) 1 MG tablet     aspirin-acetaminophen-caffeine (EXCEDRIN MIGRAINE) 250-250-65 MG per tablet     clonazePAM (KLONOPIN) 1 MG tablet     desvenlafaxine succinate (PRISTIQ) 100 MG 24 hr tablet     NIFEdipine ER OSMOTIC (PROCARDIA XL) 90 MG 24 hr tablet     sucralfate (CARAFATE) 1 GM tablet     VENTOLIN  (90 Base) MCG/ACT inhaler     No current facility-administered medications for this visit.          Review of Systems   Per HPI.         Objective    /68   Pulse 55   Temp 98.2  F (36.8  C)   Resp 14   Ht 1.778 m (5' 10\")   Wt 83.9 kg (185 lb)   SpO2 97%   BMI 26.54 kg/m    Body mass index is 26.54 kg/m .  Physical Exam   General: Pleasant, in no apparent distress.  Musculoskeletal: Minimal tenderness to palpation over posterior neck. Limited side to side ROM, " somewhat limited flexion and extension of neck.   Neurologic Exam: normal gross motor, tone coordination and no visible tremor.  Skin: No jaundice, pallor, rashes, or lesions.  Psych: Appropriate mood and affect.

## 2021-04-20 ENCOUNTER — TRANSFERRED RECORDS (OUTPATIENT)
Dept: HEALTH INFORMATION MANAGEMENT | Facility: OTHER | Age: 46
End: 2021-04-20

## 2021-04-28 ENCOUNTER — OFFICE VISIT (OUTPATIENT)
Dept: FAMILY MEDICINE | Facility: OTHER | Age: 46
End: 2021-04-28
Attending: FAMILY MEDICINE
Payer: COMMERCIAL

## 2021-04-28 VITALS
DIASTOLIC BLOOD PRESSURE: 74 MMHG | RESPIRATION RATE: 18 BRPM | TEMPERATURE: 97.4 F | OXYGEN SATURATION: 98 % | SYSTOLIC BLOOD PRESSURE: 124 MMHG | HEART RATE: 80 BPM | WEIGHT: 190 LBS | BODY MASS INDEX: 26.6 KG/M2 | HEIGHT: 71 IN

## 2021-04-28 DIAGNOSIS — F41.1 GAD (GENERALIZED ANXIETY DISORDER): ICD-10-CM

## 2021-04-28 DIAGNOSIS — Z23 NEED FOR TDAP VACCINATION: ICD-10-CM

## 2021-04-28 DIAGNOSIS — G44.221 CHRONIC TENSION-TYPE HEADACHE, INTRACTABLE: ICD-10-CM

## 2021-04-28 DIAGNOSIS — M47.812 FACET ARTHROPATHY, CERVICAL: ICD-10-CM

## 2021-04-28 DIAGNOSIS — Z85.47 HISTORY OF TESTICULAR CANCER: ICD-10-CM

## 2021-04-28 DIAGNOSIS — G43.711 INTRACTABLE CHRONIC MIGRAINE WITHOUT AURA AND WITH STATUS MIGRAINOSUS: Primary | ICD-10-CM

## 2021-04-28 DIAGNOSIS — I73.00 RAYNAUD'S PHENOMENON WITHOUT GANGRENE: ICD-10-CM

## 2021-04-28 PROCEDURE — 96372 THER/PROPH/DIAG INJ SC/IM: CPT | Performed by: FAMILY MEDICINE

## 2021-04-28 PROCEDURE — 90715 TDAP VACCINE 7 YRS/> IM: CPT | Performed by: FAMILY MEDICINE

## 2021-04-28 PROCEDURE — 99215 OFFICE O/P EST HI 40 MIN: CPT | Mod: 25 | Performed by: FAMILY MEDICINE

## 2021-04-28 PROCEDURE — 250N000011 HC RX IP 250 OP 636: Performed by: FAMILY MEDICINE

## 2021-04-28 PROCEDURE — 90471 IMMUNIZATION ADMIN: CPT | Performed by: FAMILY MEDICINE

## 2021-04-28 RX ORDER — ZOLMITRIPTAN 5 MG/1
5 TABLET, FILM COATED ORAL
Qty: 10 TABLET | Refills: 0 | Status: SHIPPED | OUTPATIENT
Start: 2021-04-28 | End: 2021-05-05

## 2021-04-28 RX ORDER — NIFEDIPINE 90 MG/1
90 TABLET, EXTENDED RELEASE ORAL DAILY PRN
Qty: 90 TABLET | Status: CANCELLED | OUTPATIENT
Start: 2021-04-28

## 2021-04-28 RX ORDER — PROCHLORPERAZINE MALEATE 10 MG
10 TABLET ORAL EVERY 6 HOURS PRN
Qty: 30 TABLET | Refills: 1 | Status: SHIPPED | OUTPATIENT
Start: 2021-04-28 | End: 2021-06-09

## 2021-04-28 RX ORDER — KETOROLAC TROMETHAMINE 30 MG/ML
60 INJECTION, SOLUTION INTRAMUSCULAR; INTRAVENOUS ONCE
Status: COMPLETED | OUTPATIENT
Start: 2021-04-28 | End: 2021-04-28

## 2021-04-28 RX ORDER — KETOROLAC TROMETHAMINE 30 MG/ML
60 INJECTION, SOLUTION INTRAMUSCULAR; INTRAVENOUS ONCE
Status: DISCONTINUED | OUTPATIENT
Start: 2021-04-28 | End: 2021-04-28

## 2021-04-28 RX ORDER — TOPIRAMATE 25 MG/1
25 TABLET, FILM COATED ORAL 2 TIMES DAILY
Qty: 60 TABLET | Refills: 0 | Status: SHIPPED | OUTPATIENT
Start: 2021-04-28 | End: 2021-05-05

## 2021-04-28 RX ADMIN — KETOROLAC TROMETHAMINE 60 MG: 30 INJECTION, SOLUTION INTRAMUSCULAR at 11:26

## 2021-04-28 ASSESSMENT — ANXIETY QUESTIONNAIRES
GAD7 TOTAL SCORE: 0
1. FEELING NERVOUS, ANXIOUS, OR ON EDGE: NOT AT ALL
3. WORRYING TOO MUCH ABOUT DIFFERENT THINGS: NOT AT ALL
6. BECOMING EASILY ANNOYED OR IRRITABLE: NOT AT ALL
IF YOU CHECKED OFF ANY PROBLEMS ON THIS QUESTIONNAIRE, HOW DIFFICULT HAVE THESE PROBLEMS MADE IT FOR YOU TO DO YOUR WORK, TAKE CARE OF THINGS AT HOME, OR GET ALONG WITH OTHER PEOPLE: NOT DIFFICULT AT ALL
7. FEELING AFRAID AS IF SOMETHING AWFUL MIGHT HAPPEN: NOT AT ALL
2. NOT BEING ABLE TO STOP OR CONTROL WORRYING: NOT AT ALL
5. BEING SO RESTLESS THAT IT IS HARD TO SIT STILL: NOT AT ALL

## 2021-04-28 ASSESSMENT — PATIENT HEALTH QUESTIONNAIRE - PHQ9
SUM OF ALL RESPONSES TO PHQ QUESTIONS 1-9: 0
5. POOR APPETITE OR OVEREATING: NOT AT ALL

## 2021-04-28 ASSESSMENT — PAIN SCALES - GENERAL: PAINLEVEL: EXTREME PAIN (9)

## 2021-04-28 ASSESSMENT — MIFFLIN-ST. JEOR: SCORE: 1771.02

## 2021-04-28 NOTE — NURSING NOTE
"Patient presents to the clinic for establish care with discussion about chronic pain.  [facility] for [facility] signed at this time.      Chief Complaint   Patient presents with     Establish Care       Initial /74 (BP Location: Right arm, Patient Position: Sitting, Cuff Size: Adult Regular)   Pulse 80   Temp 97.4  F (36.3  C) (Temporal)   Resp 18   Ht 1.791 m (5' 10.5\")   Wt 86.2 kg (190 lb)   SpO2 98%   BMI 26.88 kg/m   Estimated body mass index is 26.88 kg/m  as calculated from the following:    Height as of this encounter: 1.791 m (5' 10.5\").    Weight as of this encounter: 86.2 kg (190 lb).  Medication Reconciliation: complete    Pam Agosto LPN    "

## 2021-04-28 NOTE — NURSING NOTE
Immunization Documentation    Prior to Immunization administration, verified patients identity using patient's name and date of birth. Please see IMMUNIZATIONS  and order for additional information.  Patient / Parent declined any side effects from the previous Tetnus injections.    Was entire vial of medication used? Yes  Vial/Syringe: Syringe    Clinic Administered Medication Documentation      Injectable Medication Documentation    Patient was given Ketorolac Tromethamine (Toradol). Prior to medication administration, verified patients identity using patient s name and date of birth. Please see MAR and medication order for additional information. Patient instructed to okay with previous injection.      Was entire vial of medication used? Yes  Vial/Syringe: Single dose vial  Expiration Date:  04/2022  Was this medication supplied by the patient? No      Pam Agosto LPN 4/28/2021 11:29 AM

## 2021-04-28 NOTE — PROGRESS NOTES
"Nursing Notes:   Pam Agosto LPN  4/28/2021 10:13 AM  Sign at exiting of workspace  Patient presents to the clinic for establish care with discussion about chronic pain.  [facility] signed at this time.      Chief Complaint   Patient presents with     Establish Care       Initial /74 (BP Location: Right arm, Patient Position: Sitting, Cuff Size: Adult Regular)   Pulse 80   Temp 97.4  F (36.3  C) (Temporal)   Resp 18   Ht 1.791 m (5' 10.5\")   Wt 86.2 kg (190 lb)   SpO2 98%   BMI 26.88 kg/m   Estimated body mass index is 26.88 kg/m  as calculated from the following:    Height as of this encounter: 1.791 m (5' 10.5\").    Weight as of this encounter: 86.2 kg (190 lb).  Medication Reconciliation: joshua Agosto LPN         Assessment & Plan       ICD-10-CM    1. Intractable chronic migraine without aura and with status migrainosus  G43.711 prochlorperazine (COMPAZINE) 10 MG tablet     ZOLMitriptan (ZOMIG) 5 MG tablet     topiramate (TOPAMAX) 25 MG tablet     tiZANidine (ZANAFLEX) 4 MG tablet     ketorolac (TORADOL) injection 60 mg     DISCONTINUED: ketorolac (TORADOL) injection 60 mg   2. Chronic tension-type headache, intractable  G44.221 tiZANidine (ZANAFLEX) 4 MG tablet     ketorolac (TORADOL) injection 60 mg   3. Facet arthropathy, cervical  M47.812    4. History of testicular cancer  Z85.47    5. Raynaud's phenomenon without gangrene  I73.00    6. MARLEN (generalized anxiety disorder)  F41.1    7. Need for Tdap vaccination  Z23 TDAP VACCINE (Adacel, Boostrix)  [[id number]]     History was a little circuitous.  He is looking for pain relief.  Was seen 2 weeks ago requesting opioids for neck pain and none were provided.  We discussed how opioids should not be used for headache and are not indicated for pain syndromes he is describing.  Additionally, he is on chronic benzodiazepines and the risk for overdose and death is multitudes higher.  Therefore opioids will not be " instituted.    His primary goal is headache treatment.  It sounds like he has both tension headache as well as migraine.  We discussed how the tension headache could certainly trigger migraines, given his migraine history.  Provided prochlorperazine to help nausea, Zomig to help treat migraine, tizanidine to help with tension headache, and shot of ketorolac 60 mg IM today.  He can take those 3 medications now after receiving the ketorolac injection and rest.  Should see some improvement in headache.    Reviewed recommendations for migraine prophylaxis.  Could use verapamil, but he is already on nifedipine.  Effexor could be used for tension and migraine headache, but he is already on Pristiq.  Topiramate or Depakote are best options.  Started on topiramate tonight.  25 mg each evening for 1 week then 25 mg twice daily.  May continue to utilize as needed prochlorperazine, Zomig, and tizanidine for the appropriate indications.    Plans to continue pursuing injection options and neck through [facility].    Due for Tdap, provided today      Follow up 2 weeks    40 minutes spent on the date of the encounter doing chart review, patient visit and documentation     James Archuleta MD     [facility]      SUBJECTIVE:  45 year old male presents for headache, neck pain, and left arm numbness.    He starts by telling me about his abnormal left arm EMG and neck injections, but greatest concern is actually headache.  Says the pain is leading him to become an alcoholic.  He reports drinking alcohol each day to deal with the pain.    History of left ulnar nerve transposition. Just had an EMG with Dr. Hayden ordered by [facility] that showed apparently ongoing left ulnar neuropathy.  Patient says he also had some radicular findings.  We do not have the EMG results.    [facility]4,5,[facility] that helped some neck pain temporarily, but did not provide any lasting  "relief and did not have his headache.    Currently has a 10 out of 10 headache.  History of migraines.  He has nausea and photophobia currently.  Headache seems to come from the left neck radiates forward on the left temple.  He does not have any triptan on his list.  Looking back in his chart he has been on prochlorperazine, sumatriptan, topiramate.  Currently on Pristiq for anxiety, reports Effexor did not help in the past.  Uses nifedipine for Raynaud's.  Reports having some pharmacogenomics testing through mental health provider.  On chronic clonazepam and alprazolam      REVIEW OF SYSTEMS:    Pertinent items are noted in HPI.    Current Outpatient Medications   Medication Sig Dispense Refill     albuterol (PROAIR HFA/PROVENTIL HFA/VENTOLIN HFA) 108 (90 Base) MCG/ACT inhaler Inhale 2 puffs into the lungs every 6 hours 1 Inhaler 0     ALPRAZolam (XANAX) 1 MG tablet TK ONE T PO QD PRF PANIC ATTACK  4     aspirin-acetaminophen-caffeine (EXCEDRIN MIGRAINE) 250-250-65 MG per tablet Take 2 tablets by mouth every 6 hours as needed for headaches Max acetaminophen dose: 4000 mg in 24 hours       clonazePAM (KLONOPIN) 1 MG tablet Take 1 mg by mouth 3 times daily       desvenlafaxine succinate (PRISTIQ) 100 MG 24 hr tablet Take 100 mg by mouth       NIFEdipine ER OSMOTIC (PROCARDIA XL) 90 MG 24 hr tablet TAKE 1 TABLET(90 MG) BY MOUTH DAILY 30 tablet 0     No Known Allergies    OBJECTIVE:  /74 (BP Location: Right arm, Patient Position: Sitting, Cuff Size: Adult Regular)   Pulse 80   Temp 97.4  F (36.3  C) (Temporal)   Resp 18   Ht 1.791 m (5' 10.5\")   Wt 86.2 kg (190 lb)   SpO2 98%   BMI 26.88 kg/m      EXAM:  General Appearance: Alert. No acute distress  Psychiatric: Normal affect and mentation  Musculoskeletal: Tenderness in left posterior neck muscles.  Left occipital tenderness.  Neurological: Cranial nerves II through XII intact.  No clear neurologic deficits.     "

## 2021-04-28 NOTE — PATIENT INSTRUCTIONS
Start topiramate 25 mg at bedtime for 1 week, then 25 mg twice daily    Toradol shot today  Then you can take the 3 following medications as well:  Tizanidine 4 mg as needed to help muscle spasm and headache   Compazine as needed for nausea  Zomig to help migraine headache    Avoid daily use of Zomig, only 3 times per week    Follow-up in 2 weeks

## 2021-04-29 ASSESSMENT — ANXIETY QUESTIONNAIRES: GAD7 TOTAL SCORE: 0

## 2021-05-04 ENCOUNTER — TELEPHONE (OUTPATIENT)
Dept: FAMILY MEDICINE | Facility: OTHER | Age: 46
End: 2021-05-04

## 2021-05-04 DIAGNOSIS — G44.221 CHRONIC TENSION-TYPE HEADACHE, INTRACTABLE: ICD-10-CM

## 2021-05-04 DIAGNOSIS — M47.812 FACET ARTHROPATHY, CERVICAL: ICD-10-CM

## 2021-05-04 DIAGNOSIS — G43.711 INTRACTABLE CHRONIC MIGRAINE WITHOUT AURA AND WITH STATUS MIGRAINOSUS: Primary | ICD-10-CM

## 2021-05-04 RX ORDER — ZOLMITRIPTAN 5 MG/1
5 TABLET, FILM COATED ORAL
Qty: 10 TABLET | Refills: 0 | Status: CANCELLED | OUTPATIENT
Start: 2021-05-04

## 2021-05-04 NOTE — TELEPHONE ENCOUNTER
How many zolmitriptan is he taking at a time?  He should not be using more than a couple times per week, but may use 10 mg at once, so we can increase to 10 mg dose if needed    As for injections, I would like records from the [facility] to know what is the next best option. We do not have the records from [facility].   He can see radiology at [facility] for injections, but they would also need to know where injections were placed.    Also, I can do a temporary novocain injection in the base of the skull to see if it helps before he sees radiology to know if that option is effective.     Best next step would be a clinic follow up this week to come up with a plan.

## 2021-05-04 NOTE — TELEPHONE ENCOUNTER
After proper verification writer spoke with patient who wanted to see about going through with the injections that you spoke with him about at his appointment on April 28. He stated that you talked about 3 shots. 1 at the base of his skull and 2 on the outer areas. He would like to get this process started ASA. He was very impressed with James erickson. Patient states the medications he was prescribed is helping and his zolmitriptan he is taking more than he thinks he should. He would like a refill as he only has 2 left. He rates the pain at about a 4/10. Continues to have complaints to nausea and vomiting. He would like to know if he can get in for injections here rather than going down to the [facility], which he is supposed to see on Thursday.   Please advise. Patient would like a call back today if possible.  Radha Alonzo LPN on 5/4/2021 at 11:31 AM

## 2021-05-04 NOTE — TELEPHONE ENCOUNTER
Notified patient that HIM has not delivered it yet but can take a couple days to rec'd.  Alisha Levi, GERALDINE ....................  5/4/2021   4:05 PM

## 2021-05-04 NOTE — TELEPHONE ENCOUNTER
Notified patient of providers note. Appointment made.  Alisha Levi LPN ....................  5/4/2021   2:25 PM

## 2021-05-04 NOTE — TELEPHONE ENCOUNTER
Pt is wondering if you received the faxed records that were sent from [facility].  Please call      Mark Jacques on 5/4/2021 at 3:13 PM

## 2021-05-04 NOTE — TELEPHONE ENCOUNTER
PBI-patient is looking for a call about injections in his neck    Please call and advise    Thank You  Franny Jones on 5/4/2021 at 11:01 AM

## 2021-05-05 ENCOUNTER — TELEPHONE (OUTPATIENT)
Dept: FAMILY MEDICINE | Facility: OTHER | Age: 46
End: 2021-05-05

## 2021-05-05 ENCOUNTER — OFFICE VISIT (OUTPATIENT)
Dept: FAMILY MEDICINE | Facility: OTHER | Age: 46
End: 2021-05-05
Attending: FAMILY MEDICINE
Payer: COMMERCIAL

## 2021-05-05 VITALS
SYSTOLIC BLOOD PRESSURE: 108 MMHG | RESPIRATION RATE: 16 BRPM | HEART RATE: 84 BPM | BODY MASS INDEX: 26.59 KG/M2 | OXYGEN SATURATION: 99 % | TEMPERATURE: 96.9 F | DIASTOLIC BLOOD PRESSURE: 62 MMHG | WEIGHT: 188 LBS

## 2021-05-05 DIAGNOSIS — M54.81 BILATERAL OCCIPITAL NEURALGIA: ICD-10-CM

## 2021-05-05 DIAGNOSIS — G44.221 CHRONIC TENSION-TYPE HEADACHE, INTRACTABLE: ICD-10-CM

## 2021-05-05 DIAGNOSIS — S16.1XXD STRAIN OF NECK MUSCLE, SUBSEQUENT ENCOUNTER: ICD-10-CM

## 2021-05-05 DIAGNOSIS — G43.711 INTRACTABLE CHRONIC MIGRAINE WITHOUT AURA AND WITH STATUS MIGRAINOSUS: Primary | ICD-10-CM

## 2021-05-05 PROCEDURE — 250N000009 HC RX 250: Performed by: FAMILY MEDICINE

## 2021-05-05 PROCEDURE — 99214 OFFICE O/P EST MOD 30 MIN: CPT | Mod: 25 | Performed by: FAMILY MEDICINE

## 2021-05-05 PROCEDURE — 20552 NJX 1/MLT TRIGGER POINT 1/2: CPT | Performed by: FAMILY MEDICINE

## 2021-05-05 RX ORDER — TOPIRAMATE 50 MG/1
50 TABLET, FILM COATED ORAL 2 TIMES DAILY
Qty: 60 TABLET | Refills: 0 | Status: SHIPPED | OUTPATIENT
Start: 2021-05-05 | End: 2021-05-10

## 2021-05-05 RX ORDER — CYCLOBENZAPRINE HCL 5 MG
5 TABLET ORAL DAILY PRN
Qty: 30 TABLET | Refills: 0 | Status: SHIPPED | OUTPATIENT
Start: 2021-05-05 | End: 2021-06-09

## 2021-05-05 RX ORDER — ZOLMITRIPTAN 5 MG/1
5 TABLET, FILM COATED ORAL PRN
Qty: 10 TABLET | Refills: 1 | Status: SHIPPED | OUTPATIENT
Start: 2021-05-05 | End: 2021-05-10

## 2021-05-05 RX ADMIN — LIDOCAINE HYDROCHLORIDE 5 ML: 10 INJECTION, SOLUTION INFILTRATION; PERINEURAL at 10:00

## 2021-05-05 ASSESSMENT — PAIN SCALES - GENERAL: PAINLEVEL: EXTREME PAIN (8)

## 2021-05-05 NOTE — PROGRESS NOTES
"Nursing Notes:   Pam Agosto LPN  5/5/2021  9:53 AM  Sign at exiting of workspace  Patient presents to the clinic for discussion about injections.      Chief Complaint   Patient presents with     Clinic Care Coordination - Follow-up       Initial /62 (BP Location: Right arm, Patient Position: Sitting, Cuff Size: Adult Regular)   Pulse 84   Temp 96.9  F (36.1  C) (Temporal)   Resp 16   Wt 85.3 kg (188 lb)   SpO2 99%   BMI 26.59 kg/m   Estimated body mass index is 26.59 kg/m  as calculated from the following:    Height as of 4/28/21: 1.791 m (5' 10.5\").    Weight as of this encounter: 85.3 kg (188 lb).  Medication Reconciliation: complete    Pam Agosto LPN         Assessment & Plan       ICD-10-CM    1. Intractable chronic migraine without aura and with status migrainosus  G43.711 topiramate (TOPAMAX) 50 MG tablet     ZOLMitriptan (ZOMIG) 5 MG tablet     US Occipital Nerve Block     CANCELED: XR Great Occipital Nerve Block Injection   2. Chronic tension-type headache, intractable  G44.221 cyclobenzaprine (FLEXERIL) 5 MG tablet     INJECTION SNGL/MULT TRIGGER POINT, 1 OR 2 MUSCLES   3. Strain of neck muscle, subsequent encounter  S16.1XXD cyclobenzaprine (FLEXERIL) 5 MG tablet     INJECTION SNGL/MULT TRIGGER POINT, 1 OR 2 MUSCLES   4. Bilateral occipital neuralgia  M54.81 US Occipital Nerve Block     CANCELED: XR Great Occipital Nerve Block Injection     On exam, palpation of occiput replicates pain. We previously discussed cervical rhizotomy vs occipital blocks. Based on pain today he would benefit from occipital block. Reviewed anatomy of nerves and typical occipital neuralgia.   He would like to try injections.  Recommend trial trigger injections today given severity of headaches and use of multiple medications for relief.  Reviewed risks and benefits of injection. Patient gave verbal informed consent to proceed.   PROCEDURE: Sites of maximal tenderness were cleansed with alcohol.  Trigger " point injection(s) with 2 mL of 1% plain lidocaine performed at 2 sites - L and R occiput. This was well tolerated, and followed by pain improvement.    Pain relief likely temporary. Referral to radiology for imaging guided occipital nerve injection.    Stop tizanidine, may use cyclobenzaprine instead. Cautioned about overuse with Pristiq. Only use daily as needed     Increase topiramate to 50 mg BID    Increased Zomig to 10 mg dose at a time, but only 3 times per week to avoid overuse.    Follow up in 2 weeks      35 minutes spent on the date of the encounter doing chart review, patient visit and documentation, separate from 7 minutes spent on trigger injections.     James Archuleta MD     [facility]      SUBJECTIVE:  45 year old male presents to follow up on headache and neck pain    Zomig helping headache, brings it down from 10 to 5. However he is often dosing twice daily  Compazine helps nausea  Tizanidine does not help. He took some of wife's cyclobenzaprine because he took it previously and felt it was useful without sedation.  No change on topiramate 25 mg BID. No side effects.    Was to see [facility] tomorrow about potential injections, but after we discussed options last week he would like to see what can be done at [facility].      REVIEW OF SYSTEMS:    Pertinent items are noted in HPI.    Current Outpatient Medications   Medication Sig Dispense Refill     albuterol (PROAIR HFA/PROVENTIL HFA/VENTOLIN HFA) 108 (90 Base) MCG/ACT inhaler Inhale 2 puffs into the lungs every 6 hours 1 Inhaler 0     ALPRAZolam (XANAX) 1 MG tablet TK ONE T PO QD PRF PANIC ATTACK  4     aspirin-acetaminophen-caffeine (EXCEDRIN MIGRAINE) 250-250-65 MG per tablet Take 2 tablets by mouth every 6 hours as needed for headaches Max acetaminophen dose: 4000 mg in 24 hours       clonazePAM (KLONOPIN) 1 MG tablet Take 1 mg by mouth 3 times daily       desvenlafaxine succinate (PRISTIQ) 100 MG 24 hr  tablet Take 100 mg by mouth       NIFEdipine ER OSMOTIC (PROCARDIA XL) 90 MG 24 hr tablet TAKE 1 TABLET(90 MG) BY MOUTH DAILY 30 tablet 0     prochlorperazine (COMPAZINE) 10 MG tablet Take 1 tablet (10 mg) by mouth every 6 hours as needed for nausea or vomiting 30 tablet 1     tiZANidine (ZANAFLEX) 4 MG tablet Take 1 tablet (4 mg) by mouth 3 times daily as needed for muscle spasms 60 tablet 1     topiramate (TOPAMAX) 25 MG tablet Take 1 tablet (25 mg) by mouth 2 times daily 60 tablet 0     ZOLMitriptan (ZOMIG) 5 MG tablet Take 1 tablet (5 mg) by mouth at onset of headache for migraine May repeat in 2 hours. Max 2 tablets/24 hours. 10 tablet 0     No Known Allergies    OBJECTIVE:  /62 (BP Location: Right arm, Patient Position: Sitting, Cuff Size: Adult Regular)   Pulse 84   Temp 96.9  F (36.1  C) (Temporal)   Resp 16   Wt 85.3 kg (188 lb)   SpO2 99%   BMI 26.59 kg/m      EXAM:  General Appearance: Alert. No acute distress  Psychiatric: Normal affect and mentation  Musculoskeletal: Tender in bilateral occipital region more than posterior neck muscles. Palpation to occiput radiates pain to temples.

## 2021-05-05 NOTE — PATIENT INSTRUCTIONS
Increase topiramate to 50 mg twice daily  May use cyclobenzaprine instead of tizanidine for muscle tension  OK to continue prochlorperazine to help with nausea  I increased the Zomig dose to 10 mg (2 of current pills). Do not use the Zomig (zolmitiptan) more than 3 times per week  Ordered occipital nerve injection with radiology  Follow up in 2 weeks

## 2021-05-05 NOTE — NURSING NOTE
"Patient presents to the clinic for discussion about injections.      Chief Complaint   Patient presents with     Clinic Care Coordination - Follow-up       Initial /62 (BP Location: Right arm, Patient Position: Sitting, Cuff Size: Adult Regular)   Pulse 84   Temp 96.9  F (36.1  C) (Temporal)   Resp 16   Wt 85.3 kg (188 lb)   SpO2 99%   BMI 26.59 kg/m   Estimated body mass index is 26.59 kg/m  as calculated from the following:    Height as of 4/28/21: 1.791 m (5' 10.5\").    Weight as of this encounter: 85.3 kg (188 lb).  Medication Reconciliation: complete    Pam Agosto LPN    "

## 2021-05-06 ENCOUNTER — TELEPHONE (OUTPATIENT)
Dept: FAMILY MEDICINE | Facility: OTHER | Age: 46
End: 2021-05-06

## 2021-05-06 NOTE — TELEPHONE ENCOUNTER
Insurance will be faxing over some paperwork regarding ZOLMitriptan (ZOMIG) and patient wants you to be on the lookout for it    It might come today or tomorrow    Please call as needed    Thank you

## 2021-05-10 ENCOUNTER — HOSPITAL ENCOUNTER (OUTPATIENT)
Dept: ULTRASOUND IMAGING | Facility: OTHER | Age: 46
Discharge: HOME | End: 2021-05-10
Attending: FAMILY MEDICINE | Admitting: FAMILY MEDICINE
Payer: COMMERCIAL

## 2021-05-10 ENCOUNTER — TELEPHONE (OUTPATIENT)
Dept: FAMILY MEDICINE | Facility: OTHER | Age: 46
End: 2021-05-10

## 2021-05-10 DIAGNOSIS — G43.711 INTRACTABLE CHRONIC MIGRAINE WITHOUT AURA AND WITH STATUS MIGRAINOSUS: ICD-10-CM

## 2021-05-10 DIAGNOSIS — G43.711 INTRACTABLE CHRONIC MIGRAINE WITHOUT AURA AND WITH STATUS MIGRAINOSUS: Primary | ICD-10-CM

## 2021-05-10 DIAGNOSIS — M54.81 BILATERAL OCCIPITAL NEURALGIA: ICD-10-CM

## 2021-05-10 PROCEDURE — 250N000009 HC RX 250: Performed by: RADIOLOGY

## 2021-05-10 PROCEDURE — 250N000011 HC RX IP 250 OP 636: Performed by: RADIOLOGY

## 2021-05-10 PROCEDURE — 64405 NJX AA&/STRD GR OCPL NRV: CPT

## 2021-05-10 RX ORDER — TRIAMCINOLONE ACETONIDE 40 MG/ML
40 INJECTION, SUSPENSION INTRA-ARTICULAR; INTRAMUSCULAR EVERY 5 MIN PRN
Status: COMPLETED | OUTPATIENT
Start: 2021-05-10 | End: 2021-05-10

## 2021-05-10 RX ORDER — TOPIRAMATE 25 MG/1
TABLET, FILM COATED ORAL
COMMUNITY
Start: 2021-04-28 | End: 2021-05-10

## 2021-05-10 RX ORDER — LIDOCAINE HYDROCHLORIDE 10 MG/ML
5 INJECTION, SOLUTION EPIDURAL; INFILTRATION; INTRACAUDAL; PERINEURAL ONCE
Status: COMPLETED | OUTPATIENT
Start: 2021-05-10 | End: 2021-05-05

## 2021-05-10 RX ORDER — SUMATRIPTAN 100 MG/1
100 TABLET, FILM COATED ORAL
Qty: 12 TABLET | Refills: 1 | Status: SHIPPED | OUTPATIENT
Start: 2021-05-10 | End: 2021-08-04

## 2021-05-10 RX ADMIN — LIDOCAINE HYDROCHLORIDE 10 ML: 10 INJECTION, SOLUTION INFILTRATION; PERINEURAL at 14:54

## 2021-05-10 RX ADMIN — TRIAMCINOLONE ACETONIDE 40 MG: 40 INJECTION, SUSPENSION INTRA-ARTICULAR; INTRAMUSCULAR at 14:59

## 2021-05-10 RX ADMIN — TRIAMCINOLONE ACETONIDE 40 MG: 40 INJECTION, SUSPENSION INTRA-ARTICULAR; INTRAMUSCULAR at 14:54

## 2021-05-10 NOTE — NURSING NOTE
Clinic Administered Medication Documentation      Injectable Medication Documentation    Patient was given Lidocaine 1%. Prior to medication administration, verified patients identity using patient s name and date of birth. Please see MAR and medication order for additional information. Patient instructed to remain in clinic for 15 minutes.      Was entire vial of medication used? Yes  Vial/Syringe: Single dose vial  Expiration Date:  02/01/2025  Was this medication supplied by the patient? No     Pam Agosto LPN 5/10/2021 10:44 AM

## 2021-05-10 NOTE — PROGRESS NOTES
Patient is here for bilateral occipital nerve block(s).     Prior to the start of the procedure, with procedural staff and physician participation, I verbally confirmed the patient s identity using two indicators, relevant allergies, that the procedure was appropriate and matched the consent or emergent situation, and that the correct equipment/implants were available. Immediately prior to starting the procedure I conducted the Time Out with the procedural staff and re-confirmed the patient s name, procedure, and site/side. (The Joint Commission universal protocol was followed.)  Yes.    Patient describes pain as: global tight Band, throbbing, pulsating, stabbing, pressure inside the head with the following associated symptoms: ear pain, eye pain, nausea, neck pain , stiff neck and phonophobia. Pain is present: continuously.     Pain pre-procedure: 9.5/10 on pain scale.  Pain post-procedure: 7/10 on pain scale.     This writer will call patient in approximately 1 week to assess benefit of these injections. Discharge instructions reviewed with patient. Discharged home.

## 2021-05-10 NOTE — DISCHARGE INSTRUCTIONS
OCCIPITAL NERVE BLOCK    Pain relief may be noted immediately. Duration of relief is variable. It may take 2-3 days for the steroid to begin to work.       Activity:   After your procedure take it easy for the rest of the day.  You should avoid heavy work or strenuous exercise for the first   few days. You can normally get back to work the next day.      Care of site:   Watch for signs/symptoms of infection such as redness, swelling,   or drainage from the needle site.     Comfort:  You may feel sore for a couple of days until the steroid begins to work. You may have some bruising at the injection site.   Continue taking your usual pain medication until you feel the benefit from injections.       Call your doctor: if fever over 101 degrees, increased redness, increased swelling, persistent drainage, foul smelling drainage, or    increased pain at injection site.    In the future, if you would like to have a subsequent injection, please contact your primary care provider.     If you have any questions, you can reach the [facility] Radiology Department at [phone]. Let the person who answers know  that you had an injection by a radiologist. Alternatively, you can reach the Imaging Nurse at [phone] Monday-Friday   8:00 am - 4:30 pm.     Botox injections are another possible option, as discussed with Dr. Grover. Most insurances require trial and failure of 2 drugs from 3 different categories of medications (beta blockers, calcium channel blockers, antidepressants and anti seizure medications). Your doctor may be able to answer more questions you may have about this.

## 2021-05-10 NOTE — IP AVS SNAPSHOT
[facility]  [address]  [place] [address]  Phone: [phone]  Fax: [phone]                                    After Visit Summary   5/10/2021    Dennis J Goodell    MRN: [id number]           After Visit Summary Signature Page    I have received my discharge instructions, and my questions have been answered. I have discussed any challenges I see with this plan with the nurse or doctor.    ..........................................................................................................................................  Patient/Patient Representative Signature      ..........................................................................................................................................  Patient Representative Print Name and Relationship to Patient    ..................................................               ................................................  Date                                   Time    ..........................................................................................................................................  Reviewed by Signature/Title    ...................................................              ..............................................  Date                                               Time          22EPIC Rev 08/18

## 2021-05-10 NOTE — TELEPHONE ENCOUNTER
He would like to Imitrex in pill form to [facility].  He gets  nausea and vomiting dry mouth and made his headache worse with Topiramate.  Hi mom had the same reaction to Topiramate.  Norma J. Gosselin, LPN .......  5/10/2021  1:34 PM

## 2021-05-10 NOTE — TELEPHONE ENCOUNTER
He should be on topiramate twice daily every day no matter what to help reduce migraines. It will not help when in a severe headache   If he is having a severe headache then Imitrex can be used instead of Zomig. Is this what he means? Would he take a pill or injection of Imitrex?

## 2021-05-10 NOTE — TELEPHONE ENCOUNTER
Patient needs to have Imitrex called into [facility] pharmacy instead of Topamax.    Patient is in a lot of pain at a level of 9.   Please call patient back.   Thank you   Emily Lindsay on 5/10/2021 at 11:38 AM

## 2021-05-12 NOTE — TELEPHONE ENCOUNTER
Allergy list updated, pharmacy confirms that patient did  the Imitrex prescription.      Pam Agosto LPN 5/12/2021 1:06 PM

## 2021-05-17 ENCOUNTER — TELEPHONE (OUTPATIENT)
Dept: ULTRASOUND IMAGING | Facility: OTHER | Age: 46
End: 2021-05-17

## 2021-05-17 NOTE — TELEPHONE ENCOUNTER
Call placed to patient to follow-up after recent radiology procedure.     Patient states he feels 100% better.     Patient encouraged to follow-up with primary care provider for any new concerns or needs.    Patient verbalized understanding of how to reach appointment desk to schedule future appointments.

## 2021-06-08 DIAGNOSIS — G43.711 INTRACTABLE CHRONIC MIGRAINE WITHOUT AURA AND WITH STATUS MIGRAINOSUS: ICD-10-CM

## 2021-06-08 DIAGNOSIS — G44.221 CHRONIC TENSION-TYPE HEADACHE, INTRACTABLE: ICD-10-CM

## 2021-06-08 DIAGNOSIS — S16.1XXD STRAIN OF NECK MUSCLE, SUBSEQUENT ENCOUNTER: ICD-10-CM

## 2021-06-09 RX ORDER — PROCHLORPERAZINE MALEATE 10 MG
TABLET ORAL
Qty: 30 TABLET | Refills: 1 | Status: SHIPPED | OUTPATIENT
Start: 2021-06-09 | End: 2022-10-01

## 2021-06-09 RX ORDER — CYCLOBENZAPRINE HCL 5 MG
TABLET ORAL
Qty: 30 TABLET | Refills: 0 | Status: SHIPPED | OUTPATIENT
Start: 2021-06-09 | End: 2021-07-05

## 2021-06-09 NOTE — TELEPHONE ENCOUNTER
[facility] sent Rx request for the following:      PROCHLORPERAZINE 10MG TABLETS  Sig: TAKE 1 TABLET BY MOUTH EVERY 6 HOURS AS NEEDED NAUSEA OR VOMITING      Last Prescription Date:   4/28/2021  Last Fill Qty/Refills:         30, R-1    Last Office Visit:              5/5/2021   Future Office visit:           None    Prescription refilled per RN Medication Refill Policy.................... Chintan Peterson RN ....................  6/9/2021   2:49 PM       CYCLOBENZAPRINE 5MG TABLETS  Sig: TAKE 1 TABLET(5 MG) BY MOUTH DAILY AS NEEDED FOR MUSCLE SPASMS      Last Prescription Date:   5/5/2021  Last Fill Qty/Refills:         30, R-0        Routing refill request to provider for review/approval because:  Drug not on the [facility] or [facility] refill protocol or controlled substance    Unable to complete prescription refill per RN Medication Refill Policy.................... Chintan Peterson RN ....................  6/9/2021   2:49 PM

## 2021-07-05 DIAGNOSIS — S16.1XXD STRAIN OF NECK MUSCLE, SUBSEQUENT ENCOUNTER: ICD-10-CM

## 2021-07-05 DIAGNOSIS — G44.221 CHRONIC TENSION-TYPE HEADACHE, INTRACTABLE: ICD-10-CM

## 2021-07-05 RX ORDER — CYCLOBENZAPRINE HCL 5 MG
TABLET ORAL
Qty: 10 TABLET | Refills: 0 | Status: SHIPPED | OUTPATIENT
Start: 2021-07-05 | End: 2022-11-18

## 2021-07-05 NOTE — TELEPHONE ENCOUNTER
[facility] sent Rx request for the following:      CYCLOBENZAPRINE 5MG TABLETS  Sig: TAKE 1 TABLET(5 MG) BY MOUTH DAILY AS NEEDED FOR MUSCLE SPASMS      Last Prescription Date:   6/9/2021  Last Fill Qty/Refills:         30, R-0    Last Office Visit:              5/5/2021   Future Office visit:           none    Routing refill request to provider for review/approval because:  Drug not on the [facility], [facility] or [facility] refill protocol or controlled substance    Unable to complete prescription refill per RN Medication Refill Policy.................... Chintan Peterson RN ....................  7/5/2021   3:15 PM

## 2021-07-07 ENCOUNTER — TELEPHONE (OUTPATIENT)
Dept: FAMILY MEDICINE | Facility: OTHER | Age: 46
End: 2021-07-07

## 2021-07-07 DIAGNOSIS — M54.81 BILATERAL OCCIPITAL NEURALGIA: Primary | ICD-10-CM

## 2021-07-07 NOTE — TELEPHONE ENCOUNTER
After verifying patient last name and date of birth, patient was given the below information.  Britney Murphy on 7/7/2021 at 3:49 PM

## 2021-07-07 NOTE — TELEPHONE ENCOUNTER
Patient called radiology scheduling stating that he had an occipital nerve block done in May and is hoping to have another. Please place order if appropriate.     Silva King on 7/7/2021 at 9:44 AM

## 2021-07-15 ENCOUNTER — HOSPITAL ENCOUNTER (OUTPATIENT)
Dept: ULTRASOUND IMAGING | Facility: OTHER | Age: 46
Discharge: HOME | End: 2021-07-15
Attending: FAMILY MEDICINE | Admitting: FAMILY MEDICINE
Payer: COMMERCIAL

## 2021-07-15 DIAGNOSIS — M54.81 BILATERAL OCCIPITAL NEURALGIA: ICD-10-CM

## 2021-07-15 PROCEDURE — 250N000009 HC RX 250: Performed by: FAMILY MEDICINE

## 2021-07-15 PROCEDURE — 76942 ECHO GUIDE FOR BIOPSY: CPT

## 2021-07-15 PROCEDURE — 64405 NJX AA&/STRD GR OCPL NRV: CPT

## 2021-07-15 PROCEDURE — 250N000011 HC RX IP 250 OP 636: Performed by: FAMILY MEDICINE

## 2021-07-15 RX ORDER — TRIAMCINOLONE ACETONIDE 40 MG/ML
40 INJECTION, SUSPENSION INTRA-ARTICULAR; INTRAMUSCULAR EVERY 5 MIN PRN
Status: COMPLETED | OUTPATIENT
Start: 2021-07-15 | End: 2021-07-15

## 2021-07-15 RX ADMIN — TRIAMCINOLONE ACETONIDE 40 MG: 40 INJECTION, SUSPENSION INTRA-ARTICULAR; INTRAMUSCULAR at 14:41

## 2021-07-15 RX ADMIN — TRIAMCINOLONE ACETONIDE 40 MG: 40 INJECTION, SUSPENSION INTRA-ARTICULAR; INTRAMUSCULAR at 14:40

## 2021-07-15 RX ADMIN — LIDOCAINE HYDROCHLORIDE 10 ML: 10 INJECTION, SOLUTION INFILTRATION; PERINEURAL at 14:40

## 2021-07-15 NOTE — PROGRESS NOTES
Patient is here for bilateral occipital nerve block(s).     Prior to the start of the procedure, with procedural staff and physician participation, I verbally confirmed the patient s identity using two indicators, relevant allergies, that the procedure was appropriate and matched the consent or emergent situation, and that the correct equipment/implants were available. Immediately prior to starting the procedure I conducted the Time Out with the procedural staff and re-confirmed the patient s name, procedure, and site/side. (The Joint Commission universal protocol was followed.)  Yes.    Patient describes pain as: Dull aching, throbbing, pulsating, and behind right eye with the following associated symptoms: neck pain  and stiff neck. Pain is present: constant, gets worse through the day.     Pain pre-procedure: 6/10 on pain scale.  Pain post-procedure: 5/10 on pain scale.     This writer will call patient in approximately 1 week to assess benefit of these injections. Discharge instructions reviewed with patient. Discharged home.

## 2021-07-22 ENCOUNTER — TELEPHONE (OUTPATIENT)
Dept: MRI IMAGING | Facility: OTHER | Age: 46
End: 2021-07-22

## 2021-07-22 NOTE — TELEPHONE ENCOUNTER
Patient stated that he hurt at the injection site for about 7 days. He states that his pain right now is 2/10. He requests that the lidocaine should have more time to work before the steroid is injected. He stated he has great pain relief, and will reach out to his provider for a new order to have a repeat injection in 12 weeks.     Yisel Huff RN on 7/22/2021 at 9:47 AM

## 2021-08-02 ENCOUNTER — TELEPHONE (OUTPATIENT)
Dept: FAMILY MEDICINE | Facility: OTHER | Age: 46
End: 2021-08-02

## 2021-08-02 NOTE — TELEPHONE ENCOUNTER
Patient would like consideration of Cortisone facet injections and occipital nerve injections at the same time.  Patient recently had recent occipital injections with minimal effect.      [place]ation [place] [facility] [place] scanned into chart dated 4-.      Pam Agosto LPN 8/2/2021 12:28 PM

## 2021-08-02 NOTE — TELEPHONE ENCOUNTER
Call patient to discuss an order for an injection. Previous injections in neck did not last.     Fiona Buitrago on 8/2/2021 at 8:34 AM

## 2021-08-02 NOTE — TELEPHONE ENCOUNTER
We can discuss this at an appointment, I will need to do some research on this request and discuss at an appointment

## 2021-08-03 ENCOUNTER — TELEPHONE (OUTPATIENT)
Dept: FAMILY MEDICINE | Facility: OTHER | Age: 46
End: 2021-08-03

## 2021-08-03 DIAGNOSIS — G43.711 INTRACTABLE CHRONIC MIGRAINE WITHOUT AURA AND WITH STATUS MIGRAINOSUS: ICD-10-CM

## 2021-08-03 NOTE — TELEPHONE ENCOUNTER
Reason for call: Medication or medication refill    Name of medication requested: SUMAtriptan    Are you out of the medication? Yes - Pain level at an 8    What pharmacy do you use? [facility]    Preferred method for responding to this message: Telephone Call    Phone number patient can be reached at: Cell number on file:    Telephone Information:   Mobile [phone]       If we cannot reach you directly, may we leave a detailed response at the number you provided? Will have phone with him

## 2021-08-04 RX ORDER — SUMATRIPTAN 100 MG/1
100 TABLET, FILM COATED ORAL
Qty: 12 TABLET | Refills: 1 | Status: SHIPPED | OUTPATIENT
Start: 2021-08-04 | End: 2022-10-01

## 2021-08-04 NOTE — TELEPHONE ENCOUNTER
The patient called stating the pain is now at a 10 today.  Migraine is behind and above his eyes right side temple.

## 2021-08-09 ENCOUNTER — OFFICE VISIT (OUTPATIENT)
Dept: FAMILY MEDICINE | Facility: OTHER | Age: 46
End: 2021-08-09
Attending: FAMILY MEDICINE
Payer: COMMERCIAL

## 2021-08-09 VITALS
BODY MASS INDEX: 24.47 KG/M2 | WEIGHT: 173 LBS | RESPIRATION RATE: 18 BRPM | SYSTOLIC BLOOD PRESSURE: 112 MMHG | HEART RATE: 96 BPM | TEMPERATURE: 97.3 F | OXYGEN SATURATION: 98 % | DIASTOLIC BLOOD PRESSURE: 70 MMHG

## 2021-08-09 DIAGNOSIS — G44.221 CHRONIC TENSION-TYPE HEADACHE, INTRACTABLE: ICD-10-CM

## 2021-08-09 DIAGNOSIS — G43.711 INTRACTABLE CHRONIC MIGRAINE WITHOUT AURA AND WITH STATUS MIGRAINOSUS: Primary | ICD-10-CM

## 2021-08-09 DIAGNOSIS — M54.12 LEFT CERVICAL RADICULOPATHY: ICD-10-CM

## 2021-08-09 DIAGNOSIS — S16.1XXD STRAIN OF NECK MUSCLE, SUBSEQUENT ENCOUNTER: ICD-10-CM

## 2021-08-09 PROCEDURE — 99214 OFFICE O/P EST MOD 30 MIN: CPT | Performed by: FAMILY MEDICINE

## 2021-08-09 RX ORDER — AMITRIPTYLINE HYDROCHLORIDE 10 MG/1
10 TABLET ORAL AT BEDTIME
Qty: 30 TABLET | Refills: 1 | Status: SHIPPED | OUTPATIENT
Start: 2021-08-09 | End: 2022-10-01

## 2021-08-09 RX ORDER — CYCLOBENZAPRINE HCL 5 MG
5 TABLET ORAL DAILY PRN
Qty: 10 TABLET | Status: CANCELLED | OUTPATIENT
Start: 2021-08-09

## 2021-08-09 ASSESSMENT — ANXIETY QUESTIONNAIRES
GAD7 TOTAL SCORE: 19
GAD7 TOTAL SCORE: 19
4. TROUBLE RELAXING: NEARLY EVERY DAY
1. FEELING NERVOUS, ANXIOUS, OR ON EDGE: NEARLY EVERY DAY
2. NOT BEING ABLE TO STOP OR CONTROL WORRYING: NEARLY EVERY DAY
3. WORRYING TOO MUCH ABOUT DIFFERENT THINGS: NEARLY EVERY DAY
7. FEELING AFRAID AS IF SOMETHING AWFUL MIGHT HAPPEN: SEVERAL DAYS
GAD7 TOTAL SCORE: 19
6. BECOMING EASILY ANNOYED OR IRRITABLE: NEARLY EVERY DAY
5. BEING SO RESTLESS THAT IT IS HARD TO SIT STILL: NEARLY EVERY DAY
7. FEELING AFRAID AS IF SOMETHING AWFUL MIGHT HAPPEN: SEVERAL DAYS
8. IF YOU CHECKED OFF ANY PROBLEMS, HOW DIFFICULT HAVE THESE MADE IT FOR YOU TO DO YOUR WORK, TAKE CARE OF THINGS AT HOME, OR GET ALONG WITH OTHER PEOPLE?: EXTREMELY DIFFICULT

## 2021-08-09 ASSESSMENT — PAIN SCALES - GENERAL: PAINLEVEL: SEVERE PAIN (7)

## 2021-08-09 ASSESSMENT — PATIENT HEALTH QUESTIONNAIRE - PHQ9
SUM OF ALL RESPONSES TO PHQ QUESTIONS 1-9: 15
SUM OF ALL RESPONSES TO PHQ QUESTIONS 1-9: 15
10. IF YOU CHECKED OFF ANY PROBLEMS, HOW DIFFICULT HAVE THESE PROBLEMS MADE IT FOR YOU TO DO YOUR WORK, TAKE CARE OF THINGS AT HOME, OR GET ALONG WITH OTHER PEOPLE: EXTREMELY DIFFICULT

## 2021-08-09 NOTE — NURSING NOTE
"Patient presents to the clinic for follow up with migraine.    FOOD SECURITY SCREENING QUESTIONS  Hunger Vital Signs:  Within the past 12 months we worried whether our food would run out before we got money to buy more. Never  Within the past 12 months the food we bought just didn't last and we didn't have money to get more. Never    Advance Care Directive on file? No  Advance Care Directive provided to patient? Declined.    Chief Complaint   Patient presents with     Clinic Care Coordination - Follow-up       Initial /70 (BP Location: Right arm, Patient Position: Sitting, Cuff Size: Adult Regular)   Pulse 96   Temp 97.3  F (36.3  C) (Temporal)   Resp 18   Wt 78.5 kg (173 lb)   SpO2 98%   BMI 24.47 kg/m   Estimated body mass index is 24.47 kg/m  as calculated from the following:    Height as of 4/28/21: 1.791 m (5' 10.5\").    Weight as of this encounter: 78.5 kg (173 lb).  Medication Reconciliation: complete    Pam Agosto LPN       "

## 2021-08-09 NOTE — PATIENT INSTRUCTIONS
Start amitriptyline 10 mg at bedtime  Check in with Manuel about interaction with Pristiq and for the genetic testing on what medications are best    Ordered cervical epidural injection  If that is not helpful, we can consider nerve burning procedure

## 2021-08-09 NOTE — PROGRESS NOTES
"Nursing Notes:   Pam Agosto LPN  8/9/2021  9:51 AM  Sign at exiting of workspace  Patient presents to the clinic for follow up with migraine.    FOOD SECURITY SCREENING QUESTIONS  Hunger Vital Signs:  Within the past 12 months we worried whether our food would run out before we got money to buy more. Never  Within the past 12 months the food we bought just didn't last and we didn't have money to get more. Never    Advance Care Directive on file? No  Advance Care Directive provided to patient? Declined.    Chief Complaint   Patient presents with     Clinic Care Coordination - Follow-up       Initial /70 (BP Location: Right arm, Patient Position: Sitting, Cuff Size: Adult Regular)   Pulse 96   Temp 97.3  F (36.3  C) (Temporal)   Resp 18   Wt 78.5 kg (173 lb)   SpO2 98%   BMI 24.47 kg/m   Estimated body mass index is 24.47 kg/m  as calculated from the following:    Height as of 4/28/21: 1.791 m (5' 10.5\").    Weight as of this encounter: 78.5 kg (173 lb).  Medication Reconciliation: complete    Pam Agosto LPN            Assessment & Plan       ICD-10-CM    1. Intractable chronic migraine without aura and with status migrainosus  G43.711 amitriptyline (ELAVIL) 10 MG tablet   2. Chronic tension-type headache, intractable  G44.221 amitriptyline (ELAVIL) 10 MG tablet   3. Strain of neck muscle, subsequent encounter  S16.1XXD    4. Left cervical radiculopathy  M54.12 XR Cervical/Thoracic Epidural Inj Incl Imaging     We spent a good deal of time discussing his multiple issues including migraine, tension headache, as well as cervical radiculopathy.  I do not have the EMG results, but the left arm numbness has some component of ulnar neuropathy as well as radiculopathy.    He is wondering about occipital nerve blocks, which have provided temporary relief.  Rhizotomy may be a better option, but a lot of his headache is driven by neck pain.  Based upon radicular symptoms, recommend cervical epidural " injection.  This could help pain as well as radicular symptoms.    Depending on results from the epidural, could consider other injection treatments.  He would like to avoid surgery for radiculopathy.     As for migraine treatment, recommend first dealing with the radiculopathy.  Possibly if his neck pain improves, he will have less migraine flares.  Reviewed options for migraine treatment.  He did not tolerate topiramate.  Depakote could be used.  Consider verapamil, but blood pressure is on the low side and he uses nifedipine for Raynaud's.  Already on Pristiq, so Effexor could not be used.  Gabapentin last in 2012, does not recall benefit or side effects.  His mother had benefit with amitriptyline.  Start amitriptyline 10 mg each evening.  We discussed the potential for serotonin syndrome with Pristiq.  He needs to discuss use of amitriptyline with [facility].  If his prescriber there is comfortable, amitriptyline could be increased.  Patient will track down his pharmacogenomics testing obtained through [facility] for my information so we can consider other options for treatment such as gabapentin    Follow up after epidural    34 minutes spent on the date of the encounter doing chart review, interpretation of tests, patient visit and documentation     James Archuleta MD     [facility]      SUBJECTIVE:  46 year old male presents to follow up on chronic headaches and neck pain    Last seen May 5.  Started topiramate the week prior and saw no results. A few days after appointment reported nausea, vomiting and worse headache on topiramate. Stopped topiramate.  Apparently has mother who has migraines had similar problems with topiramate.  She recommends amitriptyline as that has worked for her.    Also given tizanidine, but has not really helped.  He brought in a bottle of cyclobenzaprine, but that has not really helped either.    Had occipital nerve block May 10  with benefit for 2 months. Repeated occipital nerve block July 15 with less benefit.  April 20, 2021 had left C2-C6 facet injections at [facility] in the [facility], but that did not provide much benefit.    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  His wife confronted him and he has now quit drinking.  Started AA.  Attending regularly.  Feels that he does not need medications to help with sobriety.  Motivated to remain sober.    Left arm going to sleep often.  Sometimes it is positional.  Does have a history of ulnar nerve transposition, but apparently is again dealing with ulnar nerve symptoms based on EMG from late 2020 ordered by [facility].  The EMG also showed some radicular etiology for his symptoms.  He wants to avoid neck surgery.    Seeing Sammy Stover at [facility].  Due to medication intolerance, had pharmacogenomics testing several years ago.  Those results are not known.      REVIEW OF SYSTEMS:    Pertinent items are noted in HPI.    Current Outpatient Medications   Medication Sig Dispense Refill     albuterol (PROAIR HFA/PROVENTIL HFA/VENTOLIN HFA) 108 (90 Base) MCG/ACT inhaler Inhale 2 puffs into the lungs every 6 hours 1 Inhaler 0     ALPRAZolam (XANAX) 1 MG tablet TK ONE T PO QD PRF PANIC ATTACK  4     aspirin-acetaminophen-caffeine (EXCEDRIN MIGRAINE) 250-250-65 MG per tablet Take 2 tablets by mouth every 6 hours as needed for headaches Max acetaminophen dose: 4000 mg in 24 hours       clonazePAM (KLONOPIN) 1 MG tablet Take 1 mg by mouth 3 times daily       cyclobenzaprine (FLEXERIL) 5 MG tablet TAKE 1 TABLET(5 MG) BY MOUTH DAILY AS NEEDED FOR MUSCLE SPASMS 10 tablet 0     desvenlafaxine succinate (PRISTIQ) 100 MG 24 hr tablet Take 100 mg by mouth       NIFEdipine ER OSMOTIC (PROCARDIA XL) 90 MG 24 hr tablet TAKE 1 TABLET(90 MG) BY MOUTH DAILY 30 tablet 0     prochlorperazine (COMPAZINE) 10 MG tablet TAKE 1 TABLET BY MOUTH EVERY 6 HOURS AS NEEDED NAUSEA OR VOMITING 30 tablet 1      SUMAtriptan (IMITREX) 100 MG tablet Take 1 tablet (100 mg) by mouth at onset of headache for migraine Max 3 tablets per week 12 tablet 1     tiZANidine (ZANAFLEX) 4 MG tablet Take 1 tablet (4 mg) by mouth 3 times daily as needed for muscle spasms       Allergies   Allergen Reactions     Topiramate Headache and Nausea and Vomiting       OBJECTIVE:  /70 (BP Location: Right arm, Patient Position: Sitting, Cuff Size: Adult Regular)   Pulse 96   Temp 97.3  F (36.3  C) (Temporal)   Resp 18   Wt 78.5 kg (173 lb)   SpO2 98%   BMI 24.47 kg/m      EXAM:  General Appearance: Pleasant, alert, appropriate appearance for age. No acute distress  Musculoskeletal Exam: Tender in posterior neck muscles on the left.  Neurologic Exam: reflexes 2+, strength slightly weak in left  strength.  Other upper extremity strength testing is 5/5  Psychiatric: Normal affect and mentation       Answers for HPI/ROS submitted by the patient on 8/9/2021  If you checked off any problems, how difficult have these problems made it for you to do your work, take care of things at home, or get along with other people?: Extremely difficult  PHQ9 TOTAL SCORE: 15  MARLEN 7 TOTAL SCORE: 19

## 2021-08-10 ASSESSMENT — PATIENT HEALTH QUESTIONNAIRE - PHQ9: SUM OF ALL RESPONSES TO PHQ QUESTIONS 1-9: 15

## 2021-08-10 ASSESSMENT — ANXIETY QUESTIONNAIRES: GAD7 TOTAL SCORE: 19

## 2021-08-20 ENCOUNTER — HOSPITAL ENCOUNTER (OUTPATIENT)
Dept: GENERAL RADIOLOGY | Facility: OTHER | Age: 46
Discharge: HOME | End: 2021-08-20
Attending: FAMILY MEDICINE | Admitting: FAMILY MEDICINE
Payer: COMMERCIAL

## 2021-08-20 DIAGNOSIS — M54.12 LEFT CERVICAL RADICULOPATHY: ICD-10-CM

## 2021-08-20 PROCEDURE — 250N000011 HC RX IP 250 OP 636: Performed by: RADIOLOGY

## 2021-08-20 PROCEDURE — 255N000002 HC RX 255 OP 636: Performed by: RADIOLOGY

## 2021-08-20 PROCEDURE — 250N000009 HC RX 250: Performed by: RADIOLOGY

## 2021-08-20 PROCEDURE — 62321 NJX INTERLAMINAR CRV/THRC: CPT

## 2021-08-20 RX ORDER — DEXAMETHASONE SODIUM PHOSPHATE 10 MG/ML
10 INJECTION, SOLUTION INTRAMUSCULAR; INTRAVENOUS ONCE
Status: COMPLETED | OUTPATIENT
Start: 2021-08-20 | End: 2021-08-20

## 2021-08-20 RX ORDER — LIDOCAINE HYDROCHLORIDE 10 MG/ML
2 INJECTION, SOLUTION INFILTRATION; PERINEURAL ONCE
Status: COMPLETED | OUTPATIENT
Start: 2021-08-20 | End: 2021-08-20

## 2021-08-20 RX ADMIN — IOHEXOL 4 ML: 240 INJECTION, SOLUTION INTRATHECAL; INTRAVASCULAR; INTRAVENOUS; ORAL at 10:43

## 2021-08-20 RX ADMIN — DEXAMETHASONE SODIUM PHOSPHATE 10 MG: 10 INJECTION, SOLUTION INTRAMUSCULAR; INTRAVENOUS at 10:43

## 2021-08-20 RX ADMIN — LIDOCAINE HYDROCHLORIDE 2 ML: 10 INJECTION, SOLUTION INFILTRATION; PERINEURAL at 10:43

## 2021-10-03 ENCOUNTER — HEALTH MAINTENANCE LETTER (OUTPATIENT)
Age: 46
End: 2021-10-03

## 2021-10-15 DIAGNOSIS — Z00.00 PHYSICAL EXAM, ANNUAL: ICD-10-CM

## 2021-10-18 RX ORDER — NIFEDIPINE 90 MG/1
TABLET, EXTENDED RELEASE ORAL
Qty: 90 TABLET | Refills: 0 | Status: SHIPPED | OUTPATIENT
Start: 2021-10-18 | End: 2022-10-23

## 2021-10-18 NOTE — TELEPHONE ENCOUNTER
NIFEdipine ER OSMOTIC (PROCARDIA XL) 90 MG 24 hr tablet     Sig: TAKE 1 TABLET(90 MG) BY MOUTH DAILY           Last Written Prescription Date:  12/1/20  Last Fill Quantity: 30,   # refills: 0  Last Office Visit: 8/9/21  Future Office visit:       Routing refill request to provider for review/approval because:  Drug not on the [facility], [facility] or [facility] refill protocol or controlled substance Angela Swanson RN on 10/18/2021 at 3:25 PM

## 2021-10-21 ENCOUNTER — TELEPHONE (OUTPATIENT)
Dept: FAMILY MEDICINE | Facility: OTHER | Age: 46
End: 2021-10-21

## 2021-10-21 DIAGNOSIS — M54.12 LEFT CERVICAL RADICULOPATHY: ICD-10-CM

## 2021-10-21 DIAGNOSIS — M47.812 FACET ARTHROPATHY, CERVICAL: ICD-10-CM

## 2021-10-21 DIAGNOSIS — M54.81 BILATERAL OCCIPITAL NEURALGIA: Primary | ICD-10-CM

## 2021-10-21 NOTE — TELEPHONE ENCOUNTER
States he is having headaches again and would like to have injections done. Please call to discuss

## 2021-10-25 NOTE — TELEPHONE ENCOUNTER
Does he want injections from radiology? Just had a cervical epidural 2 months ago, so it is too soon to repeat the most recent injection  The occipital injection (back of his head) was last in July, so that could be performed if that is what he is looking for.

## 2021-10-27 NOTE — TELEPHONE ENCOUNTER
The occipital injection and cortisone shot between C3-C7 from when he was at [facility].  Norma J. Gosselin, LPN .......  10/27/2021  12:33 PM    Call his work number when scheduling [phone].

## 2021-11-23 ENCOUNTER — HOSPITAL ENCOUNTER (OUTPATIENT)
Dept: GENERAL RADIOLOGY | Facility: OTHER | Age: 46
Discharge: HOME | End: 2021-11-23
Attending: FAMILY MEDICINE | Admitting: FAMILY MEDICINE
Payer: COMMERCIAL

## 2021-11-23 DIAGNOSIS — M54.12 LEFT CERVICAL RADICULOPATHY: ICD-10-CM

## 2021-11-23 DIAGNOSIS — M47.812 FACET ARTHROPATHY, CERVICAL: ICD-10-CM

## 2021-11-23 PROCEDURE — 250N000011 HC RX IP 250 OP 636: Performed by: RADIOLOGY

## 2021-11-23 PROCEDURE — 255N000002 HC RX 255 OP 636: Performed by: RADIOLOGY

## 2021-11-23 PROCEDURE — 250N000009 HC RX 250: Performed by: RADIOLOGY

## 2021-11-23 PROCEDURE — 62321 NJX INTERLAMINAR CRV/THRC: CPT

## 2021-11-23 RX ORDER — BETAMETHASONE SODIUM PHOSPHATE AND BETAMETHASONE ACETATE 3; 3 MG/ML; MG/ML
2 INJECTION, SUSPENSION INTRA-ARTICULAR; INTRALESIONAL; INTRAMUSCULAR; SOFT TISSUE ONCE
Status: COMPLETED | OUTPATIENT
Start: 2021-11-23 | End: 2021-11-23

## 2021-11-23 RX ORDER — LIDOCAINE HYDROCHLORIDE 10 MG/ML
10 INJECTION, SOLUTION INFILTRATION; PERINEURAL ONCE
Status: COMPLETED | OUTPATIENT
Start: 2021-11-23 | End: 2021-11-23

## 2021-11-23 RX ADMIN — IOHEXOL 2 ML: 240 INJECTION, SOLUTION INTRATHECAL; INTRAVASCULAR; INTRAVENOUS; ORAL at 10:09

## 2021-11-23 RX ADMIN — LIDOCAINE HYDROCHLORIDE 3 ML: 10 INJECTION, SOLUTION INFILTRATION; PERINEURAL at 10:08

## 2021-11-23 RX ADMIN — BETAMETHASONE SODIUM PHOSPHATE AND BETAMETHASONE ACETATE 2 ML: 3; 3 INJECTION, SUSPENSION INTRA-ARTICULAR; INTRALESIONAL; INTRAMUSCULAR at 10:10

## 2022-01-22 ENCOUNTER — HEALTH MAINTENANCE LETTER (OUTPATIENT)
Age: 47
End: 2022-01-22

## 2022-09-04 ENCOUNTER — HEALTH MAINTENANCE LETTER (OUTPATIENT)
Age: 47
End: 2022-09-04

## 2022-10-01 ENCOUNTER — VIRTUAL VISIT (OUTPATIENT)
Dept: FAMILY MEDICINE | Facility: OTHER | Age: 47
End: 2022-10-01
Attending: FAMILY MEDICINE
Payer: COMMERCIAL

## 2022-10-01 VITALS — BODY MASS INDEX: 25.46 KG/M2 | WEIGHT: 180 LBS | TEMPERATURE: 98.7 F

## 2022-10-01 DIAGNOSIS — U07.1 INFECTION DUE TO 2019 NOVEL CORONAVIRUS: Primary | ICD-10-CM

## 2022-10-01 PROCEDURE — 99441 PR PHYSICIAN TELEPHONE EVALUATION 5-10 MIN: CPT | Performed by: FAMILY MEDICINE

## 2022-10-01 RX ORDER — NAPROXEN SODIUM 220 MG
440 TABLET ORAL
COMMUNITY
End: 2022-11-18

## 2022-10-01 ASSESSMENT — PAIN SCALES - GENERAL: PAINLEVEL: NO PAIN (0)

## 2022-10-01 NOTE — PROGRESS NOTES
Reggie is a 47 year old who is being evaluated via a billable telephone visit.      What phone number would you like to be contacted at? [phone]  How would you like to obtain your AVS? MyChart    Assessment & Plan     Infection due to 2019 novel coronavirus  Discussed options and he elected to start treatment.  Paxlovid was sent to his pharmacy.  He uses Procardia as needed for Raynaud's and we discussed that he should not use this for the next 5 days.  Discussed quarantine recommendations.  Follow-up as needed.  - nirmatrelvir and ritonavir (PAXLOVID) therapy pack; Take 3 tablets by mouth 2 times daily for 5 days (Take 2 Nirmatrelvir tablets and 1 Ritonavir tablet twice daily for 5 days)                 No follow-ups on file.    Zheng Chakraborty  [facility]    Subjective   Reggie is a 47 year old, presenting for the following health issues:  Covid Concern (Positive Covid yesterday x 3 days, sinus drainage, cough, body aches)    Tested positive 2 days ago.    HPI           Review of Systems         Objective           Vitals:  No vitals were obtained today due to virtual visit.    Physical Exam   healthy, alert and no distress  PSYCH: Alert and oriented times 3; coherent speech, normal   rate and volume, able to articulate logical thoughts, able   to abstract reason, no tangential thoughts, no hallucinations   or delusions  His affect is normal  RESP: No cough, no audible wheezing, able to talk in full sentences  Remainder of exam unable to be completed due to telephone visits                Phone call duration: 5 minutes

## 2022-10-01 NOTE — NURSING NOTE
Patient experiencing sinus drainage, cough and body aches for past 3 days.  He did have positive covid test yesterday.  Patient requesting anti viral medication be sent to [facility] pharmacy.    Uyen Rock LPN............10/1/2022 11:11 AM

## 2022-10-03 ENCOUNTER — TELEPHONE (OUTPATIENT)
Dept: FAMILY MEDICINE | Facility: OTHER | Age: 47
End: 2022-10-03

## 2022-10-04 NOTE — TELEPHONE ENCOUNTER
Usually imaging is performed for 5 years after treatment, so he is likely fine there  Typically lab testing is recommended for life, so yes, I could order tests at a future appointment. I have to think through all the tests and would order these at an appointment and not by phone.    Dr Sutton is part of an outreach team to get more people screened for colon cancer which is why he received a message from her. He should consider Cologuard or colonoscopy. I can place a referral for either if desired.

## 2022-10-04 NOTE — TELEPHONE ENCOUNTER
History of Malignant Neoplasm of the Testis in 2005.  Imaging and Labs pertaining to this DX was 2018.  Patient would like James Archuleta MD consideration for next time to recheck labs/imaging.      Pam Agosto LPN 10/4/2022 10:57 AM

## 2022-10-10 ENCOUNTER — OFFICE VISIT (OUTPATIENT)
Dept: FAMILY MEDICINE | Facility: OTHER | Age: 47
End: 2022-10-10
Attending: PHYSICIAN ASSISTANT
Payer: COMMERCIAL

## 2022-10-10 ENCOUNTER — NURSE TRIAGE (OUTPATIENT)
Dept: FAMILY MEDICINE | Facility: OTHER | Age: 47
End: 2022-10-10

## 2022-10-10 ENCOUNTER — HOSPITAL ENCOUNTER (OUTPATIENT)
Dept: GENERAL RADIOLOGY | Facility: OTHER | Age: 47
Discharge: HOME | End: 2022-10-10
Attending: NURSE PRACTITIONER
Payer: COMMERCIAL

## 2022-10-10 VITALS
HEART RATE: 88 BPM | TEMPERATURE: 99.5 F | BODY MASS INDEX: 23.58 KG/M2 | DIASTOLIC BLOOD PRESSURE: 84 MMHG | SYSTOLIC BLOOD PRESSURE: 138 MMHG | OXYGEN SATURATION: 97 % | WEIGHT: 166.7 LBS | RESPIRATION RATE: 20 BRPM

## 2022-10-10 DIAGNOSIS — J20.9 ACUTE BRONCHITIS, UNSPECIFIED ORGANISM: ICD-10-CM

## 2022-10-10 DIAGNOSIS — J02.9 SORE THROAT: ICD-10-CM

## 2022-10-10 DIAGNOSIS — R05.1 ACUTE COUGH: ICD-10-CM

## 2022-10-10 DIAGNOSIS — R09.89 CHEST CONGESTION: Primary | ICD-10-CM

## 2022-10-10 LAB — GROUP A STREP BY PCR: NOT DETECTED

## 2022-10-10 PROCEDURE — 87651 STREP A DNA AMP PROBE: CPT | Mod: ZL | Performed by: NURSE PRACTITIONER

## 2022-10-10 PROCEDURE — 71046 X-RAY EXAM CHEST 2 VIEWS: CPT

## 2022-10-10 PROCEDURE — G0463 HOSPITAL OUTPT CLINIC VISIT: HCPCS

## 2022-10-10 PROCEDURE — 99213 OFFICE O/P EST LOW 20 MIN: CPT | Performed by: NURSE PRACTITIONER

## 2022-10-10 PROCEDURE — G0463 HOSPITAL OUTPT CLINIC VISIT: HCPCS | Mod: 25

## 2022-10-10 RX ORDER — BENZONATATE 100 MG/1
100 CAPSULE ORAL 3 TIMES DAILY PRN
Qty: 30 CAPSULE | Refills: 0 | Status: SHIPPED | OUTPATIENT
Start: 2022-10-10 | End: 2022-11-18

## 2022-10-10 ASSESSMENT — ENCOUNTER SYMPTOMS
MYALGIAS: 1
COUGH: 1
NUMBNESS: 0
ADENOPATHY: 0
ABDOMINAL PAIN: 0
FEVER: 0
EYE DISCHARGE: 0
DIZZINESS: 0
BRUISES/BLEEDS EASILY: 0
WHEEZING: 0
LIGHT-HEADEDNESS: 0
FATIGUE: 1
FACIAL ASYMMETRY: 0
STRIDOR: 0
HEMATURIA: 0
CONFUSION: 0
SHORTNESS OF BREATH: 1
BACK PAIN: 0
RHINORRHEA: 1
EYE ITCHING: 0
CONSTIPATION: 0
EYE PAIN: 0
CHILLS: 0
WOUND: 0
DIARRHEA: 0
CHEST TIGHTNESS: 1
HEADACHES: 1

## 2022-10-10 ASSESSMENT — PAIN SCALES - GENERAL: PAINLEVEL: MODERATE PAIN (5)

## 2022-10-10 NOTE — TELEPHONE ENCOUNTER
Patient states he has Covid and talked to Dr. Chakraborty/was prescribed paxlovid 10/1/22. Things are getting worse. He would like a call back.    Silva King on 10/10/2022 at 1:38 PM

## 2022-10-10 NOTE — PATIENT INSTRUCTIONS
Results for orders placed or performed in visit on 10/10/22   XR Chest 2 Views     Status: None    Narrative    Procedure:XR CHEST 2 VIEWS    Clinical history:Male, 47 years, Chest congestion    Technique: Two views are submitted.    Comparison: 7/31/2018    Findings: The cardiac silhouette is normal. The pulmonary vasculature  is normal.    The lungs are clear. Bony structures are unremarkable.      Impression    Impression:   No acute abnormality. No evidence of acute or active cardiopulmonary  disease.    SACHIN HURTADO MD         SYSTEM ID:  [id number]   Group A Streptococcus PCR Throat Swab     Status: Normal    Specimen: Throat; Swab   Result Value Ref Range    Group A strep by PCR Not Detected Not Detected    Narrative    The Xpert Xpress Strep A test, performed on the [employer], is a rapid, qualitative in vitro diagnostic test for the detection of Streptococcus pyogenes (Group A ß-hemolytic Streptococcus, Strep A) in throat swab specimens from patients with signs and symptoms of pharyngitis. The Xpert Xpress Strep A test can be used as an aid in the diagnosis of Group A Streptococcal pharyngitis. The assay is not intended to monitor treatment for Group A Streptococcus infections. The Xpert Xpress Strep A test utilizes an automated real-time polymerase chain reaction (PCR) to detect Streptococcus pyogenes DNA.

## 2022-10-10 NOTE — PROGRESS NOTES
"Assessment & Plan   Dennis J Goodell is a 47 year old, presenting for the following health issues:      ICD-10-CM    1. Chest congestion  R09.89 XR Chest 2 Views      2. Sore throat  J02.9 Group A Streptococcus PCR Throat Swab      3. Acute cough  R05.1 benzonatate (TESSALON) 100 MG capsule      4. Acute bronchitis, unspecified organism  J20.9 benzonatate (TESSALON) 100 MG capsule        Vital signs stable. Physical exam reassuring for normal airflow in lungs, normal respirations, and oxygen saturations. Patient was prescribed Paxlovid in the setting of positive Covid-19 test. He reports taking 3.5-4 days of the medication due to the \"bad aftertaste\". He did have 2 days of symptom improvement, which could point to rebound effect after taking Paxlovid. Will obtain a chest XR to rule out any pneumonia that could be causing his symptoms. CXR is WNL, no acute cardiopulmonary process seen.       PE consistent with URI. Testing results were as follows: Strep A swab obtained in clinic today.       Symptoms due to virus. No antibiotic is needed at this time. Symptoms typically worse on days 3-4 and then begin improving each day - but can last up to 1 or 2 weeks.If symptoms begin worsening or fail to improve after 10 days, return to clinic for reevaluation.     Monitor for any fevers or chills. Return in 7-10 days if not feeling better. Pleasecall clinic with any questions or concerns. Please take in a lot of fluids and get rest.     Push oral hydration - prevent dehydration.  Urine should be clear or light yellow.  Inhaler - use as needed for wheezing and shortness of breath.   -- take 1 to 2 puff every 4 hours as needed.   -- Pre-treat prior to exposuresor exercise that have been exacerbating your cough/breathing problems.   -- Try to use 15 to 30 minutes before exposures.    Mucinex DM - helps thin the phlegm and settle the cough, without causing drowsiness.   -Maximum strength (buy the generic)    Use tessalon pearles as " directed for cough.     May take tylenol as needed for sore throat. Treat symptomatically with warm salt water gargles. Lozenges, Tylenol, Advil orAleve as needed. Frequent swallows of cool liquid. Oatmeal coats the throat and some patients find it soothes the pain.     Follow up in clinic if:   You have a fever of at least 101 F or 38.4 C   Your throat pain is severe or does not start toimprove within 5 to 7 days  Call 9-1-1 or go to the emergency room if you:   Have trouble breathing   Are drooling because you cannot swallow your saliva   Have swelling of the neck or tongue   Cannot move your neck or have trouble opening your mouth    Maintain hydration by drinking small amounts of clear fluids frequently, then soft diet, and then advance diet as tolerated. May use OTC Immodium if desired for any diarrhea.  Call if symptoms worsen, high fever, severe weakness or fainting, increased abdominal pain, blood in stool or vomit, or failure to improve in 2-3 days.        Patient is in agreement and understanding of the above treatment plan. All questions and concerns were addressed and answered to patient's satisfaction. AVS reviewed with patient.               Return if symptoms worsen or fail to improve, for Return as needed for new or worsening symptoms.    DONTRELL Bo CNP  [facility]        Subjective   Reggie is a 47 year old, presenting for the following health issues:  Fatigue      [facility] with [facility] congestion, SOB, cough, body aches. He felt better last week for a couple of days, then symptoms returned. Didn't take all of the Paxlovid. Acetaminophen and Aleve as needed for body aches and headaches, mild relief.  Patient has had pneumonia 4x previously and symptoms feel similar.              Review of Systems   Constitutional: Positive for fatigue. Negative for chills and fever.   HENT: Positive for congestion and rhinorrhea.    Eyes: Negative for pain,  discharge, itching and visual disturbance.   Respiratory: Positive for cough, chest tightness and shortness of breath. Negative for wheezing and stridor.    Cardiovascular: Negative for chest pain and peripheral edema.   Gastrointestinal: Negative for abdominal pain, constipation and diarrhea.   Endocrine: Positive for cold intolerance (Raynauds).   Genitourinary: Negative for hematuria.   Musculoskeletal: Positive for myalgias. Negative for back pain.   Skin: Negative for rash and wound.   Allergic/Immunologic: Negative for immunocompromised state.   Neurological: Positive for headaches. Negative for dizziness, syncope, facial asymmetry, light-headedness and numbness.   Hematological: Negative for adenopathy. Does not bruise/bleed easily.   Psychiatric/Behavioral: Negative for confusion.            Objective    /84   Pulse 88   Temp 99.5  F (37.5  C)   Resp 20   Wt 75.6 kg (166 lb 11.2 oz)   SpO2 97%   BMI 23.58 kg/m    Body mass index is 23.58 kg/m .  Physical Exam  Vitals and nursing note reviewed.   Constitutional:       Appearance: He is ill-appearing.   HENT:      Head: Normocephalic and atraumatic.      Right Ear: Tympanic membrane, ear canal and external ear normal.      Left Ear: Tympanic membrane, ear canal and external ear normal.      Nose: Congestion and rhinorrhea present. Rhinorrhea is clear.      Mouth/Throat:      Mouth: Mucous membranes are moist.      Pharynx: Oropharyngeal exudate present.   Eyes:      Conjunctiva/sclera: Conjunctivae normal.      Pupils: Pupils are equal, round, and reactive to light.   Cardiovascular:      Rate and Rhythm: Normal rate and regular rhythm.      Pulses: Normal pulses.      Heart sounds: Normal heart sounds.   Pulmonary:      Effort: Pulmonary effort is normal. No tachypnea, bradypnea, accessory muscle usage or respiratory distress.      Breath sounds: Normal air entry. No decreased air movement. Examination of the right-lower field reveals decreased  breath sounds. Examination of the left-lower field reveals decreased breath sounds. Decreased breath sounds present. No wheezing or rhonchi.   Abdominal:      General: Abdomen is flat. Bowel sounds are normal. There is no distension.      Palpations: Abdomen is soft. There is no mass.      Tenderness: There is no abdominal tenderness.   Musculoskeletal:         General: Normal range of motion.      Cervical back: Normal range of motion.   Lymphadenopathy:      Cervical: No cervical adenopathy.   Skin:     General: Skin is warm and dry.      Capillary Refill: Capillary refill takes less than 2 seconds.   Neurological:      General: No focal deficit present.      Mental Status: He is alert and oriented to person, place, and time. Mental status is at baseline.   Psychiatric:         Mood and Affect: Mood normal.         Behavior: Behavior normal.              Results for orders placed or performed in visit on 10/10/22   XR Chest 2 Views     Status: None    Narrative    Procedure:XR CHEST 2 VIEWS    Clinical history:Male, 47 years, Chest congestion    Technique: Two views are submitted.    Comparison: 7/31/2018    Findings: The cardiac silhouette is normal. The pulmonary vasculature  is normal.    The lungs are clear. Bony structures are unremarkable.      Impression    Impression:   No acute abnormality. No evidence of acute or active cardiopulmonary  disease.    SACHIN HURTADO MD         SYSTEM ID:  [id number]   Group A Streptococcus PCR Throat Swab     Status: Normal    Specimen: Throat; Swab   Result Value Ref Range    Group A strep by PCR Not Detected Not Detected    Narrative    The Xpert Xpress Strep A test, performed on the [employer], is a rapid, qualitative in vitro diagnostic test for the detection of Streptococcus pyogenes (Group A ß-hemolytic Streptococcus, Strep A) in throat swab specimens from patients with signs and symptoms of pharyngitis. The Xpert Xpress Strep A test can be used as  an aid in the diagnosis of Group A Streptococcal pharyngitis. The assay is not intended to monitor treatment for Group A Streptococcus infections. The Xpert Xpress Strep A test utilizes an automated real-time polymerase chain reaction (PCR) to detect Streptococcus pyogenes DNA.

## 2022-10-10 NOTE — NURSING NOTE
Patient presents for fever, body aches, fatigue and shortness of breath.     Medication Reconciliation Complete    Katherin Harden LPN  10/10/2022 3:41 PM

## 2022-10-10 NOTE — TELEPHONE ENCOUNTER
"S-(situation): Patient called and said that his COVID-19 symptoms have been getting worse.     B-(background): Patient tested positive for COVID-19 on 10/1/22 with an at home test. He was treated with Paxlovid on 10/1/22.      A-(assessment): He has the following symptoms: cough, yellow phlegm, shortness of breath, loss of voice, hard time talking, diarrhea, headache, bodyache, increased respirations, chest pressure. Denies a fever. Patient reports that he went back to work on the 7th but called in throughout the weekend due to worse symptoms. Patient reported \"It feels like pneumonia I have had it 4 times since having cancer.\"            R-(recommendations): Per PCP verbal recommendation patient was advised to go into the [facility] clinic to be evaluated.      Anila Parsons RN on 10/10/2022 at 2:30 PM      Reason for Disposition    [1] Typical COVID-19 symptoms AND [2] lasting less than 3 weeks    Chest pain or pressure    Additional Information    Negative: Bluish (or gray) lips or face now    Negative: Sounds like a life-threatening emergency to the triager    Negative: Difficult to awaken or acting confused (e.g., disoriented, slurred speech)    Negative: [1] SEVERE weakness (e.g., can't stand or can barely walk) AND [2] new-onset or WORSE    Negative: SEVERE difficulty breathing (e.g., struggling for each breath, speaks in single words)    Negative: SEVERE difficulty breathing (e.g., struggling for each breath, speaks in single words)    Negative: Difficult to awaken or acting confused (e.g., disoriented, slurred speech)    Negative: Bluish (or gray) lips or face now    Negative: Shock suspected (e.g., cold/pale/clammy skin, too weak to stand, low BP, rapid pulse)    Negative: Sounds like a life-threatening emergency to the triager    Negative: [1] Diagnosed or suspected COVID-19 AND [2] symptoms lasting 3 or more weeks    Negative: [1] COVID-19 exposure AND [2] no symptoms    Negative: COVID-19 vaccine reaction " suspected (e.g., fever, headache, muscle aches) occurring 1 to 3 days after getting vaccine    Negative: COVID-19 vaccine, questions about    Negative: [1] Lives with someone known to have influenza (flu test positive) AND [2] flu-like symptoms (e.g., cough, runny nose, sore throat, SOB; with or without fever)    Negative: [1] Adult with possible COVID-19 symptoms AND [2] triager concerned about severity of symptoms or other causes    Negative: COVID-19 and breastfeeding, questions about    Negative: SEVERE or constant chest pain or pressure  (Exception: Mild central chest pain, present only when coughing.)    Negative: MODERATE difficulty breathing (e.g., speaks in phrases, SOB even at rest, pulse 100-120)    Negative: Headache and stiff neck (can't touch chin to chest)    Negative: Oxygen level (e.g., pulse oximetry) 90 percent or lower     Unable to assess    Protocols used: CORONAVIRUS (COVID-19) PERSISTING SYMPTOMS FOLLOW-UP CALL-A-OH, CORONAVIRUS (COVID-19) DIAGNOSED OR [id number] 1.18.2022

## 2022-10-10 NOTE — Clinical Note
STEFANY, patient seen in [facility] today with SOB, congestion, body aches and headaches. Covid 19 positive 10//22, was prescribed Paxlovid took 3.5-4 days out of 5. His CXR today was clear of any infectious process. Strep A swab negative today as well. I prescribed benzonatate TID PRN for cough and we discussed symptomatic remedies.  Thank you for allowing me to participate in your patient's care.   Moraima Andersen, CNP

## 2022-10-19 DIAGNOSIS — Z00.00 PHYSICAL EXAM, ANNUAL: ICD-10-CM

## 2022-10-20 RX ORDER — NIFEDIPINE 90 MG/1
TABLET, EXTENDED RELEASE ORAL
Qty: 90 TABLET | Refills: 0 | OUTPATIENT
Start: 2022-10-20

## 2022-10-20 NOTE — TELEPHONE ENCOUNTER
[facility] sent Rx request for the following:      NIFEDIPINE 90MG ER (XL/OSM) TABLET      Last Prescription Date:   10/18/2021  Last Fill Qty/Refills:         90, R-0    Last Office Visit:              8/9/2021   Future Office visit:           none    Chintan Peterson RN, BSN  ....................  10/20/2022   11:37 AM

## 2022-10-23 RX ORDER — NIFEDIPINE 90 MG/1
TABLET, EXTENDED RELEASE ORAL
Qty: 90 TABLET | Refills: 0 | Status: SHIPPED | OUTPATIENT
Start: 2022-10-23 | End: 2022-12-02

## 2022-10-24 NOTE — TELEPHONE ENCOUNTER
LMTCB to schedule appointment.    Pt is due for annual exam.    Silva King on 10/24/2022 at 1:13 PM

## 2022-10-26 ENCOUNTER — OFFICE VISIT (OUTPATIENT)
Dept: FAMILY MEDICINE | Facility: OTHER | Age: 47
End: 2022-10-26
Attending: NURSE PRACTITIONER
Payer: COMMERCIAL

## 2022-10-26 VITALS
TEMPERATURE: 97.3 F | HEART RATE: 100 BPM | BODY MASS INDEX: 24.87 KG/M2 | OXYGEN SATURATION: 97 % | RESPIRATION RATE: 19 BRPM | DIASTOLIC BLOOD PRESSURE: 90 MMHG | SYSTOLIC BLOOD PRESSURE: 132 MMHG | WEIGHT: 175.8 LBS

## 2022-10-26 DIAGNOSIS — Z02.89 ENCOUNTER FOR COMPLETION OF FORM WITH PATIENT: Primary | ICD-10-CM

## 2022-10-26 PROCEDURE — G0463 HOSPITAL OUTPT CLINIC VISIT: HCPCS

## 2022-10-26 PROCEDURE — 99213 OFFICE O/P EST LOW 20 MIN: CPT | Performed by: NURSE PRACTITIONER

## 2022-10-26 RX ORDER — ALPRAZOLAM 1 MG
1 TABLET ORAL 2 TIMES DAILY PRN
COMMUNITY
Start: 2022-05-27

## 2022-10-26 ASSESSMENT — PAIN SCALES - GENERAL: PAINLEVEL: SEVERE PAIN (6)

## 2022-10-26 NOTE — NURSING NOTE
"Chief Complaint   Patient presents with     doctor's note   Patient presents to clinic to get a doctor note.     Initial BP (!) 132/90 (BP Location: Right arm, Patient Position: Sitting, Cuff Size: Adult Regular)   Pulse 100   Temp 97.3  F (36.3  C) (Tympanic)   Resp 19   Wt 79.7 kg (175 lb 12.8 oz)   SpO2 97%   BMI 24.87 kg/m   Estimated body mass index is 24.87 kg/m  as calculated from the following:    Height as of 4/28/21: 1.791 m (5' 10.5\").    Weight as of this encounter: 79.7 kg (175 lb 12.8 oz).  Medication Reconciliation: complete        Cheyenne Murillo  "

## 2022-10-26 NOTE — PROGRESS NOTES
Assessment & Plan   Dennis J Goodell is a 47 year old, presenting for the following health issues:      ICD-10-CM    1. Encounter for completion of form with patient  Z02.89         Patient presents to [facility] today, as he needs documentation that he can return to work. I saw patient 10/10/22 and he was diagnosed with acute bronchitis and viral pharyngitis. He tested positive for Covid-19 9/29/22. He was unable to return to work on two separate occasions due to extreme fatigue from Covid-19. He states he wants to go back to work. I spoke with occupational medicine and due to the fact he was never on work restrictions, I will fill out the paperwork, so he may return to work.      Discussed signs/ symptoms that would warrant urgent/ emergent follow-up. Patient in agreement with plan. AVS reviewed with patient. All questions and concerns addressed this visit.     Encouraged weight loss and regular exercise.     Return if symptoms worsen or fail to improve, for Return as needed for new or worsening symptoms.    DONTRELL Bo CNP  [facility]        Subjective   Reggie is a 47 year old, presenting for the following health issues:  doctor's note      Patient presents to  for work note after he tested positive for Covid-19 10/1/22. He has had extreme fatigue and has been unable to return to work on two separate occasions. He has paperwork from his employer stating he can go back to work.             Review of Systems   Constitutional: Positive for fatigue. Negative for chills and fever.   HENT: Negative for congestion.    Eyes: Negative for visual disturbance.   Respiratory: Negative for chest tightness and shortness of breath.    Cardiovascular: Negative for chest pain.   Gastrointestinal: Negative for abdominal pain.   Genitourinary: Negative for hematuria.   Musculoskeletal: Negative for back pain.   Skin: Negative for rash and wound.   Neurological: Negative for syncope.    Hematological: Does not bruise/bleed easily.   Psychiatric/Behavioral: Negative for confusion.            Objective    BP (!) 132/90 (BP Location: Right arm, Patient Position: Sitting, Cuff Size: Adult Regular)   Pulse 100   Temp 97.3  F (36.3  C) (Tympanic)   Resp 19   Wt 79.7 kg (175 lb 12.8 oz)   SpO2 97%   BMI 24.87 kg/m    Body mass index is 24.87 kg/m .  Physical Exam  Vitals and nursing note reviewed.   Constitutional:       Appearance: Normal appearance. He is normal weight.   HENT:      Head: Normocephalic and atraumatic.   Eyes:      Conjunctiva/sclera: Conjunctivae normal.   Cardiovascular:      Rate and Rhythm: Normal rate.   Pulmonary:      Effort: Pulmonary effort is normal.   Musculoskeletal:         General: Normal range of motion.      Cervical back: Normal range of motion.   Skin:     General: Skin is warm and dry.      Capillary Refill: Capillary refill takes less than 2 seconds.   Neurological:      General: No focal deficit present.      Mental Status: He is alert and oriented to person, place, and time. Mental status is at baseline.

## 2022-10-30 ASSESSMENT — ENCOUNTER SYMPTOMS
BRUISES/BLEEDS EASILY: 0
FEVER: 0
ABDOMINAL PAIN: 0
WOUND: 0
HEMATURIA: 0
CONFUSION: 0
CHEST TIGHTNESS: 0
CHILLS: 0
FATIGUE: 1
SHORTNESS OF BREATH: 0
BACK PAIN: 0

## 2022-11-18 ENCOUNTER — APPOINTMENT (OUTPATIENT)
Dept: CT IMAGING | Facility: OTHER | Age: 47
End: 2022-11-18
Attending: EMERGENCY MEDICINE
Payer: COMMERCIAL

## 2022-11-18 ENCOUNTER — HOSPITAL ENCOUNTER (INPATIENT)
Facility: OTHER | Age: 47
LOS: 4 days | Discharge: HOME | End: 2022-11-22
Attending: EMERGENCY MEDICINE | Admitting: INTERNAL MEDICINE
Payer: COMMERCIAL

## 2022-11-18 DIAGNOSIS — K52.9 COLITIS: ICD-10-CM

## 2022-11-18 PROBLEM — D72.829 LEUKOCYTOSIS: Status: ACTIVE | Noted: 2022-11-18

## 2022-11-18 LAB
ALBUMIN SERPL BCG-MCNC: 4.3 G/DL (ref 3.5–5.2)
ALBUMIN UR-MCNC: NEGATIVE MG/DL
ALP SERPL-CCNC: 77 U/L (ref 40–129)
ALT SERPL W P-5'-P-CCNC: 15 U/L (ref 10–50)
ANION GAP SERPL CALCULATED.3IONS-SCNC: 12 MMOL/L (ref 7–15)
APPEARANCE UR: CLEAR
AST SERPL W P-5'-P-CCNC: 18 U/L (ref 10–50)
BASOPHILS # BLD AUTO: 0.1 10E3/UL (ref 0–0.2)
BASOPHILS NFR BLD AUTO: 0 %
BILIRUB SERPL-MCNC: 0.6 MG/DL
BILIRUB UR QL STRIP: NEGATIVE
BUN SERPL-MCNC: 10.5 MG/DL (ref 6–20)
CALCIUM SERPL-MCNC: 8.6 MG/DL (ref 8.6–10)
CHLORIDE SERPL-SCNC: 98 MMOL/L (ref 98–107)
COLOR UR AUTO: YELLOW
CREAT SERPL-MCNC: 0.75 MG/DL (ref 0.67–1.17)
DEPRECATED HCO3 PLAS-SCNC: 26 MMOL/L (ref 22–29)
EOSINOPHIL # BLD AUTO: 0 10E3/UL (ref 0–0.7)
EOSINOPHIL NFR BLD AUTO: 0 %
ERYTHROCYTE [DISTWIDTH] IN BLOOD BY AUTOMATED COUNT: 13.2 % (ref 10–15)
FLUAV RNA SPEC QL NAA+PROBE: NEGATIVE
FLUBV RNA RESP QL NAA+PROBE: NEGATIVE
GFR SERPL CREATININE-BSD FRML MDRD: >90 ML/MIN/1.73M2
GLUCOSE SERPL-MCNC: 104 MG/DL (ref 70–99)
GLUCOSE UR STRIP-MCNC: NEGATIVE MG/DL
HCT VFR BLD AUTO: 41 % (ref 40–53)
HGB BLD-MCNC: 14.3 G/DL (ref 13.3–17.7)
HGB UR QL STRIP: NEGATIVE
HOLD SPECIMEN: NORMAL
IMM GRANULOCYTES # BLD: 0.1 10E3/UL
IMM GRANULOCYTES NFR BLD: 0 %
KETONES UR STRIP-MCNC: NEGATIVE MG/DL
LACTATE SERPL-SCNC: 2.3 MMOL/L (ref 0.7–2)
LACTATE SERPL-SCNC: 2.7 MMOL/L (ref 0.7–2)
LEUKOCYTE ESTERASE UR QL STRIP: NEGATIVE
LYMPHOCYTES # BLD AUTO: 1 10E3/UL (ref 0.8–5.3)
LYMPHOCYTES NFR BLD AUTO: 6 %
MCH RBC QN AUTO: 30.2 PG (ref 26.5–33)
MCHC RBC AUTO-ENTMCNC: 34.9 G/DL (ref 31.5–36.5)
MCV RBC AUTO: 87 FL (ref 78–100)
MONOCYTES # BLD AUTO: 1.4 10E3/UL (ref 0–1.3)
MONOCYTES NFR BLD AUTO: 8 %
NEUTROPHILS # BLD AUTO: 15.3 10E3/UL (ref 1.6–8.3)
NEUTROPHILS NFR BLD AUTO: 86 %
NITRATE UR QL: NEGATIVE
NRBC # BLD AUTO: 0 10E3/UL
NRBC BLD AUTO-RTO: 0 /100
PH UR STRIP: 6.5 [PH] (ref 5–9)
PLATELET # BLD AUTO: 296 10E3/UL (ref 150–450)
POTASSIUM SERPL-SCNC: 4.2 MMOL/L (ref 3.4–5.3)
PROT SERPL-MCNC: 6.6 G/DL (ref 6.4–8.3)
RBC # BLD AUTO: 4.73 10E6/UL (ref 4.4–5.9)
RBC URINE: <1 /HPF
RSV RNA SPEC NAA+PROBE: NEGATIVE
SARS-COV-2 RNA RESP QL NAA+PROBE: NEGATIVE
SODIUM SERPL-SCNC: 136 MMOL/L (ref 136–145)
SP GR UR STRIP: 1.01 (ref 1–1.03)
UROBILINOGEN UR STRIP-MCNC: NORMAL MG/DL
WBC # BLD AUTO: 17.9 10E3/UL (ref 4–11)
WBC URINE: <1 /HPF

## 2022-11-18 PROCEDURE — 81001 URINALYSIS AUTO W/SCOPE: CPT | Performed by: EMERGENCY MEDICINE

## 2022-11-18 PROCEDURE — 36415 COLL VENOUS BLD VENIPUNCTURE: CPT | Performed by: EMERGENCY MEDICINE

## 2022-11-18 PROCEDURE — 250N000011 HC RX IP 250 OP 636: Performed by: FAMILY MEDICINE

## 2022-11-18 PROCEDURE — 99222 1ST HOSP IP/OBS MODERATE 55: CPT | Mod: 25 | Performed by: SURGERY

## 2022-11-18 PROCEDURE — 250N000011 HC RX IP 250 OP 636: Performed by: EMERGENCY MEDICINE

## 2022-11-18 PROCEDURE — 96367 TX/PROPH/DG ADDL SEQ IV INF: CPT | Performed by: EMERGENCY MEDICINE

## 2022-11-18 PROCEDURE — 85025 COMPLETE CBC W/AUTO DIFF WBC: CPT | Performed by: EMERGENCY MEDICINE

## 2022-11-18 PROCEDURE — 250N000011 HC RX IP 250 OP 636: Performed by: INTERNAL MEDICINE

## 2022-11-18 PROCEDURE — 83605 ASSAY OF LACTIC ACID: CPT | Performed by: EMERGENCY MEDICINE

## 2022-11-18 PROCEDURE — 74177 CT ABD & PELVIS W/CONTRAST: CPT

## 2022-11-18 PROCEDURE — 83605 ASSAY OF LACTIC ACID: CPT | Performed by: INTERNAL MEDICINE

## 2022-11-18 PROCEDURE — 99285 EMERGENCY DEPT VISIT HI MDM: CPT | Mod: CS | Performed by: EMERGENCY MEDICINE

## 2022-11-18 PROCEDURE — 96365 THER/PROPH/DIAG IV INF INIT: CPT | Mod: XU | Performed by: EMERGENCY MEDICINE

## 2022-11-18 PROCEDURE — 250N000013 HC RX MED GY IP 250 OP 250 PS 637: Performed by: INTERNAL MEDICINE

## 2022-11-18 PROCEDURE — 96376 TX/PRO/DX INJ SAME DRUG ADON: CPT | Performed by: EMERGENCY MEDICINE

## 2022-11-18 PROCEDURE — 36415 COLL VENOUS BLD VENIPUNCTURE: CPT | Performed by: INTERNAL MEDICINE

## 2022-11-18 PROCEDURE — 250N000013 HC RX MED GY IP 250 OP 250 PS 637: Performed by: FAMILY MEDICINE

## 2022-11-18 PROCEDURE — 96375 TX/PRO/DX INJ NEW DRUG ADDON: CPT | Performed by: EMERGENCY MEDICINE

## 2022-11-18 PROCEDURE — 99285 EMERGENCY DEPT VISIT HI MDM: CPT | Mod: 25,CS | Performed by: EMERGENCY MEDICINE

## 2022-11-18 PROCEDURE — 120N000001 HC R&B MED SURG/OB

## 2022-11-18 PROCEDURE — C9113 INJ PANTOPRAZOLE SODIUM, VIA: HCPCS | Performed by: INTERNAL MEDICINE

## 2022-11-18 PROCEDURE — 258N000003 HC RX IP 258 OP 636: Performed by: EMERGENCY MEDICINE

## 2022-11-18 PROCEDURE — 87637 SARSCOV2&INF A&B&RSV AMP PRB: CPT | Performed by: EMERGENCY MEDICINE

## 2022-11-18 PROCEDURE — 258N000003 HC RX IP 258 OP 636: Performed by: INTERNAL MEDICINE

## 2022-11-18 PROCEDURE — 250N000013 HC RX MED GY IP 250 OP 250 PS 637: Performed by: EMERGENCY MEDICINE

## 2022-11-18 PROCEDURE — C9803 HOPD COVID-19 SPEC COLLECT: HCPCS | Performed by: EMERGENCY MEDICINE

## 2022-11-18 PROCEDURE — 80053 COMPREHEN METABOLIC PANEL: CPT | Performed by: EMERGENCY MEDICINE

## 2022-11-18 PROCEDURE — 99223 1ST HOSP IP/OBS HIGH 75: CPT | Mod: AI | Performed by: INTERNAL MEDICINE

## 2022-11-18 RX ORDER — GLIPIZIDE 10 MG/1
1 TABLET ORAL
Status: DISCONTINUED | OUTPATIENT
Start: 2022-11-18 | End: 2022-11-22 | Stop reason: HOSPADM

## 2022-11-18 RX ORDER — HYDROMORPHONE HYDROCHLORIDE 2 MG/1
2 TABLET ORAL EVERY 4 HOURS PRN
Status: DISCONTINUED | OUTPATIENT
Start: 2022-11-18 | End: 2022-11-21

## 2022-11-18 RX ORDER — ALPRAZOLAM 0.5 MG
1 TABLET ORAL 2 TIMES DAILY PRN
Status: DISCONTINUED | OUTPATIENT
Start: 2022-11-18 | End: 2022-11-22 | Stop reason: HOSPADM

## 2022-11-18 RX ORDER — FENTANYL CITRATE 50 UG/ML
50 INJECTION, SOLUTION INTRAMUSCULAR; INTRAVENOUS ONCE
Status: COMPLETED | OUTPATIENT
Start: 2022-11-18 | End: 2022-11-18

## 2022-11-18 RX ORDER — METRONIDAZOLE 500 MG/100ML
500 INJECTION, SOLUTION INTRAVENOUS ONCE
Status: COMPLETED | OUTPATIENT
Start: 2022-11-18 | End: 2022-11-18

## 2022-11-18 RX ORDER — POLYETHYLENE GLYCOL 3350 17 G/17G
17 POWDER, FOR SOLUTION ORAL DAILY PRN
Status: DISCONTINUED | OUTPATIENT
Start: 2022-11-18 | End: 2022-11-22 | Stop reason: HOSPADM

## 2022-11-18 RX ORDER — AMOXICILLIN 250 MG
1 CAPSULE ORAL 2 TIMES DAILY PRN
Status: DISCONTINUED | OUTPATIENT
Start: 2022-11-18 | End: 2022-11-22 | Stop reason: HOSPADM

## 2022-11-18 RX ORDER — ALBUTEROL SULFATE 90 UG/1
2 AEROSOL, METERED RESPIRATORY (INHALATION) EVERY 6 HOURS PRN
COMMUNITY

## 2022-11-18 RX ORDER — BISACODYL 10 MG
10 SUPPOSITORY, RECTAL RECTAL DAILY PRN
Status: DISCONTINUED | OUTPATIENT
Start: 2022-11-18 | End: 2022-11-22 | Stop reason: HOSPADM

## 2022-11-18 RX ORDER — NALOXONE HYDROCHLORIDE 0.4 MG/ML
0.4 INJECTION, SOLUTION INTRAMUSCULAR; INTRAVENOUS; SUBCUTANEOUS
Status: DISCONTINUED | OUTPATIENT
Start: 2022-11-18 | End: 2022-11-22 | Stop reason: HOSPADM

## 2022-11-18 RX ORDER — CIPROFLOXACIN 2 MG/ML
400 INJECTION, SOLUTION INTRAVENOUS ONCE
Status: COMPLETED | OUTPATIENT
Start: 2022-11-18 | End: 2022-11-18

## 2022-11-18 RX ORDER — CIPROFLOXACIN 2 MG/ML
400 INJECTION, SOLUTION INTRAVENOUS EVERY 12 HOURS
Status: DISCONTINUED | OUTPATIENT
Start: 2022-11-18 | End: 2022-11-22

## 2022-11-18 RX ORDER — DESVENLAFAXINE 50 MG/1
100 TABLET, FILM COATED, EXTENDED RELEASE ORAL DAILY
Status: DISCONTINUED | OUTPATIENT
Start: 2022-11-18 | End: 2022-11-22 | Stop reason: HOSPADM

## 2022-11-18 RX ORDER — PROCHLORPERAZINE 25 MG
25 SUPPOSITORY, RECTAL RECTAL EVERY 12 HOURS PRN
Status: DISCONTINUED | OUTPATIENT
Start: 2022-11-18 | End: 2022-11-22 | Stop reason: HOSPADM

## 2022-11-18 RX ORDER — ACETAMINOPHEN 650 MG/1
650 SUPPOSITORY RECTAL ONCE
Status: DISCONTINUED | OUTPATIENT
Start: 2022-11-18 | End: 2022-11-18

## 2022-11-18 RX ORDER — PROCHLORPERAZINE MALEATE 10 MG
10 TABLET ORAL EVERY 6 HOURS PRN
Status: DISCONTINUED | OUTPATIENT
Start: 2022-11-18 | End: 2022-11-22 | Stop reason: HOSPADM

## 2022-11-18 RX ORDER — ONDANSETRON 4 MG/1
4 TABLET, ORALLY DISINTEGRATING ORAL EVERY 6 HOURS PRN
Status: DISCONTINUED | OUTPATIENT
Start: 2022-11-18 | End: 2022-11-22 | Stop reason: HOSPADM

## 2022-11-18 RX ORDER — SODIUM CHLORIDE 9 MG/ML
1000 INJECTION, SOLUTION INTRAVENOUS CONTINUOUS
Status: DISCONTINUED | OUTPATIENT
Start: 2022-11-18 | End: 2022-11-18 | Stop reason: DRUGHIGH

## 2022-11-18 RX ORDER — LIDOCAINE 40 MG/G
CREAM TOPICAL
Status: DISCONTINUED | OUTPATIENT
Start: 2022-11-18 | End: 2022-11-22 | Stop reason: HOSPADM

## 2022-11-18 RX ORDER — SODIUM CHLORIDE 9 MG/ML
INJECTION, SOLUTION INTRAVENOUS CONTINUOUS
Status: DISCONTINUED | OUTPATIENT
Start: 2022-11-18 | End: 2022-11-18 | Stop reason: DRUGHIGH

## 2022-11-18 RX ORDER — ONDANSETRON 2 MG/ML
4 INJECTION INTRAMUSCULAR; INTRAVENOUS EVERY 6 HOURS PRN
Status: DISCONTINUED | OUTPATIENT
Start: 2022-11-18 | End: 2022-11-22 | Stop reason: HOSPADM

## 2022-11-18 RX ORDER — METRONIDAZOLE 500 MG/100ML
500 INJECTION, SOLUTION INTRAVENOUS EVERY 12 HOURS
Status: DISCONTINUED | OUTPATIENT
Start: 2022-11-18 | End: 2022-11-22

## 2022-11-18 RX ORDER — NALOXONE HYDROCHLORIDE 0.4 MG/ML
0.2 INJECTION, SOLUTION INTRAMUSCULAR; INTRAVENOUS; SUBCUTANEOUS
Status: DISCONTINUED | OUTPATIENT
Start: 2022-11-18 | End: 2022-11-22 | Stop reason: HOSPADM

## 2022-11-18 RX ORDER — ACETAMINOPHEN 325 MG/1
975 TABLET ORAL EVERY 6 HOURS PRN
Status: DISCONTINUED | OUTPATIENT
Start: 2022-11-18 | End: 2022-11-22 | Stop reason: HOSPADM

## 2022-11-18 RX ORDER — ACETAMINOPHEN 325 MG/1
650 TABLET ORAL ONCE
Status: COMPLETED | OUTPATIENT
Start: 2022-11-18 | End: 2022-11-18

## 2022-11-18 RX ORDER — NIFEDIPINE 30 MG/1
90 TABLET, EXTENDED RELEASE ORAL DAILY
Status: DISCONTINUED | OUTPATIENT
Start: 2022-11-18 | End: 2022-11-22 | Stop reason: HOSPADM

## 2022-11-18 RX ORDER — HYDROMORPHONE HYDROCHLORIDE 2 MG/1
2 TABLET ORAL EVERY 4 HOURS PRN
Status: DISCONTINUED | OUTPATIENT
Start: 2022-11-18 | End: 2022-11-18

## 2022-11-18 RX ORDER — METRONIDAZOLE 500 MG/100ML
500 INJECTION, SOLUTION INTRAVENOUS EVERY 12 HOURS
Status: DISCONTINUED | OUTPATIENT
Start: 2022-11-18 | End: 2022-11-18 | Stop reason: DRUGHIGH

## 2022-11-18 RX ORDER — AMOXICILLIN 250 MG
2 CAPSULE ORAL 2 TIMES DAILY PRN
Status: DISCONTINUED | OUTPATIENT
Start: 2022-11-18 | End: 2022-11-22 | Stop reason: HOSPADM

## 2022-11-18 RX ORDER — METRONIDAZOLE 500 MG/100ML
500 INJECTION, SOLUTION INTRAVENOUS EVERY 8 HOURS
Status: DISCONTINUED | OUTPATIENT
Start: 2022-11-18 | End: 2022-11-18

## 2022-11-18 RX ORDER — LORAZEPAM 2 MG/ML
1 INJECTION INTRAMUSCULAR ONCE
Status: COMPLETED | OUTPATIENT
Start: 2022-11-18 | End: 2022-11-18

## 2022-11-18 RX ORDER — HYDROMORPHONE HYDROCHLORIDE 1 MG/ML
0.5 INJECTION, SOLUTION INTRAMUSCULAR; INTRAVENOUS; SUBCUTANEOUS
Status: DISCONTINUED | OUTPATIENT
Start: 2022-11-18 | End: 2022-11-22 | Stop reason: HOSPADM

## 2022-11-18 RX ORDER — CALCIUM CARBONATE 500 MG/1
1 TABLET, CHEWABLE ORAL 4 TIMES DAILY PRN
COMMUNITY

## 2022-11-18 RX ORDER — IOPAMIDOL 755 MG/ML
100 INJECTION, SOLUTION INTRAVASCULAR ONCE
Status: COMPLETED | OUTPATIENT
Start: 2022-11-18 | End: 2022-11-18

## 2022-11-18 RX ORDER — FAMOTIDINE 20 MG/1
20 TABLET, FILM COATED ORAL DAILY
COMMUNITY
End: 2023-01-11

## 2022-11-18 RX ORDER — ONDANSETRON 2 MG/ML
4 INJECTION INTRAMUSCULAR; INTRAVENOUS ONCE
Status: COMPLETED | OUTPATIENT
Start: 2022-11-18 | End: 2022-11-18

## 2022-11-18 RX ORDER — SODIUM CHLORIDE 9 MG/ML
INJECTION, SOLUTION INTRAVENOUS CONTINUOUS
Status: DISCONTINUED | OUTPATIENT
Start: 2022-11-18 | End: 2022-11-22

## 2022-11-18 RX ADMIN — LORAZEPAM 1 MG: 2 INJECTION, SOLUTION INTRAMUSCULAR; INTRAVENOUS at 11:49

## 2022-11-18 RX ADMIN — SODIUM CHLORIDE 1000 ML: 9 INJECTION, SOLUTION INTRAVENOUS at 09:35

## 2022-11-18 RX ADMIN — DEXTRAN 70, GLYCERIN, HYPROMELLOSE 1 DROP: 1; 2; 3 SOLUTION/ DROPS OPHTHALMIC at 21:07

## 2022-11-18 RX ADMIN — IOPAMIDOL 100 ML: 755 INJECTION, SOLUTION INTRAVENOUS at 10:12

## 2022-11-18 RX ADMIN — ONDANSETRON 4 MG: 2 INJECTION INTRAMUSCULAR; INTRAVENOUS at 09:33

## 2022-11-18 RX ADMIN — FENTANYL CITRATE 50 MCG: 50 INJECTION, SOLUTION INTRAMUSCULAR; INTRAVENOUS at 13:13

## 2022-11-18 RX ADMIN — NIFEDIPINE 90 MG: 30 TABLET, FILM COATED, EXTENDED RELEASE ORAL at 16:17

## 2022-11-18 RX ADMIN — CIPROFLOXACIN 400 MG: 2 INJECTION, SOLUTION INTRAVENOUS at 11:31

## 2022-11-18 RX ADMIN — METRONIDAZOLE 500 MG: 500 INJECTION, SOLUTION INTRAVENOUS at 22:41

## 2022-11-18 RX ADMIN — HYDROMORPHONE HYDROCHLORIDE 0.5 MG: 1 INJECTION, SOLUTION INTRAMUSCULAR; INTRAVENOUS; SUBCUTANEOUS at 17:15

## 2022-11-18 RX ADMIN — HYDROMORPHONE HYDROCHLORIDE 0.5 MG: 1 INJECTION, SOLUTION INTRAMUSCULAR; INTRAVENOUS; SUBCUTANEOUS at 19:53

## 2022-11-18 RX ADMIN — FENTANYL CITRATE 50 MCG: 50 INJECTION, SOLUTION INTRAMUSCULAR; INTRAVENOUS at 11:29

## 2022-11-18 RX ADMIN — METRONIDAZOLE 500 MG: 500 INJECTION, SOLUTION INTRAVENOUS at 11:32

## 2022-11-18 RX ADMIN — SODIUM CHLORIDE: 9 INJECTION, SOLUTION INTRAVENOUS at 14:37

## 2022-11-18 RX ADMIN — HYDROMORPHONE HYDROCHLORIDE 0.5 MG: 1 INJECTION, SOLUTION INTRAMUSCULAR; INTRAVENOUS; SUBCUTANEOUS at 22:41

## 2022-11-18 RX ADMIN — HYDROMORPHONE HYDROCHLORIDE 2 MG: 2 TABLET ORAL at 15:36

## 2022-11-18 RX ADMIN — CIPROFLOXACIN 400 MG: 2 INJECTION, SOLUTION INTRAVENOUS at 21:08

## 2022-11-18 RX ADMIN — SODIUM CHLORIDE 1000 ML: 9 INJECTION, SOLUTION INTRAVENOUS at 11:31

## 2022-11-18 RX ADMIN — PANTOPRAZOLE SODIUM 40 MG: 40 INJECTION, POWDER, FOR SOLUTION INTRAVENOUS at 16:17

## 2022-11-18 RX ADMIN — ACETAMINOPHEN 975 MG: 325 TABLET ORAL at 17:15

## 2022-11-18 RX ADMIN — DESVENLAFAXINE 100 MG: 50 TABLET, EXTENDED RELEASE ORAL at 16:18

## 2022-11-18 RX ADMIN — ALPRAZOLAM 1 MG: 0.5 TABLET ORAL at 17:15

## 2022-11-18 RX ADMIN — ACETAMINOPHEN 650 MG: 325 TABLET ORAL at 09:33

## 2022-11-18 RX ADMIN — FENTANYL CITRATE 50 MCG: 50 INJECTION, SOLUTION INTRAMUSCULAR; INTRAVENOUS at 09:35

## 2022-11-18 ASSESSMENT — ACTIVITIES OF DAILY LIVING (ADL)
WALKING_OR_CLIMBING_STAIRS_DIFFICULTY: NO
DRESSING/BATHING_DIFFICULTY: NO
ADLS_ACUITY_SCORE: 20
TOILETING_ISSUES: NO
DIFFICULTY_EATING/SWALLOWING: NO
ADLS_ACUITY_SCORE: 35
ADLS_ACUITY_SCORE: 20
ADLS_ACUITY_SCORE: 20
CONCENTRATING,_REMEMBERING_OR_MAKING_DECISIONS_DIFFICULTY: NO
ADLS_ACUITY_SCORE: 20
EQUIPMENT_CURRENTLY_USED_AT_HOME: CANE, QUAD
CHANGE_IN_FUNCTIONAL_STATUS_SINCE_ONSET_OF_CURRENT_ILLNESS/INJURY: NO
DOING_ERRANDS_INDEPENDENTLY_DIFFICULTY: NO
FALL_HISTORY_WITHIN_LAST_SIX_MONTHS: NO
ADLS_ACUITY_SCORE: 35
ADLS_ACUITY_SCORE: 35
WEAR_GLASSES_OR_BLIND: YES

## 2022-11-18 ASSESSMENT — ENCOUNTER SYMPTOMS
FEVER: 0
VOMITING: 1
ABDOMINAL PAIN: 1
NAUSEA: 1
DYSURIA: 0
CHILLS: 0
DIARRHEA: 1
ARTHRALGIAS: 0
LIGHT-HEADEDNESS: 0
AGITATION: 0
SHORTNESS OF BREATH: 0
BLOOD IN STOOL: 0
CHEST TIGHTNESS: 0

## 2022-11-18 NOTE — CONSULTS
GENERAL SURGERY CONSULTATION NOTE    Dennis J Goodell   [address] [place] [address]  47 year old  male  Admission Date/Time: 11/18/2022  9:04 AM  Primary Care Provider:  James Archuleta was asked to see this patient by Dr. Dietz for evaluation of colitis.     HPI:    Reggie presents with 2 days of progressive abdominal pain.  Localized to the right abdomen.  He has been nauseated.  No vomiting.  No diarrhea.  No blood per rectum.    REVIEW OF SYSTEMS:    GENERAL: No fevers or chills. Denies fatigue, recent weight loss.  HEENT: No sinus drainage. No changes with vision or hearing. No difficulty swallowing.   LYMPHATICS:  Noswollen nodes in axilla, neck or groin.  CARDIOVASCULAR: Denies chest pain, palpitations and dyspnea on exertion.  PULMONARY: No shortness of breath or cough. No increase in sputum production.  GI: Denies melena,bright red blood in stools. No hematemesis. No constipation or diarrhea.  : No dysuria or hematuria.  SKIN: No recent rashes or ulcers.   HEMATOLOGY:  No history of easy bruising or bleeding.  ENDOCRINE:  Nohistory of diabetes or thyroid problems.  NEUROLOGY:  No history of seizures or headaches. No motor or sensory changes.        Patient Active Problem List   Diagnosis     Dysthymic disorder     History of testicular cancer     Raynaud phenomenon     MARLEN (generalized anxiety disorder)     Facet arthropathy, cervical     Intractable chronic migraine without aura and with status migrainosus     Colitis     Leukocytosis       Past Medical History:   Diagnosis Date     Anxiety disorder 2012     Dysthymic disorder     No Comments Provided     Malignant neoplasm of testis (H) 2005 2005,teratoma     Raynaud's syndrome without gangrene     No Comments Provided     Uncomplicated alcohol abuse     7/25/2012       Past Surgical History:   Procedure Laterality Date     DECOMPRESSION CUBITAL TUNNEL Left 01/2019     HERNIA REPAIR      ,HERNIA REPAIR     IR CHEST PORT  PLACEMENT > 5 YRS OF AGE Left 04/01/2008     LYMPH NODE BIOPSY  09/05/2008     [facility]. 37 lymph nodes excised     ORCHIECTOMY SCROTAL Right 12/30/2005    teratoma     OTHER SURGICAL HISTORY      9/05/08,[id number],OTHER     RELEASE CARPAL TUNNEL  04/09/2013       Family History   Problem Relation Age of Onset     Pulmonary Embolism Mother      Other - See Comments Daughter         recurrent OM     Other - See Comments Other         Suicidality,maternal uncle     No Known Problems Father        Social History     Social History Narrative    He is , 4  Girls.    Wife runs a .       Social History     Socioeconomic History     Marital status:      Spouse name: Jillian     Number of children: 4     Years of education: 13     Highest education level: Not on file   Occupational History     Occupation: underground utilities/dirt work     Employer: [employer]   Tobacco Use     Smoking status: Never     Smokeless tobacco: Current     Types: Chew     Tobacco comments:     1 tin lasts 1 week   Vaping Use     Vaping Use: Never used   Substance and Sexual Activity     Alcohol use: Not Currently     Alcohol/week: 8.0 standard drinks     Comment: Quit Date: 8-1-2021     Drug use: Never     Sexual activity: Yes     Partners: Female     Birth control/protection: Surgical   Other Topics Concern     Parent/sibling w/ CABG, MI or angioplasty before 65F 55M? Not Asked   Social History Narrative    He is , 4  Girls.    Wife runs a .     Social Determinants of Health     Financial Resource Strain: Not on file   Food Insecurity: Not on file   Transportation Needs: Not on file   Physical Activity: Not on file   Stress: Not on file   Social Connections: Not on file   Intimate Partner Violence: Not on file   Housing Stability: Not on file       No current facility-administered medications on file prior to encounter.  albuterol (PROAIR HFA/PROVENTIL HFA/VENTOLIN HFA) 108 (90 Base) MCG/ACT  "inhaler, Inhale 2 puffs into the lungs every 6 hours as needed for shortness of breath / dyspnea or wheezing  ALPRAZolam (XANAX) 1 MG tablet, Take 1 mg by mouth 2 times daily as needed for anxiety  calcium carbonate (TUMS) 500 MG chewable tablet, Take 1 chew tab by mouth 4 times daily as needed for heartburn  desvenlafaxine succinate (PRISTIQ) 100 MG 24 hr tablet, Take 100 mg by mouth daily  famotidine (PEPCID) 20 MG tablet, Take 20 mg by mouth daily  NIFEdipine ER OSMOTIC (PROCARDIA XL) 90 MG 24 hr tablet, TAKE 1 TABLET(90 MG) BY MOUTH DAILY  omeprazole (PRILOSEC) 20 MG DR capsule, Take 20 mg by mouth daily  clonazePAM (KLONOPIN) 1 MG tablet, Take 1 mg by mouth 3 times daily          ALLERGIES/SENSITIVITIES: No Known Allergies    PHYSICAL EXAM:     BP (!) 146/75 (BP Location: Right arm)   Pulse 93   Temp (!) 100.8  F (38.2  C) (Oral)   Resp 16   Ht 1.778 m (5' 10\")   Wt 77.1 kg (170 lb)   SpO2 98%   BMI 24.39 kg/m      General Appearance: Appears uncomfortable  Heart & CV:  RRR no murmur.  Intact distal pulses, good cap refill.  LUNGS:  CTA B/L, no wheezing or crackles.  Abd: Nondistended, right lower abdominal tenderness  Ext: No deformity      ADDITIONAL COMMENTS:      CONSULTATION ASSESSMENT AND PLAN:    47 year old male with right-sided colitis on CT.  Differential is broad.  We will rule out ischemic colitis and try to rehydrate.  Follow-up C. Difficile    Possible diagnostic colonoscopy       Solomon Arciniega MD on 11/18/2022 at 3:33 PM      "

## 2022-11-18 NOTE — PHARMACY
Pharmacy- Automatic Medication Dose Adjustment    Patient Active Problem List   Diagnosis     Dysthymic disorder     History of testicular cancer     Raynaud phenomenon     MARLEN (generalized anxiety disorder)     Facet arthropathy, cervical     Intractable chronic migraine without aura and with status migrainosus     Colitis     Leukocytosis        Relevant Labs:  Recent Labs   Lab Test 11/18/22  0946 01/07/19  1623 11/03/16  1145   WBC 17.9*  --   --    HGB 14.3 14.8 14.6     --  301        No intake or output data in the 24 hours ending 11/18/22 1447       Per Automatic Medication Dose Adjustment Protocol, will adjust:  Concern for potential C dif- will adjust Metronidazole to 500 mg Q8H per Automatic Medication Dose adjustment policy- Appendix 2.      Zeferino Mosquera RPH ....................  11/18/2022   2:47 PM    Will continue to follow and make adjustments accordingly. Thank You.    Changed to BID dosing- C diff PCR negative. Nevin Childress RPH on 11/19/2022 at 1:57 PM

## 2022-11-18 NOTE — H&P
[facility]    History and Physical - Hospitalist Service       Date of Admission:  11/18/2022    Assessment & Plan      Dennis J Goodell is a 47 year old male admitted on 11/18/2022. He has colitis.     Principal Problem:    Colitis  Assessment: Present on admission.  Differential diagnosis includes infectious, inflammatory or ischemic colitis.  He has not been on any recent antibiotics, however he recently had COVID and was treated with antivirals.  This may have suppressed his immune system enough that he could develop C. difficile and so we will check for that.  Also send off stool cultures.  No other sick contacts in the family.  Inflammatory bowel disease and ischemia are also possible.    Plan: Admit to the hospital  Consult general surgery  Empiric ciprofloxacin and metronidazole  IV fluids  Follow lactate  Bowel rest    Active Problems:    Dysthymic disorder  Assessment: chronic  Plan: continue pristique       History of testicular cancer      MARLEN (generalized anxiety disorder)  Assessment: chronic  Plan: continue home xanax      Leukocytosis  Assessment: present on admission   Plan: follow      Diet:   clear liquid  DVT Prophylaxis: Pneumatic Compression Devices  Cabrera Catheter: Not present  Central Lines: None  Cardiac Monitoring: None  Code Status:   Full      Disposition Plan      Expected Discharge Date: 11/20/2022                The patient's care was discussed with the Patient and Patient's Family.    David Hewitt MD  Hospitalist Service  [facility]  Securely message with the Vocera Web Console (learn more here)  Text page via [facility] Paging/Directory         ______________________________________________________________________    Chief Complaint   Abdominal pain    History is obtained from the patient    History of Present Illness   Dennis J Goodell is a 47 year old male who presents to the emergency department with right lower quadrant abdominal pain  that developed approximately 11 PM the night prior to admission.  It is persistent and quite exquisitely painful.  Any movement causes more pain.  He had vomiting and diarrhea throughout the night.  No blood in the emesis or stool.  He has intermittent abdominal pains and has had them for years.  He has never had endoscopy.  He denied fevers or chills.  No shortness of breath.    He recently was diagnosed with COVID in October and was treated with paxlovid.  He had been feeling better and was back at work.    Work-up in the emergency department shows that he is hemodynamically stable.  He has leukocytosis and a low-grade fever.  A CT of the abdomen shows right-sided colitis.  No evidence of perforation or free air.  He was admitted to the hospital for further management.    Review of Systems    CONSTITUTIONAL: NEGATIVE for fever, chills, change in weight  INTEGUMENTARY/SKIN: NEGATIVE for worrisome rashes, moles or lesions  EYES: NEGATIVE for vision changes or irritation  ENT/MOUTH: NEGATIVE for ear, mouth and throat problems  RESP: NEGATIVE for significant cough or SOB  CV: NEGATIVE for chest pain, palpitations or peripheral edema  GI: POSITIVE for abdominal pain RLQ, diarrhea, nausea and vomiting  : NEGATIVE for frequency, dysuria, or hematuria  MUSCULOSKELETAL: NEGATIVE for significant arthralgias or myalgia  NEURO: NEGATIVE for weakness, dizziness or paresthesias  ENDOCRINE: NEGATIVE for temperature intolerance, skin/hair changes  HEME: NEGATIVE for bleeding problems  PSYCHIATRIC: NEGATIVE for changes in mood or affect    Past Medical History    I have reviewed this patient's medical history and updated it with pertinent information if needed.   Past Medical History:   Diagnosis Date     Anxiety disorder 2012     Dysthymic disorder     No Comments Provided     Malignant neoplasm of testis (H) 2005 2005,teratoma     Raynaud's syndrome without gangrene     No Comments Provided     Uncomplicated alcohol abuse      7/25/2012       Past Surgical History   I have reviewed this patient's surgical history and updated it with pertinent information if needed.  Past Surgical History:   Procedure Laterality Date     DECOMPRESSION CUBITAL TUNNEL Left 01/2019     HERNIA REPAIR      ,HERNIA REPAIR     IR CHEST PORT PLACEMENT > 5 YRS OF AGE Left 04/01/2008     LYMPH NODE BIOPSY  09/05/2008     [facility]. 37 lymph nodes excised     ORCHIECTOMY SCROTAL Right 12/30/2005    teratoma     OTHER SURGICAL HISTORY      9/05/08,043001,OTHER     RELEASE CARPAL TUNNEL  04/09/2013       Social History   I have reviewed this patient's social history and updated it with pertinent information if needed.  Social History     Tobacco Use     Smoking status: Never     Smokeless tobacco: Current     Types: Chew     Tobacco comments:     1 tin lasts 1 week   Vaping Use     Vaping Use: Never used   Substance Use Topics     Alcohol use: Not Currently     Alcohol/week: 8.0 standard drinks     Comment: Quit Date: 8-1-2021     Drug use: Never       Family History   I have reviewed this patient's family history and updated it with pertinent information if needed.  Family History   Problem Relation Age of Onset     Pulmonary Embolism Mother      Other - See Comments Daughter         recurrent OM     Other - See Comments Other         Suicidality,maternal uncle     No Known Problems Father        Prior to Admission Medications   Prior to Admission Medications   Prescriptions Last Dose Informant Patient Reported? Taking?   ALPRAZolam (XANAX) 1 MG tablet 11/17/2022  Yes Yes   Sig: Take 1 mg by mouth 2 times daily as needed for anxiety   NIFEdipine ER OSMOTIC (PROCARDIA XL) 90 MG 24 hr tablet 11/17/2022  No Yes   Sig: TAKE 1 TABLET(90 MG) BY MOUTH DAILY   albuterol (PROAIR HFA/PROVENTIL HFA/VENTOLIN HFA) 108 (90 Base) MCG/ACT inhaler More than a month  Yes Yes   Sig: Inhale 2 puffs into the lungs every 6 hours as needed for shortness of breath / dyspnea  or wheezing   calcium carbonate (TUMS) 500 MG chewable tablet 11/17/2022  Yes Yes   Sig: Take 1 chew tab by mouth 4 times daily as needed for heartburn   clonazePAM (KLONOPIN) 1 MG tablet   Yes No   Sig: Take 1 mg by mouth 3 times daily   desvenlafaxine succinate (PRISTIQ) 100 MG 24 hr tablet 11/17/2022  Yes Yes   Sig: Take 100 mg by mouth daily   famotidine (PEPCID) 20 MG tablet 11/17/2022  Yes Yes   Sig: Take 20 mg by mouth daily   omeprazole (PRILOSEC) 20 MG DR capsule 11/17/2022  Yes Yes   Sig: Take 20 mg by mouth daily      Facility-Administered Medications: None     Allergies   No Known Allergies    Physical Exam   Vital Signs: Temp: (!) 100.6  F (38.1  C) Temp src: Tympanic BP: 130/86 Pulse: 113   Resp: 19 SpO2: 99 %      Weight: 175 lbs 0 oz    Constitutional: In obvious distress  Eyes: pupils reactive, extraocular movements intact. Anicteric sclera.   HEENT: Oropharynx nonerythematous. Neck supple, no JVD.  Respiratory: no crackles noted, no wheezes.  Cardiovascular: regular, no murmur. no lower extremity edema.  GI: soft, tender, bowel sounds present.  Lymph/Hematologic: no cervical or supraclavicular LAD.  Genitourinary: deferred  Skin: no rashes, or sores  Musculoskeletal: no joint erythema or swelling  Neurologic: cranial nerves symmetric. Neuro exam nonfocal  Psychiatric: alert and oriented x3. Interactive.       Data   Data reviewed today: I reviewed all medications, new labs and imaging results over the last 24 hours. I personally reviewed the abdominal CT image(s) showing colitis.    Recent Labs   Lab 11/18/22  0946   WBC 17.9*   HGB 14.3   MCV 87         POTASSIUM 4.2   CHLORIDE 98   CO2 26   BUN 10.5   CR 0.75   ANIONGAP 12   MARIA INES 8.6   *   ALBUMIN 4.3   PROTTOTAL 6.6   BILITOTAL 0.6   ALKPHOS 77   ALT 15   AST 18     Recent Results (from the past 24 hour(s))   CT Abdomen Pelvis w Contrast    Narrative    PROCEDURE: CT ABDOMEN PELVIS W CONTRAST 11/18/2022 10:18 AM    HISTORY:  rlq pain, fever    COMPARISONS: 9/7/2018.    Meds/Dose Given: Isovue 370 100mL    TECHNIQUE: Axial postcontrast enhanced images with coronal and  sagittal reformatted images.    FINDINGS: Lung bases are clear.    No focal abnormality is seen in the liver, spleen, adrenal glands or  in the pancreas. Gallbladder is present.    No renal mass or hydronephrosis is seen.    There is diffuse thickening of the wall of the descending colon with  some extension into the hepatic flexure. There is some free fluid  around the ascending colon. The appendix is not definitely visualized.    There is a rounded low attenuation structure inferolateral to the  cecum. This was present on the prior exam and was thought to possibly  represent scarring related to prior surgery. It is similar in size to  the prior exam but it does now contain a punctate calcification which  could be seen in scarring. There is not significant inflammation  centered around this process.    No other significant bowel abnormality is seen. No significant lymph  node enlargement is seen. There are nonenlarged retroperitoneal lymph  nodes.    No aneurysm is seen. There is no focal bone lesion.    Wall of the urinary bladder is somewhat thick which may be due to  underdistention.      Impression    IMPRESSION: Very abnormal appearing right colon with diffuse  thickening of the wall which suggests colitis. There is some fluid  around the colon but only very mild inflammatory change. There is not  a well-defined abscess.    Appendix is not visualized separate from this.    KRISTINA BROOKS MD         SYSTEM ID:  [id number]

## 2022-11-18 NOTE — PHARMACY-ADMISSION MEDICATION HISTORY
Pharmacy -- Admission Medication Reconciliation    Prior to admission (PTA) medications were reviewed and the patient's PTA medication list was updated.    Sources Consulted: chart review, sure scripts, patient interview    The reliability of this Medication Reconciliation is: Reliability: Reliable    The following significant changes were made:    Removed:    Naproxen    Cyclobenzaprine    Benzonatate    Excedrin migraine    Alprazolam duplicate    Updated:    Albuterol to PRN per patient (no fills in last year; uses maybe once per month; needs a refill)    Dose directions for desvenlafaxine    Added:    Omeprazole    Famotidine    Tums     Note:    Patient stated nifedipine is for Raynauds    Stated takes his medications when he remembers and works night shifts sometimes so varies his administration times between AM and PM     Patient is using both alprazolam and clonazepam. Stated he uses the alprazolam PRN but uses about 5-6 days per week.    In addition, the patient's allergies were reviewed with the patient and updated as follows:   Allergies: Patient has no known allergies.    The pharmacist has reviewed with the patient that all personal medications should be removed from the building or locked in the belongings safe.  Patient shall only take medications ordered by the physician and administered by the nursing staff.     Medication barriers identified: none   Medication adherence concerns: sometimes forgets to takes his medications   Understanding of emergency medications: rare use of albuterol    Nevin Childress RPH, 11/18/2022,  12:46 PM

## 2022-11-18 NOTE — ED TRIAGE NOTES
ED Nursing Triage Note (General)   ________________________________    Dennis J Goodell is a 47 year old Male that presents to triage via private vehicle with complaints of abdominal pain and fevers.  Patient states pain is located in the RLQ, however, radiates throughout the abdomen.  Wife states patient has had intermittent abdominal pain throughout the month and has been taking tums, Prilosec and zantac due to patient believing he may have an ulcer.  Patient states emesis throughout the night as well as diarrhea, however, no blood noted in stool or urine.  Hx of abdominal surgery due to hernia as well as testicular cancer.   Significant symptoms had onset 10 hours ago.  Patient appears alert behavior.  GCS-15  Airway: intact  Breathing noted as Normal  Action taken: 3      PRE HOSPITAL PRIOR LIVING SITUATION-home

## 2022-11-18 NOTE — ED PROVIDER NOTES
History     Chief Complaint   Patient presents with     Abdominal Pain     Fever     HPI  Dennis J Goodell is a 47 year old male who is here with right lower quadrant pain.  He states he does have episodes of abdominal pain fairly frequently, however this is more epigastric and upper abdomen.  Last night at 11 he developed this pain which is more in the right lower quadrant.  It is very painful, nothing makes it better.  Going over bumps in the car made it significantly worse.  He was up most of the night with vomiting and diarrhea.  He denies bloody black or tarry stools.  Was not aware that he had a fever.  No respiratory difficulty occultly or chest discomfort.    Allergies:  Allergies   Allergen Reactions     Topiramate Headache and Nausea and Vomiting       Problem List:    Patient Active Problem List    Diagnosis Date Noted     Facet arthropathy, cervical 04/28/2021     Priority: Medium     Intractable chronic migraine without aura and with status migrainosus 04/28/2021     Priority: Medium     MARLEN (generalized anxiety disorder) 06/09/2020     Priority: Medium     Dysthymic disorder 02/09/2018     Priority: Medium     Raynaud phenomenon 02/09/2018     Priority: Medium     History of testicular cancer 08/12/2016     Priority: Medium     Mets to left neck  Diagnosed in [place]  In remission          Past Medical History:    Past Medical History:   Diagnosis Date     Anxiety disorder 2012     Dysthymic disorder      Malignant neoplasm of testis (H) 2005     Raynaud's syndrome without gangrene      Uncomplicated alcohol abuse        Past Surgical History:    Past Surgical History:   Procedure Laterality Date     DECOMPRESSION CUBITAL TUNNEL Left 01/2019     HERNIA REPAIR      ,HERNIA REPAIR     IR CHEST PORT PLACEMENT > 5 YRS OF AGE Left 04/01/2008     LYMPH NODE BIOPSY  09/05/2008     [facility]. 37 lymph nodes excised     ORCHIECTOMY SCROTAL Right 12/30/2005    teratoma     OTHER SURGICAL HISTORY       9/05/08,068300,OTHER     RELEASE CARPAL TUNNEL  04/09/2013       Family History:    Family History   Problem Relation Age of Onset     Pulmonary Embolism Mother      Other - See Comments Daughter         recurrent OM     Other - See Comments Other         Suicidality,maternal uncle     No Known Problems Father        Social History:  Marital Status:   [2]  Social History     Tobacco Use     Smoking status: Never     Smokeless tobacco: Current     Types: Chew     Tobacco comments:     1 tin lasts 1 week   Vaping Use     Vaping Use: Never used   Substance Use Topics     Alcohol use: Not Currently     Alcohol/week: 8.0 standard drinks     Comment: Quit Date: 8-1-2021     Drug use: Never        Medications:    albuterol (PROAIR HFA/PROVENTIL HFA/VENTOLIN HFA) 108 (90 Base) MCG/ACT inhaler  ALPRAZolam (XANAX) 1 MG tablet  ALPRAZolam (XANAX) 1 MG tablet  aspirin-acetaminophen-caffeine (EXCEDRIN MIGRAINE) 250-250-65 MG per tablet  benzonatate (TESSALON) 100 MG capsule  clonazePAM (KLONOPIN) 1 MG tablet  cyclobenzaprine (FLEXERIL) 5 MG tablet  desvenlafaxine succinate (PRISTIQ) 100 MG 24 hr tablet  naproxen sodium (ANAPROX) 220 MG tablet  NIFEdipine ER OSMOTIC (PROCARDIA XL) 90 MG 24 hr tablet          Review of Systems   Constitutional: Negative for chills and fever.   HENT: Negative for congestion.    Eyes: Negative for visual disturbance.   Respiratory: Negative for chest tightness and shortness of breath.    Cardiovascular: Negative for chest pain.   Gastrointestinal: Positive for abdominal pain, diarrhea, nausea and vomiting. Negative for blood in stool.   Genitourinary: Negative for dysuria.   Musculoskeletal: Negative for arthralgias.   Skin: Negative for rash.   Neurological: Negative for light-headedness.   Psychiatric/Behavioral: Negative for agitation.       Physical Exam   BP: 114/79  Pulse: 113  Temp: (!) 100.7  F (38.2  C)  Resp: 19  Weight: 79.4 kg (175 lb)  SpO2: 98 %      Physical Exam  Vitals  and nursing note reviewed.   Constitutional:       Appearance: He is well-developed.   HENT:      Head: Normocephalic and atraumatic.      Mouth/Throat:      Mouth: Mucous membranes are moist.   Eyes:      Conjunctiva/sclera: Conjunctivae normal.   Cardiovascular:      Rate and Rhythm: Normal rate and regular rhythm.      Heart sounds: Normal heart sounds.   Pulmonary:      Effort: Pulmonary effort is normal.      Breath sounds: Normal breath sounds.   Abdominal:      General: Abdomen is flat. Bowel sounds are normal.      Palpations: Abdomen is soft.      Comments: Diffusely tender throughout, however markedly more so in the right lower quadrant.  Positive rebound tenderness.   Skin:     General: Skin is warm and dry.   Neurological:      Mental Status: He is alert and oriented to person, place, and time.   Psychiatric:         Behavior: Behavior normal.         ED Course                 Procedures                Results for orders placed or performed during the hospital encounter of 11/18/22 (from the past 24 hour(s))   Extra Tube    Narrative    The following orders were created for panel order Extra Tube.  Procedure                               Abnormality         Status                     ---------                               -----------         ------                     Extra Blue Top Tube[[id number]]                              Final result               Extra Serum Separator Tu...[[id number]]                      Final result               Extra Green Top (Lithium...[[id number]]                      Final result                 Please view results for these tests on the individual orders.   Extra Blue Top Tube   Result Value Ref Range    Hold Specimen JIC    Extra Serum Separator Tube (SST)   Result Value Ref Range    Hold Specimen JIC    Extra Green Top (Lithium Heparin) ON ICE   Result Value Ref Range    Hold Specimen JIC    Lactic acid whole blood   Result Value Ref Range    Lactic Acid 2.7 (H) 0.7 -  2.0 mmol/L   CBC with platelets differential    Narrative    The following orders were created for panel order CBC with platelets differential.  Procedure                               Abnormality         Status                     ---------                               -----------         ------                     CBC with platelets and d...[[id number]]  Abnormal            Final result                 Please view results for these tests on the individual orders.   Comprehensive metabolic panel   Result Value Ref Range    Sodium 136 136 - 145 mmol/L    Potassium 4.2 3.4 - 5.3 mmol/L    Chloride 98 98 - 107 mmol/L    Carbon Dioxide (CO2) 26 22 - 29 mmol/L    Anion Gap 12 7 - 15 mmol/L    Urea Nitrogen 10.5 6.0 - 20.0 mg/dL    Creatinine 0.75 0.67 - 1.17 mg/dL    Calcium 8.6 8.6 - 10.0 mg/dL    Glucose 104 (H) 70 - 99 mg/dL    Alkaline Phosphatase 77 40 - 129 U/L    AST 18 10 - 50 U/L    ALT 15 10 - 50 U/L    Protein Total 6.6 6.4 - 8.3 g/dL    Albumin 4.3 3.5 - 5.2 g/dL    Bilirubin Total 0.6 <=1.2 mg/dL    GFR Estimate >90 >60 mL/min/1.73m2   CBC with platelets and differential   Result Value Ref Range    WBC Count 17.9 (H) 4.0 - 11.0 10e3/uL    RBC Count 4.73 4.40 - 5.90 10e6/uL    Hemoglobin 14.3 13.3 - 17.7 g/dL    Hematocrit 41.0 40.0 - 53.0 %    MCV 87 78 - 100 fL    MCH 30.2 26.5 - 33.0 pg    MCHC 34.9 31.5 - 36.5 g/dL    RDW 13.2 10.0 - 15.0 %    Platelet Count 296 150 - 450 10e3/uL    % Neutrophils 86 %    % Lymphocytes 6 %    % Monocytes 8 %    % Eosinophils 0 %    % Basophils 0 %    % Immature Granulocytes 0 %    NRBCs per 100 WBC 0 <1 /100    Absolute Neutrophils 15.3 (H) 1.6 - 8.3 10e3/uL    Absolute Lymphocytes 1.0 0.8 - 5.3 10e3/uL    Absolute Monocytes 1.4 (H) 0.0 - 1.3 10e3/uL    Absolute Eosinophils 0.0 0.0 - 0.7 10e3/uL    Absolute Basophils 0.1 0.0 - 0.2 10e3/uL    Absolute Immature Granulocytes 0.1 <=0.4 10e3/uL    Absolute NRBCs 0.0 10e3/uL   CT Abdomen Pelvis w Contrast    Narrative     PROCEDURE: CT ABDOMEN PELVIS W CONTRAST 11/18/2022 10:18 AM    HISTORY: rlq pain, fever    COMPARISONS: 9/7/2018.    Meds/Dose Given: Isovue 370 100mL    TECHNIQUE: Axial postcontrast enhanced images with coronal and  sagittal reformatted images.    FINDINGS: Lung bases are clear.    No focal abnormality is seen in the liver, spleen, adrenal glands or  in the pancreas. Gallbladder is present.    No renal mass or hydronephrosis is seen.    There is diffuse thickening of the wall of the descending colon with  some extension into the hepatic flexure. There is some free fluid  around the ascending colon. The appendix is not definitely visualized.    There is a rounded low attenuation structure inferolateral to the  cecum. This was present on the prior exam and was thought to possibly  represent scarring related to prior surgery. It is similar in size to  the prior exam but it does now contain a punctate calcification which  could be seen in scarring. There is not significant inflammation  centered around this process.    No other significant bowel abnormality is seen. No significant lymph  node enlargement is seen. There are nonenlarged retroperitoneal lymph  nodes.    No aneurysm is seen. There is no focal bone lesion.    Wall of the urinary bladder is somewhat thick which may be due to  underdistention.      Impression    IMPRESSION: Very abnormal appearing right colon with diffuse  thickening of the wall which suggests colitis. There is some fluid  around the colon but only very mild inflammatory change. There is not  a well-defined abscess.    Appendix is not visualized separate from this.    KRISTINA BROOKS MD         SYSTEM ID:  [id number]       Medications   sodium chloride 0.9% infusion (has no administration in time range)   sodium chloride 0.9% infusion (has no administration in time range)   ciprofloxacin (CIPRO) infusion 400 mg (400 mg Intravenous New Bag 11/18/22 1131)   metroNIDAZOLE (FLAGYL) infusion 500  mg (500 mg Intravenous New Bag 11/18/22 1132)   0.9% sodium chloride BOLUS (1,000 mLs Intravenous New Bag 11/18/22 1131)     Followed by   sodium chloride 0.9% infusion (has no administration in time range)   ondansetron (ZOFRAN) injection 4 mg (4 mg Intravenous Given 11/18/22 0933)   0.9% sodium chloride BOLUS (1,000 mLs Intravenous New Bag 11/18/22 0935)   fentaNYL (PF) (SUBLIMAZE) injection 50 mcg (50 mcg Intravenous Given 11/18/22 0935)   acetaminophen (TYLENOL) tablet 650 mg (650 mg Oral Given 11/18/22 0933)   iopamidol (ISOVUE-370) solution 100 mL (100 mLs Intravenous Given 11/18/22 1012)   fentaNYL (PF) (SUBLIMAZE) injection 50 mcg (50 mcg Intravenous Given 11/18/22 1129)   LORazepam (ATIVAN) injection 1 mg (1 mg Intravenous Given 11/18/22 1149)       Assessments & Plan (with Medical Decision Making)     I have reviewed the nursing notes.    I have reviewed the findings, diagnosis, plan and need for follow up with the patient.  Patient here with colitis on CT scan as above.  He has elevated white count, fever and significant right lower quadrant pain with rebound.  I called and spoke with Dr. Arciniega from general surgery.  He was able to review CT scan as well.  It is unclear if this colitis is due to something like infection or possibly even bowel ischemia.  The lactic acid is slightly elevated.  We are giving fluid bolus as well as IV Cipro and Flagyl.  I called Dr. Pal, hospitalist for admission.  We will recheck lactic acid in a few hours to see if this is coming down with fluids.  Dr. Arciniega will consult.    New Prescriptions    No medications on file       Final diagnoses:   Colitis       11/18/2022   [facility]     Lemuel Dietz MD  11/18/22 1151

## 2022-11-18 NOTE — PLAN OF CARE
Patient c/o abdominal pain rating 8-9/10. PRN dilaudid ineffective at this time. Declined non-pharmaceutical interventions. Tender to palpate lower quadrants. BS audible. No loose stool. Febrile; Tylenol given. VSS.  Patient is independent in room. Family is at bedside.

## 2022-11-19 LAB
ALBUMIN SERPL BCG-MCNC: 3.4 G/DL (ref 3.5–5.2)
ALP SERPL-CCNC: 73 U/L (ref 40–129)
ALT SERPL W P-5'-P-CCNC: 42 U/L (ref 10–50)
ANION GAP SERPL CALCULATED.3IONS-SCNC: 8 MMOL/L (ref 7–15)
AST SERPL W P-5'-P-CCNC: 43 U/L (ref 10–50)
BILIRUB SERPL-MCNC: 0.7 MG/DL
BUN SERPL-MCNC: 9.5 MG/DL (ref 6–20)
C DIFF TOX B STL QL: NEGATIVE
CALCIUM SERPL-MCNC: 8.1 MG/DL (ref 8.6–10)
CHLORIDE SERPL-SCNC: 99 MMOL/L (ref 98–107)
CREAT SERPL-MCNC: 0.68 MG/DL (ref 0.67–1.17)
DEPRECATED HCO3 PLAS-SCNC: 26 MMOL/L (ref 22–29)
ERYTHROCYTE [DISTWIDTH] IN BLOOD BY AUTOMATED COUNT: 13.2 % (ref 10–15)
GFR SERPL CREATININE-BSD FRML MDRD: >90 ML/MIN/1.73M2
GLUCOSE SERPL-MCNC: 108 MG/DL (ref 70–99)
HCT VFR BLD AUTO: 38.8 % (ref 40–53)
HGB BLD-MCNC: 13.2 G/DL (ref 13.3–17.7)
LACTATE SERPL-SCNC: 1.9 MMOL/L (ref 0.7–2)
MAGNESIUM SERPL-MCNC: 1.8 MG/DL (ref 1.7–2.3)
MCH RBC QN AUTO: 29.9 PG (ref 26.5–33)
MCHC RBC AUTO-ENTMCNC: 34 G/DL (ref 31.5–36.5)
MCV RBC AUTO: 88 FL (ref 78–100)
PLATELET # BLD AUTO: 243 10E3/UL (ref 150–450)
POTASSIUM SERPL-SCNC: 4 MMOL/L (ref 3.4–5.3)
PROT SERPL-MCNC: 5.7 G/DL (ref 6.4–8.3)
RBC # BLD AUTO: 4.41 10E6/UL (ref 4.4–5.9)
SODIUM SERPL-SCNC: 133 MMOL/L (ref 136–145)
WBC # BLD AUTO: 14.8 10E3/UL (ref 4–11)

## 2022-11-19 PROCEDURE — 85027 COMPLETE CBC AUTOMATED: CPT | Performed by: INTERNAL MEDICINE

## 2022-11-19 PROCEDURE — C9113 INJ PANTOPRAZOLE SODIUM, VIA: HCPCS | Performed by: INTERNAL MEDICINE

## 2022-11-19 PROCEDURE — 83605 ASSAY OF LACTIC ACID: CPT | Performed by: INTERNAL MEDICINE

## 2022-11-19 PROCEDURE — 83735 ASSAY OF MAGNESIUM: CPT | Performed by: INTERNAL MEDICINE

## 2022-11-19 PROCEDURE — 80053 COMPREHEN METABOLIC PANEL: CPT | Performed by: INTERNAL MEDICINE

## 2022-11-19 PROCEDURE — 120N000001 HC R&B MED SURG/OB

## 2022-11-19 PROCEDURE — 250N000013 HC RX MED GY IP 250 OP 250 PS 637: Performed by: FAMILY MEDICINE

## 2022-11-19 PROCEDURE — 250N000011 HC RX IP 250 OP 636: Performed by: INTERNAL MEDICINE

## 2022-11-19 PROCEDURE — 250N000013 HC RX MED GY IP 250 OP 250 PS 637: Performed by: INTERNAL MEDICINE

## 2022-11-19 PROCEDURE — 258N000003 HC RX IP 258 OP 636: Performed by: INTERNAL MEDICINE

## 2022-11-19 PROCEDURE — 87506 IADNA-DNA/RNA PROBE TQ 6-11: CPT | Performed by: INTERNAL MEDICINE

## 2022-11-19 PROCEDURE — 99231 SBSQ HOSP IP/OBS SF/LOW 25: CPT | Mod: 25 | Performed by: SURGERY

## 2022-11-19 PROCEDURE — 99232 SBSQ HOSP IP/OBS MODERATE 35: CPT | Performed by: INTERNAL MEDICINE

## 2022-11-19 PROCEDURE — 250N000011 HC RX IP 250 OP 636: Performed by: FAMILY MEDICINE

## 2022-11-19 PROCEDURE — 87493 C DIFF AMPLIFIED PROBE: CPT | Performed by: INTERNAL MEDICINE

## 2022-11-19 PROCEDURE — 36415 COLL VENOUS BLD VENIPUNCTURE: CPT | Performed by: INTERNAL MEDICINE

## 2022-11-19 RX ORDER — DIPHENHYDRAMINE HCL 50 MG
50 CAPSULE ORAL EVERY 6 HOURS PRN
Status: DISCONTINUED | OUTPATIENT
Start: 2022-11-19 | End: 2022-11-22 | Stop reason: HOSPADM

## 2022-11-19 RX ADMIN — METRONIDAZOLE 500 MG: 500 INJECTION, SOLUTION INTRAVENOUS at 10:19

## 2022-11-19 RX ADMIN — ALPRAZOLAM 1 MG: 0.5 TABLET ORAL at 21:16

## 2022-11-19 RX ADMIN — NIFEDIPINE 90 MG: 30 TABLET, FILM COATED, EXTENDED RELEASE ORAL at 09:44

## 2022-11-19 RX ADMIN — HYDROMORPHONE HYDROCHLORIDE 0.5 MG: 1 INJECTION, SOLUTION INTRAMUSCULAR; INTRAVENOUS; SUBCUTANEOUS at 20:21

## 2022-11-19 RX ADMIN — HYDROMORPHONE HYDROCHLORIDE 0.5 MG: 1 INJECTION, SOLUTION INTRAMUSCULAR; INTRAVENOUS; SUBCUTANEOUS at 01:40

## 2022-11-19 RX ADMIN — ACETAMINOPHEN 975 MG: 325 TABLET ORAL at 10:01

## 2022-11-19 RX ADMIN — METRONIDAZOLE 500 MG: 500 INJECTION, SOLUTION INTRAVENOUS at 22:54

## 2022-11-19 RX ADMIN — HYDROMORPHONE HYDROCHLORIDE 0.5 MG: 1 INJECTION, SOLUTION INTRAMUSCULAR; INTRAVENOUS; SUBCUTANEOUS at 04:24

## 2022-11-19 RX ADMIN — HYDROMORPHONE HYDROCHLORIDE 2 MG: 2 TABLET ORAL at 02:28

## 2022-11-19 RX ADMIN — HYDROMORPHONE HYDROCHLORIDE 0.5 MG: 1 INJECTION, SOLUTION INTRAMUSCULAR; INTRAVENOUS; SUBCUTANEOUS at 18:11

## 2022-11-19 RX ADMIN — DESVENLAFAXINE 100 MG: 50 TABLET, EXTENDED RELEASE ORAL at 09:51

## 2022-11-19 RX ADMIN — PANTOPRAZOLE SODIUM 40 MG: 40 INJECTION, POWDER, FOR SOLUTION INTRAVENOUS at 09:54

## 2022-11-19 RX ADMIN — HYDROMORPHONE HYDROCHLORIDE 0.5 MG: 1 INJECTION, SOLUTION INTRAMUSCULAR; INTRAVENOUS; SUBCUTANEOUS at 11:27

## 2022-11-19 RX ADMIN — HYDROMORPHONE HYDROCHLORIDE 0.5 MG: 1 INJECTION, SOLUTION INTRAMUSCULAR; INTRAVENOUS; SUBCUTANEOUS at 15:54

## 2022-11-19 RX ADMIN — HYDROMORPHONE HYDROCHLORIDE 0.5 MG: 1 INJECTION, SOLUTION INTRAMUSCULAR; INTRAVENOUS; SUBCUTANEOUS at 14:08

## 2022-11-19 RX ADMIN — HYDROMORPHONE HYDROCHLORIDE 0.5 MG: 1 INJECTION, SOLUTION INTRAMUSCULAR; INTRAVENOUS; SUBCUTANEOUS at 22:54

## 2022-11-19 RX ADMIN — DIPHENHYDRAMINE HYDROCHLORIDE 50 MG: 50 CAPSULE ORAL at 18:11

## 2022-11-19 RX ADMIN — HYDROMORPHONE HYDROCHLORIDE 0.5 MG: 1 INJECTION, SOLUTION INTRAMUSCULAR; INTRAVENOUS; SUBCUTANEOUS at 08:54

## 2022-11-19 RX ADMIN — HYDROMORPHONE HYDROCHLORIDE 0.5 MG: 1 INJECTION, SOLUTION INTRAMUSCULAR; INTRAVENOUS; SUBCUTANEOUS at 06:56

## 2022-11-19 RX ADMIN — DEXTRAN 70, GLYCERIN, HYPROMELLOSE 1 DROP: 1; 2; 3 SOLUTION/ DROPS OPHTHALMIC at 14:16

## 2022-11-19 RX ADMIN — CIPROFLOXACIN 400 MG: 2 INJECTION, SOLUTION INTRAVENOUS at 09:00

## 2022-11-19 RX ADMIN — ALPRAZOLAM 1 MG: 0.5 TABLET ORAL at 09:50

## 2022-11-19 RX ADMIN — ACETAMINOPHEN 975 MG: 325 TABLET ORAL at 16:12

## 2022-11-19 RX ADMIN — DEXTRAN 70, GLYCERIN, HYPROMELLOSE 1 DROP: 1; 2; 3 SOLUTION/ DROPS OPHTHALMIC at 09:15

## 2022-11-19 RX ADMIN — SODIUM CHLORIDE: 9 INJECTION, SOLUTION INTRAVENOUS at 14:06

## 2022-11-19 RX ADMIN — CIPROFLOXACIN 400 MG: 2 INJECTION, SOLUTION INTRAVENOUS at 21:16

## 2022-11-19 RX ADMIN — SODIUM CHLORIDE 1000 ML: 9 INJECTION, SOLUTION INTRAVENOUS at 02:28

## 2022-11-19 ASSESSMENT — ACTIVITIES OF DAILY LIVING (ADL)
ADLS_ACUITY_SCORE: 20
ADLS_ACUITY_SCORE: 22
ADLS_ACUITY_SCORE: 23
ADLS_ACUITY_SCORE: 20
ADLS_ACUITY_SCORE: 23
ADLS_ACUITY_SCORE: 23
ADLS_ACUITY_SCORE: 20
ADLS_ACUITY_SCORE: 22
ADLS_ACUITY_SCORE: 20
ADLS_ACUITY_SCORE: 22

## 2022-11-19 NOTE — PROGRESS NOTES
PROGRESS NOTE    cc: Right lower quadrant pain    HPI: No bowel movement yet.  Continues to have pain that is severe at times.  Noted fever.    EXAM:  Date 22 0700 - 22 0659   Shift 3803-7746 5133-3919 1603-9829 24 Hour Total   INTAKE   P.O. 480   480   Shift Total(mL/kg) 480(6.26)   480(6.26)   OUTPUT   Urine 575   575   Shift Total(mL/kg) 575(7.5)   575(7.5)   Weight (kg) 76.66 76.66 76.66 76.66      Temp: (!) 100.9  F (38.3  C)   Temp  Min: 98.6  F (37  C)  Max: 102.9  F (39.4  C)  Resp: 16 Resp  Min: 16  Max: 16  SpO2: 94 % SpO2  Min: 94 %  Max: 99 %  Pulse: 103 Pulse  Min: 93  Max: 103    No data recorded  BP: 112/64 Systolic (24hrs), Av , Min:112 , Max:146   Diastolic (24hrs), Av, Min:64, Max:86        GENERAL: alert, NAD  CV: RRR, no murmurs  RESPIRATORY: no dyspnea, clear bilat  ABDOMEN: non-distended, +BS, soft, non-tender   EXTREMITY: no edema, neg Janette's  SKIN: warm and dry, no jaundice no rashes  NEURO: cranial nerves II-XII grossly intact,motor exam intact    LABS  Recent Labs   Lab Test 22  0556 22  0946 19  1623 16  1145 14  1123   WBC 14.8* 17.9*  --   --   --    RBC 4.41 4.73  --   --   --    HGB 13.2* 14.3   < > 14.6 15.5   HCT 38.8* 41.0  --  43.2 45.3   MCV 88 87  --  88 88   MCH 29.9 30.2  --  30.0 30.2   MCHC 34.0 34.9  --  33.9 34.2    296  --  301 251   MPV  --   --   --  6.7 6.9    < > = values in this interval not displayed.       Recent Labs   Lab Test 22  0556 22  0946   POTASSIUM 4.0 4.2   CHLORIDE 99 98   BUN 9.5 10.5     Recent Labs   Lab Test 22  0556 22  1955 2246 19  1045 18  0920   PROTEIN  --  Negative  --   --  Negative   BILITOTAL 0.7  --  0.6   < >  --    AST 43  --  18   < >  --    ALT 42  --  15   < >  --     < > = values in this interval not displayed.       IMAGING  I personally reviewed patient's CT scan from admission    ASSESSMENT  47-year-old male with nonspecific  colitis.  We will continue to investigate for etiology and cover for infectious causes      Patient Active Problem List   Diagnosis     Dysthymic disorder     History of testicular cancer     Raynaud phenomenon     MARLEN (generalized anxiety disorder)     Facet arthropathy, cervical     Intractable chronic migraine without aura and with status migrainosus     Colitis     Leukocytosis       PLAN  Continue antibiotics    Await stool culture enteric panel and C. Difficile    Serial abdominal exams and exploratory laparotomy if peritonitis develops      Solomon Arciniega MD on 11/19/2022 at 11:50 AM

## 2022-11-19 NOTE — PLAN OF CARE
Goal Outcome Evaluation:      Plan of Care Reviewed With: patient    Overall Patient Progress: no changeOverall Patient Progress: no change    Outcome Evaluation: imrpoved pain control

## 2022-11-19 NOTE — PROVIDER NOTIFICATION
11/19/22 1122   Valuables   Patient Belongings Remaining with Patient cell phone/electronics;clothing;ring;shoes       [facility] will make every effort per our policy to help keep your items safe while in the hospital.  If you choose to keep any items at the bedside, we cannot be held responsible for any items that are lost or broken.      List items sent to safe: NA    I have reviewed my belongings list on admission and verify that it is correct.     Patient signature_______________________________  Date/Time_____________________    2nd Staff person if patient unable to sign __________________________  Date/Time ______________________      I have received all my belongings noted above at discharge.    Patient signature________________________________  Date/Time  __________________________

## 2022-11-19 NOTE — PLAN OF CARE
"Pt is A/O x 4, he is independent in room. Pain has been rated from a 6-9 out of 10 in his RLQ. Has increased pain with palpation. States oral dilaudid is not effective for him, but IV dilaudid makes the pain tolerable. /64 (BP Location: Right arm, Patient Position: Semi-Marie's, Cuff Size: Adult Regular)   Pulse 103   Temp 98.6  F (37  C) (Tympanic)   Resp 16   Ht 1.778 m (5' 10\")   Wt 76.7 kg (169 lb)   SpO2 94%   BMI 24.25 kg/m          Goal Outcome Evaluation:      Plan of Care Reviewed With: patient    Overall Patient Progress: improvingOverall Patient Progress: improving           "

## 2022-11-19 NOTE — PLAN OF CARE
Assumed patient care at 1500. Patient is resting. RLQ pain is managed with PRN medications. Voiding without difficulty. Up ad martine. Elevated temp; Tylenol given.         Order was signed , please help pt with appointment

## 2022-11-19 NOTE — PROGRESS NOTES
[facility]    Medicine Progress Note - Hospitalist Service    Date of Admission:  11/18/2022    Assessment & Plan      Dennis J Goodell is a 47 year old male admitted on 11/18/2022. He has colitis.     Principal Problem:    Colitis  Assessment: Present on admission.  Differential diagnosis includes infectious, inflammatory or ischemic colitis.  He has not been on any recent antibiotics, however he recently had COVID and was treated with antivirals.  This may have suppressed his immune system enough that he could develop C. difficile and so we will check for that.  Also send off stool cultures.  No other sick contacts in the family.  Inflammatory bowel disease and ischemia are also possible. Lactate and white blood cell count improving. Still pain and fever.     Plan:   Consult general surgery  Empiric ciprofloxacin and metronidazole day 2  IV fluids  Bowel rest    Active Problems:    Dysthymic disorder  Assessment: chronic  Plan: continue pristique       History of testicular cancer      MARLEN (generalized anxiety disorder)  Assessment: chronic  Plan: continue home xanax      Leukocytosis  Assessment: present on admission   Plan: follow    # Hyponatremia: Lowest Na = 133 mmol/L (Ref range: 136-145) in last 2 days, will monitor as appropriate  # Hypocalcemia: Lowest Ca = 8.1 mg/dL (Ref range: 8.5 - 10.1 mg/dL) in last 2 days, will monitor and replace as appropriate     # Hypoalbuminemia: Lowest albumin = 3.4 g/dL (Ref range: 3.5-5.2) at 11/19/2022  5:56 AM, will monitor as appropriate                      Diet: Combination Diet Clear Liquid    DVT Prophylaxis: Pneumatic Compression Devices  Cabrera Catheter: Not present  Central Lines: None  Cardiac Monitoring: None  Code Status: Full Code      The patient's care was discussed with the Patient.    David Hewitt MD  Hospitalist Service  [facility]  Securely message with the Vocera Web Console (learn more here)  Text page via  [employer] Paging/Directory       Interval History   Continued abdomen pain. Fever to 103 last evening.     Data reviewed today: I reviewed all medications, new labs and imaging results over the last 24 hours. I personally reviewed no images or EKG's today.    Physical Exam   Vital Signs: Temp: (!) 100.9  F (38.3  C) Temp src: Tympanic BP: 112/64 Pulse: 103   Resp: 16 SpO2: 94 % O2 Device: None (Room air)    Weight: 169 lbs 0 oz  GENERAL: Comfortable, no apparent distress.  CARDIOVASCULAR: regular rate and rhythm, no murmur. No lower extremity edema   RESPIRATORY: Clear to auscultation bilaterally, no wheezes or crackles.  GI: tender, non-distended, normal bowel sounds.   SKIN: warm periphery, no rashes      Data   Recent Labs   Lab 11/19/22  0556 11/18/22  0946   WBC 14.8* 17.9*   HGB 13.2* 14.3   MCV 88 87    296   * 136   POTASSIUM 4.0 4.2   CHLORIDE 99 98   CO2 26 26   BUN 9.5 10.5   CR 0.68 0.75   ANIONGAP 8 12   MARIA INES 8.1* 8.6   * 104*   ALBUMIN 3.4* 4.3   PROTTOTAL 5.7* 6.6   BILITOTAL 0.7 0.6   ALKPHOS 73 77   ALT 42 15   AST 43 18     No results found for this or any previous visit (from the past 24 hour(s)).  Medications     sodium chloride 1,000 mL (11/19/22 0228)       ciprofloxacin  400 mg Intravenous Q12H     desvenlafaxine  100 mg Oral Daily     metroNIDAZOLE  500 mg Intravenous Q12H     NIFEdipine ER OSMOTIC  90 mg Oral Daily     pantoprazole  40 mg Intravenous Daily     sodium chloride (PF)  3 mL Intracatheter Q8H

## 2022-11-20 LAB
ALBUMIN SERPL BCG-MCNC: 3.7 G/DL (ref 3.5–5.2)
ALP SERPL-CCNC: 85 U/L (ref 40–129)
ALT SERPL W P-5'-P-CCNC: 29 U/L (ref 10–50)
ANION GAP SERPL CALCULATED.3IONS-SCNC: 11 MMOL/L (ref 7–15)
AST SERPL W P-5'-P-CCNC: 22 U/L (ref 10–50)
BILIRUB SERPL-MCNC: 0.3 MG/DL
BUN SERPL-MCNC: 3.6 MG/DL (ref 6–20)
C COLI+JEJUNI+LARI FUSA STL QL NAA+PROBE: NOT DETECTED
CALCIUM SERPL-MCNC: 8.6 MG/DL (ref 8.6–10)
CHLORIDE SERPL-SCNC: 96 MMOL/L (ref 98–107)
CREAT SERPL-MCNC: 0.73 MG/DL (ref 0.67–1.17)
DEPRECATED HCO3 PLAS-SCNC: 27 MMOL/L (ref 22–29)
EC STX1 GENE STL QL NAA+PROBE: NOT DETECTED
EC STX2 GENE STL QL NAA+PROBE: NOT DETECTED
ERYTHROCYTE [DISTWIDTH] IN BLOOD BY AUTOMATED COUNT: 13.2 % (ref 10–15)
GFR SERPL CREATININE-BSD FRML MDRD: >90 ML/MIN/1.73M2
GLUCOSE SERPL-MCNC: 103 MG/DL (ref 70–99)
HCT VFR BLD AUTO: 40.8 % (ref 40–53)
HGB BLD-MCNC: 14.2 G/DL (ref 13.3–17.7)
MAGNESIUM SERPL-MCNC: 1.9 MG/DL (ref 1.7–2.3)
MCH RBC QN AUTO: 30.5 PG (ref 26.5–33)
MCHC RBC AUTO-ENTMCNC: 34.8 G/DL (ref 31.5–36.5)
MCV RBC AUTO: 88 FL (ref 78–100)
NOROV GI+II ORF1-ORF2 JNC STL QL NAA+PR: NOT DETECTED
PLATELET # BLD AUTO: 300 10E3/UL (ref 150–450)
POTASSIUM SERPL-SCNC: 3.5 MMOL/L (ref 3.4–5.3)
PROT SERPL-MCNC: 6.7 G/DL (ref 6.4–8.3)
RBC # BLD AUTO: 4.65 10E6/UL (ref 4.4–5.9)
RVA NSP5 STL QL NAA+PROBE: NOT DETECTED
SALMONELLA SP RPOD STL QL NAA+PROBE: NOT DETECTED
SHIGELLA SP+EIEC IPAH STL QL NAA+PROBE: NOT DETECTED
SODIUM SERPL-SCNC: 134 MMOL/L (ref 136–145)
V CHOL+PARA RFBL+TRKH+TNAA STL QL NAA+PR: NOT DETECTED
WBC # BLD AUTO: 16.2 10E3/UL (ref 4–11)
Y ENTERO RECN STL QL NAA+PROBE: NOT DETECTED

## 2022-11-20 PROCEDURE — 83735 ASSAY OF MAGNESIUM: CPT | Performed by: INTERNAL MEDICINE

## 2022-11-20 PROCEDURE — 80053 COMPREHEN METABOLIC PANEL: CPT | Performed by: INTERNAL MEDICINE

## 2022-11-20 PROCEDURE — 250N000011 HC RX IP 250 OP 636: Performed by: INTERNAL MEDICINE

## 2022-11-20 PROCEDURE — 36415 COLL VENOUS BLD VENIPUNCTURE: CPT | Performed by: INTERNAL MEDICINE

## 2022-11-20 PROCEDURE — 99232 SBSQ HOSP IP/OBS MODERATE 35: CPT | Performed by: INTERNAL MEDICINE

## 2022-11-20 PROCEDURE — 258N000003 HC RX IP 258 OP 636: Performed by: INTERNAL MEDICINE

## 2022-11-20 PROCEDURE — C9113 INJ PANTOPRAZOLE SODIUM, VIA: HCPCS | Performed by: INTERNAL MEDICINE

## 2022-11-20 PROCEDURE — 120N000001 HC R&B MED SURG/OB

## 2022-11-20 PROCEDURE — 250N000011 HC RX IP 250 OP 636: Performed by: FAMILY MEDICINE

## 2022-11-20 PROCEDURE — 85027 COMPLETE CBC AUTOMATED: CPT | Performed by: INTERNAL MEDICINE

## 2022-11-20 PROCEDURE — 99231 SBSQ HOSP IP/OBS SF/LOW 25: CPT | Mod: 25 | Performed by: SURGERY

## 2022-11-20 PROCEDURE — 250N000013 HC RX MED GY IP 250 OP 250 PS 637: Performed by: INTERNAL MEDICINE

## 2022-11-20 RX ADMIN — HYDROMORPHONE HYDROCHLORIDE 0.5 MG: 1 INJECTION, SOLUTION INTRAMUSCULAR; INTRAVENOUS; SUBCUTANEOUS at 08:32

## 2022-11-20 RX ADMIN — HYDROMORPHONE HYDROCHLORIDE 0.5 MG: 1 INJECTION, SOLUTION INTRAMUSCULAR; INTRAVENOUS; SUBCUTANEOUS at 01:42

## 2022-11-20 RX ADMIN — HYDROMORPHONE HYDROCHLORIDE 0.5 MG: 1 INJECTION, SOLUTION INTRAMUSCULAR; INTRAVENOUS; SUBCUTANEOUS at 04:17

## 2022-11-20 RX ADMIN — ACETAMINOPHEN 975 MG: 325 TABLET ORAL at 19:42

## 2022-11-20 RX ADMIN — PANTOPRAZOLE SODIUM 40 MG: 40 INJECTION, POWDER, FOR SOLUTION INTRAVENOUS at 09:04

## 2022-11-20 RX ADMIN — CIPROFLOXACIN 400 MG: 2 INJECTION, SOLUTION INTRAVENOUS at 21:39

## 2022-11-20 RX ADMIN — SODIUM CHLORIDE 1000 ML: 9 INJECTION, SOLUTION INTRAVENOUS at 02:41

## 2022-11-20 RX ADMIN — CIPROFLOXACIN 400 MG: 2 INJECTION, SOLUTION INTRAVENOUS at 10:57

## 2022-11-20 RX ADMIN — ALPRAZOLAM 1 MG: 0.5 TABLET ORAL at 21:47

## 2022-11-20 RX ADMIN — METRONIDAZOLE 500 MG: 500 INJECTION, SOLUTION INTRAVENOUS at 09:20

## 2022-11-20 RX ADMIN — DESVENLAFAXINE 100 MG: 50 TABLET, EXTENDED RELEASE ORAL at 08:58

## 2022-11-20 RX ADMIN — DIPHENHYDRAMINE HYDROCHLORIDE 50 MG: 50 CAPSULE ORAL at 09:00

## 2022-11-20 RX ADMIN — ACETAMINOPHEN 975 MG: 325 TABLET ORAL at 09:01

## 2022-11-20 RX ADMIN — SODIUM CHLORIDE: 9 INJECTION, SOLUTION INTRAVENOUS at 14:20

## 2022-11-20 RX ADMIN — HYDROMORPHONE HYDROCHLORIDE 0.5 MG: 1 INJECTION, SOLUTION INTRAMUSCULAR; INTRAVENOUS; SUBCUTANEOUS at 12:52

## 2022-11-20 RX ADMIN — NIFEDIPINE 90 MG: 30 TABLET, FILM COATED, EXTENDED RELEASE ORAL at 08:53

## 2022-11-20 RX ADMIN — HYDROMORPHONE HYDROCHLORIDE 0.5 MG: 1 INJECTION, SOLUTION INTRAMUSCULAR; INTRAVENOUS; SUBCUTANEOUS at 19:44

## 2022-11-20 RX ADMIN — DIPHENHYDRAMINE HYDROCHLORIDE 50 MG: 50 CAPSULE ORAL at 21:47

## 2022-11-20 RX ADMIN — METRONIDAZOLE 500 MG: 500 INJECTION, SOLUTION INTRAVENOUS at 22:46

## 2022-11-20 RX ADMIN — HYDROMORPHONE HYDROCHLORIDE 0.5 MG: 1 INJECTION, SOLUTION INTRAMUSCULAR; INTRAVENOUS; SUBCUTANEOUS at 10:53

## 2022-11-20 RX ADMIN — DEXTRAN 70, GLYCERIN, HYPROMELLOSE 1 DROP: 1; 2; 3 SOLUTION/ DROPS OPHTHALMIC at 11:04

## 2022-11-20 RX ADMIN — HYDROMORPHONE HYDROCHLORIDE 0.5 MG: 1 INJECTION, SOLUTION INTRAMUSCULAR; INTRAVENOUS; SUBCUTANEOUS at 15:15

## 2022-11-20 RX ADMIN — ALPRAZOLAM 1 MG: 0.5 TABLET ORAL at 08:57

## 2022-11-20 ASSESSMENT — ACTIVITIES OF DAILY LIVING (ADL)
ADLS_ACUITY_SCORE: 23

## 2022-11-20 NOTE — PLAN OF CARE
"Pt is A/O x 4, he has had moderate to severe RLQ pain in abdominal, pain increases with ambulation and palpation. IV Dilaudid has been given every 2-3 hours for pain management, pt refuses oral dilaudid, states it will not work. VS stable, temps in the mid 90's. Pt has sweat through his shirt tonight, thinks he may have broke a fever.      /56 (BP Location: Right arm, Patient Position: Semi-Marie's, Cuff Size: Adult Regular)   Pulse 105   Temp 99.4  F (37.4  C) (Tympanic)   Resp 16   Ht 1.778 m (5' 10\")   Wt 81.6 kg (179 lb 14.4 oz)   SpO2 92%   BMI 25.81 kg/m          Goal Outcome Evaluation:      Plan of Care Reviewed With: patient    Overall Patient Progress: no changeOverall Patient Progress: no change           "

## 2022-11-20 NOTE — PLAN OF CARE
Goal Outcome Evaluation:      Plan of Care Reviewed With: patient    Overall Patient Progress: no changeOverall Patient Progress: no change

## 2022-11-20 NOTE — PROGRESS NOTES
Patient pain 8/10 gave PRN medication, see MAR.  Patient reported dilaudid does decrease his pain significantly, however getting out of bed and ambulating increases the pain back to 8/10.  Will continue to monitor.   Keila Lundy RN on 11/20/2022 at 4:45 PM

## 2022-11-20 NOTE — PROGRESS NOTES
PROGRESS NOTE    cc: Right lower quadrant pain    HPI: Stool cultures negative for C. difficile or typical enteric pathogens.     EXAM:  Date 22 0700 - 22 0659   Shift 2692-3173 7789-6666 5560-9156 24 Hour Total   INTAKE   P.O. 480   480   Shift Total(mL/kg) 480(6.26)   480(6.26)   OUTPUT   Urine 575   575   Shift Total(mL/kg) 575(7.5)   575(7.5)   Weight (kg) 76.66 76.66 76.66 76.66      Temp: 99.9  F (37.7  C) Temp  Min: 97.3  F (36.3  C)  Max: 100.3  F (37.9  C)  Resp: 16 Resp  Min: 16  Max: 16  SpO2: 91 % SpO2  Min: 91 %  Max: 95 %  Pulse: 111 Pulse  Min: 77  Max: 114    No data recorded  BP: 128/64 Systolic (24hrs), Av , Min:98 , Max:128   Diastolic (24hrs), Av, Min:43, Max:64        GENERAL: alert, NAD  CV: RRR, no murmurs  RESPIRATORY: no dyspnea, clear bilat  ABDOMEN: non-distended, +BS, soft, mildly tender  EXTREMITY: no edema, neg Janette's  SKIN: warm and dry, no jaundice no rashes  NEURO: cranial nerves II-XII grossly intact,motor exam intact    LABS  Recent Labs   Lab Test 22  0606 22  0556 19  1623 16  1145 14  1123   WBC 16.2* 14.8*   < >  --   --    RBC 4.65 4.41   < >  --   --    HGB 14.2 13.2*   < > 14.6 15.5   HCT 40.8 38.8*   < > 43.2 45.3   MCV 88 88   < > 88 88   MCH 30.5 29.9   < > 30.0 30.2   MCHC 34.8 34.0   < > 33.9 34.2    243   < > 301 251   MPV  --   --   --  6.7 6.9    < > = values in this interval not displayed.       Recent Labs   Lab Test 22  0622  0556   POTASSIUM 3.5 4.0   CHLORIDE 96* 99   BUN 3.6* 9.5     Recent Labs   Lab Test 22  0606 22  0556 22  1955 19  1045 18  0920   PROTEIN  --   --  Negative  --  Negative   BILITOTAL 0.3 0.7  --    < >  --    AST 22 43  --    < >  --    ALT 29 42  --    < >  --     < > = values in this interval not displayed.       IMAGING  I personally reviewed patient's CT scan from admission    ASSESSMENT  47-year-old male with nonspecific colitis.   We will continue to investigate for etiology and cover for infectious causes.  Currently favoring inflammatory or vascular causes.      Patient Active Problem List   Diagnosis     Dysthymic disorder     History of testicular cancer     Raynaud phenomenon     MARLEN (generalized anxiety disorder)     Facet arthropathy, cervical     Intractable chronic migraine without aura and with status migrainosus     Colitis     Leukocytosis       PLAN    Continue antibiotics  Transition to oral pain control  Discharge home on bland diet when able to tolerate pain    Colonoscopy in 2 to 3 weeks    Solomon Arciniega MD on 11/20/2022 at 12:45 PM

## 2022-11-20 NOTE — PLAN OF CARE
Goal Outcome Evaluation:    Dilaudid given every 2-3 hours.  Pain still very high when up walking to BR.  Loose brown stools again today.  Full liquid diet.  Xanax for anxiety .  Benadryl for skin itching.   Tylenol.

## 2022-11-20 NOTE — PROGRESS NOTES
[facility]    Medicine Progress Note - Hospitalist Service    Date of Admission:  11/18/2022    Assessment & Plan      Dennis J Goodell is a 47 year old male admitted on 11/18/2022. He has colitis.     Principal Problem:    Colitis  Assessment: Present on admission.  Differential diagnosis includes infectious, inflammatory or ischemic colitis. Still some pain and leukocytosis. C diff negative, stool panel negative. Overall pain improving.     Plan:   Consult general surgery  Empiric ciprofloxacin and metronidazole day 3  IV fluids  Bowel rest    Active Problems:    Dysthymic disorder  Assessment: chronic  Plan: continue pristique       History of testicular cancer      MARLEN (generalized anxiety disorder)  Assessment: chronic  Plan: continue home xanax      Leukocytosis  Assessment: present on admission   Plan: follow    # Hyponatremia: Lowest Na = 133 mmol/L (Ref range: 136-145) in last 2 days, will monitor as appropriate  # Hypocalcemia: Lowest Ca = 8.1 mg/dL (Ref range: 8.5 - 10.1 mg/dL) in last 2 days, will monitor and replace as appropriate     # Hypoalbuminemia: Lowest albumin = 3.4 g/dL (Ref range: 3.5-5.2) at 11/19/2022  5:56 AM, will monitor as appropriate                        Diet: Full Liquid Diet    DVT Prophylaxis: Pneumatic Compression Devices  Cabrera Catheter: Not present  Central Lines: None  Cardiac Monitoring: None  Code Status: Full Code       The patient's care was discussed with the Patient.    David Hewitt MD  Hospitalist Service  [facility]  Securely message with the Vocera Web Console (learn more here)  Text page via [employer] Paging/Directory             Interval History   Complains of abdomen pain with movement. OK at rest. Sweats overnight. Passing stool    Data reviewed today: I reviewed all medications, new labs and imaging results over the last 24 hours. I personally reviewed no images or EKG's today.    Physical Exam   Vital Signs: Temp: 99.9   F (37.7  C) Temp src: Tympanic BP: 122/43 Pulse: 77   Resp: 16 SpO2: 95 % O2 Device: None (Room air)    Weight: 179 lbs 14.4 oz  GENERAL: Comfortable, no apparent distress.  CARDIOVASCULAR: regular rate and rhythm, no murmur. No lower extremity edema   RESPIRATORY: Clear to auscultation bilaterally, no wheezes or crackles.  GI: tender, non-distended, normal bowel sounds. abdomen soft.   SKIN: warm periphery, no rashes      Data   Recent Labs   Lab 11/20/22  0606 11/19/22  0556 11/18/22  0946   WBC 16.2* 14.8* 17.9*   HGB 14.2 13.2* 14.3   MCV 88 88 87    243 296   * 133* 136   POTASSIUM 3.5 4.0 4.2   CHLORIDE 96* 99 98   CO2 27 26 26   BUN 3.6* 9.5 10.5   CR 0.73 0.68 0.75   ANIONGAP 11 8 12   MARIA INES 8.6 8.1* 8.6   * 108* 104*   ALBUMIN 3.7 3.4* 4.3   PROTTOTAL 6.7 5.7* 6.6   BILITOTAL 0.3 0.7 0.6   ALKPHOS 85 73 77   ALT 29 42 15   AST 22 43 18     Medications     sodium chloride 1,000 mL (11/20/22 0241)       ciprofloxacin  400 mg Intravenous Q12H     desvenlafaxine  100 mg Oral Daily     metroNIDAZOLE  500 mg Intravenous Q12H     NIFEdipine ER OSMOTIC  90 mg Oral Daily     pantoprazole  40 mg Intravenous Daily     sodium chloride (PF)  3 mL Intracatheter Q8H

## 2022-11-21 ENCOUNTER — APPOINTMENT (OUTPATIENT)
Dept: CT IMAGING | Facility: OTHER | Age: 47
End: 2022-11-21
Attending: INTERNAL MEDICINE
Payer: COMMERCIAL

## 2022-11-21 LAB
ALBUMIN SERPL BCG-MCNC: 3.5 G/DL (ref 3.5–5.2)
ALP SERPL-CCNC: 79 U/L (ref 40–129)
ALT SERPL W P-5'-P-CCNC: 19 U/L (ref 10–50)
ANION GAP SERPL CALCULATED.3IONS-SCNC: 10 MMOL/L (ref 7–15)
AST SERPL W P-5'-P-CCNC: 12 U/L (ref 10–50)
BILIRUB SERPL-MCNC: 0.4 MG/DL
BUN SERPL-MCNC: 3.5 MG/DL (ref 6–20)
CALCIUM SERPL-MCNC: 8.4 MG/DL (ref 8.6–10)
CHLORIDE SERPL-SCNC: 105 MMOL/L (ref 98–107)
CREAT SERPL-MCNC: 0.71 MG/DL (ref 0.67–1.17)
DEPRECATED HCO3 PLAS-SCNC: 24 MMOL/L (ref 22–29)
ERYTHROCYTE [DISTWIDTH] IN BLOOD BY AUTOMATED COUNT: 13.2 % (ref 10–15)
GFR SERPL CREATININE-BSD FRML MDRD: >90 ML/MIN/1.73M2
GLUCOSE SERPL-MCNC: 101 MG/DL (ref 70–99)
HCT VFR BLD AUTO: 38.4 % (ref 40–53)
HGB BLD-MCNC: 13.4 G/DL (ref 13.3–17.7)
MAGNESIUM SERPL-MCNC: 2.1 MG/DL (ref 1.7–2.3)
MCH RBC QN AUTO: 29.9 PG (ref 26.5–33)
MCHC RBC AUTO-ENTMCNC: 34.9 G/DL (ref 31.5–36.5)
MCV RBC AUTO: 86 FL (ref 78–100)
PLATELET # BLD AUTO: 326 10E3/UL (ref 150–450)
POTASSIUM SERPL-SCNC: 3.7 MMOL/L (ref 3.4–5.3)
PROT SERPL-MCNC: 6.4 G/DL (ref 6.4–8.3)
RBC # BLD AUTO: 4.48 10E6/UL (ref 4.4–5.9)
SODIUM SERPL-SCNC: 139 MMOL/L (ref 136–145)
WBC # BLD AUTO: 10.7 10E3/UL (ref 4–11)

## 2022-11-21 PROCEDURE — 250N000013 HC RX MED GY IP 250 OP 250 PS 637: Performed by: INTERNAL MEDICINE

## 2022-11-21 PROCEDURE — 250N000011 HC RX IP 250 OP 636: Performed by: FAMILY MEDICINE

## 2022-11-21 PROCEDURE — 80053 COMPREHEN METABOLIC PANEL: CPT | Performed by: INTERNAL MEDICINE

## 2022-11-21 PROCEDURE — 36415 COLL VENOUS BLD VENIPUNCTURE: CPT | Performed by: INTERNAL MEDICINE

## 2022-11-21 PROCEDURE — 258N000003 HC RX IP 258 OP 636: Performed by: INTERNAL MEDICINE

## 2022-11-21 PROCEDURE — 250N000011 HC RX IP 250 OP 636: Performed by: INTERNAL MEDICINE

## 2022-11-21 PROCEDURE — 99231 SBSQ HOSP IP/OBS SF/LOW 25: CPT | Mod: 25 | Performed by: SURGERY

## 2022-11-21 PROCEDURE — 250N000013 HC RX MED GY IP 250 OP 250 PS 637: Performed by: SURGERY

## 2022-11-21 PROCEDURE — C9113 INJ PANTOPRAZOLE SODIUM, VIA: HCPCS | Performed by: INTERNAL MEDICINE

## 2022-11-21 PROCEDURE — 99232 SBSQ HOSP IP/OBS MODERATE 35: CPT | Performed by: INTERNAL MEDICINE

## 2022-11-21 PROCEDURE — 120N000001 HC R&B MED SURG/OB

## 2022-11-21 PROCEDURE — 83735 ASSAY OF MAGNESIUM: CPT | Performed by: INTERNAL MEDICINE

## 2022-11-21 PROCEDURE — 74177 CT ABD & PELVIS W/CONTRAST: CPT

## 2022-11-21 PROCEDURE — 85027 COMPLETE CBC AUTOMATED: CPT | Performed by: INTERNAL MEDICINE

## 2022-11-21 RX ORDER — IOPAMIDOL 755 MG/ML
88 INJECTION, SOLUTION INTRAVASCULAR ONCE
Status: COMPLETED | OUTPATIENT
Start: 2022-11-21 | End: 2022-11-21

## 2022-11-21 RX ORDER — CLONAZEPAM 1 MG/1
1 TABLET ORAL 3 TIMES DAILY
Status: DISCONTINUED | OUTPATIENT
Start: 2022-11-21 | End: 2022-11-22 | Stop reason: HOSPADM

## 2022-11-21 RX ORDER — HYDROMORPHONE HYDROCHLORIDE 4 MG/1
4 TABLET ORAL EVERY 4 HOURS PRN
Status: DISCONTINUED | OUTPATIENT
Start: 2022-11-21 | End: 2022-11-21

## 2022-11-21 RX ORDER — OXYCODONE HYDROCHLORIDE 5 MG/1
5-10 TABLET ORAL EVERY 4 HOURS PRN
Status: DISCONTINUED | OUTPATIENT
Start: 2022-11-21 | End: 2022-11-22 | Stop reason: HOSPADM

## 2022-11-21 RX ADMIN — HYDROMORPHONE HYDROCHLORIDE 4 MG: 4 TABLET ORAL at 15:15

## 2022-11-21 RX ADMIN — ALPRAZOLAM 1 MG: 0.5 TABLET ORAL at 20:39

## 2022-11-21 RX ADMIN — OXYCODONE HYDROCHLORIDE 10 MG: 5 TABLET ORAL at 23:37

## 2022-11-21 RX ADMIN — METRONIDAZOLE 500 MG: 500 INJECTION, SOLUTION INTRAVENOUS at 22:58

## 2022-11-21 RX ADMIN — DIPHENHYDRAMINE HYDROCHLORIDE 50 MG: 50 CAPSULE ORAL at 21:53

## 2022-11-21 RX ADMIN — HYDROMORPHONE HYDROCHLORIDE 0.5 MG: 1 INJECTION, SOLUTION INTRAMUSCULAR; INTRAVENOUS; SUBCUTANEOUS at 20:39

## 2022-11-21 RX ADMIN — NIFEDIPINE 90 MG: 30 TABLET, FILM COATED, EXTENDED RELEASE ORAL at 09:36

## 2022-11-21 RX ADMIN — CLONAZEPAM 1 MG: 1 TABLET ORAL at 21:53

## 2022-11-21 RX ADMIN — DESVENLAFAXINE 100 MG: 50 TABLET, EXTENDED RELEASE ORAL at 09:36

## 2022-11-21 RX ADMIN — ALPRAZOLAM 1 MG: 0.5 TABLET ORAL at 09:36

## 2022-11-21 RX ADMIN — IOPAMIDOL 88 ML: 755 INJECTION, SOLUTION INTRAVENOUS at 11:39

## 2022-11-21 RX ADMIN — HYDROMORPHONE HYDROCHLORIDE 0.5 MG: 1 INJECTION, SOLUTION INTRAMUSCULAR; INTRAVENOUS; SUBCUTANEOUS at 13:01

## 2022-11-21 RX ADMIN — HYDROMORPHONE HYDROCHLORIDE 0.5 MG: 1 INJECTION, SOLUTION INTRAMUSCULAR; INTRAVENOUS; SUBCUTANEOUS at 01:38

## 2022-11-21 RX ADMIN — OXYCODONE HYDROCHLORIDE 10 MG: 5 TABLET ORAL at 17:24

## 2022-11-21 RX ADMIN — HYDROMORPHONE HYDROCHLORIDE 0.5 MG: 1 INJECTION, SOLUTION INTRAMUSCULAR; INTRAVENOUS; SUBCUTANEOUS at 06:39

## 2022-11-21 RX ADMIN — SODIUM CHLORIDE: 9 INJECTION, SOLUTION INTRAVENOUS at 01:39

## 2022-11-21 RX ADMIN — HYDROMORPHONE HYDROCHLORIDE 0.5 MG: 1 INJECTION, SOLUTION INTRAMUSCULAR; INTRAVENOUS; SUBCUTANEOUS at 09:35

## 2022-11-21 RX ADMIN — CIPROFLOXACIN 400 MG: 2 INJECTION, SOLUTION INTRAVENOUS at 09:36

## 2022-11-21 RX ADMIN — PANTOPRAZOLE SODIUM 40 MG: 40 INJECTION, POWDER, FOR SOLUTION INTRAVENOUS at 09:35

## 2022-11-21 RX ADMIN — CIPROFLOXACIN 400 MG: 2 INJECTION, SOLUTION INTRAVENOUS at 21:53

## 2022-11-21 RX ADMIN — CLONAZEPAM 1 MG: 1 TABLET ORAL at 12:58

## 2022-11-21 RX ADMIN — METRONIDAZOLE 500 MG: 500 INJECTION, SOLUTION INTRAVENOUS at 10:57

## 2022-11-21 ASSESSMENT — ACTIVITIES OF DAILY LIVING (ADL)
ADLS_ACUITY_SCORE: 23

## 2022-11-21 NOTE — PROGRESS NOTES
Assumed care of patient.  Marii Brush RN on 11/21/2022 at 12:10 PM     Pain managed per mar.  Encouraged ambulation.  Has loose stool today, voiding no difficulty.  Tolerating regular diet. VSS.   Marii Brush RN on 11/21/2022 at 7:12 PM

## 2022-11-21 NOTE — PROGRESS NOTES
PROGRESS NOTE    cc: Right lower quadrant pain    HPI: Febrile overnight. CT today.     EXAM:  Date 22 0700 - 22 0659   Shift 1335-7064 0456-7028 9126-9067 24 Hour Total   INTAKE   P.O. 480   480   Shift Total(mL/kg) 480(6.26)   480(6.26)   OUTPUT   Urine 575   575   Shift Total(mL/kg) 575(7.5)   575(7.5)   Weight (kg) 76.66 76.66 76.66 76.66      Temp: 98.3  F (36.8  C) Temp  Min: 97.7  F (36.5  C)  Max: 101.3  F (38.5  C)  Resp: 16 Resp  Min: 16  Max: 16  SpO2: 98 % SpO2  Min: 90 %  Max: 98 %  Pulse: 108 Pulse  Min: 96  Max: 112    No data recorded  BP: 117/72 Systolic (24hrs), Av , Min:101 , Max:121   Diastolic (24hrs), Av, Min:53, Max:72        GENERAL: alert, NAD  CV: RRR, no murmurs  RESPIRATORY: no dyspnea, clear bilat  ABDOMEN: non-distended, +BS, soft, mildly tender  EXTREMITY: no edema, neg Janette's  SKIN: warm and dry, no jaundice no rashes  NEURO: cranial nerves II-XII grossly intact,motor exam intact    LABS  Recent Labs   Lab Test 22  0622  0606 19  1623 16  1145 14  1123   WBC 10.7 16.2*   < >  --   --    RBC 4.48 4.65   < >  --   --    HGB 13.4 14.2   < > 14.6 15.5   HCT 38.4* 40.8   < > 43.2 45.3   MCV 86 88   < > 88 88   MCH 29.9 30.5   < > 30.0 30.2   MCHC 34.9 34.8   < > 33.9 34.2    300   < > 301 251   MPV  --   --   --  6.7 6.9    < > = values in this interval not displayed.       Recent Labs   Lab Test 22  0622  06   POTASSIUM 3.7 3.5   CHLORIDE 105 96*   BUN 3.5* 3.6*     Recent Labs   Lab Test 22  0626 22  0606 22  0556 22  1955 19  1045 18  0920   PROTEIN  --   --   --  Negative  --  Negative   BILITOTAL 0.4 0.3   < >  --    < >  --    AST 12 22   < >  --    < >  --    ALT 19 29   < >  --    < >  --     < > = values in this interval not displayed.       IMAGING  I personally reviewed patient's CT scan from admission    ASSESSMENT  47-year-old male with nonspecific colitis.  We  will continue to investigate for etiology and cover for infectious causes.  Currently favoring inflammatory or vascular causes. CT re-assuring with no abscess or perforation.       Patient Active Problem List   Diagnosis     Dysthymic disorder     History of testicular cancer     Raynaud phenomenon     MARLEN (generalized anxiety disorder)     Facet arthropathy, cervical     Intractable chronic migraine without aura and with status migrainosus     Colitis     Leukocytosis       PLAN    Continue antibiotics, PO Cipro and flagyl at discharge  Transition to oral pain control  Discharge home on bland diet when able to tolerate pain    Colonoscopy in 2 to 3 weeks    Solomon Arciniega MD on 11/21/2022 at 2:26 PM

## 2022-11-21 NOTE — PROGRESS NOTES
[facility]    Medicine Progress Note - Hospitalist Service    Date of Admission:  11/18/2022    Assessment & Plan      Dennis J Goodell is a 47 year old male admitted on 11/18/2022. He has colitis.     Principal Problem:    Colitis  Assessment: Present on admission.  Differential diagnosis includes infectious, inflammatory or ischemic colitis.  C diff negative, stool panel negative. Overall pain improving but still present. WBC count normalized. Fever to 101.5 last evening. Discussed with Dr. Arciniega today.     Plan:   CT abdomen pelvis with contrast to assess for evolving intraabdominal process.   Empiric ciprofloxacin and metronidazole day 4  IV fluids      Active Problems:    Dysthymic disorder  Assessment: chronic  Plan: continue pristique       History of testicular cancer      MARLEN (generalized anxiety disorder)  Assessment: chronic  Plan: continue home xanax      Leukocytosis  Assessment: present on admission   Plan: follow    # Hyponatremia: Lowest Na = 133 mmol/L (Ref range: 136-145) in last 2 days, will monitor as appropriate  # Hypocalcemia: Lowest Ca = 8.1 mg/dL (Ref range: 8.5 - 10.1 mg/dL) in last 2 days, will monitor and replace as appropriate     # Hypoalbuminemia: Lowest albumin = 3.4 g/dL (Ref range: 3.5-5.2) at 11/19/2022  5:56 AM, will monitor as appropriate                          Diet: Full Liquid Diet    DVT Prophylaxis: Pneumatic Compression Devices  Cabrera Catheter: Not present  Central Lines: None  Cardiac Monitoring: None  Code Status: Full Code      The patient's care was discussed with the Patient.    David Hewitt MD  Hospitalist Service  [facility]  Securely message with the Vocera Web Console (learn more here)  Text page via [employer] Paging/Directory         Interval History   Complains of abdomen pain, but better. Fever to 101.5. Frustrated.     Data reviewed today: I reviewed all medications, new labs and imaging results over the last 24  hours. I personally reviewed no images or EKG's today.    Physical Exam   Vital Signs: Temp: 97.7  F (36.5  C) Temp src: Tympanic BP: 117/53 Pulse: 97   Resp: 16 SpO2: 91 % O2 Device: None (Room air)    Weight: 167 lbs 4.8 oz  GENERAL: Comfortable, no apparent distress.  CARDIOVASCULAR: regular rate and rhythm, no murmur. No lower extremity edema   RESPIRATORY: Clear to auscultation bilaterally, no wheezes or crackles.  GI: non-distended, normal bowel sounds.   SKIN: warm periphery, no rashes      Data   Recent Labs   Lab 11/21/22  0626 11/20/22  0606 11/19/22  0556   WBC 10.7 16.2* 14.8*   HGB 13.4 14.2 13.2*   MCV 86 88 88    300 243    134* 133*   POTASSIUM 3.7 3.5 4.0   CHLORIDE 105 96* 99   CO2 24 27 26   BUN 3.5* 3.6* 9.5   CR 0.71 0.73 0.68   ANIONGAP 10 11 8   MARIA INES 8.4* 8.6 8.1*   * 103* 108*   ALBUMIN 3.5 3.7 3.4*   PROTTOTAL 6.4 6.7 5.7*   BILITOTAL 0.4 0.3 0.7   ALKPHOS 79 85 73   ALT 19 29 42   AST 12 22 43     Medications     sodium chloride 100 mL/hr at 11/21/22 0139       ciprofloxacin  400 mg Intravenous Q12H     desvenlafaxine  100 mg Oral Daily     metroNIDAZOLE  500 mg Intravenous Q12H     NIFEdipine ER OSMOTIC  90 mg Oral Daily     pantoprazole  40 mg Intravenous Daily     sodium chloride (PF)  3 mL Intracatheter Q8H

## 2022-11-21 NOTE — PLAN OF CARE
Alert and oriented, VSS. Temp of 101.3. Tylenol given and came down to 97.7. Abdominal pain anywhere from 2-8/10, worsened with ambulation. IV dilaudid given multiple times during the night. Benadryl and xanax requested by patient for itching and anxiety. Discussed possibly attempting PO meds with the patient instead of IV, patient states PO does not help the pain much. IV replaced for phlebitis of original IV. New iv in L wrist, infusing per order. Bowel sounds hyperactive, most of the discomfort in RLQ. Loose stools x3 per patient during the night. Remains on full liquid diet, no nausea during the shift.    Keila Prieto RN on 11/21/2022 at 6:43 AM

## 2022-11-21 NOTE — PROGRESS NOTES
SAFETY CHECKLIST  ID Bands and Risk clasps correct and in place (DNR, Fall risk, Allergy, Latex, Limb):  Yes  All Lines Reconciled and labeled correctly: Yes  Whiteboard updated:Yes  Environmental interventions: Yes  Verify Tele #: ARIADNE Lundy RN on 11/21/2022 at 7:25 AM

## 2022-11-22 VITALS
WEIGHT: 169.4 LBS | TEMPERATURE: 97.3 F | HEIGHT: 70 IN | OXYGEN SATURATION: 98 % | HEART RATE: 90 BPM | BODY MASS INDEX: 24.25 KG/M2 | DIASTOLIC BLOOD PRESSURE: 77 MMHG | SYSTOLIC BLOOD PRESSURE: 125 MMHG | RESPIRATION RATE: 18 BRPM

## 2022-11-22 LAB
ALBUMIN SERPL BCG-MCNC: 3.4 G/DL (ref 3.5–5.2)
ALP SERPL-CCNC: 69 U/L (ref 40–129)
ALT SERPL W P-5'-P-CCNC: 15 U/L (ref 10–50)
ANION GAP SERPL CALCULATED.3IONS-SCNC: 10 MMOL/L (ref 7–15)
AST SERPL W P-5'-P-CCNC: 14 U/L (ref 10–50)
BILIRUB SERPL-MCNC: <0.2 MG/DL
BUN SERPL-MCNC: 5.5 MG/DL (ref 6–20)
CALCIUM SERPL-MCNC: 8 MG/DL (ref 8.6–10)
CHLORIDE SERPL-SCNC: 100 MMOL/L (ref 98–107)
CREAT SERPL-MCNC: 0.76 MG/DL (ref 0.67–1.17)
DEPRECATED HCO3 PLAS-SCNC: 24 MMOL/L (ref 22–29)
ERYTHROCYTE [DISTWIDTH] IN BLOOD BY AUTOMATED COUNT: 13.1 % (ref 10–15)
GFR SERPL CREATININE-BSD FRML MDRD: >90 ML/MIN/1.73M2
GLUCOSE SERPL-MCNC: 96 MG/DL (ref 70–99)
HCT VFR BLD AUTO: 35.5 % (ref 40–53)
HGB BLD-MCNC: 12.4 G/DL (ref 13.3–17.7)
MAGNESIUM SERPL-MCNC: 2.1 MG/DL (ref 1.7–2.3)
MCH RBC QN AUTO: 30 PG (ref 26.5–33)
MCHC RBC AUTO-ENTMCNC: 34.9 G/DL (ref 31.5–36.5)
MCV RBC AUTO: 86 FL (ref 78–100)
PLATELET # BLD AUTO: 322 10E3/UL (ref 150–450)
POTASSIUM SERPL-SCNC: 3.5 MMOL/L (ref 3.4–5.3)
PROT SERPL-MCNC: 5.9 G/DL (ref 6.4–8.3)
RBC # BLD AUTO: 4.14 10E6/UL (ref 4.4–5.9)
SODIUM SERPL-SCNC: 134 MMOL/L (ref 136–145)
WBC # BLD AUTO: 7.4 10E3/UL (ref 4–11)

## 2022-11-22 PROCEDURE — 80053 COMPREHEN METABOLIC PANEL: CPT | Performed by: INTERNAL MEDICINE

## 2022-11-22 PROCEDURE — 82040 ASSAY OF SERUM ALBUMIN: CPT | Performed by: INTERNAL MEDICINE

## 2022-11-22 PROCEDURE — C9113 INJ PANTOPRAZOLE SODIUM, VIA: HCPCS | Performed by: INTERNAL MEDICINE

## 2022-11-22 PROCEDURE — 83735 ASSAY OF MAGNESIUM: CPT | Performed by: INTERNAL MEDICINE

## 2022-11-22 PROCEDURE — 250N000013 HC RX MED GY IP 250 OP 250 PS 637: Performed by: FAMILY MEDICINE

## 2022-11-22 PROCEDURE — 85027 COMPLETE CBC AUTOMATED: CPT | Performed by: INTERNAL MEDICINE

## 2022-11-22 PROCEDURE — 258N000003 HC RX IP 258 OP 636: Performed by: INTERNAL MEDICINE

## 2022-11-22 PROCEDURE — 36415 COLL VENOUS BLD VENIPUNCTURE: CPT | Performed by: INTERNAL MEDICINE

## 2022-11-22 PROCEDURE — 250N000013 HC RX MED GY IP 250 OP 250 PS 637: Performed by: INTERNAL MEDICINE

## 2022-11-22 PROCEDURE — 99232 SBSQ HOSP IP/OBS MODERATE 35: CPT | Performed by: FAMILY MEDICINE

## 2022-11-22 PROCEDURE — 250N000011 HC RX IP 250 OP 636: Performed by: INTERNAL MEDICINE

## 2022-11-22 RX ORDER — OXYCODONE HYDROCHLORIDE 5 MG/1
5-10 TABLET ORAL EVERY 4 HOURS PRN
Qty: 20 TABLET | Refills: 0 | Status: SHIPPED | OUTPATIENT
Start: 2022-11-22 | End: 2022-12-02

## 2022-11-22 RX ORDER — METRONIDAZOLE 500 MG/1
500 TABLET ORAL EVERY 12 HOURS SCHEDULED
Status: DISCONTINUED | OUTPATIENT
Start: 2022-11-22 | End: 2022-11-22 | Stop reason: HOSPADM

## 2022-11-22 RX ORDER — CIPROFLOXACIN 500 MG/1
500 TABLET, FILM COATED ORAL EVERY 12 HOURS SCHEDULED
Status: DISCONTINUED | OUTPATIENT
Start: 2022-11-22 | End: 2022-11-22 | Stop reason: HOSPADM

## 2022-11-22 RX ORDER — CIPROFLOXACIN 500 MG/1
500 TABLET, FILM COATED ORAL EVERY 12 HOURS
Qty: 14 TABLET | Refills: 0 | Status: SHIPPED | OUTPATIENT
Start: 2022-11-22 | End: 2022-11-29

## 2022-11-22 RX ORDER — METRONIDAZOLE 500 MG/1
500 TABLET ORAL EVERY 12 HOURS
Qty: 14 TABLET | Refills: 0 | Status: SHIPPED | OUTPATIENT
Start: 2022-11-22 | End: 2022-11-29

## 2022-11-22 RX ADMIN — METRONIDAZOLE 500 MG: 500 INJECTION, SOLUTION INTRAVENOUS at 09:27

## 2022-11-22 RX ADMIN — CLONAZEPAM 1 MG: 1 TABLET ORAL at 13:53

## 2022-11-22 RX ADMIN — OXYCODONE HYDROCHLORIDE 10 MG: 5 TABLET ORAL at 03:33

## 2022-11-22 RX ADMIN — DIPHENHYDRAMINE HYDROCHLORIDE 50 MG: 50 CAPSULE ORAL at 05:29

## 2022-11-22 RX ADMIN — DESVENLAFAXINE 100 MG: 50 TABLET, EXTENDED RELEASE ORAL at 09:18

## 2022-11-22 RX ADMIN — CLONAZEPAM 1 MG: 1 TABLET ORAL at 05:29

## 2022-11-22 RX ADMIN — CIPROFLOXACIN 500 MG: 500 TABLET, FILM COATED ORAL at 11:22

## 2022-11-22 RX ADMIN — NIFEDIPINE 90 MG: 30 TABLET, FILM COATED, EXTENDED RELEASE ORAL at 09:18

## 2022-11-22 RX ADMIN — ALPRAZOLAM 1 MG: 0.5 TABLET ORAL at 11:22

## 2022-11-22 RX ADMIN — OXYCODONE HYDROCHLORIDE 10 MG: 5 TABLET ORAL at 13:57

## 2022-11-22 RX ADMIN — PANTOPRAZOLE SODIUM 40 MG: 40 INJECTION, POWDER, FOR SOLUTION INTRAVENOUS at 09:20

## 2022-11-22 RX ADMIN — SODIUM CHLORIDE: 9 INJECTION, SOLUTION INTRAVENOUS at 03:33

## 2022-11-22 RX ADMIN — OXYCODONE HYDROCHLORIDE 10 MG: 5 TABLET ORAL at 09:18

## 2022-11-22 ASSESSMENT — ACTIVITIES OF DAILY LIVING (ADL)
ADLS_ACUITY_SCORE: 23

## 2022-11-22 NOTE — PLAN OF CARE
Alert and oriented, VSS. Afebrile. Complains of 7-8/10 pain to abdomen during the shift, oxycodone given multiple times, dilaudid given once with some relief. Patient reported a black stool. Asked to notify us of any other black stools. Patient reports it was only the one occurrence. Bowel sounds hyperactive. Up independent. Patient showered during the shift. Regular diet, tolerating well.    Keila Prieto RN on 11/22/2022 at 4:07 AM

## 2022-11-22 NOTE — ASSESSMENT & PLAN NOTE
Present on admission with abdominal pain and diarrhea.  Differential diagnosis includes infectious, inflammatory or ischemic colitis.  C diff negative, stool panel negative. Overall pain improving but still present. WBC count normalized. Fevers improved  Currently day of flagyl and cipro  Has been seen by general surgery with the plan for OP colonoscopy in 2-3 weeks   At this time might be ready for discharge  Home today on 7 more days of oral ciprofloxin and flagyl

## 2022-11-22 NOTE — DISCHARGE SUMMARY
[facility]  Hospitalist Discharge Summary      Date of Admission:  11/18/2022  Date of Discharge:  11/22/2022  3:41 PM  Discharging Provider: Toby Servin MD  Discharge Service: Hospitalist Service    Discharge Diagnoses   Principal Problem:    Colitis  Active Problems:    Dysthymic disorder    MARLEN (generalized anxiety disorder)    History of testicular cancer        Follow-ups Needed After Discharge   Follow-up Appointments     Follow-up and recommended labs and tests       Follow up with primary care provider, James Archuleta, within 7 days   for hospital follow- up.  The following labs/tests are recommended:   General Surgery recommending colonoscopy in next 2-3 weeks.             Unresulted Labs Ordered in the Past 30 Days of this Admission     No orders found from 10/19/2022 to 11/19/2022.      These results will be followed up by Dr. Archuleta    Discharge Disposition   Discharged to home  Condition at discharge: Satisfactory      Hospital Course   Colitis  Present on admission with abdominal pain and diarrhea.  Differential diagnosis includes infectious, inflammatory or ischemic colitis.  C diff negative, stool panel negative. Overall pain improving but still present. WBC count normalized. Fevers improved  Currently day of flagyl and cipro  Has been seen by general surgery with the plan for OP colonoscopy in 2-3 weeks   At this time might be ready for discharge  Home today on 7 more days of oral ciprofloxin and flagyl    Dysthymic disorder  Chronic and stable  Continued his home meds      History of testicular cancer  No acute sxs  Continue OP follow-up        Consultations This Hospital Stay   None    Code Status   Prior    Time Spent on this Encounter   I, Toby Servin MD, discharged this patient today but I did not personally see the patient today and will not be billing for the patient's discharge.       Toby Servin MD  [facility] [place]  [address]  [place] [address]  Phone: [phone]  Fax: [phone]  ______________________________________________________________________    Physical Exam   Vital Signs: Temp: 97.3  F (36.3  C) Temp src: Tympanic BP: 125/77 Pulse: 90   Resp: 18 SpO2: 98 % O2 Device: None (Room air)    Weight: 169 lbs 6.4 oz  Constitutional: awake, alert, cooperative, no apparent distress, and appears stated age  Respiratory: No increased work of breathing, good air exchange, clear to auscultation bilaterally, no crackles or wheezing  Cardiovascular: regular rate and rhythm  GI: normal bowel sounds, soft and non-distended       Primary Care Physician   James Archuleta    Discharge Orders      Reason for your hospital stay    Worsening diarrhea with abdominal pain     Follow-up and recommended labs and tests     Follow up with primary care provider, James Archuleta, within 7 days for hospital follow- up.  The following labs/tests are recommended: General Surgery recommending colonoscopy in next 2-3 weeks.     Activity    Your activity upon discharge: activity as tolerated     Diet    Follow this diet upon discharge: Orders Placed This Encounter      Regular Diet Adult       Significant Results and Procedures   Most Recent 3 CBC's:Recent Labs   Lab Test 11/22/22  0620 11/21/22  0626 11/20/22  0606   WBC 7.4 10.7 16.2*   HGB 12.4* 13.4 14.2   MCV 86 86 88    326 300     Most Recent 3 BMP's:Recent Labs   Lab Test 11/22/22  0620 11/21/22  0626 11/20/22  0606   * 139 134*   POTASSIUM 3.5 3.7 3.5   CHLORIDE 100 105 96*   CO2 24 24 27   BUN 5.5* 3.5* 3.6*   CR 0.76 0.71 0.73   ANIONGAP 10 10 11   MARIA INES 8.0* 8.4* 8.6   GLC 96 101* 103*   ,   Results for orders placed or performed during the hospital encounter of 11/18/22   CT Abdomen Pelvis w Contrast    Narrative    PROCEDURE: CT ABDOMEN PELVIS W CONTRAST 11/18/2022 10:18 AM    HISTORY: rlq pain, fever    COMPARISONS: 9/7/2018.    Meds/Dose Given: Isovue 370  100mL    TECHNIQUE: Axial postcontrast enhanced images with coronal and  sagittal reformatted images.    FINDINGS: Lung bases are clear.    No focal abnormality is seen in the liver, spleen, adrenal glands or  in the pancreas. Gallbladder is present.    No renal mass or hydronephrosis is seen.    There is diffuse thickening of the wall of the descending colon with  some extension into the hepatic flexure. There is some free fluid  around the ascending colon. The appendix is not definitely visualized.    There is a rounded low attenuation structure inferolateral to the  cecum. This was present on the prior exam and was thought to possibly  represent scarring related to prior surgery. It is similar in size to  the prior exam but it does now contain a punctate calcification which  could be seen in scarring. There is not significant inflammation  centered around this process.    No other significant bowel abnormality is seen. No significant lymph  node enlargement is seen. There are nonenlarged retroperitoneal lymph  nodes.    No aneurysm is seen. There is no focal bone lesion.    Wall of the urinary bladder is somewhat thick which may be due to  underdistention.      Impression    IMPRESSION: Very abnormal appearing right colon with diffuse  thickening of the wall which suggests colitis. There is some fluid  around the colon but only very mild inflammatory change. There is not  a well-defined abscess.    Appendix is not visualized separate from this.    KRISTINA BROOKS MD         SYSTEM ID:  [id number]   CT Abdomen pelvis w contrast*    Narrative    PROCEDURE:  CT ABDOMEN PELVIS W CONTRAST    HISTORY: rlq pain with colitis. Persistent pain and fever. Eval for  evolving process, abscess, etc    TECHNIQUE: Helical CT of the abdomen and pelvis was performed  following injection of intravenous contrast. This CT exam was  performed using one or more the following dose reduction techniques:  automated exposure control,  adjustment of the mA and/or kV according  to patient size, and/or iterative reconstruction technique.    COMPARISON: 11/18/2022    MEDS/CONTRAST: 100 ml isovue 370     FINDINGS:      Limited images through the lung bases demonstrate trace pleural fluid  and mild atelectasis.     The liver demonstrates no mass or intrahepatic biliary ductal  dilatation. The gallbladder, spleen, pancreas and adrenal glands are  stable. Symmetric nephrograms are present without hydronephrosis.  There is no abdominal aortic aneurysm.    The small bowel remains normal in caliber. The proximal right colon  remains abnormal in appearance with persistent wall thickening  measuring 11.9 cm. Adjacent stranding and fluid in the right paracolic  gutter is again seen. No free air or abscess formation is identified.  The appendix remains normal in caliber, containing oral contrast.  Trace pelvic free fluid is seen.    No suspicious osseous lesions are identified.      Impression    IMPRESSION:      Persistent wall thickening of the proximal right colon. Differential  considerations include infectious or inflammatory colitis as well as  neoplasm.     Mild bibasilar atelectasis.    Normal appendix.     Recommend follow-up colonoscopy if not recently performed.    IVON RIOJAS MD         SYSTEM ID:  [id number]       Discharge Medications   Discharge Medication List as of 11/22/2022  2:59 PM      START taking these medications    Details   ciprofloxacin (CIPRO) 500 MG tablet Take 1 tablet (500 mg) by mouth every 12 hours for 7 days, Disp-14 tablet, R-0, E-Prescribe      metroNIDAZOLE (FLAGYL) 500 MG tablet Take 1 tablet (500 mg) by mouth every 12 hours for 7 days, Disp-14 tablet, R-0, E-Prescribe      oxyCODONE (ROXICODONE) 5 MG tablet Take 1-2 tablets (5-10 mg) by mouth every 4 hours as needed for moderate pain (4-6) or severe pain (7-10), Disp-20 tablet, R-0, E-Prescribe         CONTINUE these medications which have NOT CHANGED    Details    albuterol (PROAIR HFA/PROVENTIL HFA/VENTOLIN HFA) 108 (90 Base) MCG/ACT inhaler Inhale 2 puffs into the lungs every 6 hours as needed for shortness of breath / dyspnea or wheezing, Historical      ALPRAZolam (XANAX) 1 MG tablet Take 1 mg by mouth 2 times daily as needed for anxiety, Historical      calcium carbonate (TUMS) 500 MG chewable tablet Take 1 chew tab by mouth 4 times daily as needed for heartburn, Historical      clonazePAM (KLONOPIN) 1 MG tablet Take 1 mg by mouth 3 times daily, Historical      desvenlafaxine succinate (PRISTIQ) 100 MG 24 hr tablet Take 100 mg by mouth daily, Historical      famotidine (PEPCID) 20 MG tablet Take 20 mg by mouth daily, Historical      NIFEdipine ER OSMOTIC (PROCARDIA XL) 90 MG 24 hr tablet TAKE 1 TABLET(90 MG) BY MOUTH DAILY, Disp-90 tablet, R-0, E-Prescribe      omeprazole (PRILOSEC) 20 MG DR capsule Take 20 mg by mouth daily, Historical           Allergies   No Known Allergies

## 2022-11-22 NOTE — PLAN OF CARE
"Goal Outcome Evaluation:   Pt A&O. VSS. Afebrile LS clear.  Pain being controlled with PRN medications. RLQ tender upon palpation. BS audible all quadrants. Urine output adequate. Appetite appropriate. Denies nausea or SOB. Ind in room.       Problem: Plan of Care - These are the overarching goals to be used throughout the patient stay.    Goal: Plan of Care Review  Description: The Plan of Care Review/Shift note should be completed every shift.  The Outcome Evaluation is a brief statement about your assessment that the patient is improving, declining, or no change.  This information will be displayed automatically on your shift note.  Outcome: Adequate for Care Transition  Goal: Patient-Specific Goal (Individualized)  Description: You can add care plan individualizations to a care plan. Examples of Individualization might be:  \"Parent requests to be called daily at 9am for status\", \"I have a hard time hearing out of my right ear\", or \"Do not touch me to wake me up as it startles me\".  Outcome: Adequate for Care Transition  Goal: Absence of Hospital-Acquired Illness or Injury  Outcome: Adequate for Care Transition  Intervention: Identify and Manage Fall Risk  Recent Flowsheet Documentation  Taken 11/22/2022 1015 by Leanna William RN  Safety Promotion/Fall Prevention: safety round/check completed  Intervention: Prevent Skin Injury  Recent Flowsheet Documentation  Taken 11/22/2022 0825 by Leanna William RN  Body Position: position changed independently  Intervention: Prevent and Manage VTE (Venous Thromboembolism) Risk  Recent Flowsheet Documentation  Taken 11/22/2022 1015 by Leanna William RN  VTE Prevention/Management: SCDs (sequential compression devices) off  Goal: Optimal Comfort and Wellbeing  Outcome: Adequate for Care Transition  Goal: Readiness for Transition of Care  Outcome: Adequate for Care Transition     Problem: Bowel Disease, Inflammatory (Ulcerative Colitis or Crohn's Disease)  Goal: Optimal " Adaptation to Chronic Illness  Outcome: Adequate for Care Transition  Goal: Diarrhea Symptom Relief  Outcome: Adequate for Care Transition  Goal: Absence of Infection Signs and Symptoms  Outcome: Adequate for Care Transition  Goal: Optimal Nutrition Delivery  Outcome: Adequate for Care Transition  Goal: Optimal Pain Control and Function  Outcome: Adequate for Care Transition     Problem: Pain Acute  Goal: Optimal Pain Control and Function  Outcome: Adequate for Care Transition  Intervention: Prevent or Manage Pain  Recent Flowsheet Documentation  Taken 11/22/2022 1015 by Leanna William, RN  Medication Review/Management: medications reviewed

## 2022-11-22 NOTE — PROGRESS NOTES
[facility]    Medicine Progress Note - Hospitalist Service    Date of Admission:  11/18/2022    Assessment & Plan   Colitis  Present on admission with abdominal pain and diarrhea.  Differential diagnosis includes infectious, inflammatory or ischemic colitis.  C diff negative, stool panel negative. Overall pain improving but still present. WBC count normalized. Fevers improved  Currently day of flagyl and cipro  Has been seen by general surgery with the plan for OP colonoscopy in 2-3 weeks   At this time might be ready for discharge  Potentially home later today on 7 more days of oral ciprofloxin and flagyl    Dysthymic disorder  Chronic and stable  Continued his home meds      History of testicular cancer  No acute sxs  Continue OP follow-up           Diet: Regular Diet Adult    DVT Prophylaxis: Pneumatic Compression Devices  Cabrera Catheter: Not present  Central Lines: None  Cardiac Monitoring: None  Code Status: Full Code      Disposition Plan    Possibly later home today     The patient's care was discussed with the Patient.    Toby Servin MD  Hospitalist Service  [facility]  Securely message with the Vocera Web Console (learn more here)  Text page via [employer] Paging/Directory         Clinically Significant Risk Factors         # Hyponatremia: Lowest Na = 134 mmol/L (Ref range: 136-145) in last 2 days, will monitor as appropriate  # Hypocalcemia: Lowest Ca = 8 mg/dL (Ref range: 8.5 - 10.1 mg/dL) in last 2 days, will monitor and replace as appropriate     # Hypoalbuminemia: Lowest albumin = 3.4 g/dL (Ref range: 3.5-5.2) at 11/22/2022  6:20 AM, will monitor as appropriate                  ______________________________________________________________________    Interval History   Feeling better. Pain limited to lower abdomen, appetite better, tolerating regular diet. Having 5-6 loose stools daily    Data reviewed today: I reviewed all medications, new labs and  imaging results over the last 24 hours. I personally reviewed no images or EKG's today.    Physical Exam   Vital Signs: Temp: 97.3  F (36.3  C) Temp src: Tympanic BP: 125/77 Pulse: 90   Resp: 18 SpO2: 98 % O2 Device: None (Room air)    Weight: 169 lbs 6.4 oz  Constitutional: awake, alert, cooperative, no apparent distress, and appears stated age  Respiratory: No increased work of breathing, good air exchange, clear to auscultation bilaterally, no crackles or wheezing  Cardiovascular: regular rate and rhythm  GI: normal bowel sounds, soft and non-distended    Data   Recent Labs   Lab 11/22/22  0620 11/21/22  0626 11/20/22  0606   WBC 7.4 10.7 16.2*   HGB 12.4* 13.4 14.2   MCV 86 86 88    326 300   * 139 134*   POTASSIUM 3.5 3.7 3.5   CHLORIDE 100 105 96*   CO2 24 24 27   BUN 5.5* 3.5* 3.6*   CR 0.76 0.71 0.73   ANIONGAP 10 10 11   MARIA INES 8.0* 8.4* 8.6   GLC 96 101* 103*   ALBUMIN 3.4* 3.5 3.7   PROTTOTAL 5.9* 6.4 6.7   BILITOTAL <0.2 0.4 0.3   ALKPHOS 69 79 85   ALT 15 19 29   AST 14 12 22

## 2022-11-22 NOTE — PLAN OF CARE
NSG DISCHARGE NOTE    Patient discharged to home at 3:40 PM via wheel chair. Accompanied by spouse and staff. Discharge instructions reviewed with patient, opportunity offered to ask questions. Prescriptions sent to patients preferred pharmacy. All belongings sent with patient.    Leanna William RN                10-Mar-2018

## 2022-11-23 ENCOUNTER — PATIENT OUTREACH (OUTPATIENT)
Dept: FAMILY MEDICINE | Facility: OTHER | Age: 47
End: 2022-11-23

## 2022-11-23 DIAGNOSIS — K52.9 COLITIS: Primary | ICD-10-CM

## 2022-11-23 RX ORDER — POLYETHYLENE GLYCOL 3350, SODIUM CHLORIDE, SODIUM BICARBONATE, POTASSIUM CHLORIDE 420; 11.2; 5.72; 1.48 G/4L; G/4L; G/4L; G/4L
4000 POWDER, FOR SOLUTION ORAL ONCE
Qty: 4000 ML | Refills: 0 | Status: SHIPPED | OUTPATIENT
Start: 2022-11-23 | End: 2022-11-23

## 2022-11-23 RX ORDER — BISACODYL 5 MG/1
TABLET, DELAYED RELEASE ORAL
Qty: 2 TABLET | Refills: 0 | Status: ON HOLD | OUTPATIENT
Start: 2022-11-23 | End: 2022-12-12

## 2022-11-23 NOTE — TELEPHONE ENCOUNTER
Transitional Care Management Phone Call      Summary of hospitalization:  [facility] discharge summary reviewed    DISCHARGE DIAGNOSIS: colitis    DATE OF DISCHARGE: 11/22/22    Diagnostic Tests/Treatments reviewed.  Follow up needed: colonoscopy     Post Discharge Medication Reconciliation: discharge medications reconciled, continue medications without change      Medications reviewed by: by myself    Problems taking medications regularly:  None    Problems adhering to non-medication therapy:  None    Other Healthcare Providers Involved in Patient's Care:         None    Update since discharge: stable.     Plan of care communicated with: patient    Just a friendly reminder that you appointment is:  Next 5 appointments (look out 90 days)    Dec 02, 2022  1:20 PM  PHYSICAL with James Archuleta MD  [facility] ([facility] ) [address]  [place] [id number] [phone]            We encourage you to keep this appointment.    Please remember to bring all of your pills in their bottles (including any vitamins or over the counter pills) with you to your appointment.     The patient indicates understanding of these issues and agrees with the plan of care.   Yes    Was the patient contacted within the 2 business days or other approved timeframe?  Yes    Was the Medication reconciliation and management done since the patient was discharged? Yes  Corinne R Thayer, RN  11/23/2022 10:35 AM

## 2022-11-26 ENCOUNTER — HOSPITAL ENCOUNTER (EMERGENCY)
Facility: OTHER | Age: 47
Discharge: HOME | End: 2022-11-26
Attending: EMERGENCY MEDICINE | Admitting: EMERGENCY MEDICINE
Payer: COMMERCIAL

## 2022-11-26 VITALS
RESPIRATION RATE: 16 BRPM | BODY MASS INDEX: 24.34 KG/M2 | TEMPERATURE: 97.9 F | HEIGHT: 70 IN | OXYGEN SATURATION: 99 % | HEART RATE: 99 BPM | WEIGHT: 170 LBS | DIASTOLIC BLOOD PRESSURE: 77 MMHG | SYSTOLIC BLOOD PRESSURE: 111 MMHG

## 2022-11-26 DIAGNOSIS — R10.84 ABDOMINAL PAIN, GENERALIZED: ICD-10-CM

## 2022-11-26 PROCEDURE — 99282 EMERGENCY DEPT VISIT SF MDM: CPT | Performed by: EMERGENCY MEDICINE

## 2022-11-26 PROCEDURE — 99284 EMERGENCY DEPT VISIT MOD MDM: CPT | Performed by: EMERGENCY MEDICINE

## 2022-11-26 RX ORDER — OXYCODONE HYDROCHLORIDE 5 MG/1
5 TABLET ORAL EVERY 6 HOURS PRN
Qty: 20 TABLET | Refills: 0 | Status: SHIPPED | OUTPATIENT
Start: 2022-11-26 | End: 2022-12-02

## 2022-11-26 RX ORDER — TRAMADOL HYDROCHLORIDE 50 MG/1
50 TABLET ORAL EVERY 6 HOURS PRN
Qty: 20 TABLET | Refills: 0 | Status: SHIPPED | OUTPATIENT
Start: 2022-11-26 | End: 2022-12-02

## 2022-11-26 NOTE — ED PROVIDER NOTES
"Emergency Department Provider Note  : 1975 Age: 47 year old Sex: male MRN: [id number]    Chief Complaint   Patient presents with     Medication Refill       Medical Decision Making / Assessment / Plan   47 year old male presenting with continued abdominal pain secondary to colitis.  His symptoms are improving.  He does have follow-up scheduled later this week with his primary care provider.  We will refill oxycodone and Ultram for him he has used both of these in the past with success.      Final diagnoses:   Abdominal pain, generalized       Gustavo Montemayor MD  2022   Emergency Department    Subjective   Reggie is a 47 year old male who presents with 9 out of 10 abdominal pain right now.  Has not had any pain medicine today.  Used his last oxycodone last night.  The patient was admitted to the hospital in the last couple weeks and discharged approximately 5 days ago with antibiotics for a bout of colitis.  Has been able to wean off his pain medicine and is using it much less frequently, has a pill cutter at home and is taking a half at a time.  Tylenol and ibuprofen do not work for him.  He has used Ultram in the past and is hoping to have something to use later this week when he goes back to work that is not as sedating as the oxycodone.  Denies other concerns today.    I have reviewed the Medications, Allergies, Past Medical and Surgical History, and Social History in the [facility] and with family.    Review of Systems:  See HPI for pertinent positives and negatives. All other systems reviewed and found to be negative.      Objective   BP: 111/77  Pulse: 99  Temp: 97.9  F (36.6  C)  Resp: 16  Height: 177.8 cm (5' 10\")  Weight: 77.1 kg (170 lb)  SpO2: 99 %    Physical Exam:   General: awake and alert, holding a sweatshirt over his stomach  Head: normocephalic, atraumatic  Eyes: conjugate gaze  ENT: moist mucous membranes  Chest/Respiratory: normal resp effort  Cardiovascular: warm and well " perfused  Extremities: mobility at baseline  Neurological: moving all extremities, normal speech, gait at baseline  Skin: warm and dry  Psychiatric: appropriate affect        Medical/Surgical History:  Past Medical History:   Diagnosis Date     Anxiety disorder 2012     Dysthymic disorder     No Comments Provided     Malignant neoplasm of testis (H) 2005 2005,teratoma     Raynaud's syndrome without gangrene     No Comments Provided     Uncomplicated alcohol abuse     7/25/2012     Past Surgical History:   Procedure Laterality Date     DECOMPRESSION CUBITAL TUNNEL Left 01/2019     HERNIA REPAIR      ,HERNIA REPAIR     IR CHEST PORT PLACEMENT > 5 YRS OF AGE Left 04/01/2008     LYMPH NODE BIOPSY  09/05/2008     [facility]. 37 lymph nodes excised     ORCHIECTOMY SCROTAL Right 12/30/2005    teratoma     OTHER SURGICAL HISTORY      9/05/08,[id number],OTHER     RELEASE CARPAL TUNNEL  04/09/2013       Medications:  No current facility-administered medications for this encounter.     Current Outpatient Medications   Medication     oxyCODONE (ROXICODONE) 5 MG tablet     oxyCODONE (ROXICODONE) 5 MG tablet     traMADol (ULTRAM) 50 MG tablet     albuterol (PROAIR HFA/PROVENTIL HFA/VENTOLIN HFA) 108 (90 Base) MCG/ACT inhaler     ALPRAZolam (XANAX) 1 MG tablet     bisacodyl (DULCOLAX) 5 MG EC tablet     calcium carbonate (TUMS) 500 MG chewable tablet     ciprofloxacin (CIPRO) 500 MG tablet     clonazePAM (KLONOPIN) 1 MG tablet     desvenlafaxine succinate (PRISTIQ) 100 MG 24 hr tablet     famotidine (PEPCID) 20 MG tablet     metroNIDAZOLE (FLAGYL) 500 MG tablet     NIFEdipine ER OSMOTIC (PROCARDIA XL) 90 MG 24 hr tablet     omeprazole (PRILOSEC) 20 MG DR capsule       Allergies:  Patient has no known allergies.    Relevant labs, images, EKGs, Epic and outside hospital (if applicable) charts were reviewed. The findings, diagnosis, plan, and need for follow up were discussed with the patient/family. Nursing notes were  reviewed.        Gustavo Montemayor MD  11/26/22 5297

## 2022-11-26 NOTE — ED TRIAGE NOTES
Pt presents to ED with request of oxycodone refill. Pt had recent admission for colitis.     Yolanda Kelly RN on 11/26/2022 at 11:11 AM       Triage Assessment     Row Name 11/26/22 1110       Triage Assessment (Adult)    Airway WDL WDL       Respiratory WDL    Respiratory WDL WDL       Peripheral/Neurovascular WDL    Peripheral Neurovascular WDL WDL       Cognitive/Neuro/Behavioral WDL    Cognitive/Neuro/Behavioral WDL WDL

## 2022-12-02 ENCOUNTER — OFFICE VISIT (OUTPATIENT)
Dept: FAMILY MEDICINE | Facility: OTHER | Age: 47
End: 2022-12-02
Attending: FAMILY MEDICINE
Payer: COMMERCIAL

## 2022-12-02 VITALS
DIASTOLIC BLOOD PRESSURE: 64 MMHG | HEART RATE: 86 BPM | RESPIRATION RATE: 16 BRPM | SYSTOLIC BLOOD PRESSURE: 110 MMHG | BODY MASS INDEX: 24.25 KG/M2 | OXYGEN SATURATION: 94 % | TEMPERATURE: 97.3 F | WEIGHT: 169 LBS

## 2022-12-02 DIAGNOSIS — K52.9 COLITIS: Primary | ICD-10-CM

## 2022-12-02 DIAGNOSIS — I73.00 RAYNAUD'S PHENOMENON WITHOUT GANGRENE: ICD-10-CM

## 2022-12-02 DIAGNOSIS — Z85.47 HISTORY OF TESTICULAR CANCER: ICD-10-CM

## 2022-12-02 PROCEDURE — 84702 CHORIONIC GONADOTROPIN TEST: CPT | Mod: ZL | Performed by: FAMILY MEDICINE

## 2022-12-02 PROCEDURE — 36415 COLL VENOUS BLD VENIPUNCTURE: CPT | Mod: ZL | Performed by: FAMILY MEDICINE

## 2022-12-02 PROCEDURE — G0463 HOSPITAL OUTPT CLINIC VISIT: HCPCS | Mod: 25

## 2022-12-02 PROCEDURE — 99495 TRANSJ CARE MGMT MOD F2F 14D: CPT | Performed by: FAMILY MEDICINE

## 2022-12-02 PROCEDURE — 82105 ALPHA-FETOPROTEIN SERUM: CPT | Mod: ZL | Performed by: FAMILY MEDICINE

## 2022-12-02 RX ORDER — NIFEDIPINE 90 MG/1
90 TABLET, EXTENDED RELEASE ORAL DAILY
Qty: 90 TABLET | Refills: 4 | Status: SHIPPED | OUTPATIENT
Start: 2022-12-02 | End: 2023-06-02

## 2022-12-02 RX ORDER — TRAMADOL HYDROCHLORIDE 50 MG/1
50 TABLET ORAL EVERY 6 HOURS PRN
Qty: 20 TABLET | Refills: 0 | Status: CANCELLED | OUTPATIENT
Start: 2022-12-02

## 2022-12-02 RX ORDER — OXYCODONE HYDROCHLORIDE 5 MG/1
5 TABLET ORAL EVERY 6 HOURS PRN
Qty: 40 TABLET | Refills: 0 | Status: ON HOLD | OUTPATIENT
Start: 2022-12-02 | End: 2022-12-12

## 2022-12-02 ASSESSMENT — PATIENT HEALTH QUESTIONNAIRE - PHQ9
10. IF YOU CHECKED OFF ANY PROBLEMS, HOW DIFFICULT HAVE THESE PROBLEMS MADE IT FOR YOU TO DO YOUR WORK, TAKE CARE OF THINGS AT HOME, OR GET ALONG WITH OTHER PEOPLE: SOMEWHAT DIFFICULT
SUM OF ALL RESPONSES TO PHQ QUESTIONS 1-9: 8
SUM OF ALL RESPONSES TO PHQ QUESTIONS 1-9: 8

## 2022-12-02 ASSESSMENT — ANXIETY QUESTIONNAIRES
8. IF YOU CHECKED OFF ANY PROBLEMS, HOW DIFFICULT HAVE THESE MADE IT FOR YOU TO DO YOUR WORK, TAKE CARE OF THINGS AT HOME, OR GET ALONG WITH OTHER PEOPLE?: SOMEWHAT DIFFICULT
6. BECOMING EASILY ANNOYED OR IRRITABLE: SEVERAL DAYS
7. FEELING AFRAID AS IF SOMETHING AWFUL MIGHT HAPPEN: SEVERAL DAYS
4. TROUBLE RELAXING: SEVERAL DAYS
2. NOT BEING ABLE TO STOP OR CONTROL WORRYING: SEVERAL DAYS
GAD7 TOTAL SCORE: 7
5. BEING SO RESTLESS THAT IT IS HARD TO SIT STILL: SEVERAL DAYS
GAD7 TOTAL SCORE: 7
3. WORRYING TOO MUCH ABOUT DIFFERENT THINGS: SEVERAL DAYS
7. FEELING AFRAID AS IF SOMETHING AWFUL MIGHT HAPPEN: SEVERAL DAYS
GAD7 TOTAL SCORE: 7
IF YOU CHECKED OFF ANY PROBLEMS ON THIS QUESTIONNAIRE, HOW DIFFICULT HAVE THESE PROBLEMS MADE IT FOR YOU TO DO YOUR WORK, TAKE CARE OF THINGS AT HOME, OR GET ALONG WITH OTHER PEOPLE: SOMEWHAT DIFFICULT
1. FEELING NERVOUS, ANXIOUS, OR ON EDGE: SEVERAL DAYS

## 2022-12-02 ASSESSMENT — PAIN SCALES - GENERAL: PAINLEVEL: EXTREME PAIN (8)

## 2022-12-02 NOTE — PROGRESS NOTES
Assessment & Plan       ICD-10-CM    1. Colitis  K52.9 oxyCODONE (ROXICODONE) 5 MG tablet      2. Raynaud's phenomenon without gangrene  I73.00 NIFEdipine ER OSMOTIC (PROCARDIA XL) 90 MG 24 hr tablet      3. History of testicular cancer  Z85.47 HCG tumor marker     AFP tumor marker     AFP tumor marker     HCG tumor marker        Hospitalized for right-sided colitis.  Improved some with antibiotics.  Pain improved, but has now plateaued.  He continues to take oxycodone or tramadol.  Back to normal diet.  Loose stools persist.  We discussed use of opioids with benzodiazepines.  He is on clonazepam and alprazolam.  There is a risk for sedation and overdose.  He is using 1-2 oxycodone up to twice daily.  Averaging about 3/day.  He is aware of the risk and will take medication safely.  Tramadol can interact with desvenlafaxine.  Recommend avoiding.  Prescribed oxycodone 5 mg to take similar how he is now, averaging about 3/day.  40 pills provided, should have enough to last until after colonoscopy and hopefully until biopsy results return.  Keep scheduled colonoscopy December 12    Refilled nifedipine for Raynaud's.  He just takes this as needed in the winter with good benefit.  Denies any side effects.    Has concerns about history of testicular cancer x2 last in 2008.  Last tumor markers were 2018.  It is reasonable to continue monitoring, although he is in a lower risk category now more than 5 years out from cancer.  Last CT scans were 2018.  He did have a CT abdomen pelvis for the hospitalization.  No recent CT chest since 2018.  We discussed obtaining, but imaging is not typically warranted more than 5 years from diagnosis.  He is okay deferring imaging for now.       MED REC REQUIRED  Post Medication Reconciliation Status:  Discharge medications reconciled and changed, see notes/orders        James Archuleta MD  [facility]    Petra Paredes is a 47 year old, presenting for the  "following health issues:  Hospital F/U      History of Present Illness       Mental Health Follow-up:  Patient presents to follow-up on Depression & Anxiety.Patient's depression since last visit has been:  Medium  The patient is not having other symptoms associated with depression.  Patient's anxiety since last visit has been:  Medium  The patient is not having other symptoms associated with anxiety.  Any significant life events: job concerns, financial concerns and health concerns  Patient is feeling anxious or having panic attacks.  Patient has no concerns about alcohol or drug use.    Headaches:   Since the patient's last clinic visit, headaches are: no change  The patient is getting headaches:  Everyday  He is not able to do normal daily activities when he has a migraine.  The patient is taking the following rescue/relief medications:  Naproxyn (Aleve) and other   Patient states \"The relief is inconsistent\" from the rescue/relief medications.   The patient is taking the following medications to prevent migraines:  No medications to prevent migraines  In the past 4 weeks, the patient has gone to an Urgent Care or Emergency Room 0 times times due to headaches.    Reason for visit:  Follow up    He eats 0-1 servings of fruits and vegetables daily.He consumes 2 sweetened beverage(s) daily.He exercises with enough effort to increase his heart rate 30 to 60 minutes per day.  He exercises with enough effort to increase his heart rate 3 or less days per week. He is missing 1 dose(s) of medications per week.    Today's PHQ-9         PHQ-9 Total Score: 8    PHQ-9 Q9 Thoughts of better off dead/self-harm past 2 weeks :   Not at all    How difficult have these problems made it for you to do your work, take care of things at home, or get along with other people: Somewhat difficult  Today's MARLEN-7 Score: 7         Hospital Follow-up Visit:    Hospital/Nursing Home/IP Rehab Facility: [facility]  Date of " Admission: 11-18  Date of Discharge: 11-22  Reason(s) for Admission: Colitis    Was your hospitalization related to COVID-19? No   Problems taking medications regularly:  None  Medication changes since discharge: Start: Cipro, Flagyl and Oxycodone  Problems adhering to non-medication therapy:  None    Summary of hospitalization:  [facility] discharge summary reviewed  Diagnostic Tests/Treatments reviewed.  Follow up needed: colonoscopy  Other Healthcare Providers Involved in Patient s Care:         Surgical follow-up appointment - colonoscopy  Update since discharge: improved.         Plan of care communicated with patient           LLQ pain improved slightly  Loose stools  Some sweats, but this is chronic        Review of Systems   As above      Objective    /64 (BP Location: Left arm, Patient Position: Sitting, Cuff Size: Adult Large)   Pulse 86   Temp 97.3  F (36.3  C) (Tympanic)   Resp 16   Wt 76.7 kg (169 lb)   SpO2 94%   BMI 24.25 kg/m    Body mass index is 24.25 kg/m .  Physical Exam   General Appearance: Alert. No acute distress  Chest/Respiratory Exam: Clear to auscultation bilaterally  Cardiovascular Exam: Regular rate and rhythm. S1, S2, no murmur, gallop, or rubs.  Gastrointestinal Exam: Soft, tender most in RLQ, no abnormal masses or organomegaly.  Extremities: No lower extremity edema.  Psychiatric: Normal affect and mentation

## 2022-12-02 NOTE — NURSING NOTE
"Patient presents to the clinic for hospital follow up.    FOOD SECURITY SCREENING QUESTIONS:    The next two questions are to help us understand your food security.  If you are feeling you need any assistance in this area, we have resources available to support you today.    Hunger Vital Signs:  Within the past 12 months we worried whether our food would run out before we got money to buy more. Never  Within the past 12 months the food we bought just didn't last and we didn't have money to get more. Never    Advance Care Directive on file? no  Advance Care Directive provided to patient? Declined.    Chief Complaint   Patient presents with     Hospital F/U       Initial /64 (BP Location: Left arm, Patient Position: Sitting, Cuff Size: Adult Large)   Pulse 86   Temp 97.3  F (36.3  C) (Tympanic)   Resp 16   Wt 76.7 kg (169 lb)   SpO2 94%   BMI 24.25 kg/m   Estimated body mass index is 24.25 kg/m  as calculated from the following:    Height as of 11/26/22: 1.778 m (5' 10\").    Weight as of this encounter: 76.7 kg (169 lb).  Medication Reconciliation: complete        Pam Agosto LPN       "

## 2022-12-02 NOTE — H&P (VIEW-ONLY)
Assessment & Plan       ICD-10-CM    1. Colitis  K52.9 oxyCODONE (ROXICODONE) 5 MG tablet      2. Raynaud's phenomenon without gangrene  I73.00 NIFEdipine ER OSMOTIC (PROCARDIA XL) 90 MG 24 hr tablet      3. History of testicular cancer  Z85.47 HCG tumor marker     AFP tumor marker     AFP tumor marker     HCG tumor marker        Hospitalized for right-sided colitis.  Improved some with antibiotics.  Pain improved, but has now plateaued.  He continues to take oxycodone or tramadol.  Back to normal diet.  Loose stools persist.  We discussed use of opioids with benzodiazepines.  He is on clonazepam and alprazolam.  There is a risk for sedation and overdose.  He is using 1-2 oxycodone up to twice daily.  Averaging about 3/day.  He is aware of the risk and will take medication safely.  Tramadol can interact with desvenlafaxine.  Recommend avoiding.  Prescribed oxycodone 5 mg to take similar how he is now, averaging about 3/day.  40 pills provided, should have enough to last until after colonoscopy and hopefully until biopsy results return.  Keep scheduled colonoscopy December 12    Refilled nifedipine for Raynaud's.  He just takes this as needed in the winter with good benefit.  Denies any side effects.    Has concerns about history of testicular cancer x2 last in 2008.  Last tumor markers were 2018.  It is reasonable to continue monitoring, although he is in a lower risk category now more than 5 years out from cancer.  Last CT scans were 2018.  He did have a CT abdomen pelvis for the hospitalization.  No recent CT chest since 2018.  We discussed obtaining, but imaging is not typically warranted more than 5 years from diagnosis.  He is okay deferring imaging for now.       MED REC REQUIRED  Post Medication Reconciliation Status:  Discharge medications reconciled and changed, see notes/orders        James Archuleta MD  [facility]    Petra Paredes is a 47 year old, presenting for the  "following health issues:  Hospital F/U      History of Present Illness       Mental Health Follow-up:  Patient presents to follow-up on Depression & Anxiety.Patient's depression since last visit has been:  Medium  The patient is not having other symptoms associated with depression.  Patient's anxiety since last visit has been:  Medium  The patient is not having other symptoms associated with anxiety.  Any significant life events: job concerns, financial concerns and health concerns  Patient is feeling anxious or having panic attacks.  Patient has no concerns about alcohol or drug use.    Headaches:   Since the patient's last clinic visit, headaches are: no change  The patient is getting headaches:  Everyday  He is not able to do normal daily activities when he has a migraine.  The patient is taking the following rescue/relief medications:  Naproxyn (Aleve) and other   Patient states \"The relief is inconsistent\" from the rescue/relief medications.   The patient is taking the following medications to prevent migraines:  No medications to prevent migraines  In the past 4 weeks, the patient has gone to an Urgent Care or Emergency Room 0 times times due to headaches.    Reason for visit:  Follow up    He eats 0-1 servings of fruits and vegetables daily.He consumes 2 sweetened beverage(s) daily.He exercises with enough effort to increase his heart rate 30 to 60 minutes per day.  He exercises with enough effort to increase his heart rate 3 or less days per week. He is missing 1 dose(s) of medications per week.    Today's PHQ-9         PHQ-9 Total Score: 8    PHQ-9 Q9 Thoughts of better off dead/self-harm past 2 weeks :   Not at all    How difficult have these problems made it for you to do your work, take care of things at home, or get along with other people: Somewhat difficult  Today's MARLEN-7 Score: 7         Hospital Follow-up Visit:    Hospital/Nursing Home/IP Rehab Facility: [facility]  Date of " Admission: 11-18  Date of Discharge: 11-22  Reason(s) for Admission: Colitis    Was your hospitalization related to COVID-19? No   Problems taking medications regularly:  None  Medication changes since discharge: Start: Cipro, Flagyl and Oxycodone  Problems adhering to non-medication therapy:  None    Summary of hospitalization:  [facility] discharge summary reviewed  Diagnostic Tests/Treatments reviewed.  Follow up needed: colonoscopy  Other Healthcare Providers Involved in Patient s Care:         Surgical follow-up appointment - colonoscopy  Update since discharge: improved.         Plan of care communicated with patient           LLQ pain improved slightly  Loose stools  Some sweats, but this is chronic        Review of Systems   As above      Objective    /64 (BP Location: Left arm, Patient Position: Sitting, Cuff Size: Adult Large)   Pulse 86   Temp 97.3  F (36.3  C) (Tympanic)   Resp 16   Wt 76.7 kg (169 lb)   SpO2 94%   BMI 24.25 kg/m    Body mass index is 24.25 kg/m .  Physical Exam   General Appearance: Alert. No acute distress  Chest/Respiratory Exam: Clear to auscultation bilaterally  Cardiovascular Exam: Regular rate and rhythm. S1, S2, no murmur, gallop, or rubs.  Gastrointestinal Exam: Soft, tender most in RLQ, no abnormal masses or organomegaly.  Extremities: No lower extremity edema.  Psychiatric: Normal affect and mentation

## 2022-12-03 LAB — AFP SERPL-MCNC: 3.5 NG/ML

## 2022-12-05 LAB — HCG-TM SERPL-ACNC: <3 IU/L

## 2022-12-12 ENCOUNTER — ANESTHESIA (OUTPATIENT)
Dept: SURGERY | Facility: OTHER | Age: 47
End: 2022-12-12
Payer: COMMERCIAL

## 2022-12-12 ENCOUNTER — ANESTHESIA EVENT (OUTPATIENT)
Dept: SURGERY | Facility: OTHER | Age: 47
End: 2022-12-12
Payer: COMMERCIAL

## 2022-12-12 ENCOUNTER — HOSPITAL ENCOUNTER (OUTPATIENT)
Facility: OTHER | Age: 47
Discharge: HOME | End: 2022-12-12
Attending: SURGERY | Admitting: SURGERY
Payer: COMMERCIAL

## 2022-12-12 VITALS
DIASTOLIC BLOOD PRESSURE: 88 MMHG | HEIGHT: 70 IN | OXYGEN SATURATION: 99 % | TEMPERATURE: 98 F | HEART RATE: 73 BPM | BODY MASS INDEX: 24.2 KG/M2 | WEIGHT: 169 LBS | RESPIRATION RATE: 16 BRPM | SYSTOLIC BLOOD PRESSURE: 116 MMHG

## 2022-12-12 DIAGNOSIS — K50.10 CROHN'S DISEASE OF COLON WITHOUT COMPLICATION (H): Primary | ICD-10-CM

## 2022-12-12 DIAGNOSIS — K52.9 COLITIS: ICD-10-CM

## 2022-12-12 PROCEDURE — 45380 COLONOSCOPY AND BIOPSY: CPT | Mod: XU | Performed by: SURGERY

## 2022-12-12 PROCEDURE — 45385 COLONOSCOPY W/LESION REMOVAL: CPT | Performed by: SURGERY

## 2022-12-12 PROCEDURE — 88305 TISSUE EXAM BY PATHOLOGIST: CPT

## 2022-12-12 PROCEDURE — 250N000011 HC RX IP 250 OP 636: Performed by: SURGERY

## 2022-12-12 PROCEDURE — 258N000003 HC RX IP 258 OP 636: Performed by: SURGERY

## 2022-12-12 PROCEDURE — 45385 COLONOSCOPY W/LESION REMOVAL: CPT

## 2022-12-12 PROCEDURE — 999N000010 HC STATISTIC ANES STAT CODE-CRNA PER MINUTE: Performed by: SURGERY

## 2022-12-12 PROCEDURE — 45385 COLONOSCOPY W/LESION REMOVAL: CPT | Performed by: NURSE ANESTHETIST, CERTIFIED REGISTERED

## 2022-12-12 PROCEDURE — 45380 COLONOSCOPY AND BIOPSY: CPT | Performed by: SURGERY

## 2022-12-12 PROCEDURE — 250N000011 HC RX IP 250 OP 636: Performed by: NURSE ANESTHETIST, CERTIFIED REGISTERED

## 2022-12-12 PROCEDURE — 250N000009 HC RX 250: Performed by: NURSE ANESTHETIST, CERTIFIED REGISTERED

## 2022-12-12 RX ORDER — OXYCODONE HYDROCHLORIDE 5 MG/1
5 TABLET ORAL EVERY 6 HOURS PRN
Qty: 40 TABLET | Refills: 0 | Status: SHIPPED | OUTPATIENT
Start: 2022-12-12 | End: 2022-12-23

## 2022-12-12 RX ORDER — FLUMAZENIL 0.1 MG/ML
0.2 INJECTION, SOLUTION INTRAVENOUS
Status: DISCONTINUED | OUTPATIENT
Start: 2022-12-12 | End: 2022-12-12 | Stop reason: HOSPADM

## 2022-12-12 RX ORDER — NALOXONE HYDROCHLORIDE 0.4 MG/ML
0.2 INJECTION, SOLUTION INTRAMUSCULAR; INTRAVENOUS; SUBCUTANEOUS
Status: DISCONTINUED | OUTPATIENT
Start: 2022-12-12 | End: 2022-12-12 | Stop reason: HOSPADM

## 2022-12-12 RX ORDER — PREDNISONE 10 MG/1
40 TABLET ORAL DAILY
Qty: 112 TABLET | Refills: 0 | Status: SHIPPED | OUTPATIENT
Start: 2022-12-12 | End: 2023-01-11

## 2022-12-12 RX ORDER — PREDNISONE 10 MG/1
40 TABLET ORAL DAILY
Qty: 112 TABLET | Refills: 0 | Status: SHIPPED | OUTPATIENT
Start: 2022-12-12 | End: 2022-12-12

## 2022-12-12 RX ORDER — NALOXONE HYDROCHLORIDE 0.4 MG/ML
0.4 INJECTION, SOLUTION INTRAMUSCULAR; INTRAVENOUS; SUBCUTANEOUS
Status: DISCONTINUED | OUTPATIENT
Start: 2022-12-12 | End: 2022-12-12 | Stop reason: HOSPADM

## 2022-12-12 RX ORDER — SODIUM CHLORIDE, SODIUM LACTATE, POTASSIUM CHLORIDE, CALCIUM CHLORIDE 600; 310; 30; 20 MG/100ML; MG/100ML; MG/100ML; MG/100ML
INJECTION, SOLUTION INTRAVENOUS CONTINUOUS
Status: DISCONTINUED | OUTPATIENT
Start: 2022-12-12 | End: 2022-12-12 | Stop reason: HOSPADM

## 2022-12-12 RX ORDER — LIDOCAINE HYDROCHLORIDE 20 MG/ML
INJECTION, SOLUTION INFILTRATION; PERINEURAL PRN
Status: DISCONTINUED | OUTPATIENT
Start: 2022-12-12 | End: 2022-12-12

## 2022-12-12 RX ORDER — LIDOCAINE 40 MG/G
CREAM TOPICAL
Status: DISCONTINUED | OUTPATIENT
Start: 2022-12-12 | End: 2022-12-12 | Stop reason: HOSPADM

## 2022-12-12 RX ORDER — PROPOFOL 10 MG/ML
INJECTION, EMULSION INTRAVENOUS CONTINUOUS PRN
Status: DISCONTINUED | OUTPATIENT
Start: 2022-12-12 | End: 2022-12-12

## 2022-12-12 RX ORDER — PROPOFOL 10 MG/ML
INJECTION, EMULSION INTRAVENOUS PRN
Status: DISCONTINUED | OUTPATIENT
Start: 2022-12-12 | End: 2022-12-12

## 2022-12-12 RX ADMIN — LIDOCAINE HYDROCHLORIDE 40 MG: 20 INJECTION, SOLUTION INFILTRATION; PERINEURAL at 08:01

## 2022-12-12 RX ADMIN — PROPOFOL 140 MCG/KG/MIN: 10 INJECTION, EMULSION INTRAVENOUS at 08:01

## 2022-12-12 RX ADMIN — MIDAZOLAM HYDROCHLORIDE 2 MG: 1 INJECTION, SOLUTION INTRAMUSCULAR; INTRAVENOUS at 07:50

## 2022-12-12 RX ADMIN — SODIUM CHLORIDE, POTASSIUM CHLORIDE, SODIUM LACTATE AND CALCIUM CHLORIDE: 600; 310; 30; 20 INJECTION, SOLUTION INTRAVENOUS at 07:49

## 2022-12-12 RX ADMIN — PROPOFOL 80 MG: 10 INJECTION, EMULSION INTRAVENOUS at 08:01

## 2022-12-12 ASSESSMENT — ACTIVITIES OF DAILY LIVING (ADL)
ADLS_ACUITY_SCORE: 35
ADLS_ACUITY_SCORE: 35

## 2022-12-12 ASSESSMENT — LIFESTYLE VARIABLES: TOBACCO_USE: 1

## 2022-12-12 NOTE — INTERVAL H&P NOTE
I have reviewed the surgical (or preoperative) H&P that is linked to this encounter, and examined the patient. There are no significant changes    Clinical Conditions Present on Arrival:  Clinically Significant Risk Factors Present on Admission           # Hyponatremia: Lowest Na = 133 mmol/L in last 30 days, will monitor as appropriate   # Hypocalcemia: Lowest Ca = 8 mg/dL in last 30 days, will monitor and replace as appropriate    # Hypoalbuminemia: Lowest albumin = 3.4 g/dL in the past 30 days , will monitor as appropriate

## 2022-12-12 NOTE — DISCHARGE INSTRUCTIONS
[facility]  Adult Discharge Orders & Instructions    ________________________________________________________________          For 12 hours after surgery  Get plenty of rest.  A responsible adult must stay with you for at least 12 hours after you leave the hospital.   You may feel lightheaded.  IF so, sit for a few minutes before standing.  Have someone help you get up.   You may have a slight fever. Call the doctor if your fever is over 101 F (38.3 C) (taken under the tongue) or lasts longer than 24 hours.  You may have a dry mouth, a sore throat, muscle aches or trouble sleeping.  These should go away after 24 hours.  Do not make important or legal decisions.  6.   Do not drive or use heavy equipment.  If you have weakness or tingling, don't drive or use heavy equipment until this feeling goes away.    To contact a doctor, call   [phone]_______________________

## 2022-12-12 NOTE — ANESTHESIA POSTPROCEDURE EVALUATION
Patient: Dennis J Goodell    Procedure: Procedure(s):  COLONOSCOPY, WITH POLYPECTOMY AND BIOPSY       Anesthesia Type:  MAC    Note:  Disposition: Outpatient   Postop Pain Control: Uneventful            Sign Out: Well controlled pain   PONV: No   Neuro/Psych: Uneventful            Sign Out: Acceptable/Baseline neuro status   Airway/Respiratory: Uneventful            Sign Out: Acceptable/Baseline resp. status   CV/Hemodynamics: Uneventful            Sign Out: Acceptable CV status; No obvious hypovolemia; No obvious fluid overload   Other NRE: NONE   DID A NON-ROUTINE EVENT OCCUR?            Last vitals:  Vitals Value Taken Time   /84 12/12/22 0852   Temp 98  F (36.7  C) 12/12/22 0845   Pulse 68 12/12/22 0852   Resp 16 12/12/22 0845   SpO2 98 % 12/12/22 0900   Vitals shown include unvalidated device data.    Electronically Signed By: DONTRELL CALDERON CRNA  December 12, 2022  10:00 AM

## 2022-12-12 NOTE — OP NOTE
PROCEDURE NOTE    SURGEON:Solomon Arciniega MD    PRE-OP DIAGNOSIS: History of right-sided colitis on CT     POST-OP DIAGNOSIS: Right-sided colitis with mild stricture, polyp    PROCEDURE: Colonoscopy, Ileoscopy with biopsy and cold snare    SPECIMEN:     ID Type Source Tests Collected by Time Destination   1 :  Biopsy Small Intestine, Ileum SURGICAL PATHOLOGY EXAM Solomon Arciniega MD 12/12/2022  8:11 AM    2 : RIGHT Biopsy Large Intestine, Colon SURGICAL PATHOLOGY EXAM Solomon Arciniega MD 12/12/2022  8:11 AM    3 : LEFT Biopsy Large Intestine, Colon SURGICAL PATHOLOGY EXAM Solomon Arciniega MD 12/12/2022  8:13 AM    4 :  Polyp Rectum SURGICAL PATHOLOGY EXAM Solomon Arciniega MD 12/12/2022  8:17 AM          ANESTHESIA:  MAC CRNA Independent: Kathleen Perry APRN CRNA   Coverage requested due to chronic narcotic benzodiazepine use    ESTIMATED BLOOD LOSS: none    COMPLICATIONS:  None    INDICATION FOR THE PROCEDURE: The patient is a 47 year old male. The patient presents with history of right-sided colitis on CT. I explained to the patient the risks, benefits and alternatives to colonoscopy for evaluating for colitis. We discussed the risks including bleeding, perforation, potential inability to reach the cecum and the risks of sedation. The patient's questions were answered and the patient wished to proceed. Informed consent paperwork was completed.    PROCEDURE: The patient was taken to the endoscopy suite. Appropriate monitors were attached. The patient was placed in the left lateral decubitus position. Timeout was performed confirming the patient's identity and procedure to be performed.  After appropriate sedation was confirmed, digital rectal exam was performed.  There was normal tone and no gross abnormality was noted.  The lubricated colonoscope was introduced into the anus the colon was insufflated with air. The prep quality was adequate.  Under direct visualization the scope was advanced to the cecum.  The  ileum was intubated and appeared normal.  Random biopsies were taken.  The mucosa of the colon was inspected while withdrawing the scope.  The area of colitis and stricture was approximately 5 to 6 cm distal to the ileocecal valve.  There was narrowing to a luminal diameter of about 2 and half centimeters here.  Biopsies were taken separately from the right colon here.  Random biopsies were taken from the left colon.  In the rectum a 5 mm polyp was removed with cold snare.  The scope was retroflexed in the rectum and the anorectal junction was inspected. No abnormalities were noted. The scope was returned to aneutral position and the colon was decompressed. The scope was removed. The patient tolerated the procedure with no immediately apparent complication. The patient was taken to recovery in stable condition.    FOLLOW UP: RECOMMEND high fiber diet, will call with pathology results.  We will start a steroid taper for presumed Crohn's disease.    Solomon Arciniega MD on 12/12/2022 at 8:23 AM

## 2022-12-12 NOTE — ANESTHESIA CARE TRANSFER NOTE
Patient: Dennis J Goodell    Procedure: Procedure(s):  COLONOSCOPY, WITH POLYPECTOMY AND BIOPSY       Diagnosis: Colitis [K52.9]  Diagnosis Additional Information: No value filed.    Anesthesia Type:   MAC     Note:    Oropharynx: oropharynx clear of all foreign objects    Oxygen Supplementation: room air    Independent Airway: airway patency satisfactory and stable    Vital Signs Stable: post-procedure vital signs reviewed and stable  Report to RN Given: handoff report given  Patient transferred to: Phase II    Handoff Report: Identifed the Patient, Identified the Reponsible Provider, Reviewed the pertinent medical history, Discussed the surgical course, Reviewed Intra-OP anesthesia mangement and issues during anesthesia, Set expectations for post-procedure period and Allowed opportunity for questions and acknowledgement of understanding      Vitals:  Vitals Value Taken Time   BP     Temp     Pulse     Resp     SpO2         Electronically Signed By: DONTRELL CALDERON CRNA  December 12, 2022  8:26 AM

## 2022-12-12 NOTE — ANESTHESIA PREPROCEDURE EVALUATION
Anesthesia Pre-Procedure Evaluation    Patient: Dennis J Goodell   MRN: [id number] : 1975        Procedure : Procedure(s):  COLONOSCOPY          Past Medical History:   Diagnosis Date     Anxiety disorder      Dysthymic disorder     No Comments Provided     Malignant neoplasm of testis (H) 2005,teratoma     Raynaud's syndrome without gangrene     No Comments Provided     Uncomplicated alcohol abuse     2012      Past Surgical History:   Procedure Laterality Date     DECOMPRESSION CUBITAL TUNNEL Left 2019     HERNIA REPAIR      ,HERNIA REPAIR     IR CHEST PORT PLACEMENT > 5 YRS OF AGE Left 2008     LYMPH NODE BIOPSY  2008     [facility]. 37 lymph nodes excised     ORCHIECTOMY SCROTAL Right 2005    teratoma     OTHER SURGICAL HISTORY      [id number],OTHER     RELEASE CARPAL TUNNEL  2013      No Known Allergies   Social History     Tobacco Use     Smoking status: Never     Smokeless tobacco: Current     Types: Chew     Tobacco comments:     1 tin lasts 1 week   Substance Use Topics     Alcohol use: Not Currently     Alcohol/week: 8.0 standard drinks     Comment: Quit Date: 2021      Wt Readings from Last 1 Encounters:   22 76.7 kg (169 lb)        Anesthesia Evaluation   Pt has had prior anesthetic.         ROS/MED HX  ENT/Pulmonary:     (+) tobacco use, Past use,     Neurologic:     (+) migraines,     Cardiovascular:  - neg cardiovascular ROS     METS/Exercise Tolerance: 4 - Raking leaves, gardening    Hematologic:  - neg hematologic  ROS     Musculoskeletal:   (+) arthritis,     GI/Hepatic: Comment: No symptoms today    (+) GERD, bowel prep,     Renal/Genitourinary:     (+) Nephrolithiasis ,     Endo:  - neg endo ROS     Psychiatric/Substance Use:     (+) psychiatric history anxiety and depression     Infectious Disease: Comment: Recent bout of colitis      Malignancy:   (+) Malignancy, History of Other.    Other:  - neg other ROS           Physical Exam    Airway        Mallampati: I   TM distance: > 3 FB   Neck ROM: full   Mouth opening: > 3 cm    Respiratory Devices and Support         Dental  no notable dental history         Cardiovascular   cardiovascular exam normal          Pulmonary   pulmonary exam normal                OUTSIDE LABS:  CBC:   Lab Results   Component Value Date    WBC 7.4 11/22/2022    WBC 10.7 11/21/2022    HGB 12.4 (L) 11/22/2022    HGB 13.4 11/21/2022    HCT 35.5 (L) 11/22/2022    HCT 38.4 (L) 11/21/2022     11/22/2022     11/21/2022     BMP:   Lab Results   Component Value Date     (L) 11/22/2022     11/21/2022    POTASSIUM 3.5 11/22/2022    POTASSIUM 3.7 11/21/2022    CHLORIDE 100 11/22/2022    CHLORIDE 105 11/21/2022    CO2 24 11/22/2022    CO2 24 11/21/2022    BUN 5.5 (L) 11/22/2022    BUN 3.5 (L) 11/21/2022    CR 0.76 11/22/2022    CR 0.71 11/21/2022    GLC 96 11/22/2022     (H) 11/21/2022     COAGS: No results found for: PTT, INR, FIBR  POC: No results found for: BGM, HCG, HCGS  HEPATIC:   Lab Results   Component Value Date    ALBUMIN 3.4 (L) 11/22/2022    PROTTOTAL 5.9 (L) 11/22/2022    ALT 15 11/22/2022    AST 14 11/22/2022    ALKPHOS 69 11/22/2022    BILITOTAL <0.2 11/22/2022     OTHER:   Lab Results   Component Value Date    LACT 1.9 11/19/2022    MARIA INES 8.0 (L) 11/22/2022    MAG 2.1 11/22/2022    LIPASE 16.0 09/12/2014       Anesthesia Plan    ASA Status:  2   NPO Status:  NPO Appropriate    Anesthesia Type: MAC.     - Reason for MAC: straight local not clinically adequate              Consents    Anesthesia Plan(s) and associated risks, benefits, and realistic alternatives discussed. Questions answered and patient/representative(s) expressed understanding.     - Discussed: Risks, Benefits and Alternatives for BOTH SEDATION and the PROCEDURE were discussed     - Discussed with:  Patient      - Extended Intubation/Ventilatory Support Discussed: No.      - Patient is DNR/DNI  Status: No    Use of blood products discussed: No .     Postoperative Care            Comments:                DONTRELL CALDERON CRNA

## 2022-12-13 ENCOUNTER — TELEPHONE (OUTPATIENT)
Dept: SURGERY | Facility: OTHER | Age: 47
End: 2022-12-13

## 2022-12-13 NOTE — TELEPHONE ENCOUNTER
Patient called he would like the size dimensions of the polop that was removed during his colonoscopy and also the size of the ulceration that was found.    Desirae Pagan on 12/13/2022 at 10:27 AM

## 2022-12-13 NOTE — TELEPHONE ENCOUNTER
Requesting pathology results, along with size of polyp and ulcer.  Cait Arvizu LPN..........12/13/2022  11:59 AM

## 2022-12-15 LAB
PATH REPORT.COMMENTS IMP SPEC: NORMAL
PATH REPORT.FINAL DX SPEC: NORMAL
PATH REPORT.RELEVANT HX SPEC: NORMAL
PHOTO IMAGE: NORMAL

## 2022-12-20 ENCOUNTER — TELEPHONE (OUTPATIENT)
Dept: FAMILY MEDICINE | Facility: OTHER | Age: 47
End: 2022-12-20

## 2022-12-20 DIAGNOSIS — K52.9 COLITIS: ICD-10-CM

## 2022-12-20 NOTE — TELEPHONE ENCOUNTER
Reason for call: Medication or medication refill    Name of medication requested: Oxycodone     Are you out of the medication? No     What pharmacy do you use? [facility]    Preferred method for responding to this message: Telephone Call    Phone number patient can be reached at: Cell number on file:    Telephone Information:   Mobile [phone]       If we cannot reach you directly, may we leave a detailed response at the number you provided? Yes      Patient had colonoscopy done and was diagnosed with Crohn's Disease. Patient reported constant pain of 6.5-7. He also wanted to make PCP aware that he has changed his diet, and is going to be seeing a specialist on 2/22/23.     Please call patient.     Anila Stover on 12/20/2022 at 2:28 PM

## 2022-12-21 ENCOUNTER — TELEPHONE (OUTPATIENT)
Dept: FAMILY MEDICINE | Facility: OTHER | Age: 47
End: 2022-12-21

## 2022-12-21 RX ORDER — OXYCODONE HYDROCHLORIDE 5 MG/1
5 TABLET ORAL EVERY 6 HOURS PRN
Qty: 40 TABLET | Refills: 0 | OUTPATIENT
Start: 2022-12-21

## 2022-12-21 NOTE — TELEPHONE ENCOUNTER
LADI 12/12/ sent Rx request for the following:    oxycodone 5 mg tablet  Last Prescription Date:   12/12/22  Last Fill Qty/Refills:         40, R-0    Last Office Visit:              12/2/22   Future Office visit:           None   Redundant refill request refused: Too soon:    Holley Mendez RN on 12/21/2022 at 1:46 PM

## 2022-12-21 NOTE — TELEPHONE ENCOUNTER
Patient needs a refill of his oxycodine, his call came into unit 4 and it took a couple days for us to call him back. He was in Med Surg early December, he said he was diagnososed with Crones and his pain is really bad. He has an appointment with a GI specialist in [place] 2/22/23. Please call patient.    Desirae Pagan on 12/21/2022 at 12:16 PM

## 2022-12-22 ENCOUNTER — HOSPITAL ENCOUNTER (EMERGENCY)
Facility: OTHER | Age: 47
Discharge: HOME | End: 2022-12-22
Attending: FAMILY MEDICINE | Admitting: FAMILY MEDICINE
Payer: COMMERCIAL

## 2022-12-22 VITALS
HEART RATE: 98 BPM | SYSTOLIC BLOOD PRESSURE: 154 MMHG | OXYGEN SATURATION: 98 % | TEMPERATURE: 98.4 F | DIASTOLIC BLOOD PRESSURE: 79 MMHG | RESPIRATION RATE: 16 BRPM | BODY MASS INDEX: 24.39 KG/M2 | WEIGHT: 170 LBS

## 2022-12-22 DIAGNOSIS — Z76.0 ENCOUNTER FOR MEDICATION REFILL: ICD-10-CM

## 2022-12-22 PROCEDURE — 99282 EMERGENCY DEPT VISIT SF MDM: CPT | Performed by: FAMILY MEDICINE

## 2022-12-22 NOTE — ED PROVIDER NOTES
History     Chief Complaint   Patient presents with     Crohns     The history is provided by the patient.     Dennis J Goodell is a 47 year old male who is here for a refill of his oxycodone.     He got 20 oxycodone from Dr Montemayor on Nov 26 and 40 from Dr Arciniega on Dec 12.  He has used 40 tablets in the past ten days.     Allergies:  No Known Allergies    Problem List:    Patient Active Problem List    Diagnosis Date Noted     Colitis 11/18/2022     Priority: Medium     Leukocytosis 11/18/2022     Priority: Medium     Facet arthropathy, cervical 04/28/2021     Priority: Medium     Intractable chronic migraine without aura and with status migrainosus 04/28/2021     Priority: Medium     MARLEN (generalized anxiety disorder) 06/09/2020     Priority: Medium     Dysthymic disorder 02/09/2018     Priority: Medium     Raynaud phenomenon 02/09/2018     Priority: Medium     History of testicular cancer 08/12/2016     Priority: Medium     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/2008     Priority: Medium        Past Medical History:    Past Medical History:   Diagnosis Date     Anxiety disorder 2012     Dysthymic disorder      Malignant neoplasm of testis (H) 2005     Raynaud's syndrome without gangrene      Uncomplicated alcohol abuse        Past Surgical History:    Past Surgical History:   Procedure Laterality Date     COLONOSCOPY N/A 12/12/2022    Procedure: COLONOSCOPY, WITH POLYPECTOMY AND BIOPSY;  Surgeon: Solomon Arciniega MD;  Location: [facility] OR     DECOMPRESSION CUBITAL TUNNEL Left 01/2019     HERNIA REPAIR      ,HERNIA REPAIR     IR CHEST PORT PLACEMENT > 5 YRS OF AGE Left 04/01/2008     LYMPH NODE BIOPSY  09/05/2008     [facility]. 37 lymph nodes excised     ORCHIECTOMY SCROTAL Right 12/30/2005     teratoma     OTHER SURGICAL HISTORY      9/05/08,[id number],OTHER     RELEASE CARPAL TUNNEL  04/09/2013       Family History:    Family History   Problem Relation Age of Onset     Pulmonary Embolism Mother      Other - See Comments Daughter         recurrent OM     Other - See Comments Other         Suicidality,maternal uncle     No Known Problems Father        Social History:  Marital Status:   [2]  Social History     Tobacco Use     Smoking status: Never     Smokeless tobacco: Current     Types: Chew     Tobacco comments:     1 tin lasts 1 week   Vaping Use     Vaping Use: Never used   Substance Use Topics     Alcohol use: Not Currently     Alcohol/week: 8.0 standard drinks     Comment: Quit Date: 8-1-2021     Drug use: Never        Medications:    albuterol (PROAIR HFA/PROVENTIL HFA/VENTOLIN HFA) 108 (90 Base) MCG/ACT inhaler  ALPRAZolam (XANAX) 1 MG tablet  calcium carbonate (TUMS) 500 MG chewable tablet  clonazePAM (KLONOPIN) 1 MG tablet  desvenlafaxine succinate (PRISTIQ) 100 MG 24 hr tablet  famotidine (PEPCID) 20 MG tablet  NIFEdipine ER OSMOTIC (PROCARDIA XL) 90 MG 24 hr tablet  omeprazole (PRILOSEC) 20 MG DR capsule  oxyCODONE (ROXICODONE) 5 MG tablet  predniSONE (DELTASONE) 10 MG tablet      Review of Systems   Constitutional:        Complaints of pain   All other systems reviewed and are negative.      Physical Exam   BP: (!) 154/79  Pulse: 114  Temp: 98.4  F (36.9  C)  Resp: 16  Weight: 77.1 kg (170 lb)  SpO2: 98 %      Physical Exam  Vitals and nursing note reviewed.   Constitutional:       Appearance: Normal appearance.   Pulmonary:      Effort: Pulmonary effort is normal. No respiratory distress.   Neurological:      General: No focal deficit present.      Mental Status: He is alert and oriented to person, place, and time.   Psychiatric:         Mood and Affect: Mood normal.         Behavior: Behavior normal.       Assessments & Plan (with Medical Decision Making)  Dennis J Goodell is a 47 year  old male who is here for a refill of his oxycodone.  He got 20 oxycodone from Dr Montemayor on Nov 26 and 40 from Dr Arciniega on Dec 12.  He has used 40 tablets in the past ten days. VS in the ED BP (!) 154/79   Pulse 98   Temp 98.4  F (36.9  C) (Tympanic)   Resp 16   Wt 77.1 kg (170 lb)   SpO2 98%   BMI 24.39 kg/m    He is here for refills of his oxycodone. I am not willing to do that.       I have reviewed the nursing notes.        Final diagnoses:   Encounter for medication refill - requesting refill of oxycodone       12/22/2022   [facility] AND [facility], Tung Wong MD  12/22/22 1558

## 2022-12-22 NOTE — ED TRIAGE NOTES
Pt presents to ED from home for c/o crohn's flare up and refill of pain medications. Pt states he takes oxycodone daily, is out and is in need of a refill. Pain 9/10. Pt tachycardic in the 110s. Pt alert, mild distress noted.  BP (!) 154/79   Pulse 114   Temp 98.4  F (36.9  C) (Tympanic)   Resp 16   Wt 77.1 kg (170 lb)   SpO2 98%   BMI 24.39 kg/m         Triage Assessment     Row Name 12/22/22 0558       Triage Assessment (Adult)    Airway WDL WDL       Respiratory WDL    Respiratory WDL WDL       Skin Circulation/Temperature WDL    Skin Circulation/Temperature WDL WDL       Cardiac WDL    Cardiac WDL WDL       Peripheral/Neurovascular WDL    Peripheral Neurovascular WDL WDL       Cognitive/Neuro/Behavioral WDL    Cognitive/Neuro/Behavioral WDL WDL

## 2022-12-22 NOTE — DISCHARGE INSTRUCTIONS
Reggie    I recommend that you talk to Dr Archuleta about refills of narcotics. We will not refill these in the ED.     Dr Evangelist Shoemaker

## 2022-12-23 ENCOUNTER — VIRTUAL VISIT (OUTPATIENT)
Dept: FAMILY MEDICINE | Facility: OTHER | Age: 47
End: 2022-12-23
Attending: FAMILY MEDICINE
Payer: COMMERCIAL

## 2022-12-23 ENCOUNTER — TELEPHONE (OUTPATIENT)
Dept: FAMILY MEDICINE | Facility: OTHER | Age: 47
End: 2022-12-23

## 2022-12-23 DIAGNOSIS — K50.10 CROHN'S DISEASE OF LARGE INTESTINE WITHOUT COMPLICATION (H): Primary | ICD-10-CM

## 2022-12-23 DIAGNOSIS — K52.9 COLITIS: ICD-10-CM

## 2022-12-23 PROCEDURE — 99442 PR PHYSICIAN TELEPHONE EVALUATION 11-20 MIN: CPT | Mod: 93 | Performed by: FAMILY MEDICINE

## 2022-12-23 RX ORDER — OXYCODONE HYDROCHLORIDE 5 MG/1
5 TABLET ORAL EVERY 6 HOURS PRN
Qty: 40 TABLET | Refills: 0 | Status: SHIPPED | OUTPATIENT
Start: 2022-12-23 | End: 2023-01-11

## 2022-12-23 NOTE — NURSING NOTE
Telephone Visit: Medication refill for Crohn's.  Patient reports that current pain is worse compared to surgery in August 2008.  [facility],37 lymph nodes excised.  Also had another incision for hernia repair, staples were placed.      Pam Agosto LPN 12/23/2022 2:05 PM

## 2022-12-23 NOTE — PROGRESS NOTES
Reggie is a 47 year old who is being evaluated via a billable telephone visit.      What phone number would you like to be contacted at? [phone]  How would you like to obtain your AVS? Steven    Distant Location (provider location):  On-site    Assessment & Plan       ICD-10-CM    1. Colitis  K52.9 oxyCODONE (ROXICODONE) 5 MG tablet        Pathology showed Crohn's.  He is waiting to see gastroenterology at [facility], but appointment is not until February.  Received 40 oxycodone on December 2 and December 12.  He reports that the colonoscopy seem to flare his pain.  Is hoping for oxycodone codon to help with current flare.  Is aware that we would not typically use this on a regular basis or long-term.  Would need follow-up in clinic for any further prescriptions.  Is aware of risk for sedation and overdose with concurrent benzodiazepines    James Archuleta MD  [facility]OSPITAL    Subjective   Reggie is a 47 year old, presenting for the following health issues:  Medication Question      HPI   Oxycodone refill for Crohn's.    12/2 40 oxycodone me  12/12 40 oxycodone Dr Arciniega  Taking 4-6 per day  Reports running out of pain medication 5 d ago      Review of Systems   As above      Objective           Vitals:  No vitals were obtained today due to virtual visit.    Physical Exam   healthy, alert and no distress  PSYCH: Alert and oriented times 3; coherent speech, normal   rate and volume, able to articulate logical thoughts, able   to abstract reason, no tangential thoughts, no hallucinations   or delusions  His affect is normal  RESP: No cough, no audible wheezing, able to talk in full sentences  Remainder of exam unable to be completed due to telephone visits              Phone call duration: 18 minutes

## 2022-12-26 ENCOUNTER — OFFICE VISIT (OUTPATIENT)
Dept: FAMILY MEDICINE | Facility: OTHER | Age: 47
End: 2022-12-26
Attending: FAMILY MEDICINE
Payer: COMMERCIAL

## 2022-12-26 VITALS
BODY MASS INDEX: 24.11 KG/M2 | WEIGHT: 168 LBS | SYSTOLIC BLOOD PRESSURE: 142 MMHG | HEART RATE: 111 BPM | OXYGEN SATURATION: 98 % | DIASTOLIC BLOOD PRESSURE: 90 MMHG | TEMPERATURE: 97.5 F | RESPIRATION RATE: 18 BRPM

## 2022-12-26 DIAGNOSIS — K50.10 CROHN'S DISEASE OF LARGE INTESTINE WITHOUT COMPLICATION (H): Primary | ICD-10-CM

## 2022-12-26 DIAGNOSIS — K61.1 PERIRECTAL ABSCESS: ICD-10-CM

## 2022-12-26 PROCEDURE — G0463 HOSPITAL OUTPT CLINIC VISIT: HCPCS | Mod: 25

## 2022-12-26 PROCEDURE — G0463 HOSPITAL OUTPT CLINIC VISIT: HCPCS

## 2022-12-26 PROCEDURE — 99213 OFFICE O/P EST LOW 20 MIN: CPT | Mod: 25 | Performed by: FAMILY MEDICINE

## 2022-12-26 PROCEDURE — 45005 DRAINAGE OF RECTAL ABSCESS: CPT | Performed by: FAMILY MEDICINE

## 2022-12-26 ASSESSMENT — PAIN SCALES - GENERAL: PAINLEVEL: WORST PAIN (10)

## 2022-12-26 NOTE — PROGRESS NOTES
Assessment & Plan     (K50.10) Crohn's disease of large intestine without complication (H)  (primary encounter diagnosis)  Comment: this is moderate to severe. Reviewed in Up To Date, and ideally would initiate antibody treatment acutely.  We do not have the ability to do that locally.  Will see if we can get him seen by GI earlier in the [facility] system.  The perineal issue today is likely a complication of the Crohn's.    Plan: Adult GI  Referral - Consult Only        He has used the oxycodone at a rate higher than prescribed. Will refer him back to his PCP for any future opioid med management. Is on prednisone currently, can safely add on Tylenol.  Is also on a benzodiazapene and his risk for overdosing is subsequently increased. PMM reviewed, but the website is down.         (K61.1) Perirectal abscess  Comment: likely a complication from above.  Sitz baths several times a day, start the antibiotics.   Plan: amoxicillin-clavulanate (AUGMENTIN) 875-125 MG         tablet, Puncture (I&D)Drainage of Lesion/Cyst          [64211]                              No follow-ups on file.    Brayan Silva MD  [facility] HOSPITAL    [facility]   Reggie is a 47 year old, presenting for the following health issues:  RECHECK (Chron's disease flare up with hemorroids)      History of Present Illness       Reason for visit:  Possible hemrrhoids  Symptom onset:  1-3 days ago  Symptoms include:  Pain  Symptom intensity:  Severe  Symptom progression:  Worsening  Had these symptoms before:  No  What makes it worse:  Sitting  What makes it better:  Pain medications    He eats 4 or more servings of fruits and vegetables daily.He consumes 0 sweetened beverage(s) daily.He exercises with enough effort to increase his heart rate 60 or more minutes per day.  He exercises with enough effort to increase his heart rate 7 days per week.   He is taking medications regularly.       He says oxycodone is the only pain reliever he  gets help from. Last took 2 last night.  Pain is in the rectal area.  Had a dime sized lesion, swelling, that curry grown to the size of a quarter.  Worse when sitting on it.  Lot os ongoing loose stools.  Got oxycodone 5 milligram on 12/23.  1 every 6 hours.  He says he has run out, was using excessively yesterday.      Has a GI consult in [place] in Feb, 22nd.  Recent colonoscopy showed Crohn's. He is on prednisone currently while awaiting GI consult.     Current Outpatient Medications   Medication     albuterol (PROAIR HFA/PROVENTIL HFA/VENTOLIN HFA) 108 (90 Base) MCG/ACT inhaler     ALPRAZolam (XANAX) 1 MG tablet     amoxicillin-clavulanate (AUGMENTIN) 875-125 MG tablet     calcium carbonate (TUMS) 500 MG chewable tablet     clonazePAM (KLONOPIN) 1 MG tablet     desvenlafaxine succinate (PRISTIQ) 100 MG 24 hr tablet     famotidine (PEPCID) 20 MG tablet     NIFEdipine ER OSMOTIC (PROCARDIA XL) 90 MG 24 hr tablet     omeprazole (PRILOSEC) 20 MG DR capsule     oxyCODONE (ROXICODONE) 5 MG tablet     predniSONE (DELTASONE) 10 MG tablet     No current facility-administered medications for this visit.               Review of Systems         Objective    BP (!) 142/90   Pulse 111   Temp 97.5  F (36.4  C) (Tympanic)   Resp 18   Wt 76.2 kg (168 lb)   SpO2 98%   BMI 24.11 kg/m    Body mass index is 24.11 kg/m .  Physical Exam  Constitutional:       Appearance: Normal appearance.   Genitourinary:     Comments: Perineum with a purple appearing fluctuant nodule, tender on palpation.  Is perhaps 3 cm in length.  Tender.  Area infiltrated with 1% lidocaine and incised with # 11 blade.  Thin yellow to grey discharge expressed.    Neurological:      General: No focal deficit present.      Mental Status: He is alert and oriented to person, place, and time.   Psychiatric:         Mood and Affect: Mood normal.         Behavior: Behavior normal.         Thought Content: Thought content normal.

## 2022-12-26 NOTE — NURSING NOTE
"Chief Complaint   Patient presents with     RECHECK     Chron's disease flare up with hemorroids     Patient reports on 12/25/2022 Patient's mother was transported via ambulance to [facility]. Patient's states his mother was released from hospital in the [facility] for open lung surgery to remove blood clots. This caused the patient's Chron's disease to flare due to stress and over production of stomach acid. Pain was a 9/10 last night until it was relieved 4/10. The patient had to miss the [employer]hy[employer] party due to pain and developed a possible hemorrhoid. Patient reports taking (2) 5 Mg Oxycodone per prescription label for pain.    Initial BP (!) 142/90   Pulse 111   Temp 97.5  F (36.4  C) (Tympanic)   Resp 18   Wt 76.2 kg (168 lb)   SpO2 98%   BMI 24.11 kg/m   Estimated body mass index is 24.11 kg/m  as calculated from the following:    Height as of 12/12/22: 1.778 m (5' 10\").    Weight as of this encounter: 76.2 kg (168 lb).  Medication Reconciliation: complete    FOOD SECURITY SCREENING QUESTIONS  Hunger Vital Signs:  Within the past 12 months we worried whether our food would run out before we got money to buy more. Never  Within the past 12 months the food we bought just didn't last and we didn't have money to get more. Never  Ira Brush LPN 12/26/2022 8:10 AM        "

## 2023-01-11 ENCOUNTER — OFFICE VISIT (OUTPATIENT)
Dept: FAMILY MEDICINE | Facility: OTHER | Age: 48
End: 2023-01-11
Attending: FAMILY MEDICINE
Payer: COMMERCIAL

## 2023-01-11 VITALS
TEMPERATURE: 96.6 F | HEART RATE: 90 BPM | DIASTOLIC BLOOD PRESSURE: 70 MMHG | BODY MASS INDEX: 23.53 KG/M2 | OXYGEN SATURATION: 97 % | RESPIRATION RATE: 20 BRPM | WEIGHT: 164 LBS | SYSTOLIC BLOOD PRESSURE: 128 MMHG

## 2023-01-11 DIAGNOSIS — R10.13 DYSPEPSIA: ICD-10-CM

## 2023-01-11 DIAGNOSIS — K50.10 CROHN'S DISEASE OF LARGE INTESTINE WITHOUT COMPLICATION (H): Primary | ICD-10-CM

## 2023-01-11 DIAGNOSIS — K52.9 COLITIS: ICD-10-CM

## 2023-01-11 PROCEDURE — G0463 HOSPITAL OUTPT CLINIC VISIT: HCPCS

## 2023-01-11 PROCEDURE — G0463 HOSPITAL OUTPT CLINIC VISIT: HCPCS | Mod: 25

## 2023-01-11 PROCEDURE — 99214 OFFICE O/P EST MOD 30 MIN: CPT | Performed by: FAMILY MEDICINE

## 2023-01-11 RX ORDER — OXYCODONE HYDROCHLORIDE 5 MG/1
5 TABLET ORAL EVERY 6 HOURS PRN
Qty: 40 TABLET | Refills: 0 | Status: SHIPPED | OUTPATIENT
Start: 2023-01-11 | End: 2023-01-27

## 2023-01-11 RX ORDER — L.ACIDOPH/B.ANIMALIS/B.LONGUM 15B CELL
CAPSULE ORAL
COMMUNITY
Start: 2023-01-11 | End: 2024-03-18

## 2023-01-11 ASSESSMENT — PAIN SCALES - GENERAL: PAINLEVEL: SEVERE PAIN (7)

## 2023-01-11 NOTE — NURSING NOTE
"Patient presents to the clinic for GI follow up.    FOOD SECURITY SCREENING QUESTIONS:    The next two questions are to help us understand your food security.  If you are feeling you need any assistance in this area, we have resources available to support you today.    Hunger Vital Signs:  Within the past 12 months we worried whether our food would run out before we got money to buy more. Never  Within the past 12 months the food we bought just didn't last and we didn't have money to get more. Never    Advance Care Directive on file? no  Advance Care Directive provided to patient? Declined.    Chief Complaint   Patient presents with     GI Problem       Initial /70 (BP Location: Right arm, Patient Position: Sitting, Cuff Size: Adult Large)   Pulse 90   Temp (!) 96.6  F (35.9  C) (Tympanic)   Resp 20   Wt 74.4 kg (164 lb)   SpO2 97%   BMI 23.53 kg/m   Estimated body mass index is 23.53 kg/m  as calculated from the following:    Height as of 12/12/22: 1.778 m (5' 10\").    Weight as of this encounter: 74.4 kg (164 lb).  Medication Reconciliation: complete        Pam Agosto LPN       "

## 2023-01-11 NOTE — PROGRESS NOTES
Assessment & Plan       ICD-10-CM    1. Crohn's disease of large intestine without complication (H)  K50.10       2. Colitis  K52.9 oxyCODONE (ROXICODONE) 5 MG tablet      3. Dyspepsia  R10.13 omeprazole (PRILOSEC) 20 MG DR capsule        Ongoing abdominal pain and loose stools  Waiting on [facility] in Feb  Dr Silva placed a referral to [facility] on 12/26 to get in sooner in the [facility]  Patient has been in communication with Gastroenterology, but is under review still  Pathology showed mild chronic colitis, favor Crohn's  He continues having diarrhea and abdominal pain  Just completed recent prednisone  Had an abscess drained 12/26, felt to potentially relate to Crohn's  Ongoing blood when wiping  Has been calling [phone] to communicate with Gastroenterology    Reports having 6-8 oxycodone pills left.  Using sporadically to help with pain.  Provided a refill for 40 pills while he waits to see GI.  PDMP Review       Value Time User    State PDMP site checked  Yes 1/11/2023 11:03 AM James Archuleta MD           Notes increased epigastric pain at times.  Had improvement taking omeprazole, but stopped as he understood it should not be used long-term.  We discussed that there certainly side effects from the medication, but short-term it is quite safe to utilize and he would be best served by restarting as anything that can help with his stomach discomfort is beneficial at this time.  Refilled omeprazole.      James Archuleta MD  [facility]    Petra Paredes is a 47 year old, presenting for the following health issues:  GI Problem      GI Problem  This is a recurrent problem. The current episode started more than 1 month ago. The problem occurs constantly. The problem has been rapidly worsening.   History of Present Illness       Reason for visit:  Pain and bleeding from Colitis/Crohn's disease    He eats 4 or more servings of fruits and vegetables daily.He consumes 2 sweetened  beverage(s) daily.He exercises with enough effort to increase his heart rate 60 or more minutes per day.  He exercises with enough effort to increase his heart rate 7 days per week.   He is taking medications regularly.     Had a perineal abscess drained 12/26        Review of Systems   As above      Objective    /70 (BP Location: Right arm, Patient Position: Sitting, Cuff Size: Adult Large)   Pulse 90   Temp (!) 96.6  F (35.9  C) (Tympanic)   Resp 20   Wt 74.4 kg (164 lb)   SpO2 97%   BMI 23.53 kg/m    Body mass index is 23.53 kg/m .  Physical Exam   General Appearance: Alert. No acute distress  Abdomen: Soft, nontender  : No signs of perineal abscess, healed. No external hemorrhoids  Psychiatric: Normal affect and mentation

## 2023-01-17 ENCOUNTER — TELEPHONE (OUTPATIENT)
Dept: GASTROENTEROLOGY | Facility: CLINIC | Age: 48
End: 2023-01-17
Payer: COMMERCIAL

## 2023-01-17 NOTE — TELEPHONE ENCOUNTER
[facility] Call Center    Phone Message    May a detailed message be left on voicemail: yes     Reason for Call: Other: Patient was calling in to schedule an appointment from his 12/23/2022 referral, but there are still no scheduling instructions on the referral. The patient has been waiting weeks and is unsure why the process is taking so long. He would like a call back to assist with scheduling his appointment. Please review and call patient to schedule, thank you!     Action Taken: Message routed to:  [facility] ([facility] Referral Triage    Travel Screening: Not Applicable

## 2023-01-17 NOTE — TELEPHONE ENCOUNTER
Called and left voice message for Pt. Called Pt to help get them scheduled with Dr. Sanchez on Thursday 1/19/2023 at 10am for an in person or video visit. Writer left call back number.

## 2023-01-18 NOTE — TELEPHONE ENCOUNTER
REFERRAL INFORMATION:    Referring Provider:  Brayan Silva MD    Referring Clinic:  [facility]     Reason for Visit/Diagnosis: Crohn's disease of large intestine without complication / Appt per Pt / Appt ok'd per Nellie     FUTURE VISIT INFORMATION:    Appointment Date: 1/19/23    Appointment Time: 10AM     NOTES STATUS DETAILS   OFFICE NOTE from Referring Provider Internal 12/26/23 OV and referral from Brayan Silva MD   MEDICATION LIST Internal         COLONOSCOPY Internal 12/12/22 - Epic    PERTINENT LABS Internal 11/19/22 - Epic    IMAGING (CT, MRI, EGD, MRCP, Small Bowel Follow Through/SBT, MR/CT Enterography) Internal CT Abd Pelvis:  11/21/22, 11/18/22, 9/7/18

## 2023-01-19 ENCOUNTER — PRE VISIT (OUTPATIENT)
Dept: GASTROENTEROLOGY | Facility: CLINIC | Age: 48
End: 2023-01-19

## 2023-01-19 ENCOUNTER — VIRTUAL VISIT (OUTPATIENT)
Dept: GASTROENTEROLOGY | Facility: CLINIC | Age: 48
End: 2023-01-19
Payer: COMMERCIAL

## 2023-01-19 VITALS — HEIGHT: 71 IN | WEIGHT: 170 LBS | BODY MASS INDEX: 23.8 KG/M2

## 2023-01-19 DIAGNOSIS — K50.10 CROHN'S DISEASE OF LARGE INTESTINE WITHOUT COMPLICATION (H): Primary | ICD-10-CM

## 2023-01-19 DIAGNOSIS — Z85.47 HISTORY OF TESTICULAR CANCER: ICD-10-CM

## 2023-01-19 PROCEDURE — 99204 OFFICE O/P NEW MOD 45 MIN: CPT | Mod: 95 | Performed by: INTERNAL MEDICINE

## 2023-01-19 RX ORDER — DICYCLOMINE HYDROCHLORIDE 10 MG/1
20 CAPSULE ORAL 2 TIMES DAILY PRN
Qty: 120 CAPSULE | Refills: 3 | Status: SHIPPED | OUTPATIENT
Start: 2023-01-19 | End: 2023-06-30

## 2023-01-19 RX ORDER — BUDESONIDE 3 MG/1
9 CAPSULE, COATED PELLETS ORAL EVERY MORNING
Qty: 270 CAPSULE | Refills: 3 | Status: SHIPPED | OUTPATIENT
Start: 2023-01-19 | End: 2023-06-02

## 2023-01-19 ASSESSMENT — PAIN SCALES - GENERAL: PAINLEVEL: NO PAIN (0)

## 2023-01-19 NOTE — PATIENT INSTRUCTIONS
It is good to meet you today. I have outlined my recommendations below.    We will check some blood work (this can be done locally in the  [facility] system)    My office will contact you about scheduling an MRI of your small intestines and an MRI of your pelvis    My office will contact you about seeing our colorectal surgery team to evaluate for zane-anal region    My office will contact you about scheduling an appointment with Oncology to make sure they are ok with starting a medication called infliximab    My office will contact you about schedule a visit with our pharmacist Christiana to discuss medication options    Please minimize and avoid if possible nonsteroidal anti-inflammatory medication. This medication can cause Crohn's to worsen.    You can try the dicyclomine (bentyl) as needed for abdominal pain    Please start the Entocort (budesonide) as instructed to help decrease the inflammation in your GI tract    Follow up with Richa Diamond in 2 months and Dr. Sanchez in 6 months

## 2023-01-19 NOTE — PROGRESS NOTES
IBD CLINIC VISIT    CC/REFERRING MD:  Referred Self    REASON FOR CONSULTATION: establish care for Crohn's    ASSESSMENT/PLAN:    1. Crohn's colitis and possible zane-anal CD - given chronic active changes on biopsies and zane-anal abscess Crohn's Disease is most likely. NSAID colopathy is possible but would not expect zane-anal abscess from this. Discussed diagnosis of Crohn's and natural history. He does have recent colonoscopy with pathology consistent with IBD. Recommend we check labs (including preparation for biologics), check MRE, check MRI pelvis, refer to colorectal for zane-anal exam, and refer to MTM.    Given concern for zane-anal disease I think anti-TNF would be most ideal (infliximab preferred). His history of metastatic testicular cancer is noted. Given remote history I think it is ok to start. However, will get oncology input prior to starting.     In the meantime we wolfe start Entocort 9 mg. Will trial dicylcomine for pain. Discussed minimizing/avoiding NSAIDs and naroctics    -labs  -MRE  -MRE pelvis  -colorectal referral  -MTM referral  -oncology referral  -likely colonoscopy in 6-9 months pending treatment decision for mucosal healing  -dicylcomine BID PRN  -Entocort 9 mg daily x 2 months, then 6 mg daily x 2 weeks, then 3 mg daily x 2 weeks and then stop    2. Recent zane-anal abscess - s/p drainage and abx  -MRI pelvis  -colorectal referral  -treatment of Crohn's as above    3. Remote history of testicular cancer - referral to oncology as we anticipate anti-TNF therapy    IBD HISTORY  Age at diagnosis: 47 (2022)  Extent of disease: colonic - possible zane-anal  Disease phenotype: inflammatory with possible penetrating  Zane-anal disease: recent zane-anal abscess  Current CD medications: none - recent prednisone  Prior IBD surgeries: none  Prior IBD Medications: prednisone    DRUG MONITORING  TPMT enzyme activity: pending    6-TGN/6-MMPN levels: NA    Biologic concentration: NA    DISEASE  ASSESSMENT  Labs  No lab results found.    Invalid input(s):  ALB,  HGB  Fecal calprotectin: none  Endoscopy:   -colonoscopy 12/2022 - normal TI (normal biopsies) - inflammation/stricture 5-6 cm distal to IC valve - able to be passed. Remainder colon normal? (details of scope limited on documentation). 5mm adenoma in rectum. Biopsies TI normal. Right colon chronic active (mild) c/w IBD and left colon normal  Enterography: none  C diff: negative    sIBDQ:   No flowsheet data found.    IBD Health Care Maintenance: Not discussed    Vaccinations:  All patients on biologics should avoid live vaccines.    -- Influenza (every year)  -- TdaP (every 10 years)  -- Pneumococcal Pneumonia (once plus booster at 5 years)  -- Yearly assessment for latent Tb (verbal screening and exam, PPD or QuantiFERON-Tb testing)    One time confirmation of immunity or serologies:  -- Hepatitis A (serologies or immunizations)  -- Hepatitis B (serologies or immunizations)  -- Varicella  -- MMR  -- HPV (all aged 18-26)  -- Meningococcal meningitis (all patients at risk for meningitis)  -- Due to the immunosuppression in this patient, I would not advise administration of live vaccines such as varicella/VZV, intranasal influenza, MMR, or yellow fever vaccine (if travelling).      Bone mineral density screening   -- Recommend all patients supplement with calcium and vitamin D  -- Given prior steroid use recommend DEXA if not already done - WILL DISCUSS AT FUTURE VISITS    Cancer Screening:  Colon cancer screening:  Given 1/3 of colon involved, recommend patient undergo regular dysplasia surveillance   Next dysplasia screening is recommended 2031.    Skin cancer screening: Annual visual exam of skin by dermatologist since patient is immunocompromised    Depression Screening:  -- Over the last month, have you felt down, depressed, or hopeless? no  -- Over the last month, have you felt little interest or pleasure doing things? no    Misc:  -- Avoid  tobacco use  -- Avoid NSAIDs as there is potentially a 25% chance of causing an IBD flare    Return to clinic in 2 months with Richa Diamond (IBD ELIANA) and 6 months with Daniel    Thank you for this consultation.  It was a pleasure to participate in the care of this patient; please contact us with any further questions.  I spent a total of 45 minutes during the day of encounter performed chart review, meeting with patient, patient counseling, care coordination, and documentation.      This note was created with voice recognition software, and while reviewed for accuracy, typos may remain.     Helder Sanchez MD  Division of Gastroenterology, Hepatology and Nutrition  [facility]  Pager: [phone]      HPI:   Currently, Reggie is here today to establish care with an IBD provider.  He has a history of metastatic testicular cancer status post right testicular cancer status post right testicle resection.  This subsequently did metastasized to his lymph nodes and he eventually did require chemotherapy and lymph node resection.  Last treatment 2008 per patient. He also has history of chronic pain - he reports tolerance to several opioids - only oxycodone works for him.    Symptoms of colitis began in November. Developed severe abdominal pain and diarrhea. Presented to ED and had CT with right sided colitis. Treated with liquid diet and abx. Was eventually discharged on abx and followed up in December with colonoscopy by Dr. Arciniega. TI normal. Inflammation/stricture (able to be passed) was seen in ascending colon about 5-6 cm distal to IC valve. Biopsies of right colon c/w IBD. He was given a course of prednisone that he has finished.    He subsequently developed zane-anal pain. Was seen at [facility] (I have no records of this) and diagnosed with zane-anal abscess. Required drainage - felt to be possibly related to Crohn's. Given 7 days of abx.    He has started floragel and metamucil shakes. He has received some  courses of oxycodone from his PCP.    In general his symptoms have improved. Pain in RLQ now every other day. Sporadic. Diarrhea resolved. Having 1-2 formed BMs daily.    No EIM.    He does take NSAIDs (Aleve or Exedrin Migraine) about 2-3 times per week (for migraines and neck pain).    He has issues with headaches (tension) and neck pain - has severe DDD of neck.    He no longer follows with oncology given his remote history of testicular cancer    ROS:    No fevers or chills  No weight loss  No blurry vision, double vision or change in vision  No sore throat  No lymphadenopathy  No headache, paraesthesias, or weakness in a limb  No shortness of breath or wheezing  No chest pain or pressure  No arthralgias or myalgias  No rashes or skin changes  No odynophagia or dysphagia  No BRBPR, hematochezia, melena  No dysuria, frequency or urgency  No hot/cold intolerance or polyria  No anxiety or depression    Extra intestinal manifestations of IBD:  No uveitis/episcleritis  No aphthous ulcers   No arthritis   No erythema nodosum/pyoderma gangrenosum.     PERTINENT PAST MEDICAL HISTORY:  Past Medical History:   Diagnosis Date     Anxiety disorder 2012     Dysthymic disorder     No Comments Provided     Malignant neoplasm of testis (H) 2005 2005,teratoma     Raynaud's syndrome without gangrene     No Comments Provided     Uncomplicated alcohol abuse     7/25/2012       PREVIOUS SURGERIES:  Past Surgical History:   Procedure Laterality Date     COLONOSCOPY N/A 12/12/2022    Procedure: COLONOSCOPY, WITH POLYPECTOMY AND BIOPSY;  Surgeon: Solomon Arciniega MD;  Location: [facility] OR     DECOMPRESSION CUBITAL TUNNEL Left 01/2019     HERNIA REPAIR      ,HERNIA REPAIR     IR CHEST PORT PLACEMENT > 5 YRS OF AGE Left 04/01/2008     LYMPH NODE BIOPSY  09/05/2008     [facility]. 37 lymph nodes excised     ORCHIECTOMY SCROTAL Right 12/30/2005    teratoma     OTHER SURGICAL HISTORY      9/05/08,[id number],OTHER     RELEASE CARPAL  TUNNEL  04/09/2013       PREVIOUS ENDOSCOPY:  No results found for this or any previous visit.]    ALLERGIES:   No Known Allergies    PERTINENT MEDICATIONS:    Current Outpatient Medications:      albuterol (PROAIR HFA/PROVENTIL HFA/VENTOLIN HFA) 108 (90 Base) MCG/ACT inhaler, Inhale 2 puffs into the lungs every 6 hours as needed for shortness of breath / dyspnea or wheezing, Disp: , Rfl:      ALPRAZolam (XANAX) 1 MG tablet, Take 1 mg by mouth 2 times daily as needed for anxiety, Disp: , Rfl:      Aspirin-Acetaminophen-Caffeine (EXCEDRIN MIGRAINE PO), , Disp: , Rfl:      bismuth subsalicylate (PEPTO BISMOL) 262 MG/15ML suspension, Take 15 mLs by mouth every 6 hours as needed for indigestion, Disp: , Rfl:      calcium carbonate (TUMS) 500 MG chewable tablet, Take 1 chew tab by mouth 4 times daily as needed for heartburn, Disp: , Rfl:      clonazePAM (KLONOPIN) 1 MG tablet, Take 1 mg by mouth 3 times daily, Disp: , Rfl:      desvenlafaxine succinate (PRISTIQ) 100 MG 24 hr tablet, Take 100 mg by mouth daily, Disp: , Rfl:      NIFEdipine ER OSMOTIC (PROCARDIA XL) 90 MG 24 hr tablet, Take 1 tablet (90 mg) by mouth daily, Disp: 90 tablet, Rfl: 4     omeprazole (PRILOSEC) 20 MG DR capsule, Take 1 capsule (20 mg) by mouth daily, Disp: 90 capsule, Rfl: 1     oxyCODONE (ROXICODONE) 5 MG tablet, Take 1 tablet (5 mg) by mouth every 6 hours as needed for severe pain (7-10), Disp: 40 tablet, Rfl: 0     Probiotic Product (FLORAJEN3) CAPS, 15 billion live cultures per capsule, 1 cap po qd, Disp: , Rfl:      psyllium (METAMUCIL/KONSYL) 58.6 % powder, Take 36 g (2 Tablespoonful) by mouth daily, Disp: , Rfl:     SOCIAL HISTORY:  Social History     Socioeconomic History     Marital status:      Spouse name: Jillian     Number of children: 4     Years of education: 13     Highest education level: Not on file   Occupational History     Occupation: underground utilities/dirt work     Employer: [employer]   Tobacco Use      "Smoking status: Never     Smokeless tobacco: Current     Types: Chew     Tobacco comments:     1 tin lasts 1 week   Vaping Use     Vaping Use: Never used   Substance and Sexual Activity     Alcohol use: Not Currently     Alcohol/week: 8.0 standard drinks     Comment: Quit Date: 8-1-2021     Drug use: Never     Sexual activity: Yes     Partners: Female     Birth control/protection: Surgical   Other Topics Concern     Parent/sibling w/ CABG, MI or angioplasty before 65F 55M? Not Asked   Social History Narrative    He is , 4  Girls.    Wife runs a .     Social Determinants of Health     Financial Resource Strain: Not on file   Food Insecurity: Not on file   Transportation Needs: Not on file   Physical Activity: Not on file   Stress: Not on file   Social Connections: Not on file   Intimate Partner Violence: Not on file   Housing Stability: Not on file       FAMILY HISTORY:  Family History   Problem Relation Age of Onset     Pulmonary Embolism Mother      Other - See Comments Daughter         recurrent OM     Other - See Comments Other         Suicidality,maternal uncle     No Known Problems Father        Past/family/social history reviewed and no changes    PHYSICAL EXAMINATION:  Constitutional: aaox3, cooperative, pleasant, not dyspneic/diaphoretic, no acute distress  Vitals reviewed: Ht 1.803 m (5' 11\")   Wt 77.1 kg (170 lb)   BMI 23.71 kg/m    Wt:   Wt Readings from Last 2 Encounters:   01/19/23 77.1 kg (170 lb)   01/11/23 74.4 kg (164 lb)      Eyes: Sclera anicteric/injected  Respiratory: Unlabored breathing  Skin:  no jaundice  Psych: Normal affect      PERTINENT STUDIES:  Most recent CBC:  Recent Labs   Lab Test 11/22/22 0620 11/21/22 0626   WBC 7.4 10.7   HGB 12.4* 13.4   HCT 35.5* 38.4*    326     Most recent hepatic panel:  Recent Labs   Lab Test 11/22/22 0620 11/21/22 0626   ALT 15 19   AST 14 12     Most recent creatinine:  Recent Labs   Lab Test 11/22/22 0620 11/21/22 0626   CR " 0.76 0.71

## 2023-01-19 NOTE — LETTER
1/19/2023         RE: Dennis J Goodell  [address]   [place] [address]        Dear Colleague,    Thank you for referring your patient, Dennis J Goodell, to the [facility] [place]. Please see a copy of my visit note below.    IBD CLINIC VISIT    CC/REFERRING MD:  Referred Self    REASON FOR CONSULTATION: establish care for Crohn's    ASSESSMENT/PLAN:    1. Crohn's colitis and possible zane-anal CD - given chronic active changes on biopsies and zane-anal abscess Crohn's Disease is most likely. NSAID colopathy is possible but would not expect zane-anal abscess from this. Discussed diagnosis of Crohn's and natural history. He does have recent colonoscopy with pathology consistent with IBD. Recommend we check labs (including preparation for biologics), check MRE, check MRI pelvis, refer to colorectal for zane-anal exam, and refer to MTM.    Given concern for zane-anal disease I think anti-TNF would be most ideal (infliximab preferred). His history of metastatic testicular cancer is noted. Given remote history I think it is ok to start. However, will get oncology input prior to starting.     In the meantime we wolfe start Entocort 9 mg. Will trial dicylcomine for pain. Discussed minimizing/avoiding NSAIDs and naroctics    -labs  -MRE  -MRE pelvis  -colorectal referral  -MTM referral  -oncology referral  -likely colonoscopy in 6-9 months pending treatment decision for mucosal healing  -dicylcomine BID PRN  -Entocort 9 mg daily x 2 months, then 6 mg daily x 2 weeks, then 3 mg daily x 2 weeks and then stop    2. Recent zane-anal abscess - s/p drainage and abx  -MRI pelvis  -colorectal referral  -treatment of Crohn's as above    3. Remote history of testicular cancer - referral to oncology as we anticipate anti-TNF therapy    IBD HISTORY  Age at diagnosis: 47 (2022)  Extent of disease: colonic - possible zane-anal  Disease phenotype: inflammatory with possible  penetrating  Zane-anal disease: recent zane-anal abscess  Current CD medications: none - recent prednisone  Prior IBD surgeries: none  Prior IBD Medications: prednisone    DRUG MONITORING  TPMT enzyme activity: pending    6-TGN/6-MMPN levels: NA    Biologic concentration: NA    DISEASE ASSESSMENT  Labs  No lab results found.    Invalid input(s):  ALB,  HGB  Fecal calprotectin: none  Endoscopy:   -colonoscopy 12/2022 - normal TI (normal biopsies) - inflammation/stricture 5-6 cm distal to IC valve - able to be passed. Remainder colon normal? (details of scope limited on documentation). 5mm adenoma in rectum. Biopsies TI normal. Right colon chronic active (mild) c/w IBD and left colon normal  Enterography: none  C diff: negative    sIBDQ:   No flowsheet data found.    IBD Health Care Maintenance: Not discussed    Vaccinations:  All patients on biologics should avoid live vaccines.    -- Influenza (every year)  -- TdaP (every 10 years)  -- Pneumococcal Pneumonia (once plus booster at 5 years)  -- Yearly assessment for latent Tb (verbal screening and exam, PPD or QuantiFERON-Tb testing)    One time confirmation of immunity or serologies:  -- Hepatitis A (serologies or immunizations)  -- Hepatitis B (serologies or immunizations)  -- Varicella  -- MMR  -- HPV (all aged 18-26)  -- Meningococcal meningitis (all patients at risk for meningitis)  -- Due to the immunosuppression in this patient, I would not advise administration of live vaccines such as varicella/VZV, intranasal influenza, MMR, or yellow fever vaccine (if travelling).      Bone mineral density screening   -- Recommend all patients supplement with calcium and vitamin D  -- Given prior steroid use recommend DEXA if not already done - WILL DISCUSS AT FUTURE VISITS    Cancer Screening:  Colon cancer screening:  Given 1/3 of colon involved, recommend patient undergo regular dysplasia surveillance   Next dysplasia screening is recommended 2031.    Skin cancer  screening: Annual visual exam of skin by dermatologist since patient is immunocompromised    Depression Screening:  -- Over the last month, have you felt down, depressed, or hopeless? no  -- Over the last month, have you felt little interest or pleasure doing things? no    Misc:  -- Avoid tobacco use  -- Avoid NSAIDs as there is potentially a 25% chance of causing an IBD flare    Return to clinic in 2 months with Richa Diamond (IBD ELIANA) and 6 months with Daniel    Thank you for this consultation.  It was a pleasure to participate in the care of this patient; please contact us with any further questions.  I spent a total of 45 minutes during the day of encounter performed chart review, meeting with patient, patient counseling, care coordination, and documentation.      This note was created with voice recognition software, and while reviewed for accuracy, typos may remain.     Helder Sanchez MD  Division of Gastroenterology, Hepatology and Nutrition  [facility]  Pager: [phone]      HPI:   Currently, Reggie is here today to establish care with an IBD provider.  He has a history of metastatic testicular cancer status post right testicular cancer status post right testicle resection.  This subsequently did metastasized to his lymph nodes and he eventually did require chemotherapy and lymph node resection.  Last treatment 2008 per patient. He also has history of chronic pain - he reports tolerance to several opioids - only oxycodone works for him.    Symptoms of colitis began in November. Developed severe abdominal pain and diarrhea. Presented to ED and had CT with right sided colitis. Treated with liquid diet and abx. Was eventually discharged on abx and followed up in December with colonoscopy by Dr. Arciniega. TI normal. Inflammation/stricture (able to be passed) was seen in ascending colon about 5-6 cm distal to IC valve. Biopsies of right colon c/w IBD. He was given a course of prednisone that he has  finished.    He subsequently developed zane-anal pain. Was seen at [facility] (I have no records of this) and diagnosed with zane-anal abscess. Required drainage - felt to be possibly related to Crohn's. Given 7 days of abx.    He has started floragel and metamucil shakes. He has received some courses of oxycodone from his PCP.    In general his symptoms have improved. Pain in RLQ now every other day. Sporadic. Diarrhea resolved. Having 1-2 formed BMs daily.    No EIM.    He does take NSAIDs (Aleve or Exedrin Migraine) about 2-3 times per week (for migraines and neck pain).    He has issues with headaches (tension) and neck pain - has severe DDD of neck.    He no longer follows with oncology given his remote history of testicular cancer    ROS:    No fevers or chills  No weight loss  No blurry vision, double vision or change in vision  No sore throat  No lymphadenopathy  No headache, paraesthesias, or weakness in a limb  No shortness of breath or wheezing  No chest pain or pressure  No arthralgias or myalgias  No rashes or skin changes  No odynophagia or dysphagia  No BRBPR, hematochezia, melena  No dysuria, frequency or urgency  No hot/cold intolerance or polyria  No anxiety or depression    Extra intestinal manifestations of IBD:  No uveitis/episcleritis  No aphthous ulcers   No arthritis   No erythema nodosum/pyoderma gangrenosum.     PERTINENT PAST MEDICAL HISTORY:  Past Medical History:   Diagnosis Date     Anxiety disorder 2012     Dysthymic disorder     No Comments Provided     Malignant neoplasm of testis (H) 2005 2005,teratoma     Raynaud's syndrome without gangrene     No Comments Provided     Uncomplicated alcohol abuse     7/25/2012       PREVIOUS SURGERIES:  Past Surgical History:   Procedure Laterality Date     COLONOSCOPY N/A 12/12/2022    Procedure: COLONOSCOPY, WITH POLYPECTOMY AND BIOPSY;  Surgeon: Solomon Arciniega MD;  Location: [facility] OR     DECOMPRESSION CUBITAL TUNNEL Left 01/2019     HERNIA  REPAIR      ,HERNIA REPAIR     IR CHEST PORT PLACEMENT > 5 YRS OF AGE Left 04/01/2008     LYMPH NODE BIOPSY  09/05/2008     [facility]. 37 lymph nodes excised     ORCHIECTOMY SCROTAL Right 12/30/2005    teratoma     OTHER SURGICAL HISTORY      9/05/08,[id number],OTHER     RELEASE CARPAL TUNNEL  04/09/2013       PREVIOUS ENDOSCOPY:  No results found for this or any previous visit.]    ALLERGIES:   No Known Allergies    PERTINENT MEDICATIONS:    Current Outpatient Medications:      albuterol (PROAIR HFA/PROVENTIL HFA/VENTOLIN HFA) 108 (90 Base) MCG/ACT inhaler, Inhale 2 puffs into the lungs every 6 hours as needed for shortness of breath / dyspnea or wheezing, Disp: , Rfl:      ALPRAZolam (XANAX) 1 MG tablet, Take 1 mg by mouth 2 times daily as needed for anxiety, Disp: , Rfl:      Aspirin-Acetaminophen-Caffeine (EXCEDRIN MIGRAINE PO), , Disp: , Rfl:      bismuth subsalicylate (PEPTO BISMOL) 262 MG/15ML suspension, Take 15 mLs by mouth every 6 hours as needed for indigestion, Disp: , Rfl:      calcium carbonate (TUMS) 500 MG chewable tablet, Take 1 chew tab by mouth 4 times daily as needed for heartburn, Disp: , Rfl:      clonazePAM (KLONOPIN) 1 MG tablet, Take 1 mg by mouth 3 times daily, Disp: , Rfl:      desvenlafaxine succinate (PRISTIQ) 100 MG 24 hr tablet, Take 100 mg by mouth daily, Disp: , Rfl:      NIFEdipine ER OSMOTIC (PROCARDIA XL) 90 MG 24 hr tablet, Take 1 tablet (90 mg) by mouth daily, Disp: 90 tablet, Rfl: 4     omeprazole (PRILOSEC) 20 MG DR capsule, Take 1 capsule (20 mg) by mouth daily, Disp: 90 capsule, Rfl: 1     oxyCODONE (ROXICODONE) 5 MG tablet, Take 1 tablet (5 mg) by mouth every 6 hours as needed for severe pain (7-10), Disp: 40 tablet, Rfl: 0     Probiotic Product (FLORAJEN3) CAPS, 15 billion live cultures per capsule, 1 cap po qd, Disp: , Rfl:      psyllium (METAMUCIL/KONSYL) 58.6 % powder, Take 36 g (2 Tablespoonful) by mouth daily, Disp: , Rfl:     SOCIAL HISTORY:  Social History  "    Socioeconomic History     Marital status:      Spouse name: Jillian     Number of children: 4     Years of education: 13     Highest education level: Not on file   Occupational History     Occupation: underground utilities/dirt work     Employer: [employer]   Tobacco Use     Smoking status: Never     Smokeless tobacco: Current     Types: Chew     Tobacco comments:     1 tin lasts 1 week   Vaping Use     Vaping Use: Never used   Substance and Sexual Activity     Alcohol use: Not Currently     Alcohol/week: 8.0 standard drinks     Comment: Quit Date: 8-1-2021     Drug use: Never     Sexual activity: Yes     Partners: Female     Birth control/protection: Surgical   Other Topics Concern     Parent/sibling w/ CABG, MI or angioplasty before 65F 55M? Not Asked   Social History Narrative    He is , 4  Girls.    Wife runs a .     Social Determinants of Health     Financial Resource Strain: Not on file   Food Insecurity: Not on file   Transportation Needs: Not on file   Physical Activity: Not on file   Stress: Not on file   Social Connections: Not on file   Intimate Partner Violence: Not on file   Housing Stability: Not on file       FAMILY HISTORY:  Family History   Problem Relation Age of Onset     Pulmonary Embolism Mother      Other - See Comments Daughter         recurrent OM     Other - See Comments Other         Suicidality,maternal uncle     No Known Problems Father        Past/family/social history reviewed and no changes    PHYSICAL EXAMINATION:  Constitutional: aaox3, cooperative, pleasant, not dyspneic/diaphoretic, no acute distress  Vitals reviewed: Ht 1.803 m (5' 11\")   Wt 77.1 kg (170 lb)   BMI 23.71 kg/m    Wt:   Wt Readings from Last 2 Encounters:   01/19/23 77.1 kg (170 lb)   01/11/23 74.4 kg (164 lb)      Eyes: Sclera anicteric/injected  Respiratory: Unlabored breathing  Skin:  no jaundice  Psych: Normal affect      PERTINENT STUDIES:  Most recent CBC:  Recent Labs   Lab " Test 11/22/22 0620 11/21/22 0626   WBC 7.4 10.7   HGB 12.4* 13.4   HCT 35.5* 38.4*    326     Most recent hepatic panel:  Recent Labs   Lab Test 11/22/22 0620 11/21/22 0626   ALT 15 19   AST 14 12     Most recent creatinine:  Recent Labs   Lab Test 11/22/22 0620 11/21/22 0626   CR 0.76 0.71             Sincerely,    Helder Sanchez MD

## 2023-01-22 ENCOUNTER — HOSPITAL ENCOUNTER (EMERGENCY)
Facility: OTHER | Age: 48
Discharge: HOME | End: 2023-01-22
Attending: STUDENT IN AN ORGANIZED HEALTH CARE EDUCATION/TRAINING PROGRAM | Admitting: STUDENT IN AN ORGANIZED HEALTH CARE EDUCATION/TRAINING PROGRAM
Payer: COMMERCIAL

## 2023-01-22 VITALS
HEART RATE: 95 BPM | HEIGHT: 71 IN | TEMPERATURE: 98.1 F | SYSTOLIC BLOOD PRESSURE: 114 MMHG | BODY MASS INDEX: 23.21 KG/M2 | OXYGEN SATURATION: 98 % | DIASTOLIC BLOOD PRESSURE: 81 MMHG | RESPIRATION RATE: 18 BRPM | WEIGHT: 165.8 LBS

## 2023-01-22 DIAGNOSIS — L05.91 PILONIDAL CYST: ICD-10-CM

## 2023-01-22 LAB
HOLD SPECIMEN: NORMAL

## 2023-01-22 PROCEDURE — 99282 EMERGENCY DEPT VISIT SF MDM: CPT | Mod: 25 | Performed by: STUDENT IN AN ORGANIZED HEALTH CARE EDUCATION/TRAINING PROGRAM

## 2023-01-22 PROCEDURE — 99283 EMERGENCY DEPT VISIT LOW MDM: CPT | Mod: 25 | Performed by: STUDENT IN AN ORGANIZED HEALTH CARE EDUCATION/TRAINING PROGRAM

## 2023-01-22 PROCEDURE — 10080 I&D PILONIDAL CYST SIMPLE: CPT | Performed by: STUDENT IN AN ORGANIZED HEALTH CARE EDUCATION/TRAINING PROGRAM

## 2023-01-22 RX ORDER — LIDOCAINE HYDROCHLORIDE AND EPINEPHRINE 10; 10 MG/ML; UG/ML
1 INJECTION, SOLUTION INFILTRATION; PERINEURAL ONCE
Status: DISCONTINUED | OUTPATIENT
Start: 2023-01-22 | End: 2023-01-22 | Stop reason: HOSPADM

## 2023-01-22 ASSESSMENT — ACTIVITIES OF DAILY LIVING (ADL): ADLS_ACUITY_SCORE: 35

## 2023-01-22 NOTE — ED TRIAGE NOTES
Pt states he has an abscess above his buttock.  This started 1-1/2 days ago.  Pt states it has gotten bigger about the size of a 50 cent piece. Pt states it started out as a pimple.  Pt states he has chron's.      Triage Assessment     Row Name 01/22/23 2439       Triage Assessment (Adult)    Airway WDL WDL       Respiratory WDL    Respiratory WDL WDL       Cardiac WDL    Cardiac WDL WDL       Peripheral/Neurovascular WDL    Peripheral Neurovascular WDL WDL       Cognitive/Neuro/Behavioral WDL    Cognitive/Neuro/Behavioral WDL WDL

## 2023-01-22 NOTE — DISCHARGE INSTRUCTIONS
Your skin lesion appears to be a pilonidal cyst.  Incision was performed but no infectious drainage was seen.      Please complete antibiotic prescription. Recommend daily yogurt or probiotic while taking antibiotics to avoid potential antibiotic side effects including diarrhea.     Symptoms may worsen over the next 1-2 days, but if symptoms are not improving after taking this antibiotic for 48 hours or have not fully resolved after completing antibiotic course - please see a primary care provider or return to the emergency department for evaluation.     Sometimes these cysts go away on their own, other times they require surgical excision.    Please review attached instructions including other reasons to return to the emergency department.

## 2023-01-22 NOTE — ED PROVIDER NOTES
History     Chief Complaint   Patient presents with     Wound Infection       Dennis J Goodell is a 47 year old male who presents with buttock abscess.  Onset started as a pimple approximately 1 to 2 days ago and has progressed to an abscess.  He has had a similar 1 in both lower down towards his anus in December that was incised and drained by Dr. Silva and treated with Augmentin with complete resolution.  Denies any systemic symptoms including fevers, chills, nausea, vomiting.  No MRSA history.  No antibiotic allergies.  History remarkable for Crohn's disease.    No Known Allergies    Patient Active Problem List    Diagnosis Date Noted     Crohn's disease of large intestine without complication (H) 12/23/2022     Priority: Medium     Colitis 11/18/2022     Priority: Medium     Leukocytosis 11/18/2022     Priority: Medium     Facet arthropathy, cervical 04/28/2021     Priority: Medium     Intractable chronic migraine without aura and with status migrainosus 04/28/2021     Priority: Medium     MARLEN (generalized anxiety disorder) 06/09/2020     Priority: Medium     Dysthymic disorder 02/09/2018     Priority: Medium     Raynaud phenomenon 02/09/2018     Priority: Medium     History of testicular cancer 08/12/2016     Priority: Medium     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/2008     Priority: Medium       Past Medical History:   Diagnosis Date     Anxiety disorder 2012     Dysthymic disorder      Malignant neoplasm of testis (H) 2005     Raynaud's syndrome without gangrene      Uncomplicated alcohol abuse        Past Surgical History:   Procedure Laterality Date     COLONOSCOPY N/A 12/12/2022    Procedure: COLONOSCOPY, WITH POLYPECTOMY AND BIOPSY;  Surgeon: Solomon Arciniega MD;  Location:  OR      "DECOMPRESSION CUBITAL TUNNEL Left 01/2019     HERNIA REPAIR      ,HERNIA REPAIR     IR CHEST PORT PLACEMENT > 5 YRS OF AGE Left 04/01/2008     LYMPH NODE BIOPSY  09/05/2008     [facility]. 37 lymph nodes excised     ORCHIECTOMY SCROTAL Right 12/30/2005    teratoma     OTHER SURGICAL HISTORY      9/05/08,117706,OTHER     RELEASE CARPAL TUNNEL  04/09/2013       Family History   Problem Relation Age of Onset     Pulmonary Embolism Mother      Other - See Comments Daughter         recurrent OM     Other - See Comments Other         Suicidality,maternal uncle     No Known Problems Father        Social History     Tobacco Use     Smoking status: Never     Smokeless tobacco: Current     Types: Chew     Tobacco comments:     1 tin lasts 1 week   Vaping Use     Vaping Use: Never used   Substance Use Topics     Alcohol use: Not Currently     Alcohol/week: 8.0 standard drinks     Comment: Quit Date: 8-1-2021     Drug use: Never       Medications:    amoxicillin-clavulanate (AUGMENTIN) 875-125 MG tablet  albuterol (PROAIR HFA/PROVENTIL HFA/VENTOLIN HFA) 108 (90 Base) MCG/ACT inhaler  ALPRAZolam (XANAX) 1 MG tablet  Aspirin-Acetaminophen-Caffeine (EXCEDRIN MIGRAINE PO)  bismuth subsalicylate (PEPTO BISMOL) 262 MG/15ML suspension  budesonide (ENTOCORT EC) 3 MG EC capsule  calcium carbonate (TUMS) 500 MG chewable tablet  clonazePAM (KLONOPIN) 1 MG tablet  desvenlafaxine succinate (PRISTIQ) 100 MG 24 hr tablet  dicyclomine (BENTYL) 10 MG capsule  NIFEdipine ER OSMOTIC (PROCARDIA XL) 90 MG 24 hr tablet  omeprazole (PRILOSEC) 20 MG DR capsule  oxyCODONE (ROXICODONE) 5 MG tablet  Probiotic Product (FLORAJEN3) CAPS  psyllium (METAMUCIL/KONSYL) 58.6 % powder        Review of Systems: See HPI for pertinent negatives and positives. All other systems reviewed and found to be negative.    Physical Exam   /81   Pulse 95   Temp 98.1  F (36.7  C) (Temporal)   Resp 18   Ht 1.803 m (5' 11\")   Wt 75.2 kg (165 lb 12.8 oz)  "  SpO2 98%   BMI 23.12 kg/m       General: awake, comfortable  HEENT: atraumatic  Respiratory: normal effort  Cardiovascular: appears well perfused  Extremities: no gross deformities  Skin: Left buttock mildly protruding lesion with induration versus fluctuance approximately 2 cm in diameter with redness of the skin, dry, skin intact  Neuro: alert, no focal deficits  Psych: appropriate mood and affect    ED Course           Results for orders placed or performed during the hospital encounter of 01/22/23 (from the past 24 hour(s))   Extra Tube ([place] Draw)    Narrative    The following orders were created for panel order Extra Tube ([facility] Draw).  Procedure                               Abnormality         Status                     ---------                               -----------         ------                     Extra Blue Top Tube[[id number]]                              Final result               Extra Red Top Tube[[id number]]                               Final result               Extra Green Top (Lithium...[[id number]]                      Final result               Extra Green Top (Lithium...[[id number]]                      Final result               Extra Purple Top Tube[[id number]]                            Final result                 Please view results for these tests on the individual orders.   Extra Blue Top Tube   Result Value Ref Range    Hold Specimen JIC    Extra Red Top Tube   Result Value Ref Range    Hold Specimen JIC    Extra Green Top (Lithium Heparin) Tube   Result Value Ref Range    Hold Specimen JIC    Extra Green Top (Lithium Heparin) Tube   Result Value Ref Range    Hold Specimen JIC    Extra Purple Top Tube   Result Value Ref Range    Hold Specimen JIC        Medications   lidocaine 1% with EPINEPHrine 1:100,000 injection 1 mL (has no administration in time range)     [facility]    PROCEDURE: -Incision/Drainage    Date/Time: 1/22/2023 2:16 PM  Performed by:  Waldemar Atkins MD  Authorized by: Waldemar Atkins MD     Risks, benefits and alternatives discussed.      LOCATION:      Type:  Pilonidal cyst    Location:  Anogenital    Anogenital location:  Pilonidal    PRE-PROCEDURE DETAILS:     Skin preparation:  Betadine and Chloraprep    PROCEDURE TYPE:     Complexity:  Simple    ANESTHESIA (see MAR for exact dosages):     Anesthesia method:  Local infiltration    Local anesthetic:  Lidocaine 1% WITH epi    PROCEDURE DETAILS:     Needle aspiration: no      Incision types:  Single straight (1 cm)    Incision depth:  Subcutaneous    Scalpel blade:  10    Drainage:  Bloody    Drainage amount:  Scant (No drainage)    Wound treatment:  Wound left open    Packing materials:  1/2 in gauze    PROCEDURE    Patient Tolerance:  Patient tolerated the procedure well with no immediate complications      Assessments & Plan (with Medical Decision Making)     I have reviewed the nursing notes.    47 year old male evaluated for concern for abscess in his left gluteal cleft after recently having a perirectal abscess drained 1 month ago.  Left gluteal cleft with unclear erythematous protruding area either being a cyst plus minus abscess.  Procedure performed per above with no purulent drainage.  Wound left open and covered with gauze and tape and antibiotic therapy prescribed.  Unclear if this will resolve completely with antibiotics or if he will need this surgically excised.  Plan per below. Discharged home with attached instructions on diagnosis provided including ED return precautions.     I have reviewed the findings, diagnosis, plan, and need for any follow up with the patient.    Patient instructions:   Your skin lesion appears to be a pilonidal cyst.  Incision was performed but no infectious drainage was seen.      Please complete antibiotic prescription. Recommend daily yogurt or probiotic while taking antibiotics to avoid potential antibiotic side effects including diarrhea.      Symptoms may worsen over the next 1-2 days, but if symptoms are not improving after taking this antibiotic for 48 hours or have not fully resolved after completing antibiotic course - please see a primary care provider or return to the emergency department for evaluation.     Sometimes these cysts go away on their own, other times they require surgical excision.    Please review attached instructions including other reasons to return to the emergency department.    New Prescriptions    AMOXICILLIN-CLAVULANATE (AUGMENTIN) 875-125 MG TABLET    Take 1 tablet by mouth 2 times daily for 5 days       Final diagnoses:   Pilonidal cyst       1/22/2023   [facility]     Waldemar Atkins MD  01/22/23 [id number]

## 2023-01-23 ENCOUNTER — TELEPHONE (OUTPATIENT)
Dept: SURGERY | Facility: OTHER | Age: 48
End: 2023-01-23
Payer: COMMERCIAL

## 2023-01-23 ENCOUNTER — PRE VISIT (OUTPATIENT)
Dept: ONCOLOGY | Facility: CLINIC | Age: 48
End: 2023-01-23
Payer: COMMERCIAL

## 2023-01-23 ENCOUNTER — PATIENT OUTREACH (OUTPATIENT)
Dept: GASTROENTEROLOGY | Facility: CLINIC | Age: 48
End: 2023-01-23
Payer: COMMERCIAL

## 2023-01-23 DIAGNOSIS — K50.10 CROHN'S DISEASE OF LARGE INTESTINE WITHOUT COMPLICATION (H): Primary | ICD-10-CM

## 2023-01-23 NOTE — TELEPHONE ENCOUNTER
Action January 31, 2023 3:20 PM ABT   Action Taken Records from [facility] received and sent to HIM for upload       Action January 30, 2023 8:58 AM ABT   Action Taken Called and spoke to patient, he states that he's not sure who the urologist he saw in [place], [place] (states that it was a private practice).    Also informed patient that ZEINA is needed for his records with [facility] in [place]. ZEINA emailed to [email or web address]     Action January 24, 2023 10:03 AM ABT   Action Taken Records from [facility] received and sent to HIM for upload     Action January 23, 2023 10:00 AM ABT   Action Taken Called and unable to speak to patient. M for call back of where pt was dx back in 2005.     RECORDS STATUS - ALL OTHER DIAGNOSIS      RECORDS RECEIVED FROM: [facility], [facility]   DATE RECEIVED:    NOTES STATUS DETAILS   OFFICE NOTE from referring provider Epic 01/19/23: Dr. Helder Sanchez   OFFICE NOTE from medical oncologist [facility] 06/04/08: Dr. Joon Polk   OFFICE NOTE from other specialist [facility] 08/22/16: Dr. Satish Harmon   DISCHARGE SUMMARY from hospital External: [facility] 09/05/08: [facility]   DISCHARGE REPORT from the ER     OPERATIVE REPORT External: [facility]:  09/05/08: Modified radical left neck dissection; Probable teratoma in the right external iliac    09/04/08: Fiberoptic examination   of the pharynx    [facility]:  12/30/05: Right radical orchiectomy   MEDICATION LIST [facility]    LABS     PATHOLOGY REPORTS External: [facility]:  09/05/08: [id number]    [facility]:  12/30/05: [id number]   ANYTHING RELATED TO DIAGNOSIS     GENONOMIC TESTING     TYPE:     IMAGING (NEED IMAGES & REPORT)     CT SCANS PACS PACS:  11/21/22-12/23/05: CT Abd Pel   MRI     XRAYS     ULTRASOUND PACS PACS:  12/14/05: US Scrotum   PET

## 2023-01-23 NOTE — TELEPHONE ENCOUNTER
Sent scheduling information for labs, imaging and referrals via [employer]. Also provided direct number for questions or concerns. Will monitor to ensure next steps are scheduled.   Sent direct message to colorectal RNs to assist with referral as well.

## 2023-01-23 NOTE — TELEPHONE ENCOUNTER
Received message that the patient was seen in the ER yesterday for a pilonidal cyst. Patient was started on Augmentin 5 day course. Wound drained and left open with gauze in place for packing. Patient is wanting an appointment to have this taken care of as he reports he is having a lot of pain.     Attempted to contact patient. Left message. Waiting on call back. Bettie Mishra RN  ....................  1/23/2023   4:42 PM

## 2023-01-24 ENCOUNTER — PATIENT OUTREACH (OUTPATIENT)
Dept: ONCOLOGY | Facility: CLINIC | Age: 48
End: 2023-01-24
Payer: COMMERCIAL

## 2023-01-24 ENCOUNTER — TELEPHONE (OUTPATIENT)
Dept: SURGERY | Facility: CLINIC | Age: 48
End: 2023-01-24
Payer: COMMERCIAL

## 2023-01-24 NOTE — PROGRESS NOTES
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 Last 2008. Now with Crohn's. Want to use anti-TNF. Ok to use?        Clinical History (per Nurse review of records provided):    Right orchiectomy 12/30/05- [facility]  Pure testicular teratoma, surgical margins free of tumor  No angiolymphatic invasion identified  Background intratubular germ cell neoplasia    Recurrence      Was seen by Dr. Polk for diagnosis of Germ Cell Tumor. He was last seen on 6-4-2008 and had cycle three chemotherapy, on 6/9/2008.  From what I can identify in records available, this was his final treatment.    I attempted to reach Reggie to discuss referral to medical oncology.  There was no answer at either listed number and I left a message at both with my phone number.    I have reviewed this case with Dr. Robby Jasmine.  If his treatment did indeed end in 2008, his cancer should not recur and he could move forward with anti-TNF treatment for Crohn's disease.  I am awaiting call back for confirmation of treatment end date, which appears to be June of 2008.  If this is the case I will be able to connect Dr. Jasmine with referring provider, via messaging to give the clearance.    1/25/23  Reggie called me back and I reviewed his history.  In addition to the above he had a pelvic lymph node dissection, removing 37 lymph nodes in September of 2008, at [facility], in [place].  I will request for records on this then discuss with Dr. Jasmine.

## 2023-01-24 NOTE — TELEPHONE ENCOUNTER
Contacted Reggie to discuss symptoms and referral by Dr Sanchez. Left voicemail with callback number

## 2023-01-24 NOTE — TELEPHONE ENCOUNTER
Attempted to reach patient. Left message. No call back at this time. Bettie Mishra RN  ....................  1/24/2023   9:55 AM

## 2023-01-24 NOTE — TELEPHONE ENCOUNTER
Spoke with the patient. He reported and increase in output from the opening. Reports he has to wear like a maxi pad due to the output of drainage. He wants to see Dr. Arciniega to have it evaluated sooner rather than later. Agreeable to be seen on 9/25/2023 at 9:20 with Dr. Arciniega. Will place patient into schedule for evaluation. Bettie Mishra RN  ....................  1/24/2023   10:33 AM

## 2023-01-25 ENCOUNTER — OFFICE VISIT (OUTPATIENT)
Dept: SURGERY | Facility: OTHER | Age: 48
End: 2023-01-25
Attending: SURGERY
Payer: COMMERCIAL

## 2023-01-25 VITALS
RESPIRATION RATE: 18 BRPM | HEART RATE: 79 BPM | SYSTOLIC BLOOD PRESSURE: 120 MMHG | WEIGHT: 167.8 LBS | TEMPERATURE: 97.2 F | OXYGEN SATURATION: 99 % | BODY MASS INDEX: 23.4 KG/M2 | DIASTOLIC BLOOD PRESSURE: 79 MMHG

## 2023-01-25 DIAGNOSIS — L05.01 PILONIDAL ABSCESS: Primary | ICD-10-CM

## 2023-01-25 PROCEDURE — 87070 CULTURE OTHR SPECIMN AEROBIC: CPT | Mod: ZL | Performed by: SURGERY

## 2023-01-25 PROCEDURE — 87077 CULTURE AEROBIC IDENTIFY: CPT | Mod: ZL | Performed by: SURGERY

## 2023-01-25 PROCEDURE — G0463 HOSPITAL OUTPT CLINIC VISIT: HCPCS

## 2023-01-25 PROCEDURE — 99213 OFFICE O/P EST LOW 20 MIN: CPT | Performed by: SURGERY

## 2023-01-25 ASSESSMENT — PAIN SCALES - GENERAL: PAINLEVEL: SEVERE PAIN (7)

## 2023-01-25 NOTE — NURSING NOTE
"Chief Complaint   Patient presents with     RECHECK     Draining. Needing maxi pads to help contain the drainage. It was drained but only a scant amount of drainage when the area was opened but now it has increased.     Initial /79 (BP Location: Right arm, Patient Position: Sitting, Cuff Size: Adult Large)   Pulse 79   Temp 97.2  F (36.2  C) (Tympanic)   Resp 18   Wt 76.1 kg (167 lb 12.8 oz)   SpO2 99%   BMI 23.40 kg/m   Estimated body mass index is 23.4 kg/m  as calculated from the following:    Height as of 1/22/23: 1.803 m (5' 11\").    Weight as of this encounter: 76.1 kg (167 lb 12.8 oz).  Meds Reconciled: joshua Mishra RN      "

## 2023-01-25 NOTE — TELEPHONE ENCOUNTER
Diagnosis, Referred by & from: Crohn's Disease, perianal abscess and pilonidal cyst; referred by Dr. Sanchez   Appt date: 2/13/2023   NOTES STATUS DETAILS   OFFICE NOTE from referring provider Internal [facility]:  1/19/23 - GI OV with Dr. Sanchez   OFFICE NOTE from other specialist Internal [facility]:  1/25/23 - SURG OV with Dr. Arciniega  1/11/23 - PCC OV with Dr. Archuleta   DISCHARGE SUMMARY from hospital N/A    DISCHARGE REPORT from the ER Internal [facility]:  1/22/23 - ED OV with Dr. Atkins  11/26/22 - ED OV with Dr. Montemayor   OPERATIVE REPORT N/A    MEDICATION LIST Internal    LABS     BIOPSIES/PATHOLOGY RELATED TO DIAGNOSIS Internal [facility]:  12/12/22 - Colon Biopsy (Case: [id number])   DIAGNOSTIC PROCEDURES     Colonoscopy Internal [facility]:  12/12/22 - Colonoscopy   IMAGING (DISC & REPORT)      CT Internal [facility]:  11/21/22 - CT Abd/Pelvis  11/18/22 - CT Abd/Pelvis  9/7/18 - CT Abd/Pelvis

## 2023-01-25 NOTE — PROGRESS NOTES
GENERAL SURGERY CONSULTATION NOTE    Dennis J Goodell   [address] [place] [address]  47 year old  male  Admission Date/Time: No admission date for patient encounter.  Primary Care Provider:  James Archuleta    HPI: Reggie is a 47-year-old male with a recent diagnosis of Crohn's disease back in December.  At that time he was started on oral steroids.  He is now on budesonide.  Between that diagnosis and Christmas he had a perineal abscess.  This was drained and has resolved.  Since then he is seeing GI.  He is started on Bentyl and budesonide.  He has had resolution of his crampy right-sided abdominal pain that he had with his Crohn's diagnosis.  He has not noticed any blood per rectum.  He is concerned about having a fistula in the perianal region.  He was seen on 1/22 and a simple incision and drainage was performed in the ER.  He noticed that it was mostly just blood initially.  Has turned more purulent drainage over the next 2 days.     REVIEW OF SYSTEMS:    GENERAL: No fevers or chills. Denies fatigue, recent weight loss.  HEENT: No sinus drainage. No changes with vision or hearing. No difficulty swallowing.   LYMPHATICS:  Noswollen nodes in axilla, neck or groin.  CARDIOVASCULAR: Denies chest pain, palpitations and dyspnea on exertion.  PULMONARY: No shortness of breath or cough. No increase in sputum production.  GI: Denies melena,bright red blood in stools. No hematemesis. No constipation or diarrhea.  : No dysuria or hematuria.  SKIN: No recent rashes or ulcers.   HEMATOLOGY:  No history of easy bruising or bleeding.  ENDOCRINE:  No history of diabetes or thyroid problems.  NEUROLOGY:  No history of seizures or headaches. No motor or sensory changes.    Patient Active Problem List   Diagnosis     Dysthymic disorder     History of testicular cancer     Raynaud phenomenon     MARLEN (generalized anxiety disorder)     Facet arthropathy, cervical     Intractable chronic migraine without aura and  with status migrainosus     Colitis     Leukocytosis     Germ cell tumor (H)     Mixed emotional features as adjustment reaction     Crohn's disease of large intestine without complication (H)        Past Medical History:   Diagnosis Date     Anxiety disorder 2012     Dysthymic disorder     No Comments Provided     Malignant neoplasm of testis (H) 2005 2005,teratoma     Raynaud's syndrome without gangrene     No Comments Provided     Uncomplicated alcohol abuse     7/25/2012       Past Surgical History:   Procedure Laterality Date     COLONOSCOPY N/A 12/12/2022    Procedure: COLONOSCOPY, WITH POLYPECTOMY AND BIOPSY;  Surgeon: Solomon Arciniega MD;  Location: [facility] OR     DECOMPRESSION CUBITAL TUNNEL Left 01/2019     HERNIA REPAIR      ,HERNIA REPAIR     IR CHEST PORT PLACEMENT > 5 YRS OF AGE Left 04/01/2008     LYMPH NODE BIOPSY  09/05/2008     [facility]. 37 lymph nodes excised     ORCHIECTOMY SCROTAL Right 12/30/2005    teratoma     OTHER SURGICAL HISTORY      9/05/08,[id number],OTHER     RELEASE CARPAL TUNNEL  04/09/2013        Family History   Problem Relation Age of Onset     Pulmonary Embolism Mother      Other - See Comments Daughter         recurrent OM     Other - See Comments Other         Suicidality,maternal uncle     No Known Problems Father         Social History     Socioeconomic History     Marital status:      Spouse name: Jillian     Number of children: 4     Years of education: 13     Highest education level: Not on file   Occupational History     Occupation: underground utilities/dirt work     Employer: [employer]   Tobacco Use     Smoking status: Never     Smokeless tobacco: Current     Types: Chew     Tobacco comments:     1 tin lasts 1 week   Vaping Use     Vaping Use: Never used   Substance and Sexual Activity     Alcohol use: Not Currently     Alcohol/week: 8.0 standard drinks     Comment: Quit Date: 8-1-2021     Drug use: Never     Sexual activity: Yes     Partners:  Female     Birth control/protection: Surgical   Other Topics Concern     Parent/sibling w/ CABG, MI or angioplasty before 65F 55M? Not Asked   Social History Narrative    He is , 4  Girls.    Wife runs a .     Social Determinants of Health     Financial Resource Strain: Not on file   Food Insecurity: Not on file   Transportation Needs: Not on file   Physical Activity: Not on file   Stress: Not on file   Social Connections: Not on file   Intimate Partner Violence: Not on file   Housing Stability: Not on file         albuterol (PROAIR HFA/PROVENTIL HFA/VENTOLIN HFA) 108 (90 Base) MCG/ACT inhaler, Inhale 2 puffs into the lungs every 6 hours as needed for shortness of breath / dyspnea or wheezing  ALPRAZolam (XANAX) 1 MG tablet, Take 1 mg by mouth 2 times daily as needed for anxiety  amoxicillin-clavulanate (AUGMENTIN) 875-125 MG tablet, Take 1 tablet by mouth 2 times daily for 5 days  Aspirin-Acetaminophen-Caffeine (EXCEDRIN MIGRAINE PO),   bismuth subsalicylate (PEPTO BISMOL) 262 MG/15ML suspension, Take 15 mLs by mouth every 6 hours as needed for indigestion  budesonide (ENTOCORT EC) 3 MG EC capsule, Take 3 capsules (9 mg) by mouth every morning X 2 months. Then take 2 capsules PO q day x 2 weeks, then 1 capsule PO q day x 2 weeks and stop  calcium carbonate (TUMS) 500 MG chewable tablet, Take 1 chew tab by mouth 4 times daily as needed for heartburn  clonazePAM (KLONOPIN) 1 MG tablet, Take 1 mg by mouth 3 times daily  desvenlafaxine succinate (PRISTIQ) 100 MG 24 hr tablet, Take 100 mg by mouth daily  dicyclomine (BENTYL) 10 MG capsule, Take 2 capsules (20 mg) by mouth 2 times daily as needed (abdominal pain)  NIFEdipine ER OSMOTIC (PROCARDIA XL) 90 MG 24 hr tablet, Take 1 tablet (90 mg) by mouth daily  omeprazole (PRILOSEC) 20 MG DR capsule, Take 1 capsule (20 mg) by mouth daily  oxyCODONE (ROXICODONE) 5 MG tablet, Take 1 tablet (5 mg) by mouth every 6 hours as needed for severe pain (7-10)  Probiotic  Product (FLORAJEN3) CAPS, 15 billion live cultures per capsule, 1 cap po qd  psyllium (METAMUCIL/KONSYL) 58.6 % powder, Take 36 g (2 Tablespoonful) by mouth daily    No current facility-administered medications on file prior to visit.        ALLERGIES/SENSITIVITIES: No Known Allergies    PHYSICAL EXAM:     /79 (BP Location: Right arm, Patient Position: Sitting, Cuff Size: Adult Large)   Pulse 79   Temp 97.2  F (36.2  C) (Tympanic)   Resp 18   Wt 76.1 kg (167 lb 12.8 oz)   SpO2 99%   BMI 23.40 kg/m      General Appearance:   Appears tired uncomfortable  Heart & CV:  RRR no murmur.  Intact distal pulses, good cap refill.  LUNGS:  CTA B/L, no wheezing or crackles.  Buttocks: There is a deep gluteal cleft with some hair.  To the left of midline there is a 3 cm area of induration and erythema.  The previous incision is open with fibrinous exudate.  This is probed to approximately 1.5 cm.  No residual purulence retrieved.  1/4 inch iodoform gauze packed into the opening  Ext: No deformity      ADDITIONAL COMMENTS:      CONSULTATION ASSESSMENT AND PLAN:    47 year old male with new diagnosis of inflammatory bowel (Crohn's) on enteric steroids.  Did have a short course of oral steroids.  Not a biologic medication currently.  He has had 2 abscesses in the gluteal/perianal region/perineum since his diagnosis.  There is no evidence of any fistula at this point.  His current abscess could be a pilonidal abscess or could be a isolated gluteal abscess.  Without seeing it prior to the development of the infection is difficult to say.  He certainly has anatomy that will be prone to pilonidal disease.    Culture taken today.  Finish antibiotics  Would defer any pilonidal surgery to assessing it went into his resolved infection for this being a true etiology/waiting for any recurrent symptomatic disease to develop.  Would also not want to engage in open wound care if he needs another round of oral steroids or starting a  new biologic medication.    25 minutes spent in evaluation discussion of plan of disease and Crohn's.    Solomon Arciniega MD on 1/25/2023 at 10:01 AM

## 2023-01-27 ENCOUNTER — TELEPHONE (OUTPATIENT)
Dept: PHARMACY | Facility: CLINIC | Age: 48
End: 2023-01-27
Payer: COMMERCIAL

## 2023-01-27 ENCOUNTER — PATIENT OUTREACH (OUTPATIENT)
Dept: GASTROENTEROLOGY | Facility: CLINIC | Age: 48
End: 2023-01-27
Payer: COMMERCIAL

## 2023-01-27 ENCOUNTER — TELEPHONE (OUTPATIENT)
Dept: FAMILY MEDICINE | Facility: OTHER | Age: 48
End: 2023-01-27
Payer: COMMERCIAL

## 2023-01-27 ENCOUNTER — TELEPHONE (OUTPATIENT)
Dept: GASTROENTEROLOGY | Facility: CLINIC | Age: 48
End: 2023-01-27
Payer: COMMERCIAL

## 2023-01-27 ENCOUNTER — DOCUMENTATION ONLY (OUTPATIENT)
Dept: GASTROENTEROLOGY | Facility: CLINIC | Age: 48
End: 2023-01-27
Payer: COMMERCIAL

## 2023-01-27 ENCOUNTER — VIRTUAL VISIT (OUTPATIENT)
Dept: PHARMACY | Facility: CLINIC | Age: 48
End: 2023-01-27
Payer: COMMERCIAL

## 2023-01-27 ENCOUNTER — TELEPHONE (OUTPATIENT)
Dept: SURGERY | Facility: CLINIC | Age: 48
End: 2023-01-27
Payer: COMMERCIAL

## 2023-01-27 DIAGNOSIS — K52.9 COLITIS: ICD-10-CM

## 2023-01-27 DIAGNOSIS — K50.10 CROHN'S DISEASE OF LARGE INTESTINE WITHOUT COMPLICATION (H): Primary | ICD-10-CM

## 2023-01-27 DIAGNOSIS — A49.02 MRSA INFECTION: Primary | ICD-10-CM

## 2023-01-27 LAB — BACTERIA SPEC CULT: ABNORMAL

## 2023-01-27 PROCEDURE — 99207 PR NO CHARGE LOS: CPT | Performed by: PHARMACIST

## 2023-01-27 RX ORDER — CIPROFLOXACIN 500 MG/1
500 TABLET, FILM COATED ORAL 2 TIMES DAILY
Qty: 28 TABLET | Refills: 0 | Status: SHIPPED | OUTPATIENT
Start: 2023-01-27 | End: 2023-01-27 | Stop reason: ALTCHOICE

## 2023-01-27 RX ORDER — METHYLPREDNISOLONE SODIUM SUCCINATE 125 MG/2ML
125 INJECTION, POWDER, LYOPHILIZED, FOR SOLUTION INTRAMUSCULAR; INTRAVENOUS
Status: CANCELLED
Start: 2023-01-27

## 2023-01-27 RX ORDER — DIPHENHYDRAMINE HYDROCHLORIDE 50 MG/ML
50 INJECTION INTRAMUSCULAR; INTRAVENOUS
Status: CANCELLED
Start: 2023-01-27

## 2023-01-27 RX ORDER — EPINEPHRINE 1 MG/ML
0.3 INJECTION, SOLUTION, CONCENTRATE INTRAVENOUS EVERY 5 MIN PRN
Status: CANCELLED | OUTPATIENT
Start: 2023-01-27

## 2023-01-27 RX ORDER — OXYCODONE HYDROCHLORIDE 5 MG/1
5 TABLET ORAL EVERY 6 HOURS PRN
Qty: 15 TABLET | Refills: 0 | Status: SHIPPED | OUTPATIENT
Start: 2023-01-27 | End: 2023-01-31

## 2023-01-27 RX ORDER — ALBUTEROL SULFATE 90 UG/1
1-2 AEROSOL, METERED RESPIRATORY (INHALATION)
Status: CANCELLED
Start: 2023-01-27

## 2023-01-27 RX ORDER — DIPHENHYDRAMINE HCL 25 MG
25 CAPSULE ORAL ONCE
Status: CANCELLED
Start: 2023-02-22 | End: 2023-01-27

## 2023-01-27 RX ORDER — ACETAMINOPHEN 325 MG/1
650 TABLET ORAL ONCE
Status: CANCELLED
Start: 2023-02-22 | End: 2023-01-27

## 2023-01-27 RX ORDER — METRONIDAZOLE 500 MG/1
500 TABLET ORAL 2 TIMES DAILY
Qty: 28 TABLET | Refills: 0 | Status: SHIPPED | OUTPATIENT
Start: 2023-01-27 | End: 2023-01-27 | Stop reason: ALTCHOICE

## 2023-01-27 RX ORDER — MEPERIDINE HYDROCHLORIDE 25 MG/ML
25 INJECTION INTRAMUSCULAR; INTRAVENOUS; SUBCUTANEOUS EVERY 30 MIN PRN
Status: CANCELLED | OUTPATIENT
Start: 2023-01-27

## 2023-01-27 RX ORDER — SULFAMETHOXAZOLE/TRIMETHOPRIM 800-160 MG
2 TABLET ORAL 2 TIMES DAILY
Qty: 28 TABLET | Refills: 0 | Status: SHIPPED | OUTPATIENT
Start: 2023-01-27 | End: 2023-02-03

## 2023-01-27 RX ORDER — ALBUTEROL SULFATE 0.83 MG/ML
2.5 SOLUTION RESPIRATORY (INHALATION)
Status: CANCELLED | OUTPATIENT
Start: 2023-01-27

## 2023-01-27 NOTE — TELEPHONE ENCOUNTER
Pt states that he tested positive for the below and wants to know what he should do for medications. He stated that this is very, very, very important and needs you to get back to him asap. Call back number is     2+ stathylococcus aureus mrsa

## 2023-01-27 NOTE — TELEPHONE ENCOUNTER
Reason for call: Medication or medication refill    Name of medication requested: Oxicodone     Are you out of the medication? 5 pills left     What pharmacy do you use? [facility]    Preferred method for responding to this message: Telephone Call    Phone number patient can be reached at: Home number on file [phone] (home)    If we cannot reach you directly, may we leave a detailed response at the number you provided? Yes       He needs these medications today because he works 12 hour shifts till Wednesday and he will be out then and he has pain.    Dr. Arciniega is not in today.        Ira Harding on 1/27/2023 at 12:00 PM

## 2023-01-27 NOTE — TELEPHONE ENCOUNTER
Connected with Dr. Sanchez again who briefly spoke with the patient. Since Reggie is reporting more pain in the area, he will contact the patient to advise him to return to the ER. We will hold on cipro/flagyl for now and see if we can get him into CRS sooner.

## 2023-01-27 NOTE — TELEPHONE ENCOUNTER
Contacted Reggie per GI (Dr. Sanchez) request, I offered him 3 sooner appointments than his current scheduled appointment with Sepideh Pompa NP for questioned pilonidal abscess vs isolated gluteal abscess. He again declined a sooner visit than 2/13 due to his work schedule    Updated Oswaldo HERRERA with GI.    Eda Conteh, RN BSN  [facility] Colon Rectal Surgery  [phone]

## 2023-01-27 NOTE — TELEPHONE ENCOUNTER
Patient is requesting refill on pain medications due to several upcoming 12 hour work shifts and increase in pain. Has upcoming GI procedure on 2/13/23. Last fill date of oxycodone was 1/11/23 with 40 tablets. Patient's last clinic appointment was 1/11/23 with Dr. Archuleta. Please advise if refill of medication appropriate.    Corinne R Thayer, RN on 1/27/2023 at 2:44 PM

## 2023-01-27 NOTE — TELEPHONE ENCOUNTER
Dr. Sanchez requested we try and get the patient in sooner to see colorectal due to ER visit today. Writer reached out to colorectal, who offered the patient four more appointment slots that were sooner than 2/13. Patient declined due to his work schedule.   Writer spoke with patient and confirmed the MRI pelvis is scheduled for 2/7.  Updated MD.   This pt is seen in consultation at the request of Sammi Rosado M.D.  Pt here today for her Hypokalemia and high Blood pressure.   reports that pt's BP is all over.  Sometimes very high and very low.  Visit Vitals  /90   Pulse 68   Wt 39.5 kg   BMI 15.91 kg/m²     Social History     Tobacco Use   Smoking Status Former Smoker   • Packs/day: 1.00   • Years: 30.00   • Pack years: 30.00   • Types: Cigarettes   • Last attempt to quit: 1999   • Years since quittin.8   Smokeless Tobacco Never Used     Denies known Latex allergy or symptoms of Latex sensitivity.  Medications reviewed and updated.

## 2023-01-27 NOTE — TELEPHONE ENCOUNTER
After talking to Dr. Sanchez's Pharmacist.  Dr. Sanchez called patient and advised him to go to the ER.    Rosy Richardson LPN .......  1/27/2023  4:13 PM

## 2023-01-27 NOTE — PROGRESS NOTES
Printed MRE and MRI order and faxed to [facility] at [phone].    Printed lab orders that included and faxed to [facility] at [phone]  - vitamin D  -vitamin b12  -tsh  -tissue transglu  -thiopurine  -quantiferon  -IGA  -hep c  -hep b-antigen, antibody, and core  -hepatic  -folate  -ferritin  -ESR  -CRP  -CBC  -BMP

## 2023-01-27 NOTE — TELEPHONE ENCOUNTER
Dr. Solomon Arciniega drained an abscess on 01/25/23.  The results show positive for staph infection.  Dr. Arciniega is not in today.  Patient would like to know if there is something that should be prescribed for the infection.    Fabiana Russell on 1/27/2023 at 11:41 AM'

## 2023-01-27 NOTE — PATIENT INSTRUCTIONS
Recommendations from today's disease management visit:                                                      1. Christiana to follow-up on Entocort / discuss with Dr. Sanchez   -- Discussed directly with Dr. Sanchez after our appointment. Updates:    - ciprofloxacin 500 mg by mouth twice daily x 14 days    - metronidazole 500 mg by mouth twice daily x 14 days    - start Entocort     - prioritize MRI pelvis    - start prior authorization for infliximab but do NOT proceed until we have imaging/discussion with colorectal      -- Update: I spoke with [facility]. Budesonide is not covered. I checked the formulary online, and the only thing on formulary would be prednisone. Message sent to Dr. Sanchez. We will plan to pursue a prior authorization for budesonide.    2. Reggie to get labs and continue with plan as directed by Dr. Sanchez    -labs  -MRE   -MRI pelvis  -colorectal referral  -MTM referral  -oncology referral  -likely colonoscopy in 6-9 months pending treatment decision for mucosal healing  -dicylcomine BID PRN  -Entocort 9 mg daily x 2 months, then 6 mg daily x 2 weeks, then 3 mg daily x 2 weeks and then stop    Follow-up:    -- will follow-up with Reggie via [employer] on today's items   -- as needed re: medication questions   -- future IBD health maintenance review if pursuing immunosuppression    To schedule another MTM appointment, please call the clinic directly or you may call the MTM scheduling line at [phone] or toll-free at [phone].     My Clinical Pharmacist's contact information:                                                      Please feel free to contact me with any questions or concerns you have.      Christiana RonquilloD, BCACP  MTM Pharmacist   [facility]   Phone: [phone]

## 2023-01-27 NOTE — PROGRESS NOTES
Disease State Management Encounter:                          Dennis Goodell is a 47 year old male contacted via secure video for for an initial visit. He was referred to me from Dr. Sanchez.      Reggie is not seen for a comprehensive medication review today due to: coverage.    Reason for Visit: infliximab education    Assessment/Discussion:   - Medication Adherence/Access: See below for considerations    Reggie would benefit from continuing with plan as outlined by Dr. Sanchez, which was reviewed today. Reviewed goal of therapy for Crohn's and place in therapy for immunosuppression. Discussed infliximab therapy today including general dosing/administration, coverage process, nature of biologic medication, side effects (both common/serious), precautions, monitoring for safety and efficacy. Discussed potential need to hold medication in the setting of active signs/symptoms of infection and caution with LIVE vaccines unless specifically indicated. Pre-biologic labs have not yet been completed, but are ordered. Encouraged him to get these, as well as the other orders Dr. Sanchez placed. Health maintenance was not reviewed today due to time/amount of information given. Per patient preference, I will confirm that Dr. Sanchez would still like him to start budesonide and then figure out if this needed a prior authorization, or why he was unable to get this.    Plan:                            1. Christiana to follow-up on Entocort / discuss with Dr. Sanchez   -- Discussed directly with Dr. Sanchez after our appointment.    - ciprofloxacin 500 mg by mouth twice daily x 14 days    - metronidazole 500 mg by mouth twice daily x 14 days    - start Entocort     - prioritize MRI pelvis    - start authorization for infliximab but do NOT proceed until we have imaging/discussion with colorectal      -- Update: I spoke with Albina. Budesonide is not covered. I checked the formulary online, and the only thing on formulary would be  prednisone. Message sent to Dr. Sanchez, and we will proceed with a PA for budesonide.    2. Reggie to get labs and continue with plan as directed by Dr. Sanchez    -labs  -MRE   -MRI pelvis  -colorectal referral  -MTM referral  -oncology referral  -likely colonoscopy in 6-9 months pending treatment decision for mucosal healing  -dicylcomine BID PRN  -Entocort 9 mg daily x 2 months, then 6 mg daily x 2 weeks, then 3 mg daily x 2 weeks and then stop    Follow-up:    -- will follow-up with Reggie via [employer] on today's items   -- as needed re: medication questions    -----------------    Subjective/Objective:    Notes he is coming off the night shift, so kind of tired. I asked if him if he had any specific questions about infliximab (Remicade), and he stated he had no idea what I was talking about. He was recently seen by Dr. 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 He did get dicyclomine, but not Entocort as prescribed by Dr. Sanchez.     He notes he was seen in the ED after that appointment on 1/22. He notes his cyst was drained and this one had much more come out of it. Notes he continues to need Maxi Pads to capture drainage. He was given five days of Augmentin, which he has since finished. He was initially unsure what I was talking about, but then states he finished the antibiotics this morning. Does take Florajen. Would be interested in getting infusions at [facility].  ----------------    I spent 37 minutes with this patient today. All changes were made via verbal approval with Dr. Sanchez. A copy of the visit note was provided to the patient's provider(s).    A summary of these recommendations was sent via [employer].    Christiana RonquilloD, BCACP  MTM Pharmacist   [facility]   Phone: [phone]     Medication Therapy Recommendations  Crohn's disease of large intestine without complication (H)    Current Medication:  budesonide (ENTOCORT EC) 3 MG EC capsule   Rationale: Medication product not available - Adherence - Adherence   Recommendation: Provide Adherence Intervention   Status: Resolved Med Access Issue   Note: Will pursue PA for budesonide

## 2023-01-27 NOTE — TELEPHONE ENCOUNTER
Contacted [facility] and [facility] to gain information regarding labs, imaging and infusions.     Transferred to infusion center who provided the main individual (Kim Schrader) in prior authorization phone and fax number.  Phone [phone]  Fax [phone]  Called and left voicemail for Kim.  Ordered Infliximab therapy plan.     Contacted main number again and was transferred to [facility]. Fax number provided for MRI and MRE orders to be sent. Fax: [phone].  Sent [employer] to Ms. Morales requesting MRE and MRI Pelvis orders to be faxed.     Contacted the lab to inquire if orders need to be faxed or if they can access the. Lab personnel was not sure--Sent inbasket and requested Ms. Morales fax orders to [phone].     Attempted to contact the patient to provide update. No answer left detailed message with information on next steps. Requested patient notify GI clinic via [employer] once labs and imaging has been scheduled.

## 2023-01-27 NOTE — PROGRESS NOTES
Contacted by MTM.    Pt had another abscess - seen in ER and drained by general surgery earlier this week. Given 5 days of augmentin.    Culture returns MRSA.    Per surgery note questioned pilonidal abscess vs isolated gluteal abscess. Fistula was not seen.    Not clear to me at this time if this abscess is related to CD though given his recent history we have to have a high suspicion for this.    Plan:  -Given worsening pain I recommend he go to local ED for evaluation. They can direct need for further intervention and abx if needed  -get MRI pelvis   -we will see if we can move up colorectal clinic visit  -hold on cipro/flagyl for now given sensitivities of recent drainage and plan for ER evaluation  -once we sort out acute zane-anal/gluteal issues he will need infliximab    Pt understands and agrees to the plan    Helder Sanchez MD

## 2023-01-27 NOTE — TELEPHONE ENCOUNTER
Prior Authorization Retail Medication Request    Medication/Dose: budesonide 3 mg capsules -- Take 3 capsules (9 mg) by mouth every morning X 2 months. Then take 2 capsules PO q day x 2 weeks, then 1 capsule PO q day x 2 weeks and stop   ICD code (if different than what is on RX):    Previously Tried and Failed:    Prednisone    Rationale:    Reggie has Crohn's colitis and possible zane-anal CD with zane-anal abscess. He was recently on prednisone, but has had continued cyst development requiring draining and antibiotic therapy. We would prefer to use a more selective corticosteroid such as budesonide given less risk of systemic effects compared to prednisone.     Insurance Name:    Insurance ID:        Pharmacy Information (if different than what is on RX)  Name:    Phone:

## 2023-01-27 NOTE — TELEPHONE ENCOUNTER
Central Prior Authorization Team   Phone: [phone]      PA Initiation    Medication: Budesonide 3 mg-PA initiated  Insurance Company: [facility] - Phone [phone] Fax [phone]  Pharmacy Filling the Rx: [id number] - [place], [place] - [facility] [address]  Filling Pharmacy Phone: [phone]  Filling Pharmacy Fax:    Start Date: 1/27/2023

## 2023-01-27 NOTE — TELEPHONE ENCOUNTER
I spoke with Dr. Sanchez. Reggie can hold off on cipro/flagyl for now until he hears from us.     Dr. Sanchez would like to speak with Dr. Arciniega (or covering provider) directly regarding plan for MRSA infection. I contacted Rosy at Dr. Archuleta's office, as I see she has also been contacted and spoke with the patient today. She will connect with surgery and see if we can get this information.

## 2023-01-28 NOTE — TELEPHONE ENCOUNTER
Prior Authorization Approval    Authorization Effective Date: 1/27/2023  Authorization Expiration Date: 1/26/2024  Medication: Budesonide 3 mg-PA approved  Approved Dose/Quantity:   Reference #:     Insurance Company: [employer] - Phone [phone] Fax [phone]  Expected CoPay:       CoPay Card Available:      Foundation Assistance Needed:    Which Pharmacy is filling the prescription (Not needed for infusion/clinic administered): [id number] - [address]  Pharmacy Notified: Yes-left voicemail  Patient Notified: Yes-left voicemail

## 2023-01-30 DIAGNOSIS — K52.9 COLITIS: ICD-10-CM

## 2023-01-30 NOTE — TELEPHONE ENCOUNTER
Vee Yu  [facility] 2 days ago     DALE  This PA has been approved. Pharmacy was notified to fill the Rx and notify patient when it is ready for . I also left a voicemail for patient that he can fill this. You can close the encounter. Thank you.   Vee

## 2023-01-31 ENCOUNTER — OFFICE VISIT (OUTPATIENT)
Dept: SURGERY | Facility: OTHER | Age: 48
End: 2023-01-31
Attending: SURGERY
Payer: COMMERCIAL

## 2023-01-31 ENCOUNTER — TELEPHONE (OUTPATIENT)
Dept: GASTROENTEROLOGY | Facility: CLINIC | Age: 48
End: 2023-01-31

## 2023-01-31 ENCOUNTER — CARE COORDINATION (OUTPATIENT)
Dept: GASTROENTEROLOGY | Facility: CLINIC | Age: 48
End: 2023-01-31

## 2023-01-31 VITALS
BODY MASS INDEX: 22.73 KG/M2 | HEART RATE: 87 BPM | TEMPERATURE: 97.8 F | OXYGEN SATURATION: 100 % | RESPIRATION RATE: 18 BRPM | WEIGHT: 163 LBS

## 2023-01-31 DIAGNOSIS — K50.10 CROHN'S DISEASE OF LARGE INTESTINE WITHOUT COMPLICATION (H): Primary | ICD-10-CM

## 2023-01-31 DIAGNOSIS — R79.89 ELEVATED TSH: ICD-10-CM

## 2023-01-31 DIAGNOSIS — K50.10 CROHN'S DISEASE OF LARGE INTESTINE WITHOUT COMPLICATION (H): ICD-10-CM

## 2023-01-31 DIAGNOSIS — R79.89 ELEVATED TSH: Primary | ICD-10-CM

## 2023-01-31 DIAGNOSIS — L02.31 GLUTEAL ABSCESS: ICD-10-CM

## 2023-01-31 DIAGNOSIS — Z22.322 MRSA (METHICILLIN RESISTANT STAPH AUREUS) CULTURE POSITIVE: Primary | ICD-10-CM

## 2023-01-31 DIAGNOSIS — K52.9 COLITIS: ICD-10-CM

## 2023-01-31 LAB
ALBUMIN SERPL BCG-MCNC: 4.5 G/DL (ref 3.5–5.2)
ALP SERPL-CCNC: 74 U/L (ref 40–129)
ALT SERPL W P-5'-P-CCNC: 21 U/L (ref 10–50)
ANION GAP SERPL CALCULATED.3IONS-SCNC: 10 MMOL/L (ref 7–15)
AST SERPL W P-5'-P-CCNC: 23 U/L (ref 10–50)
BASOPHILS # BLD AUTO: 0.1 10E3/UL (ref 0–0.2)
BASOPHILS NFR BLD AUTO: 1 %
BILIRUB DIRECT SERPL-MCNC: <0.2 MG/DL (ref 0–0.3)
BILIRUB SERPL-MCNC: <0.2 MG/DL
BUN SERPL-MCNC: 13.3 MG/DL (ref 6–20)
CALCIUM SERPL-MCNC: 8.8 MG/DL (ref 8.6–10)
CHLORIDE SERPL-SCNC: 100 MMOL/L (ref 98–107)
CREAT SERPL-MCNC: 1.01 MG/DL (ref 0.67–1.17)
CRP SERPL-MCNC: <3 MG/L
DEPRECATED HCO3 PLAS-SCNC: 27 MMOL/L (ref 22–29)
EOSINOPHIL # BLD AUTO: 0.2 10E3/UL (ref 0–0.7)
EOSINOPHIL NFR BLD AUTO: 3 %
ERYTHROCYTE [DISTWIDTH] IN BLOOD BY AUTOMATED COUNT: 12.6 % (ref 10–15)
ERYTHROCYTE [SEDIMENTATION RATE] IN BLOOD BY WESTERGREN METHOD: 4 MM/HR (ref 0–15)
FERRITIN SERPL-MCNC: 51 NG/ML (ref 31–409)
FOLATE SERPL-MCNC: >20 NG/ML (ref 4.6–34.8)
GFR SERPL CREATININE-BSD FRML MDRD: >90 ML/MIN/1.73M2
GLUCOSE SERPL-MCNC: 111 MG/DL (ref 70–99)
HCT VFR BLD AUTO: 42.4 % (ref 40–53)
HGB BLD-MCNC: 14.5 G/DL (ref 13.3–17.7)
IMM GRANULOCYTES # BLD: 0.1 10E3/UL
IMM GRANULOCYTES NFR BLD: 1 %
LYMPHOCYTES # BLD AUTO: 2.2 10E3/UL (ref 0.8–5.3)
LYMPHOCYTES NFR BLD AUTO: 36 %
MCH RBC QN AUTO: 29.4 PG (ref 26.5–33)
MCHC RBC AUTO-ENTMCNC: 34.2 G/DL (ref 31.5–36.5)
MCV RBC AUTO: 86 FL (ref 78–100)
MONOCYTES # BLD AUTO: 0.7 10E3/UL (ref 0–1.3)
MONOCYTES NFR BLD AUTO: 12 %
NEUTROPHILS # BLD AUTO: 2.9 10E3/UL (ref 1.6–8.3)
NEUTROPHILS NFR BLD AUTO: 47 %
NRBC # BLD AUTO: 0 10E3/UL
NRBC BLD AUTO-RTO: 0 /100
PLATELET # BLD AUTO: 310 10E3/UL (ref 150–450)
POTASSIUM SERPL-SCNC: 4.2 MMOL/L (ref 3.4–5.3)
PROT SERPL-MCNC: 6.9 G/DL (ref 6.4–8.3)
RBC # BLD AUTO: 4.94 10E6/UL (ref 4.4–5.9)
SODIUM SERPL-SCNC: 137 MMOL/L (ref 136–145)
T4 FREE SERPL-MCNC: 0.96 NG/DL (ref 0.9–1.7)
TSH SERPL DL<=0.005 MIU/L-ACNC: 5.61 UIU/ML (ref 0.3–4.2)
VIT B12 SERPL-MCNC: 409 PG/ML (ref 232–1245)
WBC # BLD AUTO: 6.1 10E3/UL (ref 4–11)

## 2023-01-31 PROCEDURE — 84433 ASY THIOPURIN S-MTHYLTRNSFRS: CPT | Mod: ZL | Performed by: SURGERY

## 2023-01-31 PROCEDURE — 84443 ASSAY THYROID STIM HORMONE: CPT | Mod: ZL | Performed by: SURGERY

## 2023-01-31 PROCEDURE — 80053 COMPREHEN METABOLIC PANEL: CPT | Mod: ZL | Performed by: SURGERY

## 2023-01-31 PROCEDURE — 87340 HEPATITIS B SURFACE AG IA: CPT | Mod: ZL | Performed by: SURGERY

## 2023-01-31 PROCEDURE — 82746 ASSAY OF FOLIC ACID SERUM: CPT | Mod: ZL | Performed by: SURGERY

## 2023-01-31 PROCEDURE — 85025 COMPLETE CBC W/AUTO DIFF WBC: CPT | Mod: ZL | Performed by: SURGERY

## 2023-01-31 PROCEDURE — 85652 RBC SED RATE AUTOMATED: CPT | Mod: ZL | Performed by: SURGERY

## 2023-01-31 PROCEDURE — 82306 VITAMIN D 25 HYDROXY: CPT | Mod: ZL | Performed by: SURGERY

## 2023-01-31 PROCEDURE — 82607 VITAMIN B-12: CPT | Mod: ZL | Performed by: SURGERY

## 2023-01-31 PROCEDURE — 86803 HEPATITIS C AB TEST: CPT | Mod: ZL | Performed by: SURGERY

## 2023-01-31 PROCEDURE — 86481 TB AG RESPONSE T-CELL SUSP: CPT | Mod: ZL | Performed by: SURGERY

## 2023-01-31 PROCEDURE — 86364 TISS TRNSGLTMNASE EA IG CLAS: CPT | Mod: ZL | Performed by: SURGERY

## 2023-01-31 PROCEDURE — 86140 C-REACTIVE PROTEIN: CPT | Mod: ZL | Performed by: SURGERY

## 2023-01-31 PROCEDURE — 99213 OFFICE O/P EST LOW 20 MIN: CPT | Performed by: SURGERY

## 2023-01-31 PROCEDURE — 86704 HEP B CORE ANTIBODY TOTAL: CPT | Mod: ZL | Performed by: SURGERY

## 2023-01-31 PROCEDURE — 86706 HEP B SURFACE ANTIBODY: CPT | Mod: ZL | Performed by: SURGERY

## 2023-01-31 PROCEDURE — 82310 ASSAY OF CALCIUM: CPT | Mod: ZL | Performed by: SURGERY

## 2023-01-31 PROCEDURE — 82728 ASSAY OF FERRITIN: CPT | Mod: ZL | Performed by: SURGERY

## 2023-01-31 PROCEDURE — 82784 ASSAY IGA/IGD/IGG/IGM EACH: CPT | Mod: ZL | Performed by: SURGERY

## 2023-01-31 PROCEDURE — 84439 ASSAY OF FREE THYROXINE: CPT | Mod: ZL | Performed by: SURGERY

## 2023-01-31 PROCEDURE — 36415 COLL VENOUS BLD VENIPUNCTURE: CPT | Mod: ZL | Performed by: SURGERY

## 2023-01-31 PROCEDURE — G0463 HOSPITAL OUTPT CLINIC VISIT: HCPCS

## 2023-01-31 RX ORDER — OXYCODONE HYDROCHLORIDE 5 MG/1
5 TABLET ORAL EVERY 6 HOURS PRN
Qty: 15 TABLET | Refills: 0 | Status: SHIPPED | OUTPATIENT
Start: 2023-01-31 | End: 2023-04-18

## 2023-01-31 ASSESSMENT — PAIN SCALES - GENERAL: PAINLEVEL: SEVERE PAIN (7)

## 2023-01-31 NOTE — NURSING NOTE
"Chief Complaint   Patient presents with     Clinic Care Coordination - Follow-up     Here today to follow up with a abscess on the left buttocks.       Initial Pulse 87   Temp 97.8  F (36.6  C) (Tympanic)   Resp 18   Wt 73.9 kg (163 lb)   SpO2 100%   BMI 22.73 kg/m   Estimated body mass index is 22.73 kg/m  as calculated from the following:    Height as of 1/22/23: 1.803 m (5' 11\").    Weight as of this encounter: 73.9 kg (163 lb).  Medication Reconciliation: complete    Cait Arvizu LPN  "

## 2023-01-31 NOTE — TELEPHONE ENCOUNTER
Contacted [facility] and [facility] radiology department to inquire if MRE had been scheduled yet. Updated by scheduling that it was determined a PA will not be required and the patient will be called to schedule within the next couple days.     Attempted to contact Kim Schrader in [facility] prior authorizations to ask what is needed to initiate PA. No answer, left detailed message requesting callback. Sent inbasket message to Ms. Morales to send therapy plan, standing labs, and most recent office visit note to Kim's fax at [phone].

## 2023-01-31 NOTE — TELEPHONE ENCOUNTER
Receive call from Unit [address] Check in and the patient requesting a refill on his pain medication. Will route to provider to review for consideration.     Disp Refills Start End JAMIE   oxyCODONE (ROXICODONE) 5 MG tablet 15 tablet 0 1/27/2023  No   Sig - Route: Take 1 tablet (5 mg) by mouth every 6 hours as needed for severe pain (7-10) - Oral   Sent to pharmacy as: oxyCODONE HCl 5 MG Oral Tablet (ROXICODONE)   Class: E-Prescribe   Earliest Fill Date: 1/27/2023 01/27/23 1708 James Romero MD Justine Pangrac, JAVIER  ....................  1/31/2023   2:23 PM

## 2023-01-31 NOTE — PROGRESS NOTES
Patient presents for post follow-up evaluation for gluteal abscess.  Patient notes pain is better.  Packing is fallen out.  No drainage.  No fevers.  No diarrhea.  No abdominal pain.  Able to return to work without any limitations.  Patient was using oxycodone for pain last week.  It appears that GI is possibly going to start a biologic medication now.    Had gotten anxious after googling MRSA.      Pulse 87   Temp 97.8  F (36.6  C) (Tympanic)   Resp 18   Wt 73.9 kg (163 lb)   SpO2 100%   BMI 22.73 kg/m      General: NAD, pleasant and cooperative with exam and interview.  Rectal: His incision and drainage site from the ED drainage is approximated.  There is still some scarring and induration.  There is no erythema.  There is no tenderness.  Now the swelling and erythema resolved there is no evidence of any pilonidal disease.  There is no fibrous track to appreciate a connection to the anus.  Psychiatry: awake, alert and oriented. Appropriate affect.    Assessment/Plan:  47-year-old male with recent diagnosis of Crohn's disease.  Now has gluteal abscess.  This is healed appropriately.  He is on appropriate coverage for his MRSA with Septra.  Doing well now.      No evidence of fistula    No evidence of pilonidal disease    Would finish out course of sulfa-based antibiotic coverage    Follow-up as needed, likely no reason to travel to see colorectal at this point.  Would reserve this for a actual fistula of the perianal region.    Will get GI labs drawn today.    Solomon Arciniega MD on 1/31/2023 at 2:49 PM

## 2023-02-02 ENCOUNTER — TELEPHONE (OUTPATIENT)
Dept: GASTROENTEROLOGY | Facility: CLINIC | Age: 48
End: 2023-02-02
Payer: COMMERCIAL

## 2023-02-02 LAB
DEPRECATED CALCIDIOL+CALCIFEROL SERPL-MC: 13 UG/L (ref 20–75)
GAMMA INTERFERON BACKGROUND BLD IA-ACNC: 0.02 IU/ML
HBV CORE AB SERPL QL IA: NONREACTIVE
HBV SURFACE AG SERPL QL IA: NONREACTIVE
HCV AB SERPL QL IA: NONREACTIVE
IGA SERPL-MCNC: 203 MG/DL (ref 84–499)
M TB IFN-G BLD-IMP: NEGATIVE
M TB IFN-G CD4+ BCKGRND COR BLD-ACNC: 9.98 IU/ML
MITOGEN IGNF BCKGRD COR BLD-ACNC: 0.02 IU/ML
MITOGEN IGNF BCKGRD COR BLD-ACNC: 0.03 IU/ML
QUANTIFERON MITOGEN: 10 IU/ML
QUANTIFERON NIL TUBE: 0.02 IU/ML
QUANTIFERON TB1 TUBE: 0.04 IU/ML
QUANTIFERON TB2 TUBE: 0.05
TTG IGA SER-ACNC: 0.6 U/ML
TTG IGG SER-ACNC: <0.6 U/ML

## 2023-02-02 NOTE — TELEPHONE ENCOUNTER
Received call from patient asking if he still needed to attend the colorectal appointment on 2/13 with Sepideh. Writer instructed him to attend. Patient also asked about doing a virtual instead, writer updated him that there would be a physical exam as well so he needed to be here in-person.    Confirmed with the patient that the MRE is now also scheduled for 2/14. MRI pelvis 2/7.    Patient also reported that [facility] had gotten in touch with him and notified him that the Infliximab infusions have been approved. The three induction doses scheduled: 2/22, 3/10, 3/23.     Writer contacting [facility] to confirm PA approval.

## 2023-02-03 ENCOUNTER — CARE COORDINATION (OUTPATIENT)
Dept: GASTROENTEROLOGY | Facility: CLINIC | Age: 48
End: 2023-02-03
Payer: COMMERCIAL

## 2023-02-03 DIAGNOSIS — E55.9 VITAMIN D DEFICIENCY: Primary | ICD-10-CM

## 2023-02-03 LAB
HBV SURFACE AB SERPL IA-ACNC: 8.45 M[IU]/ML
HBV SURFACE AB SERPL IA-ACNC: NORMAL M[IU]/ML

## 2023-02-03 RX ORDER — ERGOCALCIFEROL 1.25 MG/1
50000 CAPSULE, LIQUID FILLED ORAL WEEKLY
Qty: 12 CAPSULE | Refills: 0 | Status: SHIPPED | OUTPATIENT
Start: 2023-02-03 | End: 2023-04-18

## 2023-02-03 NOTE — PROGRESS NOTES
Dr. Robby Jasmine, medical oncologis, has reviewed Reggie' case and history of testicular cancer.  He has communicated with Dr. Sanchez, Gastroenterologist, that he is clear to receive infliximab or other immunosuppressant as appropriate for his Crohn's disease.  I reached out to Reggie to let him know this as well.  No need for consultation at this time with medical oncology.  I will close out the referral.

## 2023-02-04 LAB — TPMT BLD-CCNC: 29.2 U/ML

## 2023-02-06 NOTE — PROGRESS NOTES
Labs returned and are scanned into chart. Nurse informed.     Imaging was returned. By chart Dr. Sanchez informed patient already.

## 2023-02-07 ENCOUNTER — HOSPITAL ENCOUNTER (OUTPATIENT)
Dept: MRI IMAGING | Facility: OTHER | Age: 48
Discharge: HOME | End: 2023-02-07
Attending: INTERNAL MEDICINE | Admitting: INTERNAL MEDICINE
Payer: COMMERCIAL

## 2023-02-07 ENCOUNTER — TRANSFERRED RECORDS (OUTPATIENT)
Dept: HEALTH INFORMATION MANAGEMENT | Facility: CLINIC | Age: 48
End: 2023-02-07

## 2023-02-07 ENCOUNTER — MYC MEDICAL ADVICE (OUTPATIENT)
Dept: SURGERY | Facility: CLINIC | Age: 48
End: 2023-02-07
Payer: COMMERCIAL

## 2023-02-07 DIAGNOSIS — K50.10 CROHN'S DISEASE OF LARGE INTESTINE WITHOUT COMPLICATION (H): ICD-10-CM

## 2023-02-07 PROCEDURE — 255N000002 HC RX 255 OP 636: Performed by: INTERNAL MEDICINE

## 2023-02-07 PROCEDURE — A9575 INJ GADOTERATE MEGLUMI 0.1ML: HCPCS | Performed by: INTERNAL MEDICINE

## 2023-02-07 PROCEDURE — 72197 MRI PELVIS W/O & W/DYE: CPT

## 2023-02-07 RX ADMIN — GADOTERATE MEGLUMINE 16 ML: 376.9 INJECTION INTRAVENOUS at 16:51

## 2023-02-10 ENCOUNTER — TELEPHONE (OUTPATIENT)
Dept: SURGERY | Facility: OTHER | Age: 48
End: 2023-02-10
Payer: COMMERCIAL

## 2023-02-10 ENCOUNTER — TELEPHONE (OUTPATIENT)
Dept: GENERAL RADIOLOGY | Facility: OTHER | Age: 48
End: 2023-02-10
Payer: COMMERCIAL

## 2023-02-10 NOTE — TELEPHONE ENCOUNTER
SPO-patient stated that he is looking for a medication before he does an MRI    Please call and advise  Thank You      Franny Jones on 2/10/2023 at 1:41 PM

## 2023-02-10 NOTE — TELEPHONE ENCOUNTER
MRI ENTEROGRAM  Procedure name: MRI Enterogram  Procedure date verified: Yes, 02/14/2023.  Arrival time verified: 0930 AM   Facility location verified: [address] [facility], [place], [place]. Instructed to enter through main clinic entrance, wear a mask, and proceed to Diagnostic Registration.  Plan to be here for approximately 2-2.5 hours. Explain current visitor policy to patient.    No COVID test required for this procedure.     No labs required for this procedure.    Do you have any history of kidney disease? No If so, notify MRI tech (some contrasts affect kidney function).    Any insulin or oral antihyperglycemic meds? No. If so, hold according to policy day of procedure (typically hold any AM diabetic meds r/t NPO status).    Inform patient someone will contact them to review an MRI safety checklist. This is in regards to any metallic items in their body.     Nothing to eat or drink after midnight.. (8 hours prior to start of procedure.)  Small sip of water with pills is ok. It is ok to take any anti-emetic medications.     On the day of the imaging test, you will arrive and begin drinking contrast solution. An IV will be placed. You will be given a medication to relax the bowel. It can make you feel sick to your stomach. We will give the medication very slowly to help minimize nausea. You will be assisted into the MRI suite where you will lie down. The MRI staff will begin to image your bowel. The imaging usually takes about 30 minutes. After the procedure, we will remove the IV, and we will give you discharge instructions. We want you to drink lots of extra water after your procedure and avoid sugary beverages for the rest of the day. Do not exercise or perform strenuous activity for 4 hours because you may overheat. The doctor who ordered your study will contact you with results. Results are usually ready within 1-2 days. We'd like you to have a  after the procedure since you may not feel well.      Instructions given: expectations for procedure, expectations for recovery, dietary restrictions, and .  Pre-procedure teaching completed: Yes  Method: verbal per phone.  People present for teaching: Patient  Response to teaching: patient demonstrates understanding of procedure, recovery, dietary restrictions, and .  Transportation after procedure: will arrange transport.  Any questions or patient concerns? Yes, all questions answered.

## 2023-02-13 ENCOUNTER — PRE VISIT (OUTPATIENT)
Dept: SURGERY | Facility: CLINIC | Age: 48
End: 2023-02-13

## 2023-02-13 ENCOUNTER — OFFICE VISIT (OUTPATIENT)
Dept: SURGERY | Facility: CLINIC | Age: 48
End: 2023-02-13
Payer: COMMERCIAL

## 2023-02-13 VITALS
HEART RATE: 85 BPM | DIASTOLIC BLOOD PRESSURE: 78 MMHG | BODY MASS INDEX: 22.69 KG/M2 | SYSTOLIC BLOOD PRESSURE: 116 MMHG | OXYGEN SATURATION: 99 % | HEIGHT: 71 IN | WEIGHT: 162.1 LBS

## 2023-02-13 DIAGNOSIS — K50.10 CROHN'S DISEASE OF LARGE INTESTINE WITHOUT COMPLICATION (H): ICD-10-CM

## 2023-02-13 DIAGNOSIS — R11.0 NAUSEA: Primary | ICD-10-CM

## 2023-02-13 DIAGNOSIS — K61.0 PERIANAL ABSCESS: Primary | ICD-10-CM

## 2023-02-13 PROCEDURE — 99213 OFFICE O/P EST LOW 20 MIN: CPT | Performed by: NURSE PRACTITIONER

## 2023-02-13 RX ORDER — ONDANSETRON 2 MG/ML
4 INJECTION INTRAMUSCULAR; INTRAVENOUS ONCE
Status: DISPENSED | OUTPATIENT
Start: 2023-02-13

## 2023-02-13 RX ORDER — ONDANSETRON 4 MG/1
4 TABLET, ORALLY DISINTEGRATING ORAL EVERY 8 HOURS PRN
Qty: 1 TABLET | Refills: 0 | Status: SHIPPED | OUTPATIENT
Start: 2023-02-13 | End: 2023-06-02

## 2023-02-13 ASSESSMENT — ENCOUNTER SYMPTOMS
INCREASED ENERGY: 1
JAUNDICE: 0
INSOMNIA: 1
FEVER: 0
BLOATING: 0
DOUBLE VISION: 0
PANIC: 0
HALLUCINATIONS: 0
VOMITING: 0
POLYPHAGIA: 0
DECREASED APPETITE: 1
CHILLS: 0
RECTAL PAIN: 1
NERVOUS/ANXIOUS: 1
HEARTBURN: 1
EYE REDNESS: 0
FATIGUE: 1
EYE WATERING: 0
BOWEL INCONTINENCE: 0
CONSTIPATION: 0
WEIGHT GAIN: 0
POLYDIPSIA: 0
NAUSEA: 0
WEIGHT LOSS: 1
DIARRHEA: 1
NIGHT SWEATS: 1
EYE IRRITATION: 0
EYE PAIN: 1
DECREASED CONCENTRATION: 1
ALTERED TEMPERATURE REGULATION: 1
ABDOMINAL PAIN: 1
DEPRESSION: 1
BLOOD IN STOOL: 0

## 2023-02-13 ASSESSMENT — PAIN SCALES - GENERAL: PAINLEVEL: SEVERE PAIN (6)

## 2023-02-13 NOTE — LETTER
"2023       RE: Dennis J Goodell  [address]   [place] [address]     Dear Colleague,    Thank you for referring your patient, Dennis J Goodell, to the [facility] COLON AND RECTAL SURGERY CLINIC [place] at [facility]. Please see a copy of my visit note below.    Colon and Rectal Surgery Consult Clinic Note    Date: 2023     Referring provider:  Helder Sanchez MD  [address] [place] [id number]     RE: Dennis J Goodell  : 1975  LISS: 2023    Dennis J Goodell is a very pleasant 47 year old male with Crohn's disease who presents today for perianal abscess.    HPI:  I&D of buttock abscess in January. Prior abscess I&D in December at a different location. He follows with Dr. Sanchez for his Crohn's disease and is currently on budesonide with plans to start Infliximab. He is having abdominal pain and nausea and some decreased appetite but has not followed up with Dr. Sanchez about this.    MR Pelvis at an outside institution 23:  IMPRESSION:    No residual perianal abscess is demonstrated. No findings to suggest a  perianal fistula.    Physical Examination: Exam was chaperoned by Lucio Hutchison, EMT-P   /78 (BP Location: Left arm, Patient Position: Sitting, Cuff Size: Adult Regular)   Pulse 85   Ht 5' 11\"   Wt 162 lb 1.6 oz   SpO2 99%   BMI 22.61 kg/m    General: alert, oriented, in no acute distress.  HEENT: mucous membranes moist  Perianal external examination:  Well healed scar to the left of the  cleft. No other abnormalities. No fluctuance or induration. No drainage or erythema. No evidence of external fistula.  Digital rectal examination: Was deferred    Anoscopy: Was deferred    Assessment/Plan: 47 year old male with Crohn's disease. He had two prior I&Ds of separate perianal abscesses. He had MRSA at that time. No evidence of fistula on outside MRI and well healed scar on exam today. We " discussed the possibility of a fistula and recurrent abscess. Since currently asymptomatic and normal exam, no further intervention needed but follow up with any recurrent symptoms. Continue to follow with Dr. Sanchez for Crohn's management. Patient's questions were answered to his stated satisfaction and he is in agreement with this plan.     Medical history:  Past Medical History:   Diagnosis Date     Anxiety disorder 2012     Dysthymic disorder     No Comments Provided     Malignant neoplasm of testis (H) 2005 2005,teratoma     Raynaud's syndrome without gangrene     No Comments Provided     Uncomplicated alcohol abuse     7/25/2012       Surgical history:  Past Surgical History:   Procedure Laterality Date     COLONOSCOPY N/A 12/12/2022    Procedure: COLONOSCOPY, WITH POLYPECTOMY AND BIOPSY;  Surgeon: Solomon Arciniega MD;  Location: [facility] OR     DECOMPRESSION CUBITAL TUNNEL Left 01/2019     HERNIA REPAIR      ,HERNIA REPAIR     IR CHEST PORT PLACEMENT > 5 YRS OF AGE Left 04/01/2008     LYMPH NODE BIOPSY  09/05/2008     [facility]. 37 lymph nodes excised     ORCHIECTOMY SCROTAL Right 12/30/2005    teratoma     OTHER SURGICAL HISTORY      9/05/08,[id number],OTHER     RELEASE CARPAL TUNNEL  04/09/2013       Problem list:    Patient Active Problem List    Diagnosis Date Noted     MRSA (methicillin resistant staph aureus) culture positive 01/31/2023     Priority: Medium     Crohn's disease of large intestine without complication (H) 12/23/2022     Priority: Medium     Colitis 11/18/2022     Priority: Medium     Leukocytosis 11/18/2022     Priority: Medium     Facet arthropathy, cervical 04/28/2021     Priority: Medium     Intractable chronic migraine without aura and with status migrainosus 04/28/2021     Priority: Medium     MARLEN (generalized anxiety disorder) 06/09/2020     Priority: Medium     Dysthymic disorder 02/09/2018     Priority: Medium     Raynaud phenomenon 02/09/2018     Priority: Medium      History of testicular cancer 08/12/2016     Priority: Medium     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/2008     Priority: Medium       Medications:  Current Outpatient Medications   Medication Sig Dispense Refill     albuterol (PROAIR HFA/PROVENTIL HFA/VENTOLIN HFA) 108 (90 Base) MCG/ACT inhaler Inhale 2 puffs into the lungs every 6 hours as needed for shortness of breath / dyspnea or wheezing       ALPRAZolam (XANAX) 1 MG tablet Take 1 mg by mouth 2 times daily as needed for anxiety       Aspirin-Acetaminophen-Caffeine (EXCEDRIN MIGRAINE PO)        bismuth subsalicylate (PEPTO BISMOL) 262 MG/15ML suspension Take 15 mLs by mouth every 6 hours as needed for indigestion       budesonide (ENTOCORT EC) 3 MG EC capsule Take 3 capsules (9 mg) by mouth every morning X 2 months. Then take 2 capsules PO q day x 2 weeks, then 1 capsule PO q day x 2 weeks and stop 270 capsule 3     calcium carbonate (TUMS) 500 MG chewable tablet Take 1 chew tab by mouth 4 times daily as needed for heartburn       clonazePAM (KLONOPIN) 1 MG tablet Take 1 mg by mouth 3 times daily       desvenlafaxine succinate (PRISTIQ) 100 MG 24 hr tablet Take 100 mg by mouth daily       dicyclomine (BENTYL) 10 MG capsule Take 2 capsules (20 mg) by mouth 2 times daily as needed (abdominal pain) 120 capsule 3     NIFEdipine ER OSMOTIC (PROCARDIA XL) 90 MG 24 hr tablet Take 1 tablet (90 mg) by mouth daily 90 tablet 4     omeprazole (PRILOSEC) 20 MG DR capsule Take 1 capsule (20 mg) by mouth daily 90 capsule 1     ondansetron (ZOFRAN ODT) 4 MG ODT tab Take 1 tablet (4 mg) by mouth every 8 hours as needed for nausea (MRI) 1 tablet 0     oxyCODONE (ROXICODONE) 5 MG tablet Take 1 tablet (5 mg) by mouth every 6 hours as needed for severe pain (7-10) 15  "tablet 0     Probiotic Product (FLORAJEN3) CAPS 15 billion live cultures per capsule, 1 cap po qd       psyllium (METAMUCIL/KONSYL) 58.6 % powder Take 36 g (2 Tablespoonful) by mouth daily       vitamin D2 (ERGOCALCIFEROL) 15994 units (1250 mcg) capsule Take 1 capsule (50,000 Units) by mouth once a week 12 capsule 0       Allergies:  No Known Allergies    Family history:  Family History   Problem Relation Age of Onset     Pulmonary Embolism Mother      Other - See Comments Daughter         recurrent OM     Other - See Comments Other         Suicidality,maternal uncle     No Known Problems Father        Social history:  Social History     Tobacco Use     Smoking status: Never     Smokeless tobacco: Current     Types: Chew     Tobacco comments:     1 tin lasts 1 week   Substance Use Topics     Alcohol use: Not Currently     Alcohol/week: 8.0 standard drinks     Comment: Quit Date: 8-1-2021    Marital status: .    Nursing Notes:   Lucio Hutchison, EMT  2/13/2023  2:26 PM  Signed  Chief Complaint   Patient presents with     New Patient     Crohns w/ pilonidal        Vitals:    02/13/23 1419 02/13/23 1422   BP:  116/78   BP Location:  Left arm   Patient Position:  Sitting   Cuff Size:  Adult Regular   Pulse:  85   SpO2:  99%   Weight:  162 lb 1.6 oz   Height: 5' 11\"        Body mass index is 22.61 kg/m .     Lucio Hutchison, EMT- P         30 minutes spent on the date of encounter (excluding time performing procedures) performing chart review, history and exam, documentation and further activities as noted above.    DONTRELL Torres, NP-C  Colon and Rectal Surgery   [facility]    This note was created using speech recognition software and may contain unintended word substitutions.  "

## 2023-02-13 NOTE — NURSING NOTE
"Chief Complaint   Patient presents with     New Patient     Crohns w/ pilonidal        Vitals:    02/13/23 1419 02/13/23 1422   BP:  116/78   BP Location:  Left arm   Patient Position:  Sitting   Cuff Size:  Adult Regular   Pulse:  85   SpO2:  99%   Weight:  162 lb 1.6 oz   Height: 5' 11\"        Body mass index is 22.61 kg/m .     Lucio Hutchison, EMT- P    "

## 2023-02-13 NOTE — PROGRESS NOTES
"Colon and Rectal Surgery Consult Clinic Note    Date: 2023     Referring provider:  Helder Sanchez MD  [address] [place] [id number]     RE: Dennis J Goodell  : 1975  LISS: 2023    Dennis J Goodell is a very pleasant 47 year old male with Crohn's disease who presents today for perianal abscess.    HPI:  I&D of buttock abscess in January. Prior abscess I&D in December at a different location. He follows with Dr. Sanchez for his Crohn's disease and is currently on budesonide with plans to start Infliximab. He is having abdominal pain and nausea and some decreased appetite but has not followed up with Dr. Sanchez about this.    MR Pelvis at an outside institution 23:  IMPRESSION:    No residual perianal abscess is demonstrated. No findings to suggest a  perianal fistula.    Physical Examination: Exam was chaperoned by Lucio Hutchison, EMT-P   /78 (BP Location: Left arm, Patient Position: Sitting, Cuff Size: Adult Regular)   Pulse 85   Ht 5' 11\"   Wt 162 lb 1.6 oz   SpO2 99%   BMI 22.61 kg/m    General: alert, oriented, in no acute distress.  HEENT: mucous membranes moist  Perianal external examination:  Well healed scar to the left of the janeth cleft. No other abnormalities. No fluctuance or induration. No drainage or erythema. No evidence of external fistula.  Digital rectal examination: Was deferred    Anoscopy: Was deferred    Assessment/Plan: 47 year old male with Crohn's disease. He had two prior I&Ds of separate perianal abscesses. He had MRSA at that time. No evidence of fistula on outside MRI and well healed scar on exam today. We discussed the possibility of a fistula and recurrent abscess. Since currently asymptomatic and normal exam, no further intervention needed but follow up with any recurrent symptoms. Continue to follow with Dr. Sanchez for Crohn's management. Patient's questions were answered to his stated satisfaction and he is in agreement with this plan. "     Medical history:  Past Medical History:   Diagnosis Date     Anxiety disorder 2012     Dysthymic disorder     No Comments Provided     Malignant neoplasm of testis (H) 2005 2005,teratoma     Raynaud's syndrome without gangrene     No Comments Provided     Uncomplicated alcohol abuse     7/25/2012       Surgical history:  Past Surgical History:   Procedure Laterality Date     COLONOSCOPY N/A 12/12/2022    Procedure: COLONOSCOPY, WITH POLYPECTOMY AND BIOPSY;  Surgeon: Solomon Arciniega MD;  Location: [facility] OR     DECOMPRESSION CUBITAL TUNNEL Left 01/2019     HERNIA REPAIR      ,HERNIA REPAIR     IR CHEST PORT PLACEMENT > 5 YRS OF AGE Left 04/01/2008     LYMPH NODE BIOPSY  09/05/2008     [facility]. 37 lymph nodes excised     ORCHIECTOMY SCROTAL Right 12/30/2005    teratoma     OTHER SURGICAL HISTORY      9/05/08,656329,OTHER     RELEASE CARPAL TUNNEL  04/09/2013       Problem list:    Patient Active Problem List    Diagnosis Date Noted     MRSA (methicillin resistant staph aureus) culture positive 01/31/2023     Priority: Medium     Crohn's disease of large intestine without complication (H) 12/23/2022     Priority: Medium     Colitis 11/18/2022     Priority: Medium     Leukocytosis 11/18/2022     Priority: Medium     Facet arthropathy, cervical 04/28/2021     Priority: Medium     Intractable chronic migraine without aura and with status migrainosus 04/28/2021     Priority: Medium     MARLEN (generalized anxiety disorder) 06/09/2020     Priority: Medium     Dysthymic disorder 02/09/2018     Priority: Medium     Raynaud phenomenon 02/09/2018     Priority: Medium     History of testicular cancer 08/12/2016     Priority: Medium     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/2008     Priority: Medium        Medications:  Current Outpatient Medications   Medication Sig Dispense Refill     albuterol (PROAIR HFA/PROVENTIL HFA/VENTOLIN HFA) 108 (90 Base) MCG/ACT inhaler Inhale 2 puffs into the lungs every 6 hours as needed for shortness of breath / dyspnea or wheezing       ALPRAZolam (XANAX) 1 MG tablet Take 1 mg by mouth 2 times daily as needed for anxiety       Aspirin-Acetaminophen-Caffeine (EXCEDRIN MIGRAINE PO)        bismuth subsalicylate (PEPTO BISMOL) 262 MG/15ML suspension Take 15 mLs by mouth every 6 hours as needed for indigestion       budesonide (ENTOCORT EC) 3 MG EC capsule Take 3 capsules (9 mg) by mouth every morning X 2 months. Then take 2 capsules PO q day x 2 weeks, then 1 capsule PO q day x 2 weeks and stop 270 capsule 3     calcium carbonate (TUMS) 500 MG chewable tablet Take 1 chew tab by mouth 4 times daily as needed for heartburn       clonazePAM (KLONOPIN) 1 MG tablet Take 1 mg by mouth 3 times daily       desvenlafaxine succinate (PRISTIQ) 100 MG 24 hr tablet Take 100 mg by mouth daily       dicyclomine (BENTYL) 10 MG capsule Take 2 capsules (20 mg) by mouth 2 times daily as needed (abdominal pain) 120 capsule 3     NIFEdipine ER OSMOTIC (PROCARDIA XL) 90 MG 24 hr tablet Take 1 tablet (90 mg) by mouth daily 90 tablet 4     omeprazole (PRILOSEC) 20 MG DR capsule Take 1 capsule (20 mg) by mouth daily 90 capsule 1     ondansetron (ZOFRAN ODT) 4 MG ODT tab Take 1 tablet (4 mg) by mouth every 8 hours as needed for nausea (MRI) 1 tablet 0     oxyCODONE (ROXICODONE) 5 MG tablet Take 1 tablet (5 mg) by mouth every 6 hours as needed for severe pain (7-10) 15 tablet 0     Probiotic Product (FLORAJEN3) CAPS 15 billion live cultures per capsule, 1 cap po qd       psyllium (METAMUCIL/KONSYL) 58.6 % powder Take 36 g (2 Tablespoonful) by mouth daily       vitamin D2 (ERGOCALCIFEROL) 08499 units (1250 mcg) capsule Take 1 capsule (50,000 Units) by mouth once a week 12 capsule 0       Allergies:  No Known  "Allergies    Family history:  Family History   Problem Relation Age of Onset     Pulmonary Embolism Mother      Other - See Comments Daughter         recurrent OM     Other - See Comments Other         Suicidality,maternal uncle     No Known Problems Father        Social history:  Social History     Tobacco Use     Smoking status: Never     Smokeless tobacco: Current     Types: Chew     Tobacco comments:     1 tin lasts 1 week   Substance Use Topics     Alcohol use: Not Currently     Alcohol/week: 8.0 standard drinks     Comment: Quit Date: 8-1-2021    Marital status: .    Nursing Notes:   Lucio Hutchison, EMT  2/13/2023  2:26 PM  Signed  Chief Complaint   Patient presents with     New Patient     Crohns w/ pilonidal        Vitals:    02/13/23 1419 02/13/23 1422   BP:  116/78   BP Location:  Left arm   Patient Position:  Sitting   Cuff Size:  Adult Regular   Pulse:  85   SpO2:  99%   Weight:  162 lb 1.6 oz   Height: 5' 11\"        Body mass index is 22.61 kg/m .     Lucio Hutchison, SOLO- P         30 minutes spent on the date of encounter (excluding time performing procedures) performing chart review, history and exam, documentation and further activities as noted above.    DONTRELL Torres, NP-C  Colon and Rectal Surgery   [facility]    This note was created using speech recognition software and may contain unintended word substitutions.    "

## 2023-02-13 NOTE — TELEPHONE ENCOUNTER
IV push medication for nausea for the MRI as he was told he will feel nauseated during this test. Bettie Mishra RN  ....................  2/13/2023   11:13 AM

## 2023-02-14 ENCOUNTER — PATIENT OUTREACH (OUTPATIENT)
Dept: GASTROENTEROLOGY | Facility: CLINIC | Age: 48
End: 2023-02-14
Payer: COMMERCIAL

## 2023-02-14 ENCOUNTER — TRANSFERRED RECORDS (OUTPATIENT)
Dept: HEALTH INFORMATION MANAGEMENT | Facility: CLINIC | Age: 48
End: 2023-02-14
Payer: COMMERCIAL

## 2023-02-14 ENCOUNTER — TELEPHONE (OUTPATIENT)
Dept: GASTROENTEROLOGY | Facility: CLINIC | Age: 48
End: 2023-02-14
Payer: COMMERCIAL

## 2023-02-14 ENCOUNTER — HOSPITAL ENCOUNTER (OUTPATIENT)
Dept: MRI IMAGING | Facility: OTHER | Age: 48
Discharge: HOME | End: 2023-02-14
Attending: INTERNAL MEDICINE | Admitting: INTERNAL MEDICINE
Payer: COMMERCIAL

## 2023-02-14 DIAGNOSIS — K50.10 CROHN'S DISEASE OF LARGE INTESTINE WITHOUT COMPLICATION (H): ICD-10-CM

## 2023-02-14 PROCEDURE — 72197 MRI PELVIS W/O & W/DYE: CPT

## 2023-02-14 PROCEDURE — A9575 INJ GADOTERATE MEGLUMI 0.1ML: HCPCS | Performed by: INTERNAL MEDICINE

## 2023-02-14 PROCEDURE — 74183 MRI ABD W/O CNTR FLWD CNTR: CPT

## 2023-02-14 PROCEDURE — 250N000011 HC RX IP 250 OP 636: Performed by: INTERNAL MEDICINE

## 2023-02-14 PROCEDURE — 255N000002 HC RX 255 OP 636: Performed by: INTERNAL MEDICINE

## 2023-02-14 RX ADMIN — GLUCAGON 0.5 MG: 1 INJECTION, POWDER, LYOPHILIZED, FOR SOLUTION INTRAMUSCULAR; INTRAVENOUS at 11:00

## 2023-02-14 RX ADMIN — GADOTERATE MEGLUMINE 15 ML: 376.9 INJECTION INTRAVENOUS at 11:15

## 2023-02-14 RX ADMIN — GLUCAGON 0.5 MG: 1 INJECTION, POWDER, LYOPHILIZED, FOR SOLUTION INTRAMUSCULAR; INTRAVENOUS at 10:31

## 2023-02-14 NOTE — TELEPHONE ENCOUNTER
Received an update from colorectal yesterday that the patient expressed lack of understanding of crohn's disease and the infusions. Attempted to call this morning, however no answer. Left detailed message with direct callback number. Also sent mychart message with education.

## 2023-02-14 NOTE — DISCHARGE INSTRUCTIONS
MRI ENTEROGRAM DISCHARGE INSTRUCTIONS    You may resume your normal diet and medications after your exam. Drink plenty of water after your   procedure.     Avoid sugary drinks or regular soda. If you are diabetic, monitor your blood sugars closely for   24-48 hours after the exam.     You may experience the following side effects. Though unpleasant, they are common.     Nausea/vomiting  Loose stools/diarrhea over the next 24 hours  Dizziness  Dry mouth  Drowsiness  Overheating with exercise - no vigorous exercise for 4 hours    The doctor who ordered your test will contact you with results.     If you have questions or concerns after your exam, please call your doctor or you may also call [phone].     Thank you for choosing [facility]!

## 2023-02-14 NOTE — TELEPHONE ENCOUNTER
Received callback from patient with questions regarding crohns and infusion treatments. Patient expressed appreciation and stated all questions were answered. Recommended patient review education piece that was sent this morning via [employer] and to call with any further questions or concerns.     Throughout the discussion of the infusions and induction doses, it was noted that the infusions are not timed out correctly. Patient provided infusion center number: [phone]. Writer will reach out to infusion center to correct infusion dates.

## 2023-02-14 NOTE — TELEPHONE ENCOUNTER
Received voicemail from [facility] infusion center. Attempted to return call, no answer left detailed message.

## 2023-02-14 NOTE — PROGRESS NOTES
Patient has been NPO x 8 hours prior to procedure. Verified that patient has held medications as directed by MD.     MRI-safe IV started per facility protocol, saline locked.     Bowel sounds active in all quadrants.     Allergies and medications reviewed.     Fasting blood glucose NA.     First dose of glucagon given immediately prior to walking into MRI suite. Patient tolerated: well.     Assisted with prone positioning on MRI exam table. Patient states positioning is: comfortable.     Second dose of glucagon given immediately prior to contrast injection. Patient tolerated: well.     Adverse effects of medications: none.     After exam complete, IV removed per facility protocol. Discharge instructions given. Patient verbalized understanding of all instructions.

## 2023-02-15 NOTE — TELEPHONE ENCOUNTER
Colorectal appointment and MRE now completed; confirmed with Dr. Sanchez that it is still appropriate to move forward with infusions:  Helder Sanchez MD Broich, Samantha, RN; Christiana Esquivel, [facility]  Yes I would like to proceed with infliximab infusions     Attempted to call [facility] back again today. No answer, left another voicemail.     Infusion dates have been corrected to the appropriate dates. (Previoulsy scheduled incorrectly)

## 2023-02-16 ENCOUNTER — TELEPHONE (OUTPATIENT)
Dept: GASTROENTEROLOGY | Facility: CLINIC | Age: 48
End: 2023-02-16
Payer: COMMERCIAL

## 2023-02-16 DIAGNOSIS — K21.00 GASTROESOPHAGEAL REFLUX DISEASE WITH ESOPHAGITIS: ICD-10-CM

## 2023-02-16 DIAGNOSIS — R19.7 DIARRHEA: ICD-10-CM

## 2023-02-16 DIAGNOSIS — R10.13 DYSPEPSIA: ICD-10-CM

## 2023-02-16 DIAGNOSIS — K50.10 CROHN'S DISEASE OF LARGE INTESTINE WITHOUT COMPLICATION (H): Primary | ICD-10-CM

## 2023-02-16 RX ORDER — OMEPRAZOLE 40 MG/1
40 CAPSULE, DELAYED RELEASE ORAL DAILY
Qty: 30 CAPSULE | Refills: 3 | Status: SHIPPED | OUTPATIENT
Start: 2023-02-16 | End: 2023-07-18

## 2023-02-16 NOTE — TELEPHONE ENCOUNTER
Ordered stool studies and Omeprazole prescription. Attempted to contact the patient. No answer, left detailed message with instructions. Also sent [employer] message.

## 2023-02-16 NOTE — TELEPHONE ENCOUNTER
Called pt to discuss. Symptoms as outlined.    Worse pain last night after big stressful episode with his daughters. He has lifelong response to stress like this. Pain did not improve with dicyclomine.     Has 3-20 BMs per day. Worse with stress - always been like this.    Take peptobismol - this did cause black stools.    Very recent MRE and labs - both reassuring. Inflammation seen in ileum and maybe some colon but not severe. No abscesses.    He is scheduled for infliximab on 2/22/23.    Recommend:  -continue with planned infliximab induction to treat CD  -check labs with infusion  -check infectious stool studies (c diff and enteric panel and giardia/cryptosporidium) and fecal calprotectin  -start omeprazole 40 mg q day  -stop pepto (this is likely causing black stools  -continue dicyclomine as needed. I will not prescribe narcotics  -may also be driven by stress - will likely need to work on this in future    Recommendations and plans discussed with patient. He agrees to the plan    Helder Sanchez MD

## 2023-02-16 NOTE — TELEPHONE ENCOUNTER
Received call from patient reporting increased RLQ abdominal pain that kept him up throughout the night. He rates the pain about 7/10. Stated it started at about 10pm, and continued throughout the night until this morning at 8-9am. Took two doses of Bentyl with no relief. He reports a similar episode occurred last Saturday as well while he was at work. The patient reports his appetite has not been good lately, however he believes this is anxiety related, as he has experienced this in the past. Last Saturday when episode occurred he was experiencing diarrhea and took Pepto-Bismol. Since then, he has been experiencing tarry black stools that fluctuate from watery to formed. The patient also reports he notices worsening GERD with the pain episodes. Denies fevers.   Patient requested writer update Dr. Sanchez and ask about any further recommendations.   First infliximab infusion scheduled for 2/22.   Routed message to Dr. Sanchez.

## 2023-02-22 ENCOUNTER — TELEPHONE (OUTPATIENT)
Dept: GASTROENTEROLOGY | Facility: CLINIC | Age: 48
End: 2023-02-22

## 2023-02-22 ENCOUNTER — HOSPITAL ENCOUNTER (OUTPATIENT)
Dept: INFUSION THERAPY | Facility: OTHER | Age: 48
Discharge: HOME | End: 2023-02-22
Attending: INTERNAL MEDICINE | Admitting: INTERNAL MEDICINE
Payer: COMMERCIAL

## 2023-02-22 VITALS
DIASTOLIC BLOOD PRESSURE: 66 MMHG | SYSTOLIC BLOOD PRESSURE: 113 MMHG | TEMPERATURE: 96.7 F | RESPIRATION RATE: 18 BRPM | BODY MASS INDEX: 22.48 KG/M2 | HEART RATE: 81 BPM | WEIGHT: 161.2 LBS

## 2023-02-22 DIAGNOSIS — K50.10 CROHN'S DISEASE OF LARGE INTESTINE WITHOUT COMPLICATION (H): Primary | ICD-10-CM

## 2023-02-22 DIAGNOSIS — K52.9 COLITIS: ICD-10-CM

## 2023-02-22 LAB
ALBUMIN SERPL BCG-MCNC: 4.7 G/DL (ref 3.5–5.2)
ALP SERPL-CCNC: 68 U/L (ref 40–129)
ALT SERPL W P-5'-P-CCNC: 14 U/L (ref 10–50)
AST SERPL W P-5'-P-CCNC: 17 U/L (ref 10–50)
BASOPHILS # BLD AUTO: 0.1 10E3/UL (ref 0–0.2)
BASOPHILS NFR BLD AUTO: 1 %
BILIRUB DIRECT SERPL-MCNC: <0.2 MG/DL (ref 0–0.3)
BILIRUB SERPL-MCNC: 0.3 MG/DL
CRP SERPL-MCNC: <3 MG/L
EOSINOPHIL # BLD AUTO: 0.2 10E3/UL (ref 0–0.7)
EOSINOPHIL NFR BLD AUTO: 3 %
ERYTHROCYTE [DISTWIDTH] IN BLOOD BY AUTOMATED COUNT: 13 % (ref 10–15)
ERYTHROCYTE [SEDIMENTATION RATE] IN BLOOD BY WESTERGREN METHOD: 2 MM/HR (ref 0–15)
HCT VFR BLD AUTO: 41.5 % (ref 40–53)
HGB BLD-MCNC: 14.3 G/DL (ref 13.3–17.7)
IMM GRANULOCYTES # BLD: 0 10E3/UL
IMM GRANULOCYTES NFR BLD: 0 %
LYMPHOCYTES # BLD AUTO: 3 10E3/UL (ref 0.8–5.3)
LYMPHOCYTES NFR BLD AUTO: 39 %
MCH RBC QN AUTO: 29.4 PG (ref 26.5–33)
MCHC RBC AUTO-ENTMCNC: 34.5 G/DL (ref 31.5–36.5)
MCV RBC AUTO: 85 FL (ref 78–100)
MONOCYTES # BLD AUTO: 0.8 10E3/UL (ref 0–1.3)
MONOCYTES NFR BLD AUTO: 10 %
NEUTROPHILS # BLD AUTO: 3.6 10E3/UL (ref 1.6–8.3)
NEUTROPHILS NFR BLD AUTO: 47 %
NRBC # BLD AUTO: 0 10E3/UL
NRBC BLD AUTO-RTO: 0 /100
PLATELET # BLD AUTO: 265 10E3/UL (ref 150–450)
PROT SERPL-MCNC: 7 G/DL (ref 6.4–8.3)
RBC # BLD AUTO: 4.87 10E6/UL (ref 4.4–5.9)
WBC # BLD AUTO: 7.6 10E3/UL (ref 4–11)

## 2023-02-22 PROCEDURE — 258N000003 HC RX IP 258 OP 636: Performed by: INTERNAL MEDICINE

## 2023-02-22 PROCEDURE — 86140 C-REACTIVE PROTEIN: CPT | Performed by: INTERNAL MEDICINE

## 2023-02-22 PROCEDURE — 96413 CHEMO IV INFUSION 1 HR: CPT

## 2023-02-22 PROCEDURE — 85025 COMPLETE CBC W/AUTO DIFF WBC: CPT | Performed by: INTERNAL MEDICINE

## 2023-02-22 PROCEDURE — 96415 CHEMO IV INFUSION ADDL HR: CPT

## 2023-02-22 PROCEDURE — 250N000011 HC RX IP 250 OP 636: Performed by: INTERNAL MEDICINE

## 2023-02-22 PROCEDURE — 85652 RBC SED RATE AUTOMATED: CPT | Performed by: INTERNAL MEDICINE

## 2023-02-22 PROCEDURE — 36415 COLL VENOUS BLD VENIPUNCTURE: CPT | Performed by: INTERNAL MEDICINE

## 2023-02-22 PROCEDURE — 80076 HEPATIC FUNCTION PANEL: CPT | Performed by: INTERNAL MEDICINE

## 2023-02-22 RX ORDER — METHYLPREDNISOLONE SODIUM SUCCINATE 125 MG/2ML
125 INJECTION, POWDER, LYOPHILIZED, FOR SOLUTION INTRAMUSCULAR; INTRAVENOUS
Status: CANCELLED
Start: 2023-03-08

## 2023-02-22 RX ORDER — ACETAMINOPHEN 325 MG/1
650 TABLET ORAL ONCE
Status: CANCELLED
Start: 2023-04-04 | End: 2023-03-08

## 2023-02-22 RX ORDER — ALBUTEROL SULFATE 90 UG/1
1-2 AEROSOL, METERED RESPIRATORY (INHALATION)
Status: CANCELLED
Start: 2023-03-08

## 2023-02-22 RX ORDER — MEPERIDINE HYDROCHLORIDE 50 MG/ML
25 INJECTION INTRAMUSCULAR; INTRAVENOUS; SUBCUTANEOUS EVERY 30 MIN PRN
Status: CANCELLED | OUTPATIENT
Start: 2023-03-08

## 2023-02-22 RX ORDER — DIPHENHYDRAMINE HCL 25 MG
25 CAPSULE ORAL ONCE
Status: CANCELLED
Start: 2023-04-04 | End: 2023-03-08

## 2023-02-22 RX ORDER — EPINEPHRINE 1 MG/ML
0.3 INJECTION, SOLUTION, CONCENTRATE INTRAVENOUS EVERY 5 MIN PRN
Status: CANCELLED | OUTPATIENT
Start: 2023-03-08

## 2023-02-22 RX ORDER — ALBUTEROL SULFATE 0.83 MG/ML
2.5 SOLUTION RESPIRATORY (INHALATION)
Status: CANCELLED | OUTPATIENT
Start: 2023-03-08

## 2023-02-22 RX ORDER — DIPHENHYDRAMINE HYDROCHLORIDE 50 MG/ML
50 INJECTION INTRAMUSCULAR; INTRAVENOUS
Status: CANCELLED
Start: 2023-03-08

## 2023-02-22 RX ADMIN — INFLIXIMAB 400 MG: 100 INJECTION, POWDER, LYOPHILIZED, FOR SOLUTION INTRAVENOUS at 08:40

## 2023-02-22 RX ADMIN — SODIUM CHLORIDE 250 ML: 9 INJECTION, SOLUTION INTRAVENOUS at 08:23

## 2023-02-22 NOTE — NURSING NOTE
Infusion Nursing Note:  Dennis J Goodell presents today for Inflliximab week 0.    Patient seen by provider today: No   present during visit today: Not Applicable.    Note: N/A.    Intravenous Access:  Labs drawn without difficulty.  Peripheral IV placed.    Treatment Conditions:  Biological Infusion Checklist: Completed  Post Infusion Assessment:  Patient tolerated infusion without incident.  Blood return noted pre and post infusion.  Site patent and intact, free from redness, edema or discomfort.  No evidence of extravasations.  Access discontinued per protocol.  Biologic Infusion Post Education: Call the triage nurse at your clinic or seek medical attention if you have chills and/or temperature greater than or equal to 100.5, uncontrolled nausea/vomiting, diarrhea, constipation, dizziness, shortness of breath, chest pain, heart palpitations, weakness or any other new or concerning symptoms, questions or concerns.  You cannot have any live virus vaccines prior to or during treatment or up to 6 months post infusion.  If you have an upcoming surgery, medical procedure or dental procedure during treatment, this should be discussed with your ordering physician and your surgeon/dentist.  If you are having any concerning symptom, if you are unsure if you should get your next infusion or wish to speak to a provider before your next infusion, please call your care coordinator or triage nurse at your clinic to notify them so we can adequately serve you.     Discharge Plan:   Discharge instructions reviewed with: Patient.  Patient and/or family verbalized understanding of discharge instructions and all questions answered.  Copy of AVS reviewed with patient and/or family.  Patient will return 3/10/23 for next appointment.  AVS to patient via [employer]HART.   Patient discharged in stable condition accompanied by: self.  Departure Mode: Ambulatory.      Katherin Llamas RN

## 2023-02-22 NOTE — TELEPHONE ENCOUNTER
Patient contacted writer to inquire if Dr. Sanchez wanted a recheck of the vitamin D lab, since he was noted to be deficient in January. Instructed to take high dose supplementation for 12 weeks, then over the counter supplementation. Updated patient that no recheck is necessary at this time. Patient noted to be at first Infliximab infusion appointment.

## 2023-02-22 NOTE — PHARMACY-MEDICATION REGIMEN REVIEW
Pharmacy: NEW INFUSION MEDICATION EDUCATION    Dennis J Goodell  [address]   [place] [address]  [phone] (home)   47 year old male  PCP:James Archuleta    No Known Allergies      Summary of Education:   Met with patient today to review new medications to be administered in infusion including infliximab. Discussed cycle length, including loading doses.  Discussed interaction between n/a. Patient asked no questions and all concerns were addressed.    Materials Provided:   Medication guide printed from Clinical Pharmacology on: infliximab    Thank you for the consult.     Soco Cabrera [facility] ....................  2/22/2023   10:18 AM

## 2023-02-22 NOTE — NURSING NOTE

## 2023-03-08 ENCOUNTER — TELEPHONE (OUTPATIENT)
Dept: GASTROENTEROLOGY | Facility: CLINIC | Age: 48
End: 2023-03-08
Payer: COMMERCIAL

## 2023-03-08 NOTE — TELEPHONE ENCOUNTER
[facility] Call Center    Phone Message    May a detailed message be left on voicemail: yes     Reason for Call: Other:       Pt is requesting a call back ASAP to discuss the they're current Crohn's flare up. Pt declined triage nurse, but stated they are close to going to the ER.    Action Taken: Message routed to:  [facility]    Travel Screening: Not Applicable

## 2023-03-09 NOTE — TELEPHONE ENCOUNTER
Contacted patient to discuss patient call to the call center. The patient reports having flare symptoms on Tuesday night while working night shift. The patient reports frequent stooling throughout the night, which was loose then transitioned to watery consistency. Towards the end of the shift he noted blood in the stool. Reports worsened abdominal pain in the RLQ, however dicyclomine was effective. Abdominal pain is somewhat improved today, however not completely resolved. Encouraged the patient to continue utilizing dicyclomine BID if needed. The patient reports a few more bloody stools last night, however no blood in stools so far today. The patient also reports taking pepto bismol--writer asked about black stools, patient denied. The patient continues to take the Entocort as prescribed. Denies any fevers. The patient stated overall he considered going to the ER after his night shift on Tuesday because of how sick he felt. Reports he is feeling somewhat better today and has his scheduled infusion tomorrow. Writer advised patient that if abdominal pain worsens, or becomes severe and/or the patient develops fevers to present to the ER. Encouraged plenty of fluid intake, keeping food bland and avoiding dairy. The patient is in agreement with this plan. Planning to complete infusion tomorrow and determine how he is feeling after that.

## 2023-03-10 ENCOUNTER — HOSPITAL ENCOUNTER (OUTPATIENT)
Dept: INFUSION THERAPY | Facility: OTHER | Age: 48
Discharge: HOME | End: 2023-03-10
Attending: INTERNAL MEDICINE | Admitting: INTERNAL MEDICINE
Payer: COMMERCIAL

## 2023-03-10 VITALS
RESPIRATION RATE: 12 BRPM | BODY MASS INDEX: 22.9 KG/M2 | HEART RATE: 88 BPM | DIASTOLIC BLOOD PRESSURE: 64 MMHG | WEIGHT: 164.2 LBS | SYSTOLIC BLOOD PRESSURE: 130 MMHG | TEMPERATURE: 97.8 F | OXYGEN SATURATION: 97 %

## 2023-03-10 DIAGNOSIS — K50.10 CROHN'S DISEASE OF LARGE INTESTINE WITHOUT COMPLICATION (H): Primary | ICD-10-CM

## 2023-03-10 LAB
ALBUMIN SERPL BCG-MCNC: 4.2 G/DL (ref 3.5–5.2)
ALP SERPL-CCNC: 57 U/L (ref 40–129)
ALT SERPL W P-5'-P-CCNC: 13 U/L (ref 10–50)
AST SERPL W P-5'-P-CCNC: 17 U/L (ref 10–50)
BASOPHILS # BLD AUTO: 0.1 10E3/UL (ref 0–0.2)
BASOPHILS NFR BLD AUTO: 1 %
BILIRUB DIRECT SERPL-MCNC: <0.2 MG/DL (ref 0–0.3)
BILIRUB SERPL-MCNC: <0.2 MG/DL
CRP SERPL-MCNC: <3 MG/L
EOSINOPHIL # BLD AUTO: 0.2 10E3/UL (ref 0–0.7)
EOSINOPHIL NFR BLD AUTO: 3 %
ERYTHROCYTE [DISTWIDTH] IN BLOOD BY AUTOMATED COUNT: 13.3 % (ref 10–15)
ERYTHROCYTE [SEDIMENTATION RATE] IN BLOOD BY WESTERGREN METHOD: 2 MM/HR (ref 0–15)
HCT VFR BLD AUTO: 38.9 % (ref 40–53)
HGB BLD-MCNC: 13.6 G/DL (ref 13.3–17.7)
IMM GRANULOCYTES # BLD: 0 10E3/UL
IMM GRANULOCYTES NFR BLD: 0 %
LYMPHOCYTES # BLD AUTO: 3 10E3/UL (ref 0.8–5.3)
LYMPHOCYTES NFR BLD AUTO: 45 %
MCH RBC QN AUTO: 29.9 PG (ref 26.5–33)
MCHC RBC AUTO-ENTMCNC: 35 G/DL (ref 31.5–36.5)
MCV RBC AUTO: 86 FL (ref 78–100)
MONOCYTES # BLD AUTO: 0.6 10E3/UL (ref 0–1.3)
MONOCYTES NFR BLD AUTO: 9 %
NEUTROPHILS # BLD AUTO: 2.8 10E3/UL (ref 1.6–8.3)
NEUTROPHILS NFR BLD AUTO: 42 %
NRBC # BLD AUTO: 0 10E3/UL
NRBC BLD AUTO-RTO: 0 /100
PLATELET # BLD AUTO: 249 10E3/UL (ref 150–450)
PROT SERPL-MCNC: 6.4 G/DL (ref 6.4–8.3)
RBC # BLD AUTO: 4.55 10E6/UL (ref 4.4–5.9)
WBC # BLD AUTO: 6.7 10E3/UL (ref 4–11)

## 2023-03-10 PROCEDURE — 80076 HEPATIC FUNCTION PANEL: CPT | Performed by: INTERNAL MEDICINE

## 2023-03-10 PROCEDURE — 86140 C-REACTIVE PROTEIN: CPT | Performed by: INTERNAL MEDICINE

## 2023-03-10 PROCEDURE — 85025 COMPLETE CBC W/AUTO DIFF WBC: CPT | Performed by: INTERNAL MEDICINE

## 2023-03-10 PROCEDURE — 250N000011 HC RX IP 250 OP 636: Performed by: INTERNAL MEDICINE

## 2023-03-10 PROCEDURE — 36415 COLL VENOUS BLD VENIPUNCTURE: CPT | Performed by: INTERNAL MEDICINE

## 2023-03-10 PROCEDURE — 85652 RBC SED RATE AUTOMATED: CPT | Performed by: INTERNAL MEDICINE

## 2023-03-10 PROCEDURE — 96415 CHEMO IV INFUSION ADDL HR: CPT

## 2023-03-10 PROCEDURE — 96413 CHEMO IV INFUSION 1 HR: CPT

## 2023-03-10 PROCEDURE — 258N000003 HC RX IP 258 OP 636: Performed by: INTERNAL MEDICINE

## 2023-03-10 RX ORDER — METHYLPREDNISOLONE SODIUM SUCCINATE 125 MG/2ML
125 INJECTION, POWDER, LYOPHILIZED, FOR SOLUTION INTRAMUSCULAR; INTRAVENOUS
Status: CANCELLED
Start: 2023-03-22

## 2023-03-10 RX ORDER — DIPHENHYDRAMINE HYDROCHLORIDE 50 MG/ML
50 INJECTION INTRAMUSCULAR; INTRAVENOUS
Status: CANCELLED
Start: 2023-03-22

## 2023-03-10 RX ORDER — ALBUTEROL SULFATE 0.83 MG/ML
2.5 SOLUTION RESPIRATORY (INHALATION)
Status: CANCELLED | OUTPATIENT
Start: 2023-03-22

## 2023-03-10 RX ORDER — ALBUTEROL SULFATE 90 UG/1
1-2 AEROSOL, METERED RESPIRATORY (INHALATION)
Status: CANCELLED
Start: 2023-03-22

## 2023-03-10 RX ORDER — ACETAMINOPHEN 325 MG/1
650 TABLET ORAL ONCE
Status: CANCELLED
Start: 2023-05-30 | End: 2023-03-22

## 2023-03-10 RX ORDER — DIPHENHYDRAMINE HCL 25 MG
25 CAPSULE ORAL ONCE
Status: CANCELLED
Start: 2023-05-30 | End: 2023-03-22

## 2023-03-10 RX ORDER — MEPERIDINE HYDROCHLORIDE 50 MG/ML
25 INJECTION INTRAMUSCULAR; INTRAVENOUS; SUBCUTANEOUS EVERY 30 MIN PRN
Status: CANCELLED | OUTPATIENT
Start: 2023-03-22

## 2023-03-10 RX ORDER — EPINEPHRINE 1 MG/ML
0.3 INJECTION, SOLUTION, CONCENTRATE INTRAVENOUS EVERY 5 MIN PRN
Status: CANCELLED | OUTPATIENT
Start: 2023-03-22

## 2023-03-10 RX ADMIN — SODIUM CHLORIDE 250 ML: 9 INJECTION, SOLUTION INTRAVENOUS at 08:23

## 2023-03-10 RX ADMIN — INFLIXIMAB 400 MG: 100 INJECTION, POWDER, LYOPHILIZED, FOR SOLUTION INTRAVENOUS at 08:45

## 2023-03-10 ASSESSMENT — PAIN SCALES - GENERAL: PAINLEVEL: NO PAIN (1)

## 2023-03-10 NOTE — NURSING NOTE
Infusion Nursing Note:  Dennis J Goodell presents today for Inflectra week 2.    Patient seen by provider today: No   present during visit today: Not Applicable.    Note: Patient here for week 2 of inflectra. Patient states he had flare of Chrons in between doses. Gastro aware and ok to proceed with dose today. Patient states abdominal pain is now gone and stools have returned to formed state.    Intravenous Access:  Labs drawn without difficulty.  Peripheral IV placed.    Treatment Conditions:  Biologic checklist completed prior to infusion.    Post Infusion Assessment:  Patient tolerated infusion without incident.  Blood return noted pre and post infusion.  Site patent and intact, free from redness, edema or discomfort.  No evidence of extravasations.  Access discontinued per protocol.  Biologic Infusion Post Education: Call the triage nurse at your clinic or seek medical attention if you have chills and/or temperature greater than or equal to 100.5, uncontrolled nausea/vomiting, diarrhea, constipation, dizziness, shortness of breath, chest pain, heart palpitations, weakness or any other new or concerning symptoms, questions or concerns.  You cannot have any live virus vaccines prior to or during treatment or up to 6 months post infusion.  If you have an upcoming surgery, medical procedure or dental procedure during treatment, this should be discussed with your ordering physician and your surgeon/dentist.  If you are having any concerning symptom, if you are unsure if you should get your next infusion or wish to speak to a provider before your next infusion, please call your care coordinator or triage nurse at your clinic to notify them so we can adequately serve you.     Discharge Plan:   Discharge instructions reviewed with: Patient.  Patient and/or family verbalized understanding of discharge instructions and all questions answered.  AVS to patient via [employer].  Patient will return 4/4/23 for next  appointment.   Patient discharged in stable condition accompanied by: self.  Departure Mode: Ambulatory.      Angela Swanson RN

## 2023-03-10 NOTE — NURSING NOTE
~~~ NOTE: If the patient answers yes to any of the questions below, hold the infusion and contact ordering provider or on-call provider.    1. Have you recently had an elevated temperature, fever, chills, productive cough, coughing for 3 weeks or longer or hemoptysis,  abnormal vital signs, night sweats,  chest pain or have you noticed a decrease in your appetite, unexplained weight loss or fatigue? No  2. Do you have any open wounds or new incisions? No  3. Do you have any upcoming hospitalizations or surgeries? Does not include esophagogastroduodenoscopy, colonoscopy, endoscopic retrograde cholangiopancreatography (ERCP), endoscopic ultrasound (EUS), dental procedures or joint aspiration/steroid injections No  4. Do you currently have any signs of illness or infection or are you on any antibiotics? No  5. Have you had any new, sudden or worsening abdominal pain? No  6. Have you or anyone in your household received a live vaccination in the past 4 weeks? Please note: No live vaccines while on biologic/chemotherapy until 6 months after the last treatment. Patient can receive the flu vaccine (shot only), pneumovax and the Covid vaccine. It is optimal for the patient to get these vaccines mid cycle, but they can be given at any time as long as it is not on the day of the infusion. No  7. Have you recently been diagnosed with any new nervous system diseases (ie. Multiple sclerosis, Guillain [place], seizures, neurological changes) or cancer diagnosis? Are you on any form of radiation or chemotherapy? No  8. Are you pregnant or breast feeding or do you have plans of pregnancy in the future? No  9. Have you been having any signs of worsening depression or suicidal ideations?  (benlysta only) No  10. Have there been any other new onset medical symptoms? No  11. Have you had any new blood clots? (IVIG only) No

## 2023-03-14 ENCOUNTER — MYC MEDICAL ADVICE (OUTPATIENT)
Dept: GASTROENTEROLOGY | Facility: CLINIC | Age: 48
End: 2023-03-14
Payer: COMMERCIAL

## 2023-03-14 DIAGNOSIS — R19.7 DIARRHEA: ICD-10-CM

## 2023-03-14 DIAGNOSIS — K50.10 CROHN'S DISEASE OF LARGE INTESTINE WITHOUT COMPLICATION (H): Primary | ICD-10-CM

## 2023-03-14 NOTE — TELEPHONE ENCOUNTER
"Received callback while on the phone with another patient. Left voicemail: Patient reports the budesonide was not started until 2/24/23. Symptoms better over the weekend, however reports a \"food trigger\" last night, then continuous diarrhea.   Touched base with covering provider who recommended stool studies be completed.   Called the patient back and discussed next steps. Patient in agreement with plan, and will  specimen containers on his way to work this evening, and will try to bring them in tomorrow.   "

## 2023-03-14 NOTE — TELEPHONE ENCOUNTER
Attempted to contact the patient to discuss symptoms in more detail. No answer, left detailed message with direct callback and sent mychart message.

## 2023-03-19 ENCOUNTER — LAB (OUTPATIENT)
Dept: LAB | Facility: OTHER | Age: 48
End: 2023-03-19
Attending: FAMILY MEDICINE
Payer: COMMERCIAL

## 2023-03-19 ENCOUNTER — HOSPITAL ENCOUNTER (EMERGENCY)
Facility: OTHER | Age: 48
End: 2023-03-19
Payer: COMMERCIAL

## 2023-03-19 DIAGNOSIS — K50.10 CROHN'S DISEASE OF LARGE INTESTINE WITHOUT COMPLICATION (H): ICD-10-CM

## 2023-03-19 DIAGNOSIS — R19.7 DIARRHEA: ICD-10-CM

## 2023-03-19 PROCEDURE — 83993 ASSAY FOR CALPROTECTIN FECAL: CPT | Mod: ZL

## 2023-03-19 PROCEDURE — 87506 IADNA-DNA/RNA PROBE TQ 6-11: CPT | Mod: ZL

## 2023-03-19 PROCEDURE — 87329 GIARDIA AG IA: CPT | Mod: ZL,XU

## 2023-03-19 PROCEDURE — 87328 CRYPTOSPORIDIUM AG IA: CPT | Mod: ZL

## 2023-03-21 LAB
C COLI+JEJUNI+LARI FUSA STL QL NAA+PROBE: NOT DETECTED
C PARVUM AG STL QL IA: NEGATIVE
EC STX1 GENE STL QL NAA+PROBE: NOT DETECTED
EC STX2 GENE STL QL NAA+PROBE: NOT DETECTED
G LAMBLIA AG STL QL IA: NEGATIVE
NOROV GI+II ORF1-ORF2 JNC STL QL NAA+PR: NOT DETECTED
RVA NSP5 STL QL NAA+PROBE: NOT DETECTED
SALMONELLA SP RPOD STL QL NAA+PROBE: NOT DETECTED
SHIGELLA SP+EIEC IPAH STL QL NAA+PROBE: NOT DETECTED
V CHOL+PARA RFBL+TRKH+TNAA STL QL NAA+PR: NOT DETECTED
Y ENTERO RECN STL QL NAA+PROBE: NOT DETECTED

## 2023-03-22 ENCOUNTER — MYC MEDICAL ADVICE (OUTPATIENT)
Dept: PHARMACY | Facility: CLINIC | Age: 48
End: 2023-03-22
Payer: COMMERCIAL

## 2023-03-22 ENCOUNTER — PATIENT OUTREACH (OUTPATIENT)
Dept: GASTROENTEROLOGY | Facility: CLINIC | Age: 48
End: 2023-03-22
Payer: COMMERCIAL

## 2023-03-22 LAB — CALPROTECTIN STL-MCNT: 138 MG/KG (ref 0–49.9)

## 2023-03-22 NOTE — TELEPHONE ENCOUNTER
[place] Recently started on budesonide (Entocort) and infliximab. Called in with some looser stools last week (after some food apparently).     Stool studies negative for infection.     Fecal calpro mildly elevated (130s) but not too bed. Inflammation should be improving on infliximab and Entocort.     Can you check in with him this week and see how he is doing?  on appropriate diet (I think he likely has poor dietary intake). Ok to start some fiber. Can take small doses of imodium if needed as we wait for meds to kick in       Left a message for patient to call back   Also my chart message

## 2023-04-04 ENCOUNTER — HOSPITAL ENCOUNTER (OUTPATIENT)
Dept: INFUSION THERAPY | Facility: OTHER | Age: 48
Discharge: HOME | End: 2023-04-04
Attending: INTERNAL MEDICINE | Admitting: INTERNAL MEDICINE
Payer: COMMERCIAL

## 2023-04-04 VITALS
SYSTOLIC BLOOD PRESSURE: 117 MMHG | HEART RATE: 71 BPM | WEIGHT: 159.6 LBS | BODY MASS INDEX: 22.26 KG/M2 | TEMPERATURE: 98.8 F | DIASTOLIC BLOOD PRESSURE: 68 MMHG | OXYGEN SATURATION: 98 % | RESPIRATION RATE: 18 BRPM

## 2023-04-04 DIAGNOSIS — K50.10 CROHN'S DISEASE OF LARGE INTESTINE WITHOUT COMPLICATION (H): Primary | ICD-10-CM

## 2023-04-04 LAB
ALBUMIN SERPL BCG-MCNC: 4.4 G/DL (ref 3.5–5.2)
ALP SERPL-CCNC: 65 U/L (ref 40–129)
ALT SERPL W P-5'-P-CCNC: 22 U/L (ref 10–50)
AST SERPL W P-5'-P-CCNC: 24 U/L (ref 10–50)
BASOPHILS # BLD AUTO: 0.1 10E3/UL (ref 0–0.2)
BASOPHILS NFR BLD AUTO: 1 %
BILIRUB DIRECT SERPL-MCNC: <0.2 MG/DL (ref 0–0.3)
BILIRUB SERPL-MCNC: 0.2 MG/DL
CRP SERPL-MCNC: <3 MG/L
EOSINOPHIL # BLD AUTO: 0.1 10E3/UL (ref 0–0.7)
EOSINOPHIL NFR BLD AUTO: 2 %
ERYTHROCYTE [DISTWIDTH] IN BLOOD BY AUTOMATED COUNT: 13.5 % (ref 10–15)
ERYTHROCYTE [SEDIMENTATION RATE] IN BLOOD BY WESTERGREN METHOD: 2 MM/HR (ref 0–15)
HCT VFR BLD AUTO: 41.3 % (ref 40–53)
HGB BLD-MCNC: 14.3 G/DL (ref 13.3–17.7)
IMM GRANULOCYTES # BLD: 0 10E3/UL
IMM GRANULOCYTES NFR BLD: 0 %
LYMPHOCYTES # BLD AUTO: 2 10E3/UL (ref 0.8–5.3)
LYMPHOCYTES NFR BLD AUTO: 30 %
MCH RBC QN AUTO: 29.4 PG (ref 26.5–33)
MCHC RBC AUTO-ENTMCNC: 34.6 G/DL (ref 31.5–36.5)
MCV RBC AUTO: 85 FL (ref 78–100)
MONOCYTES # BLD AUTO: 0.5 10E3/UL (ref 0–1.3)
MONOCYTES NFR BLD AUTO: 8 %
NEUTROPHILS # BLD AUTO: 3.7 10E3/UL (ref 1.6–8.3)
NEUTROPHILS NFR BLD AUTO: 59 %
NRBC # BLD AUTO: 0 10E3/UL
NRBC BLD AUTO-RTO: 0 /100
PLATELET # BLD AUTO: 270 10E3/UL (ref 150–450)
PROT SERPL-MCNC: 6.9 G/DL (ref 6.4–8.3)
RBC # BLD AUTO: 4.86 10E6/UL (ref 4.4–5.9)
WBC # BLD AUTO: 6.4 10E3/UL (ref 4–11)

## 2023-04-04 PROCEDURE — 258N000003 HC RX IP 258 OP 636: Performed by: INTERNAL MEDICINE

## 2023-04-04 PROCEDURE — 36415 COLL VENOUS BLD VENIPUNCTURE: CPT | Performed by: INTERNAL MEDICINE

## 2023-04-04 PROCEDURE — 85652 RBC SED RATE AUTOMATED: CPT | Performed by: INTERNAL MEDICINE

## 2023-04-04 PROCEDURE — 250N000011 HC RX IP 250 OP 636: Performed by: INTERNAL MEDICINE

## 2023-04-04 PROCEDURE — 85025 COMPLETE CBC W/AUTO DIFF WBC: CPT | Performed by: INTERNAL MEDICINE

## 2023-04-04 PROCEDURE — 80076 HEPATIC FUNCTION PANEL: CPT | Performed by: INTERNAL MEDICINE

## 2023-04-04 PROCEDURE — 86140 C-REACTIVE PROTEIN: CPT | Performed by: INTERNAL MEDICINE

## 2023-04-04 PROCEDURE — 96413 CHEMO IV INFUSION 1 HR: CPT

## 2023-04-04 PROCEDURE — 96415 CHEMO IV INFUSION ADDL HR: CPT

## 2023-04-04 RX ORDER — ACETAMINOPHEN 325 MG/1
650 TABLET ORAL ONCE
Status: CANCELLED
Start: 2023-04-05 | End: 2023-04-05

## 2023-04-04 RX ORDER — EPINEPHRINE 1 MG/ML
0.3 INJECTION, SOLUTION, CONCENTRATE INTRAVENOUS EVERY 5 MIN PRN
Status: CANCELLED | OUTPATIENT
Start: 2023-04-05

## 2023-04-04 RX ORDER — MEPERIDINE HYDROCHLORIDE 50 MG/ML
25 INJECTION INTRAMUSCULAR; INTRAVENOUS; SUBCUTANEOUS EVERY 30 MIN PRN
Status: CANCELLED | OUTPATIENT
Start: 2023-04-05

## 2023-04-04 RX ORDER — DIPHENHYDRAMINE HCL 25 MG
25 CAPSULE ORAL ONCE
Status: CANCELLED
Start: 2023-04-05 | End: 2023-04-05

## 2023-04-04 RX ORDER — ALBUTEROL SULFATE 90 UG/1
1-2 AEROSOL, METERED RESPIRATORY (INHALATION)
Status: CANCELLED
Start: 2023-04-05

## 2023-04-04 RX ORDER — DIPHENHYDRAMINE HYDROCHLORIDE 50 MG/ML
50 INJECTION INTRAMUSCULAR; INTRAVENOUS
Status: CANCELLED
Start: 2023-04-05

## 2023-04-04 RX ORDER — ALBUTEROL SULFATE 0.83 MG/ML
2.5 SOLUTION RESPIRATORY (INHALATION)
Status: CANCELLED | OUTPATIENT
Start: 2023-04-05

## 2023-04-04 RX ORDER — METHYLPREDNISOLONE SODIUM SUCCINATE 125 MG/2ML
125 INJECTION, POWDER, LYOPHILIZED, FOR SOLUTION INTRAMUSCULAR; INTRAVENOUS
Status: CANCELLED
Start: 2023-04-05

## 2023-04-04 RX ADMIN — INFLIXIMAB 400 MG: 100 INJECTION, POWDER, LYOPHILIZED, FOR SOLUTION INTRAVENOUS at 09:46

## 2023-04-04 RX ADMIN — SODIUM CHLORIDE 250 ML: 9 INJECTION, SOLUTION INTRAVENOUS at 09:34

## 2023-04-04 NOTE — NURSING NOTE

## 2023-04-04 NOTE — NURSING NOTE
Infusion Nursing Note:  Dennis J Goodell presents today for Infliximab, week 6.    Patient seen by provider today: No   present during visit today: Not Applicable.    Note: N/A.    Intravenous Access:  Labs drawn without difficulty.  Peripheral IV placed.    Treatment Conditions:  Lab Results   Component Value Date    HGB 14.3 04/04/2023    WBC 6.4 04/04/2023    ANEUTAUTO 3.7 04/04/2023     04/04/2023      Lab Results   Component Value Date     01/31/2023    POTASSIUM 4.2 01/31/2023    MAG 2.1 11/22/2022    CR 1.01 01/31/2023    MARIA INES 8.8 01/31/2023    BILITOTAL 0.2 04/04/2023    ALBUMIN 4.4 04/04/2023    ALT 22 04/04/2023    AST 24 04/04/2023     1. Biological Infusion Checklist:  Completed      Post Infusion Assessment:  Patient tolerated infusion without incident.  Blood return noted pre and post infusion.  Site patent and intact, free from redness, edema or discomfort.  No evidence of extravasations.  Access discontinued per protocol.  Biologic Infusion Post Education: Call the triage nurse at your clinic or seek medical attention if you have chills and/or temperature greater than or equal to 100.5, uncontrolled nausea/vomiting, diarrhea, constipation, dizziness, shortness of breath, chest pain, heart palpitations, weakness or any other new or concerning symptoms, questions or concerns.  You cannot have any live virus vaccines prior to or during treatment or up to 6 months post infusion.  If you have an upcoming surgery, medical procedure or dental procedure during treatment, this should be discussed with your ordering physician and your surgeon/dentist.  If you are having any concerning symptom, if you are unsure if you should get your next infusion or wish to speak to a provider before your next infusion, please call your care coordinator or triage nurse at your clinic to notify them so we can adequately serve you.     Discharge Plan:   Discharge instructions reviewed with:  Patient.  Patient and/or family verbalized understanding of discharge instructions and all questions answered.  Copy of AVS reviewed with patient and/or family.  Patient will return 5-30-23 for next appointment.  AVS to patient via [employer].  Patient discharged in stable condition accompanied by: self.  Departure Mode: Ambulatory.      Mary Kate Ospina RN

## 2023-04-05 NOTE — TELEPHONE ENCOUNTER
Patient had not responded to Ms. Perez checking in to see how the patient is doing. New [employer] message sent.     Per Dr. Sanchez:    on appropriate diet (I think he likely has poor dietary intake). Ok to start some fiber. Can take small doses of imodium if needed as we wait for meds to kick in.

## 2023-04-18 ENCOUNTER — HOSPITAL ENCOUNTER (OUTPATIENT)
Dept: GENERAL RADIOLOGY | Facility: OTHER | Age: 48
Discharge: HOME | End: 2023-04-18
Attending: INTERNAL MEDICINE
Payer: COMMERCIAL

## 2023-04-18 ENCOUNTER — TELEPHONE (OUTPATIENT)
Dept: GASTROENTEROLOGY | Facility: CLINIC | Age: 48
End: 2023-04-18
Payer: COMMERCIAL

## 2023-04-18 ENCOUNTER — OFFICE VISIT (OUTPATIENT)
Dept: INTERNAL MEDICINE | Facility: OTHER | Age: 48
End: 2023-04-18
Attending: INTERNAL MEDICINE
Payer: COMMERCIAL

## 2023-04-18 VITALS
DIASTOLIC BLOOD PRESSURE: 84 MMHG | TEMPERATURE: 95.7 F | OXYGEN SATURATION: 96 % | RESPIRATION RATE: 16 BRPM | WEIGHT: 161 LBS | BODY MASS INDEX: 22.45 KG/M2 | HEART RATE: 98 BPM | SYSTOLIC BLOOD PRESSURE: 112 MMHG

## 2023-04-18 DIAGNOSIS — J11.1 INFLUENZA-LIKE ILLNESS: Primary | ICD-10-CM

## 2023-04-18 DIAGNOSIS — J11.1 INFLUENZA-LIKE ILLNESS: ICD-10-CM

## 2023-04-18 DIAGNOSIS — F10.920 ALCOHOLIC INTOXICATION WITHOUT COMPLICATION (H): ICD-10-CM

## 2023-04-18 LAB
ERYTHROCYTE [DISTWIDTH] IN BLOOD BY AUTOMATED COUNT: 14 % (ref 10–15)
FLUAV RNA SPEC QL NAA+PROBE: NEGATIVE
FLUBV RNA RESP QL NAA+PROBE: NEGATIVE
HCT VFR BLD AUTO: 42.6 % (ref 40–53)
HGB BLD-MCNC: 14.8 G/DL (ref 13.3–17.7)
HOLD SPECIMEN: NORMAL
MCH RBC QN AUTO: 30 PG (ref 26.5–33)
MCHC RBC AUTO-ENTMCNC: 34.7 G/DL (ref 31.5–36.5)
MCV RBC AUTO: 86 FL (ref 78–100)
PLATELET # BLD AUTO: 219 10E3/UL (ref 150–450)
RBC # BLD AUTO: 4.93 10E6/UL (ref 4.4–5.9)
RSV RNA SPEC NAA+PROBE: NEGATIVE
SARS-COV-2 RNA RESP QL NAA+PROBE: NEGATIVE
WBC # BLD AUTO: 4.8 10E3/UL (ref 4–11)

## 2023-04-18 PROCEDURE — 71046 X-RAY EXAM CHEST 2 VIEWS: CPT

## 2023-04-18 PROCEDURE — C9803 HOPD COVID-19 SPEC COLLECT: HCPCS | Performed by: INTERNAL MEDICINE

## 2023-04-18 PROCEDURE — 36415 COLL VENOUS BLD VENIPUNCTURE: CPT | Mod: ZL | Performed by: INTERNAL MEDICINE

## 2023-04-18 PROCEDURE — 87637 SARSCOV2&INF A&B&RSV AMP PRB: CPT | Mod: ZL | Performed by: INTERNAL MEDICINE

## 2023-04-18 PROCEDURE — G0463 HOSPITAL OUTPT CLINIC VISIT: HCPCS

## 2023-04-18 PROCEDURE — 85027 COMPLETE CBC AUTOMATED: CPT | Mod: ZL | Performed by: INTERNAL MEDICINE

## 2023-04-18 PROCEDURE — 99213 OFFICE O/P EST LOW 20 MIN: CPT | Mod: CS | Performed by: INTERNAL MEDICINE

## 2023-04-18 PROCEDURE — G0463 HOSPITAL OUTPT CLINIC VISIT: HCPCS | Mod: 25

## 2023-04-18 ASSESSMENT — PATIENT HEALTH QUESTIONNAIRE - PHQ9: SUM OF ALL RESPONSES TO PHQ QUESTIONS 1-9: 3

## 2023-04-18 ASSESSMENT — PAIN SCALES - GENERAL: PAINLEVEL: NO PAIN (0)

## 2023-04-18 ASSESSMENT — ENCOUNTER SYMPTOMS
CARDIOVASCULAR NEGATIVE: 1
CHILLS: 1
COUGH: 1
FATIGUE: 1

## 2023-04-18 NOTE — TELEPHONE ENCOUNTER
Received voicemail from patient requesting callback. Writer returned patient call who reports severe cold symptoms. Patient wanted to ensure there was not anything that should not be taken now that he is on the infliximab. Writer reassured patient that it is ok to take antibiotics if prescribed, but to notify our office. Patient has an office visit this afternoon with internal medicine physician. Next infusion scheduled for May 30th.

## 2023-04-18 NOTE — PROGRESS NOTES
ICD-10-CM    1. Influenza-like illness  J11.1 XR Chest 2 Views     Symptomatic Influenza A/B, RSV, & SARS-CoV2 PCR (COVID-19)     CBC W PLT No Diff     Symptomatic Influenza A/B, RSV, & SARS-CoV2 PCR (COVID-19) Nose      2. Alcoholic intoxication without complication (H)  F10.920         His exam today is normal.  His chest x-ray is normal.  CBC is normal.  Multiplex is negative.  He probably has a viral syndrome.  I encouraged him to go home and rest and recommended symptomatic measures including fluids and Tylenol.  Return if not improving.  Of note is that I did talk to him about his alcohol today and encouraged him to avoid this and it should be noted that he did not drive today, he tells me that his daughter drove him.      Petra Paredes is a 47 year old, presenting for the following health issues:  Cold Symptoms         View : No data to display.              He is in today complaining of being sick.  He tells me that he has had some chills and a headache.  He has had body aches.  He has had somewhat of a cough.  He feels congestion and just feels overall weak and worn out.  He does not have any significant shortness of breath or chest discomfort.  He has not tested himself for COVID.  He is on Remicade for Crohn's disease and is therefore immunosuppressed.    He admitted to the nurse that he had 6-8 beers today before coming in.  He told me he had 3 before coming in.       Acute Illness  Acute illness concerns: Cold symptoms  Onset/Duration: Sunday 4/16  Symptoms:  Fever: No  Chills/Sweats: YES  Headache (location?): YES  Sinus Pressure: YES  Conjunctivitis:  No  Ear Pain: YES: both  Rhinorrhea: No  Congestion: No  Sore Throat: YES  Cough: YES  Wheeze: No  Decreased Appetite: No  Nausea: No  Vomiting: No  Diarrhea: No  Dysuria/Freq.: No  Dysuria or Hematuria: No  Fatigue/Achiness: No  Sick/Strep Exposure: No  Therapies tried and outcome: None  Current Outpatient Medications   Medication     albuterol  (PROAIR HFA/PROVENTIL HFA/VENTOLIN HFA) 108 (90 Base) MCG/ACT inhaler     ALPRAZolam (XANAX) 1 MG tablet     Aspirin-Acetaminophen-Caffeine (EXCEDRIN MIGRAINE PO)     bismuth subsalicylate (PEPTO BISMOL) 262 MG/15ML suspension     budesonide (ENTOCORT EC) 3 MG EC capsule     calcium carbonate (TUMS) 500 MG chewable tablet     clonazePAM (KLONOPIN) 1 MG tablet     desvenlafaxine succinate (PRISTIQ) 100 MG 24 hr tablet     dicyclomine (BENTYL) 10 MG capsule     inFLIXimab     NIFEdipine ER OSMOTIC (PROCARDIA XL) 90 MG 24 hr tablet     omeprazole (PRILOSEC) 40 MG DR capsule     ondansetron (ZOFRAN ODT) 4 MG ODT tab     Probiotic Product (FLORAJEN3) CAPS     psyllium (METAMUCIL/KONSYL) 58.6 % powder     Current Facility-Administered Medications   Medication     ondansetron (ZOFRAN) injection 4 mg           Review of Systems   Constitutional: Positive for chills and fatigue.   Respiratory: Positive for cough.    Cardiovascular: Negative.             Objective    /84   Pulse 98   Temp (!) 95.7  F (35.4  C) (Temporal)   Resp 16   Wt 73 kg (161 lb)   SpO2 96%   BMI 22.45 kg/m    Body mass index is 22.45 kg/m .  Physical Exam  Vitals and nursing note reviewed.   Constitutional:       General: He is not in acute distress.     Appearance: Normal appearance. He is not ill-appearing, toxic-appearing or diaphoretic.      Comments: Smells of alcohol   HENT:      Right Ear: Tympanic membrane normal.      Left Ear: Tympanic membrane normal.      Mouth/Throat:      Mouth: Mucous membranes are moist.      Pharynx: Oropharynx is clear. No oropharyngeal exudate.   Cardiovascular:      Rate and Rhythm: Normal rate and regular rhythm.   Pulmonary:      Effort: Pulmonary effort is normal.      Breath sounds: Normal breath sounds.   Lymphadenopathy:      Cervical: No cervical adenopathy.   Neurological:      Mental Status: He is alert.

## 2023-04-20 ENCOUNTER — OFFICE VISIT (OUTPATIENT)
Dept: FAMILY MEDICINE | Facility: OTHER | Age: 48
End: 2023-04-20
Attending: FAMILY MEDICINE
Payer: COMMERCIAL

## 2023-04-20 VITALS
DIASTOLIC BLOOD PRESSURE: 86 MMHG | HEIGHT: 70 IN | BODY MASS INDEX: 24.18 KG/M2 | OXYGEN SATURATION: 95 % | HEART RATE: 110 BPM | WEIGHT: 168.9 LBS | TEMPERATURE: 98.8 F | RESPIRATION RATE: 20 BRPM | SYSTOLIC BLOOD PRESSURE: 116 MMHG

## 2023-04-20 DIAGNOSIS — J11.1 INFLUENZA-LIKE ILLNESS: Primary | ICD-10-CM

## 2023-04-20 DIAGNOSIS — J20.9 ACUTE BRONCHITIS, UNSPECIFIED ORGANISM: ICD-10-CM

## 2023-04-20 DIAGNOSIS — J02.9 SORE THROAT: ICD-10-CM

## 2023-04-20 LAB — GROUP A STREP BY PCR: NOT DETECTED

## 2023-04-20 PROCEDURE — 87651 STREP A DNA AMP PROBE: CPT | Mod: ZL | Performed by: NURSE PRACTITIONER

## 2023-04-20 PROCEDURE — 99213 OFFICE O/P EST LOW 20 MIN: CPT | Performed by: NURSE PRACTITIONER

## 2023-04-20 PROCEDURE — G0463 HOSPITAL OUTPT CLINIC VISIT: HCPCS

## 2023-04-20 RX ORDER — AZITHROMYCIN 250 MG/1
TABLET, FILM COATED ORAL
Qty: 6 TABLET | Refills: 0 | Status: SHIPPED | OUTPATIENT
Start: 2023-04-20 | End: 2023-04-25

## 2023-04-20 ASSESSMENT — PAIN SCALES - GENERAL: PAINLEVEL: MODERATE PAIN (5)

## 2023-04-20 NOTE — PROGRESS NOTES
ASSESSMENT/PLAN:     I have reviewed the nursing notes.  I have reviewed the findings, diagnosis, plan and need for follow up with the patient.    1. Sore throat    - Group A Streptococcus PCR Throat Swab    Strep PCR test negative today    2. Influenza-like illness    Negative Influenza, Covid, and RSV testing on 4/18/23  CXR from 4/18/23 reviewed and appears clear, radiologist over read: No acute infiltrate.     Symptomatic treatment - Encouraged fluids, salt water gargles, honey, elevation, humidifier, saline nasal spray, sinus rinse/netti pot, lozenges, tea, soup, smoothies, popsicles, topical vapor rub, rest, etc     May use over-the-counter Tylenol or ibuprofen PRN    Discussed warning signs/symptoms indicative of need to f/u  Follow up if symptoms persist or worsen or concerns    3. Acute bronchitis, unspecified organism    - azithromycin (ZITHROMAX) 250 MG tablet; Take 2 tablets (500 mg) by mouth daily for 1 day, THEN 1 tablet (250 mg) daily for 4 days.  Dispense: 6 tablet; Refill: 0    Negative Influenza, Covid, and RSV testing on 4/18/23  CXR from 4/18/23 reviewed and appears clear, radiologist over read: No acute infiltrate.     Discussed with patient that symptoms and exam are consistent with viral illness.  However due to his persistence of symptoms without improvement and hx of immunosuppression from taking Remicade, patient insistent on antibiotics.      Symptomatic treatment - Encouraged fluids, salt water gargles, honey, elevation, humidifier, saline nasal spray, sinus rinse/netti pot, lozenges, tea, soup, smoothies, popsicles, topical vapor rub, rest, etc     May use over-the-counter Tylenol or ibuprofen PRN    Discussed warning signs/symptoms indicative of need to f/u  Follow up if symptoms persist or worsen or concerns      I explained my diagnostic considerations and recommendations to the patient, who voiced understanding and agreement with the treatment plan. All questions were answered. We  discussed potential side effects of any prescribed or recommended therapies, as well as expectations for response to treatments.    Genie Grant NP  [facility]      SUBJECTIVE:   Dennis J Goodell is a 47 year old male who presents to clinic today for the following health issues:  Cough and body aches    HPI  Cough, chills, night sweats, headaches, fatigue, chest congestion, shortness of breath, nasal congestion, and sore throat for the past 5 days.  States he has only been eating hot cereal and soup.  No vomiting or diarrhea.  Sleeping a lot.   No fevers.  He seen internal medicine on 4/1/23 for same symptoms and had negative influenza, Covid, and RSV testing.  Along with normal CBC and CXR.  Taking Mucinex and Tylenol Cold and Flu  Patient is immunocompromised as he takes Remicade for crohns.      Past Medical History:   Diagnosis Date     Anxiety disorder 2012     Dysthymic disorder     No Comments Provided     Malignant neoplasm of testis (H) 2005 2005,teratoma     Raynaud's syndrome without gangrene     No Comments Provided     Uncomplicated alcohol abuse     7/25/2012     Past Surgical History:   Procedure Laterality Date     COLONOSCOPY N/A 12/12/2022    Procedure: COLONOSCOPY, WITH POLYPECTOMY AND BIOPSY;  Surgeon: Solomon Arciniega MD;  Location: [facility] OR     DECOMPRESSION CUBITAL TUNNEL Left 01/2019     HERNIA REPAIR      ,HERNIA REPAIR     IR CHEST PORT PLACEMENT > 5 YRS OF AGE Left 04/01/2008     LYMPH NODE BIOPSY  09/05/2008     [facility]. 37 lymph nodes excised     ORCHIECTOMY SCROTAL Right 12/30/2005    teratoma     OTHER SURGICAL HISTORY      9/05/08,[id number],OTHER     RELEASE CARPAL TUNNEL  04/09/2013     Social History     Tobacco Use     Smoking status: Never     Smokeless tobacco: Current     Types: Chew     Tobacco comments:     1 tin lasts 1 week   Vaping Use     Vaping status: Never Used   Substance Use Topics     Alcohol use: Not Currently      Alcohol/week: 6.0 standard drinks of alcohol     Types: 6 Cans of beer per week     Current Outpatient Medications   Medication Sig Dispense Refill     albuterol (PROAIR HFA/PROVENTIL HFA/VENTOLIN HFA) 108 (90 Base) MCG/ACT inhaler Inhale 2 puffs into the lungs every 6 hours as needed for shortness of breath / dyspnea or wheezing       ALPRAZolam (XANAX) 1 MG tablet Take 1 mg by mouth 2 times daily as needed for anxiety       Aspirin-Acetaminophen-Caffeine (EXCEDRIN MIGRAINE PO)        bismuth subsalicylate (PEPTO BISMOL) 262 MG/15ML suspension Take 15 mLs by mouth every 6 hours as needed for indigestion       budesonide (ENTOCORT EC) 3 MG EC capsule Take 3 capsules (9 mg) by mouth every morning X 2 months. Then take 2 capsules PO q day x 2 weeks, then 1 capsule PO q day x 2 weeks and stop 270 capsule 3     calcium carbonate (TUMS) 500 MG chewable tablet Take 1 chew tab by mouth 4 times daily as needed for heartburn       clonazePAM (KLONOPIN) 1 MG tablet Take 1 mg by mouth 3 times daily       desvenlafaxine succinate (PRISTIQ) 100 MG 24 hr tablet Take 100 mg by mouth daily       dicyclomine (BENTYL) 10 MG capsule Take 2 capsules (20 mg) by mouth 2 times daily as needed (abdominal pain) 120 capsule 3     inFLIXimab Inject into the vein once for 1 dose       NIFEdipine ER OSMOTIC (PROCARDIA XL) 90 MG 24 hr tablet Take 1 tablet (90 mg) by mouth daily 90 tablet 4     omeprazole (PRILOSEC) 40 MG DR capsule Take 1 capsule (40 mg) by mouth daily 30 capsule 3     ondansetron (ZOFRAN ODT) 4 MG ODT tab Take 1 tablet (4 mg) by mouth every 8 hours as needed for nausea (MRI) 1 tablet 0     Probiotic Product (FLORAJEN3) CAPS 15 billion live cultures per capsule, 1 cap po qd       psyllium (METAMUCIL/KONSYL) 58.6 % powder Take 36 g (2 Tablespoonful) by mouth daily       No Known Allergies      Past medical history, past surgical history, current medications and allergies reviewed and accurate to the best of my knowledge.   "      OBJECTIVE:     /86 (BP Location: Right arm, Patient Position: Sitting, Cuff Size: Adult Regular)   Pulse 110   Temp 98.8  F (37.1  C) (Tympanic)   Resp 20   Ht 1.778 m (5' 10\")   Wt 76.6 kg (168 lb 14.4 oz)   SpO2 95%   BMI 24.23 kg/m    Body mass index is 24.23 kg/m .     Physical Exam  General Appearance: Miserable and fatigued adult male, appropriate appearance for age. No acute distress  Orophayrnx: moist mucous membranes, pharynx with mild erythema, tonsils with 1-2+ hypertrophy, tonsils with mild erythema, no tonsillar exudates, no oral lesions, no palate petechiae, no post nasal drip seen, no trismus, voice clear.    Nose:  No noted drainage    Neck: mild bilateral tonsillar lymph node enlargement with tenderness to palpation   Respiratory: normal chest wall and respirations.  Normal effort.  Clear to auscultation bilaterally, no wheezing, crackles or rhonchi.  No increased work of breathing.  No cough appreciated.  Cardiac: RRR with no murmurs  Musculoskeletal:  Equal movement of bilateral upper extremities.  Equal movement of bilateral lower extremities.  Normal gait.    Psychological: normal affect, alert, oriented, and pleasant.       Labs:  Results for orders placed or performed in visit on 04/20/23   Group A Streptococcus PCR Throat Swab     Status: Normal    Specimen: Throat; Swab   Result Value Ref Range    Group A strep by PCR Not Detected Not Detected    Narrative    The Xpert Xpress Strep A test, performed on the [employer]  Instrument Systems, is a rapid, qualitative in vitro diagnostic test for the detection of Streptococcus pyogenes (Group A ß-hemolytic Streptococcus, Strep A) in throat swab specimens from patients with signs and symptoms of pharyngitis. The Xpert Xpress Strep A test can be used as an aid in the diagnosis of Group A Streptococcal pharyngitis. The assay is not intended to monitor treatment for Group A Streptococcus infections. The Xpert Xpress Strep A test " utilizes an automated real-time polymerase chain reaction (PCR) to detect Streptococcus pyogenes DNA.

## 2023-04-20 NOTE — NURSING NOTE
"Pt presents to  with his daughter. Pt has been sick with cough and body aches since Sunday. Took Mucinex at 1730.    Chief Complaint   Patient presents with     Cough     Generalized Body Aches       FOOD SECURITY SCREENING QUESTIONS  Hunger Vital Signs:  Within the past 12 months we worried whether our food would run out before we got money to buy more. Never  Within the past 12 months the food we bought just didn't last and we didn't have money to get more. Never  Uyen Deajovanon 4/20/2023 6:23 PM      Initial /86 (BP Location: Right arm, Patient Position: Sitting, Cuff Size: Adult Regular)   Pulse 110   Temp 98.8  F (37.1  C) (Tympanic)   Resp 20   Ht 1.778 m (5' 10\")   Wt 76.6 kg (168 lb 14.4 oz)   SpO2 95%   BMI 24.23 kg/m   Estimated body mass index is 24.23 kg/m  as calculated from the following:    Height as of this encounter: 1.778 m (5' 10\").    Weight as of this encounter: 76.6 kg (168 lb 14.4 oz).  Medication Reconciliation: complete    Uyen Marguerite    "

## 2023-04-20 NOTE — LETTER
April 20, 2023      Dennis J Goodell  [address] [place] [address]        To Whom It May Concern:    Dennis J Goodell  was seen on 4/20/23.  Please excuse him  until 4/22/23 due to illness.        Sincerely,        Genie Grant, NP

## 2023-04-29 ENCOUNTER — HEALTH MAINTENANCE LETTER (OUTPATIENT)
Age: 48
End: 2023-04-29

## 2023-05-11 ENCOUNTER — TELEPHONE (OUTPATIENT)
Dept: FAMILY MEDICINE | Facility: OTHER | Age: 48
End: 2023-05-11
Payer: COMMERCIAL

## 2023-05-11 DIAGNOSIS — J01.91 ACUTE RECURRENT SINUSITIS, UNSPECIFIED LOCATION: Primary | ICD-10-CM

## 2023-05-11 RX ORDER — CIPROFLOXACIN 500 MG/1
500 TABLET, FILM COATED ORAL 2 TIMES DAILY
Qty: 20 TABLET | Refills: 0 | Status: SHIPPED | OUTPATIENT
Start: 2023-05-11 | End: 2023-05-21

## 2023-05-11 NOTE — TELEPHONE ENCOUNTER
Calling in due to persistent sinus and chest symptoms.  Recent azithromycin. Having persistent symptoms. Requesting another antibiotic. Sent ciprofloxacin to the pharmacy.   Ira Barrera PA-C ..................5/11/2023 10:38 AM

## 2023-05-30 ENCOUNTER — TELEPHONE (OUTPATIENT)
Dept: GASTROENTEROLOGY | Facility: CLINIC | Age: 48
End: 2023-05-30
Payer: COMMERCIAL

## 2023-05-30 ENCOUNTER — HOSPITAL ENCOUNTER (OUTPATIENT)
Dept: INFUSION THERAPY | Facility: OTHER | Age: 48
Discharge: HOME | End: 2023-05-30
Attending: INTERNAL MEDICINE | Admitting: INTERNAL MEDICINE
Payer: COMMERCIAL

## 2023-05-30 ENCOUNTER — MYC MEDICAL ADVICE (OUTPATIENT)
Dept: GASTROENTEROLOGY | Facility: CLINIC | Age: 48
End: 2023-05-30
Payer: COMMERCIAL

## 2023-05-30 VITALS
HEART RATE: 74 BPM | TEMPERATURE: 97.4 F | BODY MASS INDEX: 23.42 KG/M2 | DIASTOLIC BLOOD PRESSURE: 85 MMHG | RESPIRATION RATE: 12 BRPM | SYSTOLIC BLOOD PRESSURE: 136 MMHG | WEIGHT: 163.2 LBS

## 2023-05-30 DIAGNOSIS — K50.10 CROHN'S DISEASE OF LARGE INTESTINE WITHOUT COMPLICATION (H): Primary | ICD-10-CM

## 2023-05-30 DIAGNOSIS — R19.7 DIARRHEA: ICD-10-CM

## 2023-05-30 LAB
ALBUMIN SERPL BCG-MCNC: 4.4 G/DL (ref 3.5–5.2)
ALP SERPL-CCNC: 66 U/L (ref 40–129)
ALT SERPL W P-5'-P-CCNC: 19 U/L (ref 10–50)
AST SERPL W P-5'-P-CCNC: 22 U/L (ref 10–50)
BASOPHILS # BLD AUTO: 0.1 10E3/UL (ref 0–0.2)
BASOPHILS NFR BLD AUTO: 1 %
BILIRUB DIRECT SERPL-MCNC: <0.2 MG/DL (ref 0–0.3)
BILIRUB SERPL-MCNC: <0.2 MG/DL
CRP SERPL-MCNC: <3 MG/L
EOSINOPHIL # BLD AUTO: 0.7 10E3/UL (ref 0–0.7)
EOSINOPHIL NFR BLD AUTO: 10 %
ERYTHROCYTE [DISTWIDTH] IN BLOOD BY AUTOMATED COUNT: 13.7 % (ref 10–15)
ERYTHROCYTE [SEDIMENTATION RATE] IN BLOOD BY WESTERGREN METHOD: 2 MM/HR (ref 0–15)
HCT VFR BLD AUTO: 41.4 % (ref 40–53)
HGB BLD-MCNC: 14.1 G/DL (ref 13.3–17.7)
IMM GRANULOCYTES # BLD: 0 10E3/UL
IMM GRANULOCYTES NFR BLD: 0 %
LYMPHOCYTES # BLD AUTO: 3.1 10E3/UL (ref 0.8–5.3)
LYMPHOCYTES NFR BLD AUTO: 48 %
MCH RBC QN AUTO: 30.2 PG (ref 26.5–33)
MCHC RBC AUTO-ENTMCNC: 34.1 G/DL (ref 31.5–36.5)
MCV RBC AUTO: 89 FL (ref 78–100)
MONOCYTES # BLD AUTO: 0.7 10E3/UL (ref 0–1.3)
MONOCYTES NFR BLD AUTO: 10 %
NEUTROPHILS # BLD AUTO: 2 10E3/UL (ref 1.6–8.3)
NEUTROPHILS NFR BLD AUTO: 31 %
NRBC # BLD AUTO: 0 10E3/UL
NRBC BLD AUTO-RTO: 0 /100
PLATELET # BLD AUTO: 226 10E3/UL (ref 150–450)
PROT SERPL-MCNC: 6.7 G/DL (ref 6.4–8.3)
RBC # BLD AUTO: 4.67 10E6/UL (ref 4.4–5.9)
WBC # BLD AUTO: 6.6 10E3/UL (ref 4–11)

## 2023-05-30 PROCEDURE — 250N000011 HC RX IP 250 OP 636: Performed by: INTERNAL MEDICINE

## 2023-05-30 PROCEDURE — 96415 CHEMO IV INFUSION ADDL HR: CPT

## 2023-05-30 PROCEDURE — 96413 CHEMO IV INFUSION 1 HR: CPT

## 2023-05-30 PROCEDURE — 86140 C-REACTIVE PROTEIN: CPT | Performed by: INTERNAL MEDICINE

## 2023-05-30 PROCEDURE — 258N000003 HC RX IP 258 OP 636: Performed by: INTERNAL MEDICINE

## 2023-05-30 PROCEDURE — 85652 RBC SED RATE AUTOMATED: CPT | Performed by: INTERNAL MEDICINE

## 2023-05-30 PROCEDURE — 85025 COMPLETE CBC W/AUTO DIFF WBC: CPT | Performed by: INTERNAL MEDICINE

## 2023-05-30 PROCEDURE — 87506 IADNA-DNA/RNA PROBE TQ 6-11: CPT | Performed by: INTERNAL MEDICINE

## 2023-05-30 PROCEDURE — 80076 HEPATIC FUNCTION PANEL: CPT | Performed by: INTERNAL MEDICINE

## 2023-05-30 PROCEDURE — 36415 COLL VENOUS BLD VENIPUNCTURE: CPT | Performed by: INTERNAL MEDICINE

## 2023-05-30 PROCEDURE — 83993 ASSAY FOR CALPROTECTIN FECAL: CPT | Performed by: INTERNAL MEDICINE

## 2023-05-30 RX ORDER — ACETAMINOPHEN 325 MG/1
650 TABLET ORAL ONCE
Status: CANCELLED
Start: 2023-05-31 | End: 2023-05-31

## 2023-05-30 RX ORDER — DIPHENHYDRAMINE HYDROCHLORIDE 50 MG/ML
50 INJECTION INTRAMUSCULAR; INTRAVENOUS
Status: CANCELLED
Start: 2023-05-31

## 2023-05-30 RX ORDER — MEPERIDINE HYDROCHLORIDE 50 MG/ML
25 INJECTION INTRAMUSCULAR; INTRAVENOUS; SUBCUTANEOUS EVERY 30 MIN PRN
Status: CANCELLED | OUTPATIENT
Start: 2023-05-31

## 2023-05-30 RX ORDER — ALBUTEROL SULFATE 90 UG/1
1-2 AEROSOL, METERED RESPIRATORY (INHALATION)
Status: CANCELLED
Start: 2023-05-31

## 2023-05-30 RX ORDER — EPINEPHRINE 1 MG/ML
0.3 INJECTION, SOLUTION, CONCENTRATE INTRAVENOUS EVERY 5 MIN PRN
Status: CANCELLED | OUTPATIENT
Start: 2023-05-31

## 2023-05-30 RX ORDER — DIPHENHYDRAMINE HCL 25 MG
25 CAPSULE ORAL ONCE
Status: CANCELLED
Start: 2023-05-31 | End: 2023-05-31

## 2023-05-30 RX ORDER — METHYLPREDNISOLONE SODIUM SUCCINATE 125 MG/2ML
125 INJECTION, POWDER, LYOPHILIZED, FOR SOLUTION INTRAMUSCULAR; INTRAVENOUS
Status: CANCELLED
Start: 2023-05-31

## 2023-05-30 RX ORDER — ALBUTEROL SULFATE 0.83 MG/ML
2.5 SOLUTION RESPIRATORY (INHALATION)
Status: CANCELLED | OUTPATIENT
Start: 2023-05-31

## 2023-05-30 RX ADMIN — INFLIXIMAB 400 MG: 100 INJECTION, POWDER, LYOPHILIZED, FOR SOLUTION INTRAVENOUS at 12:26

## 2023-05-30 RX ADMIN — SODIUM CHLORIDE 250 ML: 9 INJECTION, SOLUTION INTRAVENOUS at 11:53

## 2023-05-30 NOTE — NURSING NOTE
Infusion Nursing Note:  Dennis J Goodell presents today for Remicade.    Patient seen by provider today: No   present during visit today: Not Applicable.    Note: call received back from Dr. Choudhary office. Ok to proceed with treatment today with patient finishing antibiotics and increased stooling. Stool sample collected while he was here. Labs collected as requested by GI today. Stool was soft dark brown stool. Patient has virtual visit set up for this week with Dr. Sanchez to discuss GI symptoms.      Intravenous Access:  Labs drawn without difficulty.  Peripheral IV placed.    Treatment Conditions:  Lab Results   Component Value Date    HGB 14.1 05/30/2023    WBC 6.6 05/30/2023    ANEUTAUTO 2.0 05/30/2023     05/30/2023      Lab Results   Component Value Date     01/31/2023    POTASSIUM 4.2 01/31/2023    MAG 2.1 11/22/2022    CR 1.01 01/31/2023    MARIA INES 8.8 01/31/2023    BILITOTAL <0.2 05/30/2023    ALBUMIN 4.4 05/30/2023    ALT 19 05/30/2023    AST 22 05/30/2023         Post Infusion Assessment:  Patient tolerated infusion without incident.  Blood return noted pre and post infusion.  Site patent and intact, free from redness, edema or discomfort.  No evidence of extravasations.  Access discontinued per protocol.  Biologic Infusion Post Education: Call the triage nurse at your clinic or seek medical attention if you have chills and/or temperature greater than or equal to 100.5, uncontrolled nausea/vomiting, diarrhea, constipation, dizziness, shortness of breath, chest pain, heart palpitations, weakness or any other new or concerning symptoms, questions or concerns.  You cannot have any live virus vaccines prior to or during treatment or up to 6 months post infusion.  If you have an upcoming surgery, medical procedure or dental procedure during treatment, this should be discussed with your ordering physician and your surgeon/dentist.  If you are having any concerning symptom, if you are  unsure if you should get your next infusion or wish to speak to a provider before your next infusion, please call your care coordinator or triage nurse at your clinic to notify them so we can adequately serve you.       Discharge Plan:   Discharge instructions reviewed with: Patient.  Patient and/or family verbalized understanding of discharge instructions and all questions answered.  Copy of AVS reviewed with patient and/or family.  Patient will return 7/25/23 for next appointment.  Patient discharged in stable condition accompanied by: self.  Departure Mode: Ambulatory.      Angela Swanson RN

## 2023-05-30 NOTE — TELEPHONE ENCOUNTER
"Received incoming call from GI Infusion RN reporting the patient has arrived for his IFX infusion this morning, but reports on-going cold-like symptoms. Previously reported to PCP with \"influenza-like symptoms\" on 4/20. Took Azithromycin for 5 days. Reported ongoing congestion/sinus congestion to PCP on 5/11, and was prescribed Cipro for 10 days. Patient reports he has not finished the antibiotic yet, due to missing doses while at work. Reports he is still congested, with thick yellow mucous. Denies fevers. The patient also reported to the nurse that he believes he is currently in a flare. Reported having about 10+ stools per day over the weekend.   While updating provider on current symptoms, writer received a call from the patient. The patient reports the most recent episode occurred Saturday night. Reports a significant amount of watery diarrhea that he reports is similar to when he preps for a scope. Once the diarrhea calms down his stool returns to formed stool, but reports it is a dark brown tarry consistency. Denies any blood in his stools. Reports right lower quadrant bloating with episodes that resolves once the diarrhea is done. Reports when episodes occur he takes Imodium, pepto bismol, and nothing is effective. Reports the episodes have been happening less often since starting the infusions, but still has had about 6 since his previous infusion on 4/4. Receiving infusion on time today, for every 8 weeks. Denies any recent traveling or changes to his diet. Discussed next appointment in July, and trying to find a sooner virtual appointment with PA.   "

## 2023-05-30 NOTE — NURSING NOTE
Call out to Dr. Sanchez's office due to patient still taking Cipro. Increased stooling of 10 X per day and having sinus symptoms of feeling congested and being plugged up. Patient is afebrile today and denies fevers at home. Discussed with care coordinator. She will discuss with MD and call back. Angela Swanson RN on 5/30/2023 at 10:53 AM

## 2023-05-30 NOTE — TELEPHONE ENCOUNTER
Received response from MD. Ok to go forward with infusion, as there have not been fevers and is not reporting trouble breathing. Requested RN collect labs, despite them being ordered as every other. RN expressed understanding. Also ordered enteric panel, c diff, and fecal calprotectin to be completed. RN reported they can send him home with the appropriate specimen containers to complete the stool studies. Contacted patient to provide update, and also offer virtual appointment on Thursday, June 1st at 8am. Patient agreed to appointment time. Message sent to [facility] to assist with scheduling.   Updated MD on current symptoms.

## 2023-05-30 NOTE — NURSING NOTE
~~~ NOTE: If the patient answers yes to any of the questions below, hold the infusion and contact ordering provider or on-call provider.    1. Have you recently had an elevated temperature, fever, chills, productive cough, coughing for 3 weeks or longer or hemoptysis,  abnormal vital signs, night sweats,  chest pain or have you noticed a decrease in your appetite, unexplained weight loss or fatigue? No  2. Do you have any open wounds or new incisions? No  3. Do you have any upcoming hospitalizations or surgeries? Does not include esophagogastroduodenoscopy, colonoscopy, endoscopic retrograde cholangiopancreatography (ERCP), endoscopic ultrasound (EUS), dental procedures or joint aspiration/steroid injections No  4. Do you currently have any signs of illness or infection or are you on any antibiotics? Yes, Cipro- 3 tabs left  5. Have you had any new, sudden or worsening abdominal pain? No, currently having flare of crohns with diarrhea- 10 stools per day  6. Have you or anyone in your household received a live vaccination in the past 4 weeks? Please note: No live vaccines while on biologic/chemotherapy until 6 months after the last treatment. Patient can receive the flu vaccine (shot only), pneumovax and the Covid vaccine. It is optimal for the patient to get these vaccines mid cycle, but they can be given at any time as long as it is not on the day of the infusion. No  7. Have you recently been diagnosed with any new nervous system diseases (ie. Multiple sclerosis, Guillain [place], seizures, neurological changes) or cancer diagnosis? Are you on any form of radiation or chemotherapy? No  8. Are you pregnant or breast feeding or do you have plans of pregnancy in the future? No  9. Have you been having any signs of worsening depression or suicidal ideations?  (benlysta only) No  10. Have there been any other new onset medical symptoms? No  11. Have you had any new blood clots? (IVIG only) No

## 2023-06-01 ENCOUNTER — TELEPHONE (OUTPATIENT)
Dept: GASTROENTEROLOGY | Facility: CLINIC | Age: 48
End: 2023-06-01

## 2023-06-01 ENCOUNTER — VIRTUAL VISIT (OUTPATIENT)
Dept: GASTROENTEROLOGY | Facility: CLINIC | Age: 48
End: 2023-06-01
Payer: COMMERCIAL

## 2023-06-01 DIAGNOSIS — K50.10 CROHN'S DISEASE OF LARGE INTESTINE WITHOUT COMPLICATION (H): ICD-10-CM

## 2023-06-01 DIAGNOSIS — K52.9 CHRONIC DIARRHEA: Primary | ICD-10-CM

## 2023-06-01 PROCEDURE — 99215 OFFICE O/P EST HI 40 MIN: CPT | Mod: VID | Performed by: INTERNAL MEDICINE

## 2023-06-01 RX ORDER — DIPHENOXYLATE HCL/ATROPINE 2.5-.025MG
1 TABLET ORAL 4 TIMES DAILY PRN
Qty: 90 TABLET | Refills: 3 | Status: SHIPPED | OUTPATIENT
Start: 2023-06-01 | End: 2024-01-09

## 2023-06-01 ASSESSMENT — PAIN SCALES - GENERAL: PAINLEVEL: NO PAIN (0)

## 2023-06-01 ASSESSMENT — ENCOUNTER SYMPTOMS
INSOMNIA: 1
DISTURBANCES IN COORDINATION: 0
HEADACHES: 1
EYE IRRITATION: 1
DEPRESSION: 1
ABDOMINAL PAIN: 1
DECREASED CONCENTRATION: 1
NUMBNESS: 0
DIZZINESS: 0
PARALYSIS: 0
PANIC: 1
SEIZURES: 0
MEMORY LOSS: 1
SPEECH CHANGE: 0
SPUTUM PRODUCTION: 1
WEAKNESS: 0
SINUS CONGESTION: 1
TINGLING: 0
LOSS OF CONSCIOUSNESS: 0
EYE WATERING: 1
TREMORS: 0
HOARSE VOICE: 1
COUGH: 1
HEARTBURN: 1
BOWEL INCONTINENCE: 1
NECK PAIN: 1
NAUSEA: 1
SINUS PAIN: 1
DIARRHEA: 1
NERVOUS/ANXIOUS: 1
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
## 2023-06-01 NOTE — PATIENT INSTRUCTIONS
It is good to see you today. I am glad you are feeling improved after the infusions but I know there is still work to do. I have outlined my recommendations below.    We are waiting for your final stool study to evaluate the degree of inflammation in your colon/intestine.    My office will contact you about scheduling a drug level for your infliximab    My office will contact you about getting an abdominal X ray to look at the stool burden in your colon    My office will contact you about scheduling a colonoscopy    I have sent a prescription for Lomotil (a different antidiarrhea med) to your pharmacy. You can take this as needed 4 times per day for diarrhea. I would take this if the imodium does not work. Please watch out for constipation.    Take citrucel 1 tablespoon daily in glass of water. After 7 days increase to 2 tablespoons daily.     Follow up as scheduled in July
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
## 2023-06-01 NOTE — TELEPHONE ENCOUNTER
Left Voicemail (1st Attempt) for the patient to call back and schedule the following:    Appointment type: 6 month follow up video  Provider: Dr. Helder Sanchez  Return date: December 2023  Specialty phone number: n/a  Additional appointment(s) needed: complex scheduling needed.   Additonal Notes: n/a

## 2023-06-01 NOTE — NURSING NOTE
Order placed for abdominal xray and the colonoscopy   Left a message for patient to call back to discuss and also a my chart message

## 2023-06-01 NOTE — NURSING NOTE
Is the patient currently in the state of [place]? YES    Visit mode:VIDEO    If the visit is dropped, the patient can be reconnected by: VIDEO VISIT: Text to cell phone: [phone]    Will anyone else be joining the visit? NO      How would you like to obtain your AVS? MyChart    Are changes needed to the allergy or medication list? NO    Reason for visit: No chief complaint on file.

## 2023-06-02 LAB — CALPROTECTIN STL-MCNT: 34.2 MG/KG (ref 0–49.9)

## 2023-06-07 ENCOUNTER — PATIENT OUTREACH (OUTPATIENT)
Dept: GASTROENTEROLOGY | Facility: CLINIC | Age: 48
End: 2023-06-07
Payer: COMMERCIAL

## 2023-06-07 ENCOUNTER — TELEPHONE (OUTPATIENT)
Dept: GASTROENTEROLOGY | Facility: CLINIC | Age: 48
End: 2023-06-07
Payer: COMMERCIAL

## 2023-06-07 ENCOUNTER — DOCUMENTATION ONLY (OUTPATIENT)
Dept: GASTROENTEROLOGY | Facility: CLINIC | Age: 48
End: 2023-06-07
Payer: COMMERCIAL

## 2023-06-07 DIAGNOSIS — K50.10 CROHN'S DISEASE OF LARGE INTESTINE WITHOUT COMPLICATION (H): Primary | ICD-10-CM

## 2023-06-07 NOTE — TELEPHONE ENCOUNTER
"Endoscopy Scheduling Screen    Have you had a positive Covid test in the last 14 days?  No    Are you active on MyChart?   Yes    What insurance is in the chart?  Other:  imcare    Ordering/Referring Provider: CRIS MADSEN   (If ordering provider performs procedure, schedule with ordering provider unless otherwise instructed. )    BMI: Estimated body mass index is 23.42 kg/m  as calculated from the following:    Height as of 4/20/23: 1.778 m (5' 10\").    Weight as of 5/30/23: 74 kg (163 lb 3.2 oz).     Sedation Ordered  moderate sedation.   If patient BMI > 50 do not schedule in [facility].    Are you taking any prescription medications for pain?   No    Are you taking methadone or Suboxone?  No    Do you have a history of malignant hyperthermia or adverse reaction to anesthesia?  No    (Females) Are you currently pregnant?        Have you been diagnosed or told you have pulmonary hypertension?   No    Do you have an LVAD?  No    Have you been told you have moderate to severe sleep apnea?  No    Have you been told you have COPD, asthma, or any other lung disease?  No    Do you have any heart conditions?  No     Have you ever had or are you awaiting a heart or lung transplant?   No    Have you had a stroke or transient ischemic attack (TIA aka \"mini stroke\" in the last 6 months?   No    Have you been diagnosed with or been told you have cirrhosis of the liver?   No    Are you currently on dialysis?   No    Do you need assistance transferring?   No    BMI: Estimated body mass index is 23.42 kg/m  as calculated from the following:    Height as of 4/20/23: 1.778 m (5' 10\").    Weight as of 5/30/23: 74 kg (163 lb 3.2 oz).     Is patients BMI > 40 and scheduling location [facility]?  No    Do you take the medication Phentermine, Ozempic or Wegovy?  No    Do you take the medication Naltrexone?  No    Do you take blood thinners?  No    Preferred Pharmacy:      [id number] - [place], [place] - [address] [facility] [address]  Phone: [phone] Fax: [phone]          Prep   Are you currently on dialysis or do you have chronic kidney disease?  No (standard prep)    Do you have a diagnosis of diabetes?  No    Do you have a diagnosis of cystic fibrosis (CF)?  No    On a regular basis do you go 3 -5 days between bowel movements?  No    BMI > 40?  No    Final Scheduling Details   Colonoscopy prep sent?  MiraLAX (No Mag Citrate)    Procedure scheduled  COLON    Surgeon:  SIVA     Date of procedure:  9/11     Schedule PAC:   No    Location  [facility]    Sedation   Moderate Sedation    Patient Reminders:    You will receive a call from a Nurse to review instructions and health history.  This assessment must be completed prior to your procedure.  Failure to complete the Nurse assessment may result in the procedure being cancelled.       On the day of your procedure, please designate an adult(s) who can drive you home stay with you for the next 24 hours. The medicines used in the exam will make you sleepy. You will not be able to drive.       You cannot take public transportation, ride share services, or non-medical taxi service without a responsible caregiver.  Medical transport services are allowed with the requirement that a responsible caregiver will receive you at your destination.  We require that drivers and caregivers are confirmed prior to your procedure.

## 2023-06-08 ENCOUNTER — TELEPHONE (OUTPATIENT)
Dept: GASTROENTEROLOGY | Facility: CLINIC | Age: 48
End: 2023-06-08
Payer: COMMERCIAL

## 2023-06-08 NOTE — TELEPHONE ENCOUNTER
----- Message from Helder Sanchez MD sent at 6/7/2023  2:44 PM CDT -----  Regarding: RE: colonoscopy  I would like to get the colonoscopy so I can see what is going on in there. He has never had one done here.    It is not urgent but I would like it    J    ----- Message -----  From: Mouna Perez RN  Sent: 6/7/2023   8:37 AM CDT  To: Liliana Johnson RN; #  Subject: colonoscopy                                      Pt sent a my chart message  Morning Mouna  Since my stool sample showed no signs of inflammation, do I still need to do a colonoscopy down there at your facility      You did order a predictpkr which would be due around June 20         Helder,   I would like to check colonoscopy to see if improvement in mucosal healing. Check IFX predict PK and adjust as needed based on level, fecal calpro and colonsocopy. Check AXR for stool burden (to evaluate for underlying constipation).

## 2023-06-08 NOTE — TELEPHONE ENCOUNTER
Colonoscopy noted to be scheduled with Dr. Monzon on 9/11. Sent [employer] message to patient instructing him to keep the appointment, per Dr. Sanchez's recommendation.

## 2023-06-16 NOTE — TELEPHONE ENCOUNTER
Attempted to contact the patient to discuss PredictrPK lab draw. No answer, left detailed message with instructions to complete at [place] location. [place] location appears to be the closest, included information in voicemail. Instructed patient to respond via mychart or telephone to notify writer if patient unable to make appointment work.

## 2023-06-22 ENCOUNTER — LAB (OUTPATIENT)
Dept: LAB | Facility: OTHER | Age: 48
End: 2023-06-22
Attending: INTERNAL MEDICINE
Payer: COMMERCIAL

## 2023-06-22 ENCOUNTER — HOSPITAL ENCOUNTER (OUTPATIENT)
Dept: GENERAL RADIOLOGY | Facility: OTHER | Age: 48
Discharge: HOME | End: 2023-06-22
Attending: INTERNAL MEDICINE
Payer: COMMERCIAL

## 2023-06-22 DIAGNOSIS — K52.9 CHRONIC DIARRHEA: ICD-10-CM

## 2023-06-22 DIAGNOSIS — K50.10 CROHN'S DISEASE OF LARGE INTESTINE WITHOUT COMPLICATION (H): ICD-10-CM

## 2023-06-22 LAB
ALBUMIN SERPL BCG-MCNC: 4.3 G/DL (ref 3.5–5.2)
ALP SERPL-CCNC: 91 U/L (ref 40–129)
ALT SERPL W P-5'-P-CCNC: 18 U/L (ref 0–70)
AST SERPL W P-5'-P-CCNC: 24 U/L (ref 0–45)
BASOPHILS # BLD AUTO: 0.1 10E3/UL (ref 0–0.2)
BASOPHILS NFR BLD AUTO: 2 %
BILIRUB DIRECT SERPL-MCNC: <0.2 MG/DL (ref 0–0.3)
BILIRUB SERPL-MCNC: <0.2 MG/DL
CRP SERPL-MCNC: 5.42 MG/L
EOSINOPHIL # BLD AUTO: 0.3 10E3/UL (ref 0–0.7)
EOSINOPHIL NFR BLD AUTO: 6 %
ERYTHROCYTE [DISTWIDTH] IN BLOOD BY AUTOMATED COUNT: 12.5 % (ref 10–15)
ERYTHROCYTE [SEDIMENTATION RATE] IN BLOOD BY WESTERGREN METHOD: 8 MM/HR (ref 0–15)
HCT VFR BLD AUTO: 44.7 % (ref 40–53)
HGB BLD-MCNC: 15.2 G/DL (ref 13.3–17.7)
IMM GRANULOCYTES # BLD: 0.1 10E3/UL
IMM GRANULOCYTES NFR BLD: 1 %
LYMPHOCYTES # BLD AUTO: 2 10E3/UL (ref 0.8–5.3)
LYMPHOCYTES NFR BLD AUTO: 37 %
MCH RBC QN AUTO: 29.5 PG (ref 26.5–33)
MCHC RBC AUTO-ENTMCNC: 34 G/DL (ref 31.5–36.5)
MCV RBC AUTO: 87 FL (ref 78–100)
MONOCYTES # BLD AUTO: 0.6 10E3/UL (ref 0–1.3)
MONOCYTES NFR BLD AUTO: 12 %
NEUTROPHILS # BLD AUTO: 2.3 10E3/UL (ref 1.6–8.3)
NEUTROPHILS NFR BLD AUTO: 42 %
NRBC # BLD AUTO: 0 10E3/UL
NRBC BLD AUTO-RTO: 0 /100
PLATELET # BLD AUTO: 373 10E3/UL (ref 150–450)
PROT SERPL-MCNC: 7.4 G/DL (ref 6.4–8.3)
RBC # BLD AUTO: 5.16 10E6/UL (ref 4.4–5.9)
WBC # BLD AUTO: 5.3 10E3/UL (ref 4–11)

## 2023-06-22 PROCEDURE — 85652 RBC SED RATE AUTOMATED: CPT | Mod: ZL

## 2023-06-22 PROCEDURE — 86140 C-REACTIVE PROTEIN: CPT | Mod: ZL

## 2023-06-22 PROCEDURE — 74019 RADEX ABDOMEN 2 VIEWS: CPT

## 2023-06-22 PROCEDURE — 84999 UNLISTED CHEMISTRY PROCEDURE: CPT | Mod: ZL

## 2023-06-22 PROCEDURE — 36415 COLL VENOUS BLD VENIPUNCTURE: CPT | Mod: ZL

## 2023-06-22 PROCEDURE — 80076 HEPATIC FUNCTION PANEL: CPT | Mod: ZL

## 2023-06-22 PROCEDURE — 85025 COMPLETE CBC W/AUTO DIFF WBC: CPT | Mod: ZL

## 2023-06-29 LAB — SCANNED LAB RESULT: NORMAL

## 2023-06-30 ENCOUNTER — MYC MEDICAL ADVICE (OUTPATIENT)
Dept: GASTROENTEROLOGY | Facility: CLINIC | Age: 48
End: 2023-06-30
Payer: COMMERCIAL

## 2023-06-30 ENCOUNTER — TELEPHONE (OUTPATIENT)
Dept: GASTROENTEROLOGY | Facility: CLINIC | Age: 48
End: 2023-06-30
Payer: COMMERCIAL

## 2023-06-30 DIAGNOSIS — K50.10 CROHN'S DISEASE OF LARGE INTESTINE WITHOUT COMPLICATION (H): ICD-10-CM

## 2023-06-30 DIAGNOSIS — K50.10 CROHN'S DISEASE OF LARGE INTESTINE WITHOUT COMPLICATION (H): Primary | ICD-10-CM

## 2023-06-30 DIAGNOSIS — R10.11 RUQ ABDOMINAL PAIN: ICD-10-CM

## 2023-06-30 RX ORDER — DICYCLOMINE HYDROCHLORIDE 10 MG/1
20 CAPSULE ORAL 2 TIMES DAILY PRN
Qty: 120 CAPSULE | Refills: 3 | Status: SHIPPED | OUTPATIENT
Start: 2023-06-30 | End: 2023-10-26

## 2023-06-30 NOTE — TELEPHONE ENCOUNTER
Received call from patient requesting a refill on the Dicyclomine prescription. Refill sent. Patient also discussed that [facility] paperwork will be sent, fax number provided. Lastly, patient reports a recent brief episode of diarrhea. Woke up at 4am on 6/25/23 and had three consecutive episodes of diarrhea. Denies any blood. Reports taking lomotil, then symptoms resolving. Instructed patient to notify writer if symptoms return. The patient reports overall the symptoms have improved significantly despite one recent episode.

## 2023-07-06 ENCOUNTER — APPOINTMENT (OUTPATIENT)
Dept: CT IMAGING | Facility: OTHER | Age: 48
End: 2023-07-06
Attending: EMERGENCY MEDICINE
Payer: COMMERCIAL

## 2023-07-06 ENCOUNTER — DOCUMENTATION ONLY (OUTPATIENT)
Dept: GASTROENTEROLOGY | Facility: CLINIC | Age: 48
End: 2023-07-06

## 2023-07-06 ENCOUNTER — HOSPITAL ENCOUNTER (EMERGENCY)
Facility: OTHER | Age: 48
Discharge: HOME | End: 2023-07-06
Attending: EMERGENCY MEDICINE | Admitting: EMERGENCY MEDICINE
Payer: COMMERCIAL

## 2023-07-06 VITALS
OXYGEN SATURATION: 96 % | HEART RATE: 59 BPM | RESPIRATION RATE: 16 BRPM | BODY MASS INDEX: 22.6 KG/M2 | WEIGHT: 161.4 LBS | SYSTOLIC BLOOD PRESSURE: 125 MMHG | HEIGHT: 71 IN | TEMPERATURE: 97.6 F | DIASTOLIC BLOOD PRESSURE: 79 MMHG

## 2023-07-06 DIAGNOSIS — R10.10 PAIN OF UPPER ABDOMEN: ICD-10-CM

## 2023-07-06 LAB
ALBUMIN SERPL BCG-MCNC: 4.6 G/DL (ref 3.5–5.2)
ALP SERPL-CCNC: 67 U/L (ref 40–129)
ALT SERPL W P-5'-P-CCNC: 15 U/L (ref 0–70)
ANION GAP SERPL CALCULATED.3IONS-SCNC: 10 MMOL/L (ref 7–15)
AST SERPL W P-5'-P-CCNC: 22 U/L (ref 0–45)
BASOPHILS # BLD AUTO: 0.1 10E3/UL (ref 0–0.2)
BASOPHILS NFR BLD AUTO: 1 %
BILIRUB SERPL-MCNC: 0.5 MG/DL
BUN SERPL-MCNC: 12.9 MG/DL (ref 6–20)
CALCIUM SERPL-MCNC: 9.2 MG/DL (ref 8.6–10)
CHLORIDE SERPL-SCNC: 100 MMOL/L (ref 98–107)
CREAT SERPL-MCNC: 0.85 MG/DL (ref 0.67–1.17)
CRP SERPL-MCNC: <3 MG/L
DEPRECATED HCO3 PLAS-SCNC: 26 MMOL/L (ref 22–29)
EOSINOPHIL # BLD AUTO: 0.2 10E3/UL (ref 0–0.7)
EOSINOPHIL NFR BLD AUTO: 3 %
ERYTHROCYTE [DISTWIDTH] IN BLOOD BY AUTOMATED COUNT: 12.7 % (ref 10–15)
GFR SERPL CREATININE-BSD FRML MDRD: >90 ML/MIN/1.73M2
GLUCOSE SERPL-MCNC: 89 MG/DL (ref 70–99)
HCT VFR BLD AUTO: 40.4 % (ref 40–53)
HGB BLD-MCNC: 14.2 G/DL (ref 13.3–17.7)
HOLD SPECIMEN: NORMAL
IMM GRANULOCYTES # BLD: 0 10E3/UL
IMM GRANULOCYTES NFR BLD: 0 %
INR PPP: 1.12 (ref 0.85–1.15)
LIPASE SERPL-CCNC: 24 U/L (ref 13–60)
LYMPHOCYTES # BLD AUTO: 3.3 10E3/UL (ref 0.8–5.3)
LYMPHOCYTES NFR BLD AUTO: 41 %
MCH RBC QN AUTO: 29.8 PG (ref 26.5–33)
MCHC RBC AUTO-ENTMCNC: 35.1 G/DL (ref 31.5–36.5)
MCV RBC AUTO: 85 FL (ref 78–100)
MONOCYTES # BLD AUTO: 0.9 10E3/UL (ref 0–1.3)
MONOCYTES NFR BLD AUTO: 11 %
NEUTROPHILS # BLD AUTO: 3.5 10E3/UL (ref 1.6–8.3)
NEUTROPHILS NFR BLD AUTO: 44 %
NRBC # BLD AUTO: 0 10E3/UL
NRBC BLD AUTO-RTO: 0 /100
PLATELET # BLD AUTO: 290 10E3/UL (ref 150–450)
POTASSIUM SERPL-SCNC: 3.7 MMOL/L (ref 3.4–5.3)
PROT SERPL-MCNC: 7.2 G/DL (ref 6.4–8.3)
RBC # BLD AUTO: 4.76 10E6/UL (ref 4.4–5.9)
SODIUM SERPL-SCNC: 136 MMOL/L (ref 136–145)
WBC # BLD AUTO: 8 10E3/UL (ref 4–11)

## 2023-07-06 PROCEDURE — 99285 EMERGENCY DEPT VISIT HI MDM: CPT | Mod: 25 | Performed by: EMERGENCY MEDICINE

## 2023-07-06 PROCEDURE — 74177 CT ABD & PELVIS W/CONTRAST: CPT | Mod: TC

## 2023-07-06 PROCEDURE — 80053 COMPREHEN METABOLIC PANEL: CPT | Performed by: EMERGENCY MEDICINE

## 2023-07-06 PROCEDURE — 36415 COLL VENOUS BLD VENIPUNCTURE: CPT | Performed by: EMERGENCY MEDICINE

## 2023-07-06 PROCEDURE — 250N000011 HC RX IP 250 OP 636: Performed by: EMERGENCY MEDICINE

## 2023-07-06 PROCEDURE — 86140 C-REACTIVE PROTEIN: CPT | Performed by: EMERGENCY MEDICINE

## 2023-07-06 PROCEDURE — 83690 ASSAY OF LIPASE: CPT | Performed by: EMERGENCY MEDICINE

## 2023-07-06 PROCEDURE — 85025 COMPLETE CBC W/AUTO DIFF WBC: CPT | Performed by: EMERGENCY MEDICINE

## 2023-07-06 PROCEDURE — 258N000003 HC RX IP 258 OP 636: Performed by: EMERGENCY MEDICINE

## 2023-07-06 PROCEDURE — 96374 THER/PROPH/DIAG INJ IV PUSH: CPT | Mod: XU | Performed by: EMERGENCY MEDICINE

## 2023-07-06 PROCEDURE — 99284 EMERGENCY DEPT VISIT MOD MDM: CPT | Performed by: EMERGENCY MEDICINE

## 2023-07-06 PROCEDURE — 96361 HYDRATE IV INFUSION ADD-ON: CPT | Performed by: EMERGENCY MEDICINE

## 2023-07-06 PROCEDURE — 85610 PROTHROMBIN TIME: CPT | Performed by: EMERGENCY MEDICINE

## 2023-07-06 RX ORDER — MORPHINE SULFATE 4 MG/ML
4 INJECTION, SOLUTION INTRAMUSCULAR; INTRAVENOUS ONCE
Status: COMPLETED | OUTPATIENT
Start: 2023-07-06 | End: 2023-07-06

## 2023-07-06 RX ORDER — IOPAMIDOL 755 MG/ML
80 INJECTION, SOLUTION INTRAVASCULAR ONCE
Status: COMPLETED | OUTPATIENT
Start: 2023-07-06 | End: 2023-07-06

## 2023-07-06 RX ORDER — KETOROLAC TROMETHAMINE 10 MG/1
10 TABLET, FILM COATED ORAL EVERY 6 HOURS PRN
Qty: 4 TABLET | Refills: 0 | Status: SHIPPED | OUTPATIENT
Start: 2023-07-06 | End: 2023-07-18

## 2023-07-06 RX ORDER — KETOROLAC TROMETHAMINE 10 MG/1
10 TABLET, FILM COATED ORAL EVERY 6 HOURS PRN
Qty: 4 TABLET | Refills: 0 | Status: SHIPPED | OUTPATIENT
Start: 2023-07-06 | End: 2023-07-06

## 2023-07-06 RX ADMIN — IOPAMIDOL 80 ML: 755 INJECTION, SOLUTION INTRAVENOUS at 05:49

## 2023-07-06 RX ADMIN — SODIUM CHLORIDE 1000 ML: 9 INJECTION, SOLUTION INTRAVENOUS at 04:44

## 2023-07-06 RX ADMIN — MORPHINE SULFATE 4 MG: 4 INJECTION, SOLUTION INTRAMUSCULAR; INTRAVENOUS at 04:44

## 2023-07-06 ASSESSMENT — ACTIVITIES OF DAILY LIVING (ADL)
ADLS_ACUITY_SCORE: 35
ADLS_ACUITY_SCORE: 35

## 2023-07-06 ASSESSMENT — ENCOUNTER SYMPTOMS
FEVER: 0
PSYCHIATRIC NEGATIVE: 1
CONSTITUTIONAL NEGATIVE: 1
RESPIRATORY NEGATIVE: 1
NEUROLOGICAL NEGATIVE: 1
ENDOCRINE NEGATIVE: 1
MUSCULOSKELETAL NEGATIVE: 1
CARDIOVASCULAR NEGATIVE: 1
CHILLS: 0
HEMATOLOGIC/LYMPHATIC NEGATIVE: 1
ALLERGIC/IMMUNOLOGIC NEGATIVE: 1
ABDOMINAL PAIN: 1
SHORTNESS OF BREATH: 0

## 2023-07-06 NOTE — TELEPHONE ENCOUNTER
Helder Sanchez MD Broich, Samantha, RN  Phone Number: [phone]     Yes let's check in in a day or two.     His CT looks fine and his labs look fine.     I see him on 7/18/23 which is good.     His CT did show a moderate amount of stool in his colon. I wonder if he is constipated? When you talk to him see if he is having BMs every day. Is he skipping days without BMs?   Remind him he has the dicyclomine to take as needed.     Thanks,   Helder

## 2023-07-06 NOTE — TELEPHONE ENCOUNTER
Sent [employer] message notifying patient that writer will check in tomorrow.    14-May-2022 11:00

## 2023-07-06 NOTE — DISCHARGE INSTRUCTIONS
1) Follow the aftercare instructions provided.  2) You must follow up with your primary care doctor and gastrointestinal doctors at the [facility]   3) You have been given a medication or prescription for a medication that can make you drowsy. Do not drink, drive, ride a bicycle or operate any heavy machinery while using this medication.   4) You have been given a prescription for a medication that can cause an allergic reactions. Return to the ER if you develop any itching, tongue swelling, wheezing or shortness of breath.  5) Your CT scan shows chronic findings of your spine. You must review those results with your primary care doctor.

## 2023-07-06 NOTE — ED PROVIDER NOTES
History     Chief Complaint   Patient presents with     Abdominal Pain     Severe upper rt quadrant pain 10/10 constant stabbing pain     HPI  Dennis J Goodell is a 47 year old male who presents with right upper quadrant pain that began this morning.  Sx beganapproximately 2 hours ago.  Since symptoms began while at work.  Patient denies any recent direct trauma.  Patient has otherwise been at baseline state of health.  Have a history of ulcerative colitis.  Patient worried that he may be having a flare.    Allergies:  No Known Allergies    Problem List:    Patient Active Problem List    Diagnosis Date Noted     MRSA (methicillin resistant staph aureus) culture positive 01/31/2023     Priority: Medium     Crohn's disease of large intestine without complication (H) 12/23/2022     Priority: Medium     Colitis 11/18/2022     Priority: Medium     Leukocytosis 11/18/2022     Priority: Medium     Facet arthropathy, cervical 04/28/2021     Priority: Medium     Intractable chronic migraine without aura and with status migrainosus 04/28/2021     Priority: Medium     MARLEN (generalized anxiety disorder) 06/09/2020     Priority: Medium     Dysthymic disorder 02/09/2018     Priority: Medium     Raynaud phenomenon 02/09/2018     Priority: Medium     History of testicular cancer 08/12/2016     Priority: Medium     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/2008     Priority: Medium        Past Medical History:    Past Medical History:   Diagnosis Date     Anxiety disorder 2012     Dysthymic disorder      Malignant neoplasm of testis (H) 2005     Raynaud's syndrome without gangrene      Uncomplicated alcohol abuse        Past Surgical History:    Past Surgical History:   Procedure Laterality Date     COLONOSCOPY N/A  12/12/2022    Procedure: COLONOSCOPY, WITH POLYPECTOMY AND BIOPSY;  Surgeon: Solomon Arciniega MD;  Location: [facility] OR     DECOMPRESSION CUBITAL TUNNEL Left 01/2019     HERNIA REPAIR      ,HERNIA REPAIR     IR CHEST PORT PLACEMENT > 5 YRS OF AGE Left 04/01/2008     LYMPH NODE BIOPSY  09/05/2008     [facility]. 37 lymph nodes excised     ORCHIECTOMY SCROTAL Right 12/30/2005    teratoma     OTHER SURGICAL HISTORY      9/05/08,295894,OTHER     RELEASE CARPAL TUNNEL  04/09/2013       Family History:    Family History   Problem Relation Age of Onset     Pulmonary Embolism Mother      Other - See Comments Daughter         recurrent OM     Other - See Comments Other         Suicidality,maternal uncle     No Known Problems Father        Social History:  Marital Status:   [2]  Social History     Tobacco Use     Smoking status: Never     Smokeless tobacco: Current     Types: Chew     Tobacco comments:     1 tin lasts 1 week   Vaping Use     Vaping Use: Never used   Substance Use Topics     Alcohol use: Not Currently     Alcohol/week: 6.0 standard drinks of alcohol     Types: 6 Cans of beer per week     Drug use: Never        Medications:    albuterol (PROAIR HFA/PROVENTIL HFA/VENTOLIN HFA) 108 (90 Base) MCG/ACT inhaler  ALPRAZolam (XANAX) 1 MG tablet  Aspirin-Acetaminophen-Caffeine (EXCEDRIN MIGRAINE PO)  bismuth subsalicylate (PEPTO BISMOL) 262 MG/15ML suspension  calcium carbonate (TUMS) 500 MG chewable tablet  clonazePAM (KLONOPIN) 1 MG tablet  desvenlafaxine succinate (PRISTIQ) 100 MG 24 hr tablet  dicyclomine (BENTYL) 10 MG capsule  diphenoxylate-atropine (LOMOTIL) 2.5-0.025 MG tablet  inFLIXimab  omeprazole (PRILOSEC) 40 MG DR capsule  Probiotic Product (FLORAJEN3) CAPS  psyllium (METAMUCIL/KONSYL) 58.6 % powder          Review of Systems   Constitutional: Negative.  Negative for chills and fever.   HENT: Negative.    Respiratory: Negative.  Negative for shortness of breath.    Cardiovascular: Negative.   "Negative for chest pain.   Gastrointestinal: Positive for abdominal pain.   Endocrine: Negative.    Genitourinary: Negative.    Musculoskeletal: Negative.    Skin: Negative.    Allergic/Immunologic: Negative.    Neurological: Negative.    Hematological: Negative.    Psychiatric/Behavioral: Negative.        Physical Exam   BP: (!) 146/95  Pulse: 95  Temp: 97.5  F (36.4  C)  Resp: 16  Height: 180.3 cm (5' 11\")  Weight: 73.2 kg (161 lb 6.4 oz)  SpO2: 96 %      Physical Exam  Constitutional:       General: He is not in acute distress.     Appearance: He is well-developed and normal weight. He is not toxic-appearing.   HENT:      Head: Normocephalic.   Cardiovascular:      Rate and Rhythm: Normal rate and regular rhythm.   Abdominal:      General: Bowel sounds are normal.      Palpations: Abdomen is soft.      Tenderness: There is abdominal tenderness in the right upper quadrant.   Skin:     General: Skin is warm.      Capillary Refill: Capillary refill takes less than 2 seconds.   Neurological:      General: No focal deficit present.      Mental Status: He is alert.         ED Course              ED Course as of 07/06/23 0704   Thu Jul 06, 2023   0700 No change in abdominal exam.  CT findings noted.  Outpatient ultrasound should be set up.  Patient instructed to follow-up with his primary care doctor and his GI doctors.     Procedures                Results for orders placed or performed during the hospital encounter of 07/06/23 (from the past 24 hour(s))   Extra Tube    Narrative    The following orders were created for panel order Extra Tube.  Procedure                               Abnormality         Status                     ---------                               -----------         ------                     Extra Blue Top Tube[[id number]]                              Final result               Extra Red Top Tube[[id number]]                               Final result               Extra Green Top " (Lithium...[[id number]]                      Final result               Extra Purple Top Tube[[id number]]                            Final result               Extra Green Top (Lithium...[[id number]]                      Final result                 Please view results for these tests on the individual orders.   Extra Blue Top Tube   Result Value Ref Range    Hold Specimen JIC    Extra Red Top Tube   Result Value Ref Range    Hold Specimen JIC    Extra Green Top (Lithium Heparin) Tube   Result Value Ref Range    Hold Specimen JIC    Extra Purple Top Tube   Result Value Ref Range    Hold Specimen JIC    Extra Green Top (Lithium Heparin) ON ICE   Result Value Ref Range    Hold Specimen JIC    CBC with platelets differential    Narrative    The following orders were created for panel order CBC with platelets differential.  Procedure                               Abnormality         Status                     ---------                               -----------         ------                     CBC with platelets and d...[[id number]]                      Final result                 Please view results for these tests on the individual orders.   Comprehensive metabolic panel   Result Value Ref Range    Sodium 136 136 - 145 mmol/L    Potassium 3.7 3.4 - 5.3 mmol/L    Chloride 100 98 - 107 mmol/L    Carbon Dioxide (CO2) 26 22 - 29 mmol/L    Anion Gap 10 7 - 15 mmol/L    Urea Nitrogen 12.9 6.0 - 20.0 mg/dL    Creatinine 0.85 0.67 - 1.17 mg/dL    Calcium 9.2 8.6 - 10.0 mg/dL    Glucose 89 70 - 99 mg/dL    Alkaline Phosphatase 67 40 - 129 U/L    AST 22 0 - 45 U/L    ALT 15 0 - 70 U/L    Protein Total 7.2 6.4 - 8.3 g/dL    Albumin 4.6 3.5 - 5.2 g/dL    Bilirubin Total 0.5 <=1.2 mg/dL    GFR Estimate >90 >60 mL/min/1.73m2   Lipase   Result Value Ref Range    Lipase 24 13 - 60 U/L   INR   Result Value Ref Range    INR 1.12 0.85 - 1.15   CBC with platelets and differential   Result Value Ref Range    WBC Count 8.0 4.0 - 11.0 10e3/uL     RBC Count 4.76 4.40 - 5.90 10e6/uL    Hemoglobin 14.2 13.3 - 17.7 g/dL    Hematocrit 40.4 40.0 - 53.0 %    MCV 85 78 - 100 fL    MCH 29.8 26.5 - 33.0 pg    MCHC 35.1 31.5 - 36.5 g/dL    RDW 12.7 10.0 - 15.0 %    Platelet Count 290 150 - 450 10e3/uL    % Neutrophils 44 %    % Lymphocytes 41 %    % Monocytes 11 %    % Eosinophils 3 %    % Basophils 1 %    % Immature Granulocytes 0 %    NRBCs per 100 WBC 0 <1 /100    Absolute Neutrophils 3.5 1.6 - 8.3 10e3/uL    Absolute Lymphocytes 3.3 0.8 - 5.3 10e3/uL    Absolute Monocytes 0.9 0.0 - 1.3 10e3/uL    Absolute Eosinophils 0.2 0.0 - 0.7 10e3/uL    Absolute Basophils 0.1 0.0 - 0.2 10e3/uL    Absolute Immature Granulocytes 0.0 <=0.4 10e3/uL    Absolute NRBCs 0.0 10e3/uL   CRP inflammation   Result Value Ref Range    CRP Inflammation <3.00 <5.00 mg/L   CT Abdomen Pelvis w Contrast    Narrative    PROCEDURE INFORMATION:   Exam: CT Abdomen And Pelvis With Contrast   Exam date and time: 7/6/2023 5:44 AM   Age: 47 years old   Clinical indication: Abdominal pain; Additional info: 47-year-old male with   complaints of right upper quadrant pain. Rule out gallstones versus biliary   duct dilatation vs gall bladder thickening.     TECHNIQUE:   Imaging protocol: Computed tomography of the abdomen and pelvis with contrast.   Radiation optimization: All CT scans at this facility use at least one of these   dose optimization techniques: automated exposure control; mA and/or kV   adjustment per patient size (includes targeted exams where dose is matched to   clinical indication); or iterative reconstruction.   Contrast material: ISOVUE; Contrast volume: 80 ml; Contrast route: INTRAVENOUS   (IV);      REPORTING DATA:   Count of CT and Cardiac NM exams in prior 12 months: This patient has received   2 known CTs and 0 known cardiac nuclear medicine studies in the 12 months prior   to the current study.     COMPARISON:   CT ABDOMEN PELVIS W CONTRAST 11/21/2022 10:54 AM     FINDINGS:    Lungs: There is minimal bibasilar atelectasis.     Liver: Normal. No mass.   Gallbladder and bile ducts: No calcified gallstones. No pericholecystic   inflammatory fat stranding   Pancreas: Normal. No ductal dilation.   Spleen: Normal. No splenomegaly.   Adrenal glands: Normal. No mass.   Kidneys and ureters: Punctate nonobstructing calcification within the upper   pole of the right kidney. No hydronephrosis   Stomach and bowel: Moderate feces is present throughout the colon. No   significant distention of the large or small bowel. Previously described mural   thickening of the ascending colon has resolved   Appendix: No evidence of appendicitis.     Intraperitoneal space: Unremarkable. No free air. No significant fluid   collection.   Vasculature: Unremarkable. No abdominal aortic aneurysm.   Lymph nodes: Unremarkable. No enlarged lymph nodes.   Urinary bladder: Unremarkable as visualized.   Reproductive: Unremarkable as visualized.   Bones/joints: L2 vertebral hemangioma. Moderate narrowing of the L3-L4 disc   space   Soft tissues: Unremarkable.       Impression    IMPRESSION:   1.   There is minimal bibasilar atelectasis.   2.   No acute findings within the abdomen or pelvis     THIS DOCUMENT HAS BEEN ELECTRONICALLY SIGNED BY MISAEL SPENCER MD       Medications   morphine (PF) injection 4 mg (has no administration in time range)   0.9% sodium chloride BOLUS (has no administration in time range)       Assessments & Plan (with Medical Decision Making)     47-year-old male who presents today with complaints abdominal pain.  Has a history of ulcerative colitis and is followed at [facility].  Patient had a work-up including labs and a CT scan.  Physical examination showed tenderness over right lower rib cage region.  No distinct right upper quadrant tenderness or Holden sign.  CT scan and labs showed no acute findings.  Patient received pain medication in the form of morphine.  Given results of CT scan I feel patient can  be discharged home.  Given location of pain right upper quadrant ultrasound has been ordered to be done as an outpatient.  Patient to follow-up with primary care doctor and GI doctors at the [facility].  Patient understands to return if he develops any new or worsening symptoms.  Toradol 10 mg #4 given for pain.    Due to the nature of this electronic medical record, laboratory results, imaging results, diagnosis, other information and medications reported above may not represent information available to me at the the time of my care and disposition. Medications reported above may have not been ordered by me.     Portions of the record may have been created with voice recognition software. Occasional wrong-word or 'sound-a- like' substitution may have occurred due to the inherent limitations of voice recognition software. Though the chart has been reviewed, there may be inadvertent transcription errors. Read the chart carefully and recognize, using context, where substitutions have occurred.         New Prescriptions    No medications on file       Final diagnoses:   Pain of upper abdomen       7/6/2023   [facility] AND [facility]     Nils Lee MD  07/06/23 [id number]

## 2023-07-06 NOTE — PROGRESS NOTES
[place] paperwork completed and signed by physician. Faxed to [employer] Scanned into the chart.

## 2023-07-06 NOTE — ED TRIAGE NOTES
Pt comes in c/o of severe 10/10 Rt upper quadrant pain that started Monday. Thinks he is having a chronns flare.

## 2023-07-07 ENCOUNTER — DOCUMENTATION ONLY (OUTPATIENT)
Dept: GASTROENTEROLOGY | Facility: CLINIC | Age: 48
End: 2023-07-07
Payer: COMMERCIAL

## 2023-07-07 NOTE — TELEPHONE ENCOUNTER
Attempted to contact the patient for a symptom update and discuss bowel pattern at this time. No answer, left detailed message requesting callback.

## 2023-07-07 NOTE — PROGRESS NOTES
7/6/23 received fax from [employer]. [employer] form needing review, complete and providers to sign.    Routed to nurse to complete and have provider sign via e-mail.

## 2023-07-10 NOTE — TELEPHONE ENCOUNTER
Helder Sanchez MD  You 3 hours ago (12:56 PM)     JH  Please get a RUQ US ordered. Would be good to have before visit if possible     J      Abdominal ultrasound order placed. Updated patient via [employer] and provided scheduling number.

## 2023-07-14 NOTE — TELEPHONE ENCOUNTER
Patient contacted writer to discuss the ultrasound. Patient has been on the wait list for an US at [facility], however has not yet been able to get in. Patient inquiring if [place] [facility] [place] of his appointment with Dr. Sanchez. Contacted imaging scheduling and confirmed [place] [facility] [place] when the patient is scheduled to see Dr. Sanchez. Transferred patient to imaging scheduling to confirm appointment time.

## 2023-07-18 ENCOUNTER — OFFICE VISIT (OUTPATIENT)
Dept: GASTROENTEROLOGY | Facility: CLINIC | Age: 48
End: 2023-07-18
Attending: INTERNAL MEDICINE
Payer: COMMERCIAL

## 2023-07-18 ENCOUNTER — TELEPHONE (OUTPATIENT)
Dept: GASTROENTEROLOGY | Facility: CLINIC | Age: 48
End: 2023-07-18

## 2023-07-18 ENCOUNTER — ANCILLARY PROCEDURE (OUTPATIENT)
Dept: ULTRASOUND IMAGING | Facility: CLINIC | Age: 48
End: 2023-07-18
Attending: INTERNAL MEDICINE
Payer: COMMERCIAL

## 2023-07-18 VITALS
OXYGEN SATURATION: 99 % | BODY MASS INDEX: 23.41 KG/M2 | HEART RATE: 85 BPM | DIASTOLIC BLOOD PRESSURE: 92 MMHG | HEIGHT: 70 IN | SYSTOLIC BLOOD PRESSURE: 139 MMHG | WEIGHT: 163.5 LBS

## 2023-07-18 DIAGNOSIS — R10.11 RUQ ABDOMINAL PAIN: ICD-10-CM

## 2023-07-18 DIAGNOSIS — K59.00 CONSTIPATION, UNSPECIFIED CONSTIPATION TYPE: ICD-10-CM

## 2023-07-18 DIAGNOSIS — K50.10 CROHN'S DISEASE OF LARGE INTESTINE WITHOUT COMPLICATION (H): Primary | ICD-10-CM

## 2023-07-18 DIAGNOSIS — K50.10 CROHN'S DISEASE OF LARGE INTESTINE WITHOUT COMPLICATION (H): ICD-10-CM

## 2023-07-18 DIAGNOSIS — K21.00 GASTROESOPHAGEAL REFLUX DISEASE WITH ESOPHAGITIS WITHOUT HEMORRHAGE: ICD-10-CM

## 2023-07-18 PROCEDURE — 76705 ECHO EXAM OF ABDOMEN: CPT | Performed by: RADIOLOGY

## 2023-07-18 PROCEDURE — 99215 OFFICE O/P EST HI 40 MIN: CPT | Performed by: INTERNAL MEDICINE

## 2023-07-18 RX ORDER — L.ACIDOPH/B.ANIMALIS/B.LONGUM 15B CELL
1 CAPSULE ORAL DAILY
Qty: 30 CAPSULE | Refills: 11 | Status: SHIPPED | OUTPATIENT
Start: 2023-07-18

## 2023-07-18 RX ORDER — POLYETHYLENE GLYCOL 3350 17 G/17G
1 POWDER, FOR SOLUTION ORAL DAILY
Qty: 578 G | Refills: 11 | Status: SHIPPED | OUTPATIENT
Start: 2023-07-18 | End: 2024-08-25

## 2023-07-18 RX ORDER — SUCRALFATE 1 G/1
1 TABLET ORAL 2 TIMES DAILY PRN
Qty: 360 TABLET | Refills: 3 | Status: SHIPPED | OUTPATIENT
Start: 2023-07-18 | End: 2024-07-24

## 2023-07-18 RX ORDER — LACTOBACILLUS ACIDOPHILUS 20B CELL
1 CAPSULE ORAL DAILY
Qty: 30 CAPSULE | Refills: 11 | Status: SHIPPED | OUTPATIENT
Start: 2023-07-18 | End: 2024-03-18

## 2023-07-18 RX ORDER — AMITRIPTYLINE HYDROCHLORIDE 10 MG/1
TABLET ORAL
Qty: 30 TABLET | Refills: 3 | Status: SHIPPED | OUTPATIENT
Start: 2023-07-18 | End: 2023-12-08

## 2023-07-18 RX ORDER — OMEPRAZOLE 40 MG/1
40 CAPSULE, DELAYED RELEASE ORAL
Qty: 60 CAPSULE | Refills: 11 | Status: SHIPPED | OUTPATIENT
Start: 2023-07-18 | End: 2024-08-14

## 2023-07-18 ASSESSMENT — ENCOUNTER SYMPTOMS
MUSCLE CRAMPS: 1
ALTERED TEMPERATURE REGULATION: 1
DIARRHEA: 1
DEPRESSION: 1
POLYDIPSIA: 0
DECREASED APPETITE: 1
JOINT SWELLING: 0
BACK PAIN: 0
BLOATING: 1
FEVER: 0
HALLUCINATIONS: 0
RECTAL PAIN: 1
PANIC: 0
WEIGHT LOSS: 1
NECK PAIN: 1
NERVOUS/ANXIOUS: 1
BLOOD IN STOOL: 0
STIFFNESS: 1
CHILLS: 1
CONSTIPATION: 1
NAUSEA: 1
MYALGIAS: 1
INCREASED ENERGY: 1
MUSCLE WEAKNESS: 1
ARTHRALGIAS: 1
INSOMNIA: 1
POLYPHAGIA: 0
HEARTBURN: 1
FATIGUE: 1
DECREASED CONCENTRATION: 1
VOMITING: 1
BOWEL INCONTINENCE: 1
ABDOMINAL PAIN: 1
WEIGHT GAIN: 0
JAUNDICE: 0
NIGHT SWEATS: 1

## 2023-07-18 ASSESSMENT — PAIN SCALES - GENERAL: PAINLEVEL: SEVERE PAIN (7)

## 2023-07-18 NOTE — PATIENT INSTRUCTIONS
It is good to see you today. I am sorry you are having this pain. We will work to figure this out.    We will see what the abdominal US shows    My office will work to get you scheduled for a sooner upper endoscopy and colonoscopy - I am thinking possibly August 10th     Increase your omeprazole to 40 mg twice daily. Take this 30-60 min before breakfast and dinner    You can take carafate 1 gram twice daily as needed for pain    You can continue to take the dicyclomine as needed for pain    Take the golytely clean out. Drink 1 cup every 10 minutes until gone.     The day after the golytely start miralax 1 capful daily. Goal is to have 1-3 soft easily passed BMs per day    I wrote a prescription for amitriptyline 10 mg at bedtime. Please discuss with your psychiatry team if they are ok with you taking this with pristiq    I wrote for both the types of Florajen.    Follow up in 3 months with Richa Diamond and 6 months Dr. Sanchez

## 2023-07-18 NOTE — NURSING NOTE
"Chief Complaint   Patient presents with     Follow Up       Vitals:    07/18/23 1318   BP: (!) 139/92   Pulse: 85   SpO2: 99%   Weight: 74.2 kg (163 lb 8 oz)   Height: 1.778 m (5' 10\")       Body mass index is 23.46 kg/m .    Marifer Braga MA    "

## 2023-07-18 NOTE — LETTER
7/18/2023         RE: Dennis J Goodell  [address]   [place] [address]        Dear Colleague,    Thank you for referring your patient, Dennis J Goodell, to the [facility] [place]. Please see a copy of my visit note below.    IBD CLINIC VISIT    CC/REFERRING MD:  Helder Sanchez    REASON FOR CONSULTATION: follow up CD    ASSESSMENT/PLAN:    1. New RUQ pain - x 2 weeks - etiology not entirely clear. No evidence of active Crohn's on CT, fecal calpro or labs (CRP WNL). Ddx includes IBD vs biliary colic (unlikely based on symptoms) vs gastritis/PUD (NSAID use) vs constipation vs IBS/visceral hypersensitivity vs musculoskeletal vs radicular pain from back    -will try to move up EGD/colonoscopy to August 10  -increase omeprazole to 40 mg PO BID  -add carafate 1 gm BID  -await RUQ US report (exam done)  -trial golytely clean out followed by miralax daily  -dicyclomine PRN  -add amitriptyline 10 mg q HS if psychiatry ok with this (he is on pristiq). He needs to check with psychiatry to make sure they are ok  -if all negative and pain persists can consider imaging spine for radicular source  -avoid NSAIDs    2. CD ileum and colon - on IFX 5 mg/kg q 8 weeks with good drug level. Fecal calpro normal end of May. Check colonoscopy for healing    Strongly counseled to avoid/minimize NSAIDs. Avoid if possible    3. History of zane-anal abscess - resolved. No residual fistula or abscess on exam      IBD HISTORY  Age at diagnosis: 47 (2022)  Extent of disease: colonic - possible zane-anal  Disease phenotype: inflammatory with possible penetrating  Zane-anal disease: recent zane-anal abscess  Current CD medications: IFX 5 mg/kg q 8 weeks  Prior IBD surgeries: none  Prior IBD Medications: prednisone, Entocort    DRUG MONITORING  TPMT enzyme activity: 19.2 (WNL)    6-TGN/6-MMPN levels: --    Biologic concentration:  -predict PK 6/22/23 - estimated trough 10 (on IFX 5mg/kg q  8)    DISEASE ASSESSMENT  Labs  Recent Labs   Lab Test 06/22/23  0652 05/30/23  1143   SED 8 2     Fecal calprotectin: 30s - 5/30/23  -12/2022  -colonoscopy 12/2022 - normal TI (normal biopsies) - inflammation/stricture 5-6 cm distal to IC valve - able to be passed. Remainder colon normal? (details of scope limited on documentation). 5mm adenoma in rectum. Biopsies TI normal. Right colon chronic active (mild) c/w IBD and left colon normal  Enterography:   - MRE 2/14/23 - IMPRESSION:  Mucosal thickening and hyperemia of the terminal ileum,  compatible with the prior diagnosis of Crohn disease. No evidence of  associated stricture or fistula.  C diff: negative    sIBDQ:   IBDQ Score Date IBDQ - Total Score  (Higher score better)   7/18/2023  12:31 PM Incomplete   6/1/2023   4:13 AM 34       IBD Health Care Maintenance:    Not discussed    Misc:  -- Avoid tobacco use  -- Avoid NSAIDs as there is potentially a 25% chance of causing an IBD flare    Return to clinic in 3 months with IBD ELIANA and 6 months with Dr. Sanchez    Thank you for this consultation.  It was a pleasure to participate in the care of this patient; please contact us with any further questions.  I spent a total of 40 minutes during the day of encounter performed chart review, meeting with patient, patient counseling, care coordination, and documentation.      This note was created with voice recognition software, and while reviewed for accuracy, typos may remain.     Helder Sanchez MD  Division of Gastroenterology, Hepatology and Nutrition  [facility]  Pager: [phone]      HPI:   Currently, here today with complaints of RUQ. Last seen in early June.    About 2 weeks ago developed RUQ pain. Happened rather suddenly. Has persisted. Not related to eating. Not related to BM. No trauma. Constant. Does not radiate. It's right under her rib cage.    He went to the ED that night. Labs normal (CBC, LFTs, lipase, CRP, BMP). Fecal calpro recently normal. CT  scan normal. Moderate stool in colon. Colon and ileum did not look inflamed.    He is taking NSAIDs (aleve and exedrin migraine) for chronic neck pain.    His loose stools have resolved. Lomotil helped but hasn't needed that as much. He is having some rapid pellets followed by looser stools. No improvement with pain even when he has a good BM.    ROS:    No fevers or chills  No weight loss  No blurry vision, double vision or change in vision  No sore throat  No lymphadenopathy  No headache, paraesthesias, or weakness in a limb  No shortness of breath or wheezing  No chest pain or pressure  No arthralgias or myalgias  No rashes or skin changes  No odynophagia or dysphagia  No BRBPR, hematochezia, melena  No dysuria, frequency or urgency  No hot/cold intolerance or polyria  No anxiety or depression    Extra intestinal manifestations of IBD:  No uveitis/episcleritis  No aphthous ulcers   No arthritis   No erythema nodosum/pyoderma gangrenosum.     PERTINENT PAST MEDICAL HISTORY:  Past Medical History:   Diagnosis Date    Anxiety disorder 2012    Dysthymic disorder     No Comments Provided    Malignant neoplasm of testis (H) 2005 2005,teratoma    Raynaud's syndrome without gangrene     No Comments Provided    Uncomplicated alcohol abuse     7/25/2012       PREVIOUS SURGERIES:  Past Surgical History:   Procedure Laterality Date    COLONOSCOPY N/A 12/12/2022    Procedure: COLONOSCOPY, WITH POLYPECTOMY AND BIOPSY;  Surgeon: Solomon Arciniega MD;  Location: [facility] OR    DECOMPRESSION CUBITAL TUNNEL Left 01/2019    HERNIA REPAIR      ,HERNIA REPAIR    IR CHEST PORT PLACEMENT > 5 YRS OF AGE Left 04/01/2008    LYMPH NODE BIOPSY  09/05/2008     [facility]. 37 lymph nodes excised    ORCHIECTOMY SCROTAL Right 12/30/2005    teratoma    OTHER SURGICAL HISTORY      9/05/08,[id number],OTHER    RELEASE CARPAL TUNNEL  04/09/2013       PREVIOUS ENDOSCOPY:  No results found for this or any previous visit.]    ALLERGIES:   No Known  Allergies    PERTINENT MEDICATIONS:    Current Outpatient Medications:     albuterol (PROAIR HFA/PROVENTIL HFA/VENTOLIN HFA) 108 (90 Base) MCG/ACT inhaler, Inhale 2 puffs into the lungs every 6 hours as needed for shortness of breath / dyspnea or wheezing, Disp: , Rfl:     ALPRAZolam (XANAX) 1 MG tablet, Take 1 mg by mouth 2 times daily as needed for anxiety, Disp: , Rfl:     amitriptyline (ELAVIL) 10 MG tablet, 10-20 mg (1-2 tabs), Disp: 30 tablet, Rfl: 3    Aspirin-Acetaminophen-Caffeine (EXCEDRIN MIGRAINE PO), , Disp: , Rfl:     bismuth subsalicylate (PEPTO BISMOL) 262 MG/15ML suspension, Take 15 mLs by mouth every 6 hours as needed for indigestion, Disp: , Rfl:     calcium carbonate (TUMS) 500 MG chewable tablet, Take 1 chew tab by mouth 4 times daily as needed for heartburn, Disp: , Rfl:     clonazePAM (KLONOPIN) 1 MG tablet, Take 1 mg by mouth 3 times daily, Disp: , Rfl:     desvenlafaxine succinate (PRISTIQ) 100 MG 24 hr tablet, Take 100 mg by mouth daily, Disp: , Rfl:     dicyclomine (BENTYL) 10 MG capsule, Take 2 capsules (20 mg) by mouth 2 times daily as needed (abdominal pain), Disp: 120 capsule, Rfl: 3    diphenoxylate-atropine (LOMOTIL) 2.5-0.025 MG tablet, Take 1 tablet by mouth 4 times daily as needed for diarrhea, Disp: 90 tablet, Rfl: 3    inFLIXimab, Inject into the vein once for 1 dose, Disp: , Rfl:     Lactobacillus (FLORAJEN ACIDOPHILUS) CAPS, Take 1 capsule by mouth daily, Disp: 30 capsule, Rfl: 11    omeprazole (PRILOSEC) 40 MG DR capsule, Take 1 capsule (40 mg) by mouth 2 times daily (before meals), Disp: 60 capsule, Rfl: 11    polyethylene glycol (GOLYTELY) 236 g suspension, Take 4,000 mLs by mouth once for 1 dose, Disp: 4000 mL, Rfl: 0    polyethylene glycol (MIRALAX) 17 GM/Dose powder, Take 17 g (1 Capful) by mouth daily, Disp: 578 g, Rfl: 11    Probiotic Product (FLORAJEN DIGESTION) CAPS, Take 1 capsule by mouth daily, Disp: 30 capsule, Rfl: 11    Probiotic Product (FLORAJEN3) CAPS, 15  billion live cultures per capsule, 1 cap po qd, Disp: , Rfl:     psyllium (METAMUCIL/KONSYL) 58.6 % powder, Take 36 g (2 Tablespoonful) by mouth daily, Disp: , Rfl:     sucralfate (CARAFATE) 1 GM tablet, Take 1 tablet (1 g) by mouth 2 times daily as needed (abdominal pain), Disp: 360 tablet, Rfl: 3    Current Facility-Administered Medications:     ondansetron (ZOFRAN) injection 4 mg, 4 mg, Intravenous, Once, Solomon Arciniega MD    SOCIAL HISTORY:  Social History     Socioeconomic History    Marital status:      Spouse name: Jillian    Number of children: 4    Years of education: 13    Highest education level: Not on file   Occupational History    Occupation: underground utilities/dirt work     Employer: [employer]   Tobacco Use    Smoking status: Never    Smokeless tobacco: Current     Types: Chew    Tobacco comments:     1 tin lasts 1 week   Vaping Use    Vaping Use: Never used   Substance and Sexual Activity    Alcohol use: Not Currently     Alcohol/week: 6.0 standard drinks of alcohol     Types: 6 Cans of beer per week    Drug use: Never    Sexual activity: Yes     Partners: Female     Birth control/protection: Surgical   Other Topics Concern    Parent/sibling w/ CABG, MI or angioplasty before 65F 55M? Not Asked   Social History Narrative    He is , 4  Girls.    Wife runs a .     Social Determinants of Health     Financial Resource Strain: Not on file   Food Insecurity: Not on file   Transportation Needs: Not on file   Physical Activity: Not on file   Stress: Not on file   Social Connections: Not on file   Intimate Partner Violence: Not on file   Housing Stability: Not on file       FAMILY HISTORY:  Family History   Problem Relation Age of Onset    Pulmonary Embolism Mother     Other - See Comments Daughter         recurrent OM    Other - See Comments Other         Suicidality,maternal uncle    No Known Problems Father        Past/family/social history reviewed and no  "changes    PHYSICAL EXAMINATION:  Constitutional: aaox3, cooperative, pleasant, not dyspneic/diaphoretic, no acute distress  Vitals reviewed: BP (!) 139/92   Pulse 85   Ht 1.778 m (5' 10\")   Wt 74.2 kg (163 lb 8 oz)   SpO2 99%   BMI 23.46 kg/m    Wt:   Wt Readings from Last 2 Encounters:   07/18/23 74.2 kg (163 lb 8 oz)   07/06/23 73.2 kg (161 lb 6.4 oz)      Eyes: Sclera anicteric/injected  Ears/nose/mouth/throat: Normal oropharynx without ulcers or exudate, mucus membranes moist, hearing intact  Neck: supple, thyroid normal size  CV: No edema  Respiratory: Unlabored breathing  Lymph: No axillary, submandibular, supraclavicular or inguinal lymphadenopathy  Abd:  Nondistended, +bs, no hepatosplenomegaly, tender in RUQ. Neg murphys. No r/g. No carnett's  Skin: warm, perfused, no jaundice  Psych: Normal affect  MSK: Normal gait      PERTINENT STUDIES:  Most recent CBC:  Recent Labs   Lab Test 07/06/23  0343 06/22/23  0652   WBC 8.0 5.3   HGB 14.2 15.2   HCT 40.4 44.7    373     Most recent hepatic panel:  Recent Labs   Lab Test 07/06/23  0343 06/22/23  0652   ALT 15 18   AST 22 24     Most recent creatinine:  Recent Labs   Lab Test 07/06/23  0343 01/31/23  1404   CR 0.85 1.01             Answers for HPI/ROS submitted by the patient on 7/18/2023  General Symptoms: Yes  Skin Symptoms: No  HENT Symptoms: No  EYE SYMPTOMS: No  HEART SYMPTOMS: No  LUNG SYMPTOMS: No  INTESTINAL SYMPTOMS: Yes  URINARY SYMPTOMS: No  REPRODUCTIVE SYMPTOMS: No  SKELETAL SYMPTOMS: Yes  BLOOD SYMPTOMS: No  NERVOUS SYSTEM SYMPTOMS: No  MENTAL HEALTH SYMPTOMS: Yes  Fever: No  Loss of appetite: Yes  Weight loss: Yes  Weight gain: No  Fatigue: Yes  Night sweats: Yes  Chills: Yes  Increased stress: Yes  Excessive hunger: No  Excessive thirst: No  Feeling hot or cold when others believe the temperature is normal: Yes  Loss of height: No  Post-operative complications: No  Surgical site pain: No  Hallucinations: No  Change in or Loss of " Energy: Yes  Hyperactivity: No  Confusion: Yes  Heart burn or indigestion: Yes  Nausea: Yes  Vomiting: Yes  Abdominal pain: Yes  Bloating: Yes  Constipation: Yes  Diarrhea: Yes  Blood in stool: No  Black stools: Yes  Rectal or Anal pain: Yes  Fecal incontinence: Yes  Yellowing of skin or eyes: No  Vomit with blood: No  Change in stools: Yes  Back pain: No  Muscle aches: Yes  Neck pain: Yes  Swollen joints: No  Joint pain: Yes  Bone pain: No  Muscle cramps: Yes  Muscle weakness: Yes  Joint stiffness: Yes  Bone fracture: No  Nervous or Anxious: Yes  Depression: Yes  Trouble sleeping: Yes  Trouble thinking or concentrating: Yes  Mood changes: Yes  Panic attacks: No          Again, thank you for allowing me to participate in the care of your patient.      Sincerely,    Helder Sanchez MD

## 2023-07-18 NOTE — PROGRESS NOTES
IBD CLINIC VISIT    CC/REFERRING MD:  Helder Sanchez    REASON FOR CONSULTATION: follow up CD    ASSESSMENT/PLAN:    1. New RUQ pain - x 2 weeks - etiology not entirely clear. No evidence of active Crohn's on CT, fecal calpro or labs (CRP WNL). Ddx includes IBD vs biliary colic (unlikely based on symptoms) vs gastritis/PUD (NSAID use) vs constipation vs IBS/visceral hypersensitivity vs musculoskeletal vs radicular pain from back    -will try to move up EGD/colonoscopy to August 10  -increase omeprazole to 40 mg PO BID  -add carafate 1 gm BID  -await RUQ US report (exam done)  -trial golytely clean out followed by miralax daily  -dicyclomine PRN  -add amitriptyline 10 mg q HS if psychiatry ok with this (he is on pristiq). He needs to check with psychiatry to make sure they are ok  -if all negative and pain persists can consider imaging spine for radicular source  -avoid NSAIDs    2. CD ileum and colon - on IFX 5 mg/kg q 8 weeks with good drug level. Fecal calpro normal end of May. Check colonoscopy for healing    Strongly counseled to avoid/minimize NSAIDs. Avoid if possible    3. History of zane-anal abscess - resolved. No residual fistula or abscess on exam      IBD HISTORY  Age at diagnosis: 47 (2022)  Extent of disease: colonic - possible zane-anal  Disease phenotype: inflammatory with possible penetrating  Zane-anal disease: recent zane-anal abscess  Current CD medications: IFX 5 mg/kg q 8 weeks  Prior IBD surgeries: none  Prior IBD Medications: prednisone, Entocort    DRUG MONITORING  TPMT enzyme activity: 19.2 (WNL)    6-TGN/6-MMPN levels: --    Biologic concentration:  -predict PK 6/22/23 - estimated trough 10 (on IFX 5mg/kg q 8)    DISEASE ASSESSMENT  Labs  Recent Labs   Lab Test 06/22/23  0652 05/30/23  1143   SED 8 2     Fecal calprotectin: 30s - 5/30/23  -12/2022  -colonoscopy 12/2022 - normal TI (normal biopsies) - inflammation/stricture 5-6 cm distal to IC valve - able to be passed. Remainder  colon normal? (details of scope limited on documentation). 5mm adenoma in rectum. Biopsies TI normal. Right colon chronic active (mild) c/w IBD and left colon normal  Enterography:   - MRE 2/14/23 - IMPRESSION:  Mucosal thickening and hyperemia of the terminal ileum,  compatible with the prior diagnosis of Crohn disease. No evidence of  associated stricture or fistula.  C diff: negative    sIBDQ:   IBDQ Score Date IBDQ - Total Score  (Higher score better)   7/18/2023  12:31 PM Incomplete   6/1/2023   4:13 AM 34       IBD Health Care Maintenance:    Not discussed    Misc:  -- Avoid tobacco use  -- Avoid NSAIDs as there is potentially a 25% chance of causing an IBD flare    Return to clinic in 3 months with IBD ELIANA and 6 months with Dr. Sanchez    Thank you for this consultation.  It was a pleasure to participate in the care of this patient; please contact us with any further questions.  I spent a total of 40 minutes during the day of encounter performed chart review, meeting with patient, patient counseling, care coordination, and documentation.      This note was created with voice recognition software, and while reviewed for accuracy, typos may remain.     Helder Sanchez MD  Division of Gastroenterology, Hepatology and Nutrition  [facility]  Pager: [phone]      HPI:   Currently, here today with complaints of RUQ. Last seen in early June.    About 2 weeks ago developed RUQ pain. Happened rather suddenly. Has persisted. Not related to eating. Not related to BM. No trauma. Constant. Does not radiate. It's right under her rib cage.    He went to the ED that night. Labs normal (CBC, LFTs, lipase, CRP, BMP). Fecal calpro recently normal. CT scan normal. Moderate stool in colon. Colon and ileum did not look inflamed.    He is taking NSAIDs (aleve and exedrin migraine) for chronic neck pain.    His loose stools have resolved. Lomotil helped but hasn't needed that as much. He is having some rapid pellets followed  by looser stools. No improvement with pain even when he has a good BM.    ROS:    No fevers or chills  No weight loss  No blurry vision, double vision or change in vision  No sore throat  No lymphadenopathy  No headache, paraesthesias, or weakness in a limb  No shortness of breath or wheezing  No chest pain or pressure  No arthralgias or myalgias  No rashes or skin changes  No odynophagia or dysphagia  No BRBPR, hematochezia, melena  No dysuria, frequency or urgency  No hot/cold intolerance or polyria  No anxiety or depression    Extra intestinal manifestations of IBD:  No uveitis/episcleritis  No aphthous ulcers   No arthritis   No erythema nodosum/pyoderma gangrenosum.     PERTINENT PAST MEDICAL HISTORY:  Past Medical History:   Diagnosis Date     Anxiety disorder 2012     Dysthymic disorder     No Comments Provided     Malignant neoplasm of testis (H) 2005 2005,teratoma     Raynaud's syndrome without gangrene     No Comments Provided     Uncomplicated alcohol abuse     7/25/2012       PREVIOUS SURGERIES:  Past Surgical History:   Procedure Laterality Date     COLONOSCOPY N/A 12/12/2022    Procedure: COLONOSCOPY, WITH POLYPECTOMY AND BIOPSY;  Surgeon: Solomon Arciniega MD;  Location: [facility] OR     DECOMPRESSION CUBITAL TUNNEL Left 01/2019     HERNIA REPAIR      ,HERNIA REPAIR     IR CHEST PORT PLACEMENT > 5 YRS OF AGE Left 04/01/2008     LYMPH NODE BIOPSY  09/05/2008     [facility]. 37 lymph nodes excised     ORCHIECTOMY SCROTAL Right 12/30/2005    teratoma     OTHER SURGICAL HISTORY      9/05/08,534710,OTHER     RELEASE CARPAL TUNNEL  04/09/2013       PREVIOUS ENDOSCOPY:  No results found for this or any previous visit.]    ALLERGIES:   No Known Allergies    PERTINENT MEDICATIONS:    Current Outpatient Medications:      albuterol (PROAIR HFA/PROVENTIL HFA/VENTOLIN HFA) 108 (90 Base) MCG/ACT inhaler, Inhale 2 puffs into the lungs every 6 hours as needed for shortness of breath / dyspnea or wheezing,  Disp: , Rfl:      ALPRAZolam (XANAX) 1 MG tablet, Take 1 mg by mouth 2 times daily as needed for anxiety, Disp: , Rfl:      amitriptyline (ELAVIL) 10 MG tablet, 10-20 mg (1-2 tabs), Disp: 30 tablet, Rfl: 3     Aspirin-Acetaminophen-Caffeine (EXCEDRIN MIGRAINE PO), , Disp: , Rfl:      bismuth subsalicylate (PEPTO BISMOL) 262 MG/15ML suspension, Take 15 mLs by mouth every 6 hours as needed for indigestion, Disp: , Rfl:      calcium carbonate (TUMS) 500 MG chewable tablet, Take 1 chew tab by mouth 4 times daily as needed for heartburn, Disp: , Rfl:      clonazePAM (KLONOPIN) 1 MG tablet, Take 1 mg by mouth 3 times daily, Disp: , Rfl:      desvenlafaxine succinate (PRISTIQ) 100 MG 24 hr tablet, Take 100 mg by mouth daily, Disp: , Rfl:      dicyclomine (BENTYL) 10 MG capsule, Take 2 capsules (20 mg) by mouth 2 times daily as needed (abdominal pain), Disp: 120 capsule, Rfl: 3     diphenoxylate-atropine (LOMOTIL) 2.5-0.025 MG tablet, Take 1 tablet by mouth 4 times daily as needed for diarrhea, Disp: 90 tablet, Rfl: 3     inFLIXimab, Inject into the vein once for 1 dose, Disp: , Rfl:      Lactobacillus (FLORAJEN ACIDOPHILUS) CAPS, Take 1 capsule by mouth daily, Disp: 30 capsule, Rfl: 11     omeprazole (PRILOSEC) 40 MG DR capsule, Take 1 capsule (40 mg) by mouth 2 times daily (before meals), Disp: 60 capsule, Rfl: 11     polyethylene glycol (GOLYTELY) 236 g suspension, Take 4,000 mLs by mouth once for 1 dose, Disp: 4000 mL, Rfl: 0     polyethylene glycol (MIRALAX) 17 GM/Dose powder, Take 17 g (1 Capful) by mouth daily, Disp: 578 g, Rfl: 11     Probiotic Product (FLORAJEN DIGESTION) CAPS, Take 1 capsule by mouth daily, Disp: 30 capsule, Rfl: 11     Probiotic Product (FLORAJEN3) CAPS, 15 billion live cultures per capsule, 1 cap po qd, Disp: , Rfl:      psyllium (METAMUCIL/KONSYL) 58.6 % powder, Take 36 g (2 Tablespoonful) by mouth daily, Disp: , Rfl:      sucralfate (CARAFATE) 1 GM tablet, Take 1 tablet (1 g) by mouth 2 times  "daily as needed (abdominal pain), Disp: 360 tablet, Rfl: 3    Current Facility-Administered Medications:      ondansetron (ZOFRAN) injection 4 mg, 4 mg, Intravenous, Once, Solomon Arciniega MD    SOCIAL HISTORY:  Social History     Socioeconomic History     Marital status:      Spouse name: Jillian     Number of children: 4     Years of education: 13     Highest education level: Not on file   Occupational History     Occupation: underground utilities/dirt work     Employer: [employer]   Tobacco Use     Smoking status: Never     Smokeless tobacco: Current     Types: Chew     Tobacco comments:     1 tin lasts 1 week   Vaping Use     Vaping Use: Never used   Substance and Sexual Activity     Alcohol use: Not Currently     Alcohol/week: 6.0 standard drinks of alcohol     Types: 6 Cans of beer per week     Drug use: Never     Sexual activity: Yes     Partners: Female     Birth control/protection: Surgical   Other Topics Concern     Parent/sibling w/ CABG, MI or angioplasty before 65F 55M? Not Asked   Social History Narrative    He is , 4  Girls.    Wife runs a .     Social Determinants of Health     Financial Resource Strain: Not on file   Food Insecurity: Not on file   Transportation Needs: Not on file   Physical Activity: Not on file   Stress: Not on file   Social Connections: Not on file   Intimate Partner Violence: Not on file   Housing Stability: Not on file       FAMILY HISTORY:  Family History   Problem Relation Age of Onset     Pulmonary Embolism Mother      Other - See Comments Daughter         recurrent OM     Other - See Comments Other         Suicidality,maternal uncle     No Known Problems Father        Past/family/social history reviewed and no changes    PHYSICAL EXAMINATION:  Constitutional: aaox3, cooperative, pleasant, not dyspneic/diaphoretic, no acute distress  Vitals reviewed: BP (!) 139/92   Pulse 85   Ht 1.778 m (5' 10\")   Wt 74.2 kg (163 lb 8 oz)   SpO2 99%   BMI " 23.46 kg/m    Wt:   Wt Readings from Last 2 Encounters:   07/18/23 74.2 kg (163 lb 8 oz)   07/06/23 73.2 kg (161 lb 6.4 oz)      Eyes: Sclera anicteric/injected  Ears/nose/mouth/throat: Normal oropharynx without ulcers or exudate, mucus membranes moist, hearing intact  Neck: supple, thyroid normal size  CV: No edema  Respiratory: Unlabored breathing  Lymph: No axillary, submandibular, supraclavicular or inguinal lymphadenopathy  Abd:  Nondistended, +bs, no hepatosplenomegaly, tender in RUQ. Neg murphys. No r/g. No carnett's  Skin: warm, perfused, no jaundice  Psych: Normal affect  MSK: Normal gait      PERTINENT STUDIES:  Most recent CBC:  Recent Labs   Lab Test 07/06/23  0343 06/22/23  0652   WBC 8.0 5.3   HGB 14.2 15.2   HCT 40.4 44.7    373     Most recent hepatic panel:  Recent Labs   Lab Test 07/06/23  0343 06/22/23  0652   ALT 15 18   AST 22 24     Most recent creatinine:  Recent Labs   Lab Test 07/06/23  0343 01/31/23  1404   CR 0.85 1.01             Answers for HPI/ROS submitted by the patient on 7/18/2023  General Symptoms: Yes  Skin Symptoms: No  HENT Symptoms: No  EYE SYMPTOMS: No  HEART SYMPTOMS: No  LUNG SYMPTOMS: No  INTESTINAL SYMPTOMS: Yes  URINARY SYMPTOMS: No  REPRODUCTIVE SYMPTOMS: No  SKELETAL SYMPTOMS: Yes  BLOOD SYMPTOMS: No  NERVOUS SYSTEM SYMPTOMS: No  MENTAL HEALTH SYMPTOMS: Yes  Fever: No  Loss of appetite: Yes  Weight loss: Yes  Weight gain: No  Fatigue: Yes  Night sweats: Yes  Chills: Yes  Increased stress: Yes  Excessive hunger: No  Excessive thirst: No  Feeling hot or cold when others believe the temperature is normal: Yes  Loss of height: No  Post-operative complications: No  Surgical site pain: No  Hallucinations: No  Change in or Loss of Energy: Yes  Hyperactivity: No  Confusion: Yes  Heart burn or indigestion: Yes  Nausea: Yes  Vomiting: Yes  Abdominal pain: Yes  Bloating: Yes  Constipation: Yes  Diarrhea: Yes  Blood in stool: No  Black stools: Yes  Rectal or Anal pain:  Yes  Fecal incontinence: Yes  Yellowing of skin or eyes: No  Vomit with blood: No  Change in stools: Yes  Back pain: No  Muscle aches: Yes  Neck pain: Yes  Swollen joints: No  Joint pain: Yes  Bone pain: No  Muscle cramps: Yes  Muscle weakness: Yes  Joint stiffness: Yes  Bone fracture: No  Nervous or Anxious: Yes  Depression: Yes  Trouble sleeping: Yes  Trouble thinking or concentrating: Yes  Mood changes: Yes  Panic attacks: No

## 2023-07-19 ENCOUNTER — TELEPHONE (OUTPATIENT)
Dept: GASTROENTEROLOGY | Facility: CLINIC | Age: 48
End: 2023-07-19
Payer: COMMERCIAL

## 2023-07-19 DIAGNOSIS — K50.10 CROHN'S DISEASE OF LARGE INTESTINE WITHOUT COMPLICATION (H): ICD-10-CM

## 2023-07-19 DIAGNOSIS — R10.11 RUQ ABDOMINAL PAIN: Primary | ICD-10-CM

## 2023-07-19 DIAGNOSIS — K52.9 CHRONIC DIARRHEA: ICD-10-CM

## 2023-07-19 DIAGNOSIS — K21.00 GASTROESOPHAGEAL REFLUX DISEASE WITH ESOPHAGITIS WITHOUT HEMORRHAGE: ICD-10-CM

## 2023-07-19 NOTE — TELEPHONE ENCOUNTER
Discontinued previous adult  order. Placed new order to include EGD. Sent inbasket message to endoscopy scheduling requesting patient be scheduled on Dr. Sanchez's add on day on 8/10. Awaiting confirmation. Sent [employer] message requesting patient to anticipate the 's call.

## 2023-07-19 NOTE — TELEPHONE ENCOUNTER
----- Message from Helder Sanchez MD sent at 7/18/2023  5:44 PM CDT -----  Regarding: EGD and colonoscopy for 8/10  Brenda Chacon,    Can we get Reggie on for EGD/colonoscopy for 8/10

## 2023-07-19 NOTE — TELEPHONE ENCOUNTER
Caller: Writer to patient  Reason for Reschedule/Cancellation (please be detailed, any staff messages or encounters to note?): Request by RN[facility].   Liliana Johnson, RN  Wendy Wong; Kori Lundy  Good morning, please see message below.     Let me know if for some reason this is not possible. I canceled his old order (colonoscopy only) and reordered EGD/colonoscopy with conscious sedation.     Thank you!!   Oswaldo    Prior to reschedule please review:    Ordering Provider: CRIS MADSEN    Sedation per order: Moderate    Does patient have any ASC Exclusions, please identify?: No      Notes on Cancelled Procedure:    Procedure: Lower Endoscopy [Colonoscopy]     Date: 9/11/2023    Location: [facility]; [address] [place] [address], [address] [place] [address]    Surgeon: Nicolás      Rescheduled: Yes    Procedure: Upper and Lower Endoscopy [EGD and Colonoscopy]     Date: 8/10/2023    Location: [facility]; [address] [place] [address], [address] [place] [address]    Surgeon: Daniel    Sedation Level Scheduled  Moderate,  Reason for Sedation Level Order    Prep/Instructions updated and sent: Steven.

## 2023-07-25 ENCOUNTER — HOSPITAL ENCOUNTER (OUTPATIENT)
Dept: INFUSION THERAPY | Facility: OTHER | Age: 48
Discharge: HOME | End: 2023-07-25
Attending: INTERNAL MEDICINE | Admitting: FAMILY MEDICINE
Payer: COMMERCIAL

## 2023-07-25 VITALS
SYSTOLIC BLOOD PRESSURE: 129 MMHG | HEART RATE: 100 BPM | WEIGHT: 156.6 LBS | DIASTOLIC BLOOD PRESSURE: 89 MMHG | TEMPERATURE: 97.6 F | BODY MASS INDEX: 22.47 KG/M2 | OXYGEN SATURATION: 100 % | RESPIRATION RATE: 14 BRPM

## 2023-07-25 DIAGNOSIS — K50.10 CROHN'S DISEASE OF LARGE INTESTINE WITHOUT COMPLICATION (H): Primary | ICD-10-CM

## 2023-07-25 PROCEDURE — 250N000011 HC RX IP 250 OP 636: Mod: JZ | Performed by: FAMILY MEDICINE

## 2023-07-25 PROCEDURE — 96413 CHEMO IV INFUSION 1 HR: CPT

## 2023-07-25 PROCEDURE — 258N000003 HC RX IP 258 OP 636: Performed by: FAMILY MEDICINE

## 2023-07-25 PROCEDURE — 258N000003 HC RX IP 258 OP 636: Performed by: INTERNAL MEDICINE

## 2023-07-25 RX ORDER — DIPHENHYDRAMINE HCL 25 MG
25 CAPSULE ORAL ONCE
Status: CANCELLED
Start: 2023-07-26 | End: 2023-07-26

## 2023-07-25 RX ORDER — EPINEPHRINE 1 MG/ML
0.3 INJECTION, SOLUTION, CONCENTRATE INTRAVENOUS EVERY 5 MIN PRN
Status: CANCELLED | OUTPATIENT
Start: 2023-07-26

## 2023-07-25 RX ORDER — ALBUTEROL SULFATE 0.83 MG/ML
2.5 SOLUTION RESPIRATORY (INHALATION)
Status: CANCELLED | OUTPATIENT
Start: 2023-07-26

## 2023-07-25 RX ORDER — ACETAMINOPHEN 325 MG/1
650 TABLET ORAL ONCE
Status: CANCELLED
Start: 2023-07-26 | End: 2023-07-26

## 2023-07-25 RX ORDER — DIPHENHYDRAMINE HYDROCHLORIDE 50 MG/ML
50 INJECTION INTRAMUSCULAR; INTRAVENOUS
Status: CANCELLED
Start: 2023-07-26

## 2023-07-25 RX ORDER — MEPERIDINE HYDROCHLORIDE 50 MG/ML
25 INJECTION INTRAMUSCULAR; INTRAVENOUS; SUBCUTANEOUS EVERY 30 MIN PRN
Status: CANCELLED | OUTPATIENT
Start: 2023-07-26

## 2023-07-25 RX ORDER — METHYLPREDNISOLONE SODIUM SUCCINATE 125 MG/2ML
125 INJECTION, POWDER, LYOPHILIZED, FOR SOLUTION INTRAMUSCULAR; INTRAVENOUS
Status: CANCELLED
Start: 2023-07-26

## 2023-07-25 RX ORDER — ALBUTEROL SULFATE 90 UG/1
1-2 AEROSOL, METERED RESPIRATORY (INHALATION)
Status: CANCELLED
Start: 2023-07-26

## 2023-07-25 RX ADMIN — SODIUM CHLORIDE 250 ML: 9 INJECTION, SOLUTION INTRAVENOUS at 10:18

## 2023-07-25 RX ADMIN — INFLIXIMAB 400 MG: 100 INJECTION, POWDER, LYOPHILIZED, FOR SOLUTION INTRAVENOUS at 10:37

## 2023-07-25 NOTE — NURSING NOTE
~~~ NOTE: If the patient answers yes to any of the questions below, hold the infusion and contact ordering provider or on-call provider.    Do you currently have any signs of illness or infection or are you on any antibiotics? No  Have you recently had an elevated temperature, fever, chills, productive cough, coughing for 3 weeks or longer or hemoptysis, abnormal vital signs, night sweats, chest pain or have you noticed a decrease in your appetite, unexplained weight loss or fatigue? No  Have you had any new, sudden, or worsening abdominal pain? No  Do you have any open wounds or new incisions? (exclude for patients with hidradenitis suppurativa) No  Have you recently been diagnosed with any new nervous system diseases (ie. Multiple sclerosis, Guillain [place], seizures, neurological changes) or cancer diagnosis? Are you on any form of radiation or chemotherapy? No  Have there been any other new onset medical symptoms? No  Are you pregnant or breast feeding or do you have plans of pregnancy in the future? No; N/A  Do you have any upcoming hospitalizations or surgeries? Does not include esophagogastroduodenoscopy, colonoscopy, endoscopic retrograde cholangiopancreatography (ERCP), endoscopic ultrasound (EUS), dental procedures (including cleanings, fillings, implants, extractions)  or joint aspiration/steroid injections No  Have you or anyone in your household received a live vaccination in the past 4 weeks? Please note: No live vaccines while on biologic/chemotherapy until 6 months after the last treatment. Patient can receive the flu vaccine (shot only).  It is optimal for the patient to get it mid cycle, but it can be given at any time as long as it is not on the day of the infusion. No  If applicable to prescribed medication, confirm negative PPD or quantiferon gold MTB. If positive, verify has negative chest x-ray or the patient is at least 4 weeks post initiation of INH/B6 therapy and have clearance from provider  before infusion (Y/N:[id number])  If applicable to prescribed medication, confirm negative hepatitis B surface antigen or hepatitis C. If positive, clearance from provider before infusion. (Y/N: [id number])  Rheumatology patients receiving tocilizumab (Actemra): If labs were drawn within the past week, hold dosing until cleared to infuse If AST/ALT > 2 X upper limit normal; ANC < 1.0. NO; N/A  Patients receiving belimumab (Benlysta): Have you been having any signs of worsening depression or suicidal ideations? No; N/A

## 2023-07-25 NOTE — NURSING NOTE
Infusion Nursing Note:  Dennis J Goodell presents today for Remicade Rapid infusion.    Patient seen by provider today: No   present during visit today: Not Applicable.    Note: N/A.      Intravenous Access:  Peripheral IV placed.    Treatment Conditions:  Biologic checklist completed prior to infusion, labs not needed today, done every other infusion.      Post Infusion Assessment:  Patient tolerated infusion without incident.  Blood return noted pre and post infusion.  Site patent and intact, free from redness, edema or discomfort.  No evidence of extravasations.  Access discontinued per protocol.  Biologic Infusion Post Education: Call the triage nurse at your clinic or seek medical attention if you have chills and/or temperature greater than or equal to 100.5, uncontrolled nausea/vomiting, diarrhea, constipation, dizziness, shortness of breath, chest pain, heart palpitations, weakness or any other new or concerning symptoms, questions or concerns.  You cannot have any live virus vaccines prior to or during treatment or up to 6 months post infusion.  If you have an upcoming surgery, medical procedure or dental procedure during treatment, this should be discussed with your ordering physician and your surgeon/dentist.  If you are having any concerning symptom, if you are unsure if you should get your next infusion or wish to speak to a provider before your next infusion, please call your care coordinator or triage nurse at your clinic to notify them so we can adequately serve you.       Discharge Plan:   Discharge instructions reviewed with: Patient.  Patient and/or family verbalized understanding of discharge instructions and all questions answered.  Copy of AVS reviewed with patient and/or family.  Patient will return 9/19/23 for next appointment.  Patient discharged in stable condition accompanied by: self.  Departure Mode: Ambulatory.      Angela Swanson RN     Quality 226: Preventive Care And Screening: Tobacco Use: Screening And Cessation Intervention: Patient screened for tobacco use and is an ex/non-smoker Quality 265: Biopsy Follow-Up: Biopsy results reviewed, communicated, tracked, and documented Quality 130: Documentation Of Current Medications In The Medical Record: Current Medications Documented Quality 111:Pneumonia Vaccination Status For Older Adults: Pneumococcal Vaccination not Administered or Previously Received, Reason not Otherwise Specified Quality 110: Preventive Care And Screening: Influenza Immunization: Influenza Immunization not Administered for Documented Reasons. Quality 431: Preventive Care And Screening: Unhealthy Alcohol Use - Screening: Patient not identified as an unhealthy alcohol user when screened for unhealthy alcohol use using a systematic screening method Detail Level: Detailed

## 2023-07-27 ENCOUNTER — TELEPHONE (OUTPATIENT)
Dept: GASTROENTEROLOGY | Facility: CLINIC | Age: 48
End: 2023-07-27
Payer: COMMERCIAL

## 2023-07-27 NOTE — TELEPHONE ENCOUNTER
Attempted to contact patient in order to complete pre assessment questions.     No answer. Left message to return call to [phone] option 4      Procedure details:    Patient scheduled for Colonoscopy/Upper endoscopy (EGD) on 8/10/23.     Arrival time: 1130. Procedure time 1230    Pre op exam needed? N/A    [facility] [address] [place] [address], [address], [place] [address]    Sedation type: Conscious sedation     Indication for procedure: RUQ pain, gerd, crohn's      Chart review:     Electronic implanted devices? No    Diabetic? No      Medication review:    Anticoagulants? No    NSAIDS? No    Other medication HOLDING recommendations:  Sucralfate (Carafate) - Hold 1 day prior to scheduled procedure      Prep for procedure:     Bowel prep recommendation: Miralax prep without magnesium citrate   Due to: standard bowel prep.    Prep instructions sent via [employer].     Ira Dowd RN  Endoscopy Procedure Pre Assessment RN

## 2023-07-27 NOTE — TELEPHONE ENCOUNTER
Pre assessment completed for upcoming procedure.   (Please see previous telephone encounter notes for complete details)    Patient  returned call.       Procedure details:    Arrival time and facility location reviewed    Pre op exam needed? N/A    Designated  policy reviewed. Instructed to have someone stay 6 hours post procedure.     COVID policy reviewed.      Medication review:    Medications reviewed. Please see supporting documentation below. Holding recommendations discussed (if applicable).       Prep for procedure:     Reviewed procedure prep instructions.       Additional information needed?  N/A      Patient  verbalized understanding and had no questions or concerns at this time.      Carly Parra RN  Endoscopy Procedure Pre Assessment RN  [phone] option 4

## 2023-08-09 ENCOUNTER — ANESTHESIA EVENT (OUTPATIENT)
Dept: SURGERY | Facility: AMBULATORY SURGERY CENTER | Age: 48
End: 2023-08-09
Payer: COMMERCIAL

## 2023-08-10 ENCOUNTER — HOSPITAL ENCOUNTER (OUTPATIENT)
Facility: AMBULATORY SURGERY CENTER | Age: 48
Discharge: HOME | End: 2023-08-10
Attending: INTERNAL MEDICINE | Admitting: INTERNAL MEDICINE
Payer: COMMERCIAL

## 2023-08-10 ENCOUNTER — ANESTHESIA (OUTPATIENT)
Dept: SURGERY | Facility: AMBULATORY SURGERY CENTER | Age: 48
End: 2023-08-10
Payer: COMMERCIAL

## 2023-08-10 VITALS
SYSTOLIC BLOOD PRESSURE: 129 MMHG | BODY MASS INDEX: 22.33 KG/M2 | WEIGHT: 156 LBS | OXYGEN SATURATION: 99 % | DIASTOLIC BLOOD PRESSURE: 87 MMHG | HEART RATE: 73 BPM | TEMPERATURE: 97 F | HEIGHT: 70 IN | RESPIRATION RATE: 16 BRPM

## 2023-08-10 VITALS — HEART RATE: 69 BPM

## 2023-08-10 LAB
COLONOSCOPY: NORMAL
UPPER GI ENDOSCOPY: NORMAL

## 2023-08-10 PROCEDURE — 88305 TISSUE EXAM BY PATHOLOGIST: CPT | Mod: 26 | Performed by: PATHOLOGY

## 2023-08-10 PROCEDURE — 88305 TISSUE EXAM BY PATHOLOGIST: CPT | Mod: TC | Performed by: INTERNAL MEDICINE

## 2023-08-10 PROCEDURE — 45385 COLONOSCOPY W/LESION REMOVAL: CPT | Performed by: INTERNAL MEDICINE

## 2023-08-10 PROCEDURE — 43239 EGD BIOPSY SINGLE/MULTIPLE: CPT | Performed by: INTERNAL MEDICINE

## 2023-08-10 PROCEDURE — 45380 COLONOSCOPY AND BIOPSY: CPT | Mod: 59 | Performed by: INTERNAL MEDICINE

## 2023-08-10 RX ORDER — SODIUM CHLORIDE, SODIUM LACTATE, POTASSIUM CHLORIDE, CALCIUM CHLORIDE 600; 310; 30; 20 MG/100ML; MG/100ML; MG/100ML; MG/100ML
INJECTION, SOLUTION INTRAVENOUS CONTINUOUS
Status: DISCONTINUED | OUTPATIENT
Start: 2023-08-10 | End: 2023-08-11 | Stop reason: HOSPADM

## 2023-08-10 RX ORDER — LIDOCAINE HYDROCHLORIDE 20 MG/ML
INJECTION, SOLUTION INFILTRATION; PERINEURAL PRN
Status: DISCONTINUED | OUTPATIENT
Start: 2023-08-10 | End: 2023-08-10

## 2023-08-10 RX ORDER — PROCHLORPERAZINE MALEATE 10 MG
10 TABLET ORAL EVERY 6 HOURS PRN
Status: CANCELLED | OUTPATIENT
Start: 2023-08-10

## 2023-08-10 RX ORDER — PROPOFOL 10 MG/ML
INJECTION, EMULSION INTRAVENOUS PRN
Status: DISCONTINUED | OUTPATIENT
Start: 2023-08-10 | End: 2023-08-10

## 2023-08-10 RX ORDER — ONDANSETRON 2 MG/ML
4 INJECTION INTRAMUSCULAR; INTRAVENOUS
Status: COMPLETED | OUTPATIENT
Start: 2023-08-10 | End: 2023-08-10

## 2023-08-10 RX ORDER — FLUMAZENIL 0.1 MG/ML
0.2 INJECTION, SOLUTION INTRAVENOUS
Status: CANCELLED | OUTPATIENT
Start: 2023-08-10 | End: 2023-08-11

## 2023-08-10 RX ORDER — ONDANSETRON 2 MG/ML
4 INJECTION INTRAMUSCULAR; INTRAVENOUS EVERY 6 HOURS PRN
Status: CANCELLED | OUTPATIENT
Start: 2023-08-10

## 2023-08-10 RX ORDER — NALOXONE HYDROCHLORIDE 0.4 MG/ML
0.2 INJECTION, SOLUTION INTRAMUSCULAR; INTRAVENOUS; SUBCUTANEOUS
Status: CANCELLED | OUTPATIENT
Start: 2023-08-10

## 2023-08-10 RX ORDER — NALOXONE HYDROCHLORIDE 0.4 MG/ML
0.4 INJECTION, SOLUTION INTRAMUSCULAR; INTRAVENOUS; SUBCUTANEOUS
Status: CANCELLED | OUTPATIENT
Start: 2023-08-10

## 2023-08-10 RX ORDER — ONDANSETRON 4 MG/1
4 TABLET, ORALLY DISINTEGRATING ORAL EVERY 6 HOURS PRN
Status: CANCELLED | OUTPATIENT
Start: 2023-08-10

## 2023-08-10 RX ORDER — PROPOFOL 10 MG/ML
INJECTION, EMULSION INTRAVENOUS CONTINUOUS PRN
Status: DISCONTINUED | OUTPATIENT
Start: 2023-08-10 | End: 2023-08-10

## 2023-08-10 RX ORDER — LIDOCAINE 40 MG/G
CREAM TOPICAL
Status: DISCONTINUED | OUTPATIENT
Start: 2023-08-10 | End: 2023-08-11 | Stop reason: HOSPADM

## 2023-08-10 RX ADMIN — PROPOFOL 60 MG: 10 INJECTION, EMULSION INTRAVENOUS at 12:47

## 2023-08-10 RX ADMIN — SODIUM CHLORIDE, SODIUM LACTATE, POTASSIUM CHLORIDE, CALCIUM CHLORIDE: 600; 310; 30; 20 INJECTION, SOLUTION INTRAVENOUS at 12:05

## 2023-08-10 RX ADMIN — PROPOFOL 50 MG: 10 INJECTION, EMULSION INTRAVENOUS at 13:53

## 2023-08-10 RX ADMIN — PROPOFOL 200 MCG/KG/MIN: 10 INJECTION, EMULSION INTRAVENOUS at 12:45

## 2023-08-10 RX ADMIN — PROPOFOL 150 MCG/KG/MIN: 10 INJECTION, EMULSION INTRAVENOUS at 13:25

## 2023-08-10 RX ADMIN — LIDOCAINE HYDROCHLORIDE 60 MG: 20 INJECTION, SOLUTION INFILTRATION; PERINEURAL at 12:45

## 2023-08-10 RX ADMIN — ONDANSETRON 4 MG: 2 INJECTION INTRAMUSCULAR; INTRAVENOUS at 12:22

## 2023-08-10 RX ADMIN — PROPOFOL 40 MG: 10 INJECTION, EMULSION INTRAVENOUS at 12:49

## 2023-08-10 NOTE — ANESTHESIA CARE TRANSFER NOTE
Patient: Dennis J Goodell    Procedure: Procedure(s):  COLONOSCOPY, WITH POLYPECTOMY AND BIOPSY  ESOPHAGOGASTRODUODENOSCOPY, WITH BIOPSY       Diagnosis: Crohn's disease of large intestine without complication (H) [K50.10]  Chronic diarrhea [K52.9]  RUQ abdominal pain [R10.11]  Gastroesophageal reflux disease with esophagitis without hemorrhage [K21.00]  Diagnosis Additional Information: No value filed.    Anesthesia Type:   MAC     Note:    Oropharynx: spontaneously breathing and oropharynx clear of all foreign objects  Level of Consciousness: drowsy  Oxygen Supplementation: room air    Independent Airway: airway patency satisfactory and stable  Dentition: dentition unchanged  Vital Signs Stable: post-procedure vital signs reviewed and stable  Report to RN Given: handoff report given  Patient transferred to: Phase II    Handoff Report: Identifed the Patient, Identified the Reponsible Provider, Reviewed the pertinent medical history, Discussed the surgical course, Reviewed Intra-OP anesthesia mangement and issues during anesthesia, Set expectations for post-procedure period and Allowed opportunity for questions and acknowledgement of understanding      Vitals:  Vitals Value Taken Time   BP     Temp     Pulse     Resp     SpO2         Electronically Signed By: DONTRELL Brand CRNA  August 10, 2023  2:26 PM

## 2023-08-10 NOTE — ANESTHESIA PREPROCEDURE EVALUATION
Anesthesia Pre-Procedure Evaluation    Patient: Dennis J Goodell   MRN: [id number] : 1975        Procedure : Procedure(s):  Colonoscopy  Esophagoscopy, gastroscopy, duodenoscopy (EGD), combined          Past Medical History:   Diagnosis Date    Anxiety disorder     Dysthymic disorder     No Comments Provided    Malignant neoplasm of testis (H) 2005,teratoma    Raynaud's syndrome without gangrene     No Comments Provided    Uncomplicated alcohol abuse     2012      Past Surgical History:   Procedure Laterality Date    COLONOSCOPY N/A 2022    Procedure: COLONOSCOPY, WITH POLYPECTOMY AND BIOPSY;  Surgeon: Solomon Arciniega MD;  Location: [facility] OR    DECOMPRESSION CUBITAL TUNNEL Left 2019    HERNIA REPAIR      ,HERNIA REPAIR    IR CHEST PORT PLACEMENT > 5 YRS OF AGE Left 2008    LYMPH NODE BIOPSY  2008     [facility]. 37 lymph nodes excised    ORCHIECTOMY SCROTAL Right 2005    teratoma    OTHER SURGICAL HISTORY      [id number],OTHER    RELEASE CARPAL TUNNEL  2013      No Known Allergies   Social History     Tobacco Use    Smoking status: Never    Smokeless tobacco: Current     Types: Chew    Tobacco comments:     1 tin lasts 1 week   Substance Use Topics    Alcohol use: Not Currently     Alcohol/week: 6.0 standard drinks of alcohol     Types: 6 Cans of beer per week      Wt Readings from Last 1 Encounters:   23 71 kg (156 lb 9.6 oz)           Physical Exam    Airway        Mallampati: I   TM distance: > 3 FB   Neck ROM: full   Mouth opening: > 3 cm    Respiratory Devices and Support         Dental       (+) Minor Abnormalities - some fillings, tiny chips      Cardiovascular   cardiovascular exam normal          Pulmonary   pulmonary exam normal                OUTSIDE LABS:  CBC:   Lab Results   Component Value Date    WBC 8.0 2023    WBC 5.3 2023    HGB 14.2 2023    HGB 15.2 2023    HCT 40.4 2023    HCT 44.7  06/22/2023     07/06/2023     06/22/2023     BMP:   Lab Results   Component Value Date     07/06/2023     01/31/2023    POTASSIUM 3.7 07/06/2023    POTASSIUM 4.2 01/31/2023    CHLORIDE 100 07/06/2023    CHLORIDE 100 01/31/2023    CO2 26 07/06/2023    CO2 27 01/31/2023    BUN 12.9 07/06/2023    BUN 13.3 01/31/2023    CR 0.85 07/06/2023    CR 1.01 01/31/2023    GLC 89 07/06/2023     (H) 01/31/2023     COAGS:   Lab Results   Component Value Date    INR 1.12 07/06/2023     POC: No results found for: BGM, HCG, HCGS  HEPATIC:   Lab Results   Component Value Date    ALBUMIN 4.6 07/06/2023    PROTTOTAL 7.2 07/06/2023    ALT 15 07/06/2023    AST 22 07/06/2023    ALKPHOS 67 07/06/2023    BILITOTAL 0.5 07/06/2023     OTHER:   Lab Results   Component Value Date    LACT 1.9 11/19/2022    MARIA INES 9.2 07/06/2023    MAG 2.1 11/22/2022    LIPASE 24 07/06/2023    TSH 5.61 (H) 01/31/2023    T4 0.96 01/31/2023    SED 8 06/22/2023       Anesthesia Plan    ASA Status:  2    NPO Status:  NPO Appropriate    Anesthesia Type: MAC.     - Reason for MAC: straight local not clinically adequate   Induction: Intravenous, Propofol.   Maintenance: TIVA.        Consents    Anesthesia Plan(s) and associated risks, benefits, and realistic alternatives discussed. Questions answered and patient/representative(s) expressed understanding.     - Discussed:     - Discussed with:  Patient      - Extended Intubation/Ventilatory Support Discussed: No.      - Patient is DNR/DNI Status: No     Use of blood products discussed: No .     Postoperative Care       PONV prophylaxis: Ondansetron (or other 5HT-3), Background Propofol Infusion     Comments:                Beto Guerrero MD

## 2023-08-10 NOTE — H&P
Dennis J Goodell  [id number]  male  48 year old      Reason for procedure/surgery: abdominal pain and Crohn's Disease    Patient Active Problem List   Diagnosis    Dysthymic disorder    History of testicular cancer    Raynaud phenomenon    MARLEN (generalized anxiety disorder)    Facet arthropathy, cervical    Intractable chronic migraine without aura and with status migrainosus    Colitis    Leukocytosis    Germ cell tumor (H)    Mixed emotional features as adjustment reaction    Crohn's disease of large intestine without complication (H)    MRSA (methicillin resistant staph aureus) culture positive       Past Surgical History:    Past Surgical History:   Procedure Laterality Date    COLONOSCOPY N/A 12/12/2022    Procedure: COLONOSCOPY, WITH POLYPECTOMY AND BIOPSY;  Surgeon: Solomon Arciniega MD;  Location: [facility] OR    DECOMPRESSION CUBITAL TUNNEL Left 01/2019    HERNIA REPAIR      ,HERNIA REPAIR    IR CHEST PORT PLACEMENT > 5 YRS OF AGE Left 04/01/2008    LYMPH NODE BIOPSY  09/05/2008     [facility]. 37 lymph nodes excised    ORCHIECTOMY SCROTAL Right 12/30/2005    teratoma    OTHER SURGICAL HISTORY      9/05/08,[id number],OTHER    RELEASE CARPAL TUNNEL  04/09/2013       Past Medical History:   Past Medical History:   Diagnosis Date    Anxiety disorder 2012    Dysthymic disorder     No Comments Provided    Malignant neoplasm of testis (H) 2005 2005,teratoma    Raynaud's syndrome without gangrene     No Comments Provided    Uncomplicated alcohol abuse     7/25/2012       Social History:   Social History     Tobacco Use    Smoking status: Never    Smokeless tobacco: Current     Types: Chew    Tobacco comments:     1 tin lasts 1 week   Substance Use Topics    Alcohol use: Not Currently     Alcohol/week: 6.0 standard drinks of alcohol     Types: 6 Cans of beer per week       Family History:   Family History   Problem Relation Age of Onset    Pulmonary Embolism Mother     Other - See Comments Daughter          recurrent OM    Other - See Comments Other         Suicidality,maternal uncle    No Known Problems Father        Allergies: No Known Allergies    Active Medications:   Current Outpatient Medications   Medication Sig Dispense Refill    albuterol (PROAIR HFA/PROVENTIL HFA/VENTOLIN HFA) 108 (90 Base) MCG/ACT inhaler Inhale 2 puffs into the lungs every 6 hours as needed for shortness of breath / dyspnea or wheezing      ALPRAZolam (XANAX) 1 MG tablet Take 1 mg by mouth 2 times daily as needed for anxiety      amitriptyline (ELAVIL) 10 MG tablet 10-20 mg (1-2 tabs) 30 tablet 3    Aspirin-Acetaminophen-Caffeine (EXCEDRIN MIGRAINE PO)       bismuth subsalicylate (PEPTO BISMOL) 262 MG/15ML suspension Take 15 mLs by mouth every 6 hours as needed for indigestion      calcium carbonate (TUMS) 500 MG chewable tablet Take 1 chew tab by mouth 4 times daily as needed for heartburn      clonazePAM (KLONOPIN) 1 MG tablet Take 1 mg by mouth 3 times daily      desvenlafaxine succinate (PRISTIQ) 100 MG 24 hr tablet Take 100 mg by mouth daily      dicyclomine (BENTYL) 10 MG capsule Take 2 capsules (20 mg) by mouth 2 times daily as needed (abdominal pain) 120 capsule 3    diphenoxylate-atropine (LOMOTIL) 2.5-0.025 MG tablet Take 1 tablet by mouth 4 times daily as needed for diarrhea 90 tablet 3    inFLIXimab Inject into the vein once for 1 dose      Lactobacillus (FLORAJEN ACIDOPHILUS) CAPS Take 1 capsule by mouth daily 30 capsule 11    omeprazole (PRILOSEC) 40 MG DR capsule Take 1 capsule (40 mg) by mouth 2 times daily (before meals) 60 capsule 11    polyethylene glycol (MIRALAX) 17 GM/Dose powder Take 17 g (1 Capful) by mouth daily 578 g 11    Probiotic Product (FLORAJEN DIGESTION) CAPS Take 1 capsule by mouth daily 30 capsule 11    Probiotic Product (FLORAJEN3) CAPS 15 billion live cultures per capsule, 1 cap po qd      psyllium (METAMUCIL/KONSYL) 58.6 % powder Take 36 g (2 Tablespoonful) by mouth daily      sucralfate (CARAFATE) 1 GM  "tablet Take 1 tablet (1 g) by mouth 2 times daily as needed (abdominal pain) 360 tablet 3       Systemic Review:   CONSTITUTIONAL: NEGATIVE for fever, chills, change in weight  ENT/MOUTH: NEGATIVE for ear, mouth and throat problems  RESP: NEGATIVE for significant cough or SOB  CV: NEGATIVE for chest pain, palpitations or peripheral edema    Physical Examination:   Vital Signs: /89 (BP Location: Right arm)   Pulse 76   Temp 97  F (36.1  C) (Temporal)   Resp 16   Ht 1.778 m (5' 10\")   Wt 70.8 kg (156 lb)   SpO2 99%   BMI 22.38 kg/m    GENERAL: healthy, alert and no distress  NECK: no adenopathy, no asymmetry, masses, or scars  RESP: lungs clear to auscultation - no rales, rhonchi or wheezes  CV: regular rate and rhythm, normal S1 S2, no S3 or S4, no murmur, click or rub, no peripheral edema and peripheral pulses strong  ABDOMEN: soft, nontender, no hepatosplenomegaly, no masses and bowel sounds normal  MS: no gross musculoskeletal defects noted, no edema    Plan: Appropriate to proceed as scheduled.      Helder Sanchez MD  8/10/2023    PCP:  James Archuleta    "

## 2023-08-10 NOTE — ANESTHESIA POSTPROCEDURE EVALUATION
Patient: Dennis J Goodell    Procedure: Procedure(s):  COLONOSCOPY, WITH POLYPECTOMY AND BIOPSY  ESOPHAGOGASTRODUODENOSCOPY, WITH BIOPSY       Anesthesia Type:  MAC    Note:  Disposition: Outpatient   Postop Pain Control: Uneventful            Sign Out: Well controlled pain   PONV: No   Neuro/Psych: Uneventful            Sign Out: Acceptable/Baseline neuro status   Airway/Respiratory: Uneventful            Sign Out: Acceptable/Baseline resp. status   CV/Hemodynamics: Uneventful            Sign Out: Acceptable CV status; No obvious hypovolemia; No obvious fluid overload   Other NRE:    DID A NON-ROUTINE EVENT OCCUR?            Last vitals:  Vitals Value Taken Time   BP     Temp     Pulse     Resp     SpO2         Electronically Signed By: Beto Guerrero MD  August 10, 2023  2:19 PM

## 2023-08-14 LAB
PATH REPORT.COMMENTS IMP SPEC: NORMAL
PATH REPORT.FINAL DX SPEC: NORMAL
PATH REPORT.GROSS SPEC: NORMAL
PATH REPORT.MICROSCOPIC SPEC OTHER STN: NORMAL
PATH REPORT.RELEVANT HX SPEC: NORMAL
PHOTO IMAGE: NORMAL

## 2023-08-15 ENCOUNTER — PATIENT OUTREACH (OUTPATIENT)
Dept: GASTROENTEROLOGY | Facility: CLINIC | Age: 48
End: 2023-08-15
Payer: COMMERCIAL

## 2023-08-15 NOTE — TELEPHONE ENCOUNTER
Per Dr. Lundberg as referred by Dr Sanchez, case reviewed by Dr Carreon    Procedure/Imaging/Clinic: EGD with dilation or stent placement   Physician: Toño   Timin23  Scope time needed:15 min   Anesthesia: MAC   Dx: duodenal stricture   Tier: 3   Location:  [facility]   Header of letter for pt communication: upper endoscopy to treat stricture       Returned patient call 23 to discuss procedure    Called to discuss with patient.     Explained they can expect a call from  for date and time of procedure, will need a , someone to stay with them for 24 hours and should stay in town for 24 hours (within 45 min of Hospital) post procedure    Patient needs to get pre-op physical completed. If outside [facility] system will need physical faxed to number [phone]   If you do not get a preop physical, your procedure could be cancelled, patient voiced understanding*    Preop Plan:will get locally in [facility]    Does patient have any history of gastric bypass/gastric surgery/altered panc/bili anatomy?no    Does patient have [employer] insurance?:no    Med Review    Blood thinner -  no  ASA - no  Diabetic - no  Any meds by injection or mouth for weight loss or diabetes-no    Patient Education r/t procedure:done    A pre-op nurse will call 1-2 days prior to the procedure.    NPO/Prep:   Adults and Children of all ages may consume solids up to 8 hours prior to arrival time - may consume clear liquids up to 1 hour prior to arrival time.    Other specific details/comments:none     Verbalized understanding of all instructions. All questions answered.     Procedure order placed, message routed to OR / Endo

## 2023-08-18 ENCOUNTER — PREP FOR PROCEDURE (OUTPATIENT)
Dept: GASTROENTEROLOGY | Facility: CLINIC | Age: 48
End: 2023-08-18
Payer: COMMERCIAL

## 2023-08-18 ENCOUNTER — HOSPITAL ENCOUNTER (OUTPATIENT)
Facility: CLINIC | Age: 48
End: 2023-08-18
Attending: INTERNAL MEDICINE | Admitting: INTERNAL MEDICINE
Payer: COMMERCIAL

## 2023-08-18 DIAGNOSIS — K31.5 DUODENAL STRICTURE: Primary | ICD-10-CM

## 2023-09-13 ENCOUNTER — TELEPHONE (OUTPATIENT)
Dept: GASTROENTEROLOGY | Facility: CLINIC | Age: 48
End: 2023-09-13
Payer: COMMERCIAL

## 2023-09-13 NOTE — TELEPHONE ENCOUNTER
----- Message from Helder Sanchez MD sent at 9/13/2023 12:27 PM CDT -----  Regarding: RE: [employer] cancellation 9/19  Ok.    Let's just send him a message to let us know if it recurs we can reschedule the appt    Thanks!  J  ----- Message -----  From: Liliana Johnson, RN  Sent: 9/13/2023  10:39 AM CDT  To: Helder Sanchez MD  Subject: FW: [employer] cancellation 9/19                    Just an fyi; let me know if there's anything you need me to do.     Thanks,  Oswaldo   ----- Message -----  From: Kandy Powell, JAVIER  Sent: 9/13/2023   9:45 AM CDT  To: Thony Laurent; Liliana Johnson RN  Subject: [employer] cancellation 9/19                        Reggie Sanchez wishes to cancel his procedure with Dr. Lundberg scheduled for 9/19. I have provided him with Thony's number should he wish to reschedule (maybe put in the depot for now).     Oswaldo, he asked that I let Dr. Sanchez know that his pain has resolved!     Thank you,     Kandy

## 2023-09-22 ENCOUNTER — HOSPITAL ENCOUNTER (OUTPATIENT)
Dept: INFUSION THERAPY | Facility: OTHER | Age: 48
Discharge: HOME | End: 2023-09-22
Attending: INTERNAL MEDICINE | Admitting: FAMILY MEDICINE
Payer: COMMERCIAL

## 2023-09-22 VITALS
HEART RATE: 76 BPM | BODY MASS INDEX: 23.79 KG/M2 | RESPIRATION RATE: 14 BRPM | TEMPERATURE: 97.4 F | SYSTOLIC BLOOD PRESSURE: 134 MMHG | WEIGHT: 165.8 LBS | DIASTOLIC BLOOD PRESSURE: 89 MMHG

## 2023-09-22 DIAGNOSIS — K50.10 CROHN'S DISEASE OF LARGE INTESTINE WITHOUT COMPLICATION (H): Primary | ICD-10-CM

## 2023-09-22 LAB
ALBUMIN SERPL BCG-MCNC: 4.7 G/DL (ref 3.5–5.2)
ALP SERPL-CCNC: 56 U/L (ref 40–129)
ALT SERPL W P-5'-P-CCNC: 9 U/L (ref 0–70)
AST SERPL W P-5'-P-CCNC: 17 U/L (ref 0–45)
BASOPHILS # BLD AUTO: 0.1 10E3/UL (ref 0–0.2)
BASOPHILS NFR BLD AUTO: 1 %
BILIRUB DIRECT SERPL-MCNC: <0.2 MG/DL (ref 0–0.3)
BILIRUB SERPL-MCNC: 0.3 MG/DL
CRP SERPL-MCNC: <3 MG/L
EOSINOPHIL # BLD AUTO: 0.2 10E3/UL (ref 0–0.7)
EOSINOPHIL NFR BLD AUTO: 3 %
ERYTHROCYTE [DISTWIDTH] IN BLOOD BY AUTOMATED COUNT: 13.4 % (ref 10–15)
ERYTHROCYTE [SEDIMENTATION RATE] IN BLOOD BY WESTERGREN METHOD: 1 MM/HR (ref 0–15)
HCT VFR BLD AUTO: 36.2 % (ref 40–53)
HGB BLD-MCNC: 12.7 G/DL (ref 13.3–17.7)
IMM GRANULOCYTES # BLD: 0 10E3/UL
IMM GRANULOCYTES NFR BLD: 0 %
LYMPHOCYTES # BLD AUTO: 1.7 10E3/UL (ref 0.8–5.3)
LYMPHOCYTES NFR BLD AUTO: 36 %
MCH RBC QN AUTO: 29.5 PG (ref 26.5–33)
MCHC RBC AUTO-ENTMCNC: 35.1 G/DL (ref 31.5–36.5)
MCV RBC AUTO: 84 FL (ref 78–100)
MONOCYTES # BLD AUTO: 0.5 10E3/UL (ref 0–1.3)
MONOCYTES NFR BLD AUTO: 11 %
NEUTROPHILS # BLD AUTO: 2.3 10E3/UL (ref 1.6–8.3)
NEUTROPHILS NFR BLD AUTO: 49 %
NRBC # BLD AUTO: 0 10E3/UL
NRBC BLD AUTO-RTO: 0 /100
PLATELET # BLD AUTO: 238 10E3/UL (ref 150–450)
PROT SERPL-MCNC: 6.7 G/DL (ref 6.4–8.3)
RBC # BLD AUTO: 4.3 10E6/UL (ref 4.4–5.9)
WBC # BLD AUTO: 4.8 10E3/UL (ref 4–11)

## 2023-09-22 PROCEDURE — 96413 CHEMO IV INFUSION 1 HR: CPT

## 2023-09-22 PROCEDURE — 85652 RBC SED RATE AUTOMATED: CPT | Performed by: INTERNAL MEDICINE

## 2023-09-22 PROCEDURE — 86140 C-REACTIVE PROTEIN: CPT | Performed by: INTERNAL MEDICINE

## 2023-09-22 PROCEDURE — 80076 HEPATIC FUNCTION PANEL: CPT | Performed by: INTERNAL MEDICINE

## 2023-09-22 PROCEDURE — 85025 COMPLETE CBC W/AUTO DIFF WBC: CPT | Performed by: INTERNAL MEDICINE

## 2023-09-22 PROCEDURE — 250N000011 HC RX IP 250 OP 636: Mod: JZ | Performed by: FAMILY MEDICINE

## 2023-09-22 PROCEDURE — 36415 COLL VENOUS BLD VENIPUNCTURE: CPT | Performed by: INTERNAL MEDICINE

## 2023-09-22 PROCEDURE — 258N000003 HC RX IP 258 OP 636: Performed by: FAMILY MEDICINE

## 2023-09-22 PROCEDURE — 258N000003 HC RX IP 258 OP 636: Performed by: INTERNAL MEDICINE

## 2023-09-22 RX ORDER — ALBUTEROL SULFATE 90 UG/1
1-2 AEROSOL, METERED RESPIRATORY (INHALATION)
Status: CANCELLED
Start: 2023-11-15

## 2023-09-22 RX ORDER — DIPHENHYDRAMINE HCL 25 MG
25 CAPSULE ORAL ONCE
Status: CANCELLED
Start: 2023-11-15 | End: 2023-11-15

## 2023-09-22 RX ORDER — DIPHENHYDRAMINE HYDROCHLORIDE 50 MG/ML
50 INJECTION INTRAMUSCULAR; INTRAVENOUS
Status: CANCELLED
Start: 2023-11-15

## 2023-09-22 RX ORDER — EPINEPHRINE 1 MG/ML
0.3 INJECTION, SOLUTION, CONCENTRATE INTRAVENOUS EVERY 5 MIN PRN
Status: CANCELLED | OUTPATIENT
Start: 2023-11-15

## 2023-09-22 RX ORDER — METHYLPREDNISOLONE SODIUM SUCCINATE 125 MG/2ML
125 INJECTION, POWDER, LYOPHILIZED, FOR SOLUTION INTRAMUSCULAR; INTRAVENOUS
Status: CANCELLED
Start: 2023-11-15

## 2023-09-22 RX ORDER — MEPERIDINE HYDROCHLORIDE 50 MG/ML
25 INJECTION INTRAMUSCULAR; INTRAVENOUS; SUBCUTANEOUS EVERY 30 MIN PRN
Status: CANCELLED | OUTPATIENT
Start: 2023-11-15

## 2023-09-22 RX ORDER — ALBUTEROL SULFATE 0.83 MG/ML
2.5 SOLUTION RESPIRATORY (INHALATION)
Status: CANCELLED | OUTPATIENT
Start: 2023-11-15

## 2023-09-22 RX ORDER — ACETAMINOPHEN 325 MG/1
650 TABLET ORAL ONCE
Status: CANCELLED
Start: 2023-11-15 | End: 2023-11-15

## 2023-09-22 RX ADMIN — SODIUM CHLORIDE 250 ML: 9 INJECTION, SOLUTION INTRAVENOUS at 14:12

## 2023-09-22 RX ADMIN — INFLIXIMAB 400 MG: 100 INJECTION, POWDER, LYOPHILIZED, FOR SOLUTION INTRAVENOUS at 14:18

## 2023-09-22 NOTE — NURSING NOTE
Infusion Nursing Note:  Dennis J Goodell presents today for Remicade rapid infusion.    Patient seen by provider today: No   present during visit today: Not Applicable.    Note: N/A.      Intravenous Access:  Labs drawn without difficulty.  Peripheral IV placed.    Treatment Conditions:  Lab Results   Component Value Date    HGB 12.7 (L) 09/22/2023    WBC 4.8 09/22/2023    ANEUTAUTO 2.3 09/22/2023     09/22/2023        Lab Results   Component Value Date     07/06/2023    POTASSIUM 3.7 07/06/2023    MAG 2.1 11/22/2022    CR 0.85 07/06/2023    MARIA INES 9.2 07/06/2023    BILITOTAL 0.3 09/22/2023    ALBUMIN 4.7 09/22/2023    ALT 9 09/22/2023    AST 17 09/22/2023       Results reviewed, labs MET treatment parameters, ok to proceed with treatment.  Biological Infusion Checklist:  ~~~ NOTE: If the patient answers yes to any of the questions below, hold the infusion and contact ordering provider or on-call provider.    Have you recently had an elevated temperature, fever, chills, productive cough, coughing for 3 weeks or longer or hemoptysis,  abnormal vital signs, night sweats,  chest pain or have you noticed a decrease in your appetite, unexplained weight loss or fatigue? No  Do you have any open wounds or new incisions? No  Do you have any upcoming hospitalizations or surgeries? Does not include esophagogastroduodenoscopy, colonoscopy, endoscopic retrograde cholangiopancreatography (ERCP), endoscopic ultrasound (EUS), dental procedures or joint aspiration/steroid injections No  Do you currently have any signs of illness or infection or are you on any antibiotics? No  Have you had any new, sudden or worsening abdominal pain? No  Have you or anyone in your household received a live vaccination in the past 4 weeks? Please note: No live vaccines while on biologic/chemotherapy until 6 months after the last treatment. Patient can receive the flu vaccine (shot only), pneumovax and the Covid vaccine. It is  optimal for the patient to get these vaccines mid cycle, but they can be given at any time as long as it is not on the day of the infusion. No  Have you recently been diagnosed with any new nervous system diseases (ie. Multiple sclerosis, Guillain Mendon, seizures, neurological changes) or cancer diagnosis? Are you on any form of radiation or chemotherapy? No  Are you pregnant or breast feeding or do you have plans of pregnancy in the future? No  Have you been having any signs of worsening depression or suicidal ideations?  (benlysta only) No  Have there been any other new onset medical symptoms? No  Have you had any new blood clots? (IVIG only) No      Post Infusion Assessment:  Patient tolerated infusion without incident.  Blood return noted pre and post infusion.  Site patent and intact, free from redness, edema or discomfort.  No evidence of extravasations.  Access discontinued per protocol.  Biologic Infusion Post Education: Call the triage nurse at your clinic or seek medical attention if you have chills and/or temperature greater than or equal to 100.5, uncontrolled nausea/vomiting, diarrhea, constipation, dizziness, shortness of breath, chest pain, heart palpitations, weakness or any other new or concerning symptoms, questions or concerns.  You cannot have any live virus vaccines prior to or during treatment or up to 6 months post infusion.  If you have an upcoming surgery, medical procedure or dental procedure during treatment, this should be discussed with your ordering physician and your surgeon/dentist.  If you are having any concerning symptom, if you are unsure if you should get your next infusion or wish to speak to a provider before your next infusion, please call your care coordinator or triage nurse at your clinic to notify them so we can adequately serve you.       Discharge Plan:   Discharge instructions reviewed with: Patient.  Patient and/or family verbalized understanding of discharge  instructions and all questions answered.  AVS to patient via Brown[employer].  Patient will return 10/26/23 for next appointment.   Patient discharged in stable condition accompanied by: self.  Departure Mode: Ambulatory.      Angela Swanson RN

## 2023-10-04 ENCOUNTER — TELEPHONE (OUTPATIENT)
Dept: GASTROENTEROLOGY | Facility: CLINIC | Age: 48
End: 2023-10-04
Payer: COMMERCIAL

## 2023-10-04 NOTE — TELEPHONE ENCOUNTER
CLARENCE and sent [employer] with CC number for patient to call back and reschedule canceled 10/26/23 appointment.    Per follow-up order schedule:  Follow-up appointment with IBD PA, Richa Diamond, Jose Reynolds, or Darya Hdz. RTN IBD, in person or virtual visit

## 2023-10-13 ENCOUNTER — MYC MEDICAL ADVICE (OUTPATIENT)
Dept: GASTROENTEROLOGY | Facility: CLINIC | Age: 48
End: 2023-10-13
Payer: COMMERCIAL

## 2023-10-13 NOTE — TELEPHONE ENCOUNTER
Contacted patient to discuss symptoms.     Patient states having an increased amount of stress at work, which he believes has caused his symptoms.     Reports diarrhea 2-3 stools per day so far, started last evening. Increased abdominal cramping and headaches. Denies any bloody or black stools at this time.    Discussed doing stool studies- patient declined stating he knows it is stress related and would like to give the symptoms a few days to subside but wanted to update the care team.    Writer expressed understanding and requested patient update writer on Monday.     Most recent infusion: 9/22

## 2023-10-23 ENCOUNTER — TELEPHONE (OUTPATIENT)
Dept: GASTROENTEROLOGY | Facility: CLINIC | Age: 48
End: 2023-10-23
Payer: COMMERCIAL

## 2023-10-23 DIAGNOSIS — K50.10 CROHN'S DISEASE OF LARGE INTESTINE WITHOUT COMPLICATION (H): ICD-10-CM

## 2023-10-23 NOTE — TELEPHONE ENCOUNTER
Received call from patient.     Patient reports this past weekend on 10/21, he had to call into work sick. Some increased diarrhea and abdominal pain/cramping was occurring.     Symptoms are already improving significantly, but requested Dr. Sanchez be updated.     Patient denies any severe abdominal pain or fevers.     Most recent infusion completed on 9/22.     Patient reports work has been very stressful and he believes it was stress/anxiety that caused the symptoms.     Patient does not want any interventions at this time, as he believes it is related to work stress, and symptoms are improving but asked that Dr. Sanchez be updated.     Routed to Dr. Sanchez for update.

## 2023-10-25 ENCOUNTER — DOCUMENTATION ONLY (OUTPATIENT)
Dept: GASTROENTEROLOGY | Facility: CLINIC | Age: 48
End: 2023-10-25
Payer: COMMERCIAL

## 2023-10-25 NOTE — LETTER
10/25/2023         RE: Dennis J Goodell  [address]   [place] [address]        To Whom It May Concern,        I am writing today to advocate for you to approve this patient's request for intermittent FMLA. Reggie suffers from Crohn's disease, which is a chronic disease that must be monitored throughout his lifetime. As his physician, my goal is to help him achieve remission and maintain this disease state for as long as possible. Unfortunately, acute on chronic symptoms are expected intermittently, which can make activities of daily living a challenge. When Reggie experiences acute symptoms, he may present with diarrhea, fecal incontinence, urgency, and abdominal pain. During these times, I would recommend the patient take short absences from work to reduce stress on his body and allow for expedited healing. I will prescribe medication and monitor the patient closely to help him return back to baseline as quickly as possible. Please take my statement into consideration and approve his FMLA request.      If you have any questions, please do not hesitate to reach out to my nurse care coordinator, Liliana Johnson at [phone].      Helder Sanchez MD      Inflammatory Bowel Disease Clinic   [facility]   Division of Gastroenterology, Hepatology and Nutrition

## 2023-10-25 NOTE — PROGRESS NOTES
INCOMING FAX:      Date/ Time: 10/24/2023 @ 3268    Facility: [facility]       [address]       [place] [address]      Ph: [phone]    Fax: [phone]        What: Attending physician statement    Routed document to Dr. Sanchez and Oswaldo CISNEROS to review and sign.

## 2023-10-26 DIAGNOSIS — I73.00 RAYNAUD'S PHENOMENON WITHOUT GANGRENE: ICD-10-CM

## 2023-10-26 RX ORDER — DICYCLOMINE HYDROCHLORIDE 10 MG/1
CAPSULE ORAL
Qty: 120 CAPSULE | Refills: 3 | Status: SHIPPED | OUTPATIENT
Start: 2023-10-26 | End: 2024-02-26

## 2023-10-31 RX ORDER — NIFEDIPINE 90 MG/1
90 TABLET, EXTENDED RELEASE ORAL DAILY
Qty: 90 TABLET | Refills: 0 | Status: SHIPPED | OUTPATIENT
Start: 2023-10-31 | End: 2024-08-27

## 2023-10-31 NOTE — PROGRESS NOTES
[place] paperwork completed, written statement and clinic note attached. Faxed back to [facility] at [phone].

## 2023-11-01 ENCOUNTER — MYC MEDICAL ADVICE (OUTPATIENT)
Dept: GASTROENTEROLOGY | Facility: CLINIC | Age: 48
End: 2023-11-01
Payer: COMMERCIAL

## 2023-11-02 NOTE — PROGRESS NOTES
Additional [place] paperwork completed, signed by Dr. Sanchez, faxed to [facility], and then scanned into the chart.

## 2023-11-03 ENCOUNTER — TELEPHONE (OUTPATIENT)
Dept: GASTROENTEROLOGY | Facility: CLINIC | Age: 48
End: 2023-11-03
Payer: COMMERCIAL

## 2023-11-03 NOTE — LETTER
11/7/2023         RE: Dennis J Goodell  [address]  [place] [address]       To Whom It May Concern:            Reggie is currently a patient in my care in the Gastroenterology Clinic at [facility]. Reggie has been on a leave of absence from work since October 13th due to intermittent ongoing symptoms related to his chronic disease. This time away from work has allowed the patient sufficient time for rest and recovery. At this time, Reggie is demonstrating significant clinical improvement and can safely return to work.            For questions, please call my office at [phone].          Helder Sanchez MD      Inflammatory Bowel Disease Clinic   [facility]   Division of Gastroenterology, Hepatology and Nutrition

## 2023-11-03 NOTE — TELEPHONE ENCOUNTER
Received call from patient requesting a work excuse note, as the [facility] paperwork that has now been completed was not yet active.     Patient missed intermittent shifts throughout the following dates- 10/13-11/7 due to intermittent flare-like symptoms.     Work excuse note provided.

## 2023-11-16 NOTE — TELEPHONE ENCOUNTER
Left message to call back (have patient speak with a nurse in unit 1 as I am in [facility] and not always here)     Samaria Camejo CMA on 11/16/2023 at 10:11 AM

## 2023-11-17 ENCOUNTER — TELEPHONE (OUTPATIENT)
Dept: GASTROENTEROLOGY | Facility: CLINIC | Age: 48
End: 2023-11-17

## 2023-11-17 ENCOUNTER — HOSPITAL ENCOUNTER (OUTPATIENT)
Dept: INFUSION THERAPY | Facility: OTHER | Age: 48
Discharge: HOME | End: 2023-11-17
Attending: INTERNAL MEDICINE | Admitting: FAMILY MEDICINE
Payer: COMMERCIAL

## 2023-11-17 VITALS
TEMPERATURE: 97.3 F | WEIGHT: 168.8 LBS | SYSTOLIC BLOOD PRESSURE: 130 MMHG | HEART RATE: 80 BPM | BODY MASS INDEX: 24.22 KG/M2 | DIASTOLIC BLOOD PRESSURE: 80 MMHG | RESPIRATION RATE: 16 BRPM

## 2023-11-17 DIAGNOSIS — K50.10 CROHN'S DISEASE OF LARGE INTESTINE WITHOUT COMPLICATION (H): Primary | ICD-10-CM

## 2023-11-17 LAB
ALBUMIN SERPL BCG-MCNC: 5 G/DL (ref 3.5–5.2)
ALP SERPL-CCNC: 61 U/L (ref 40–150)
ALT SERPL W P-5'-P-CCNC: 12 U/L (ref 0–70)
ANION GAP SERPL CALCULATED.3IONS-SCNC: 11 MMOL/L (ref 7–15)
AST SERPL W P-5'-P-CCNC: 20 U/L (ref 0–45)
BASOPHILS # BLD AUTO: 0.1 10E3/UL (ref 0–0.2)
BASOPHILS NFR BLD AUTO: 1 %
BILIRUB DIRECT SERPL-MCNC: <0.2 MG/DL (ref 0–0.3)
BILIRUB SERPL-MCNC: 0.5 MG/DL
BUN SERPL-MCNC: 8.1 MG/DL (ref 6–20)
CALCIUM SERPL-MCNC: 9.3 MG/DL (ref 8.6–10)
CHLORIDE SERPL-SCNC: 102 MMOL/L (ref 98–107)
CREAT SERPL-MCNC: 0.81 MG/DL (ref 0.67–1.17)
CRP SERPL-MCNC: <3 MG/L
DEPRECATED HCO3 PLAS-SCNC: 26 MMOL/L (ref 22–29)
EGFRCR SERPLBLD CKD-EPI 2021: >90 ML/MIN/1.73M2
EOSINOPHIL # BLD AUTO: 0.2 10E3/UL (ref 0–0.7)
EOSINOPHIL NFR BLD AUTO: 4 %
ERYTHROCYTE [DISTWIDTH] IN BLOOD BY AUTOMATED COUNT: 13.1 % (ref 10–15)
ERYTHROCYTE [SEDIMENTATION RATE] IN BLOOD BY WESTERGREN METHOD: 1 MM/HR (ref 0–15)
GLUCOSE SERPL-MCNC: 112 MG/DL (ref 70–99)
HCT VFR BLD AUTO: 40.4 % (ref 40–53)
HGB BLD-MCNC: 14.2 G/DL (ref 13.3–17.7)
IMM GRANULOCYTES # BLD: 0 10E3/UL
IMM GRANULOCYTES NFR BLD: 0 %
LYMPHOCYTES # BLD AUTO: 2.1 10E3/UL (ref 0.8–5.3)
LYMPHOCYTES NFR BLD AUTO: 40 %
MCH RBC QN AUTO: 29.8 PG (ref 26.5–33)
MCHC RBC AUTO-ENTMCNC: 35.1 G/DL (ref 31.5–36.5)
MCV RBC AUTO: 85 FL (ref 78–100)
MONOCYTES # BLD AUTO: 0.5 10E3/UL (ref 0–1.3)
MONOCYTES NFR BLD AUTO: 10 %
NEUTROPHILS # BLD AUTO: 2.3 10E3/UL (ref 1.6–8.3)
NEUTROPHILS NFR BLD AUTO: 45 %
NRBC # BLD AUTO: 0 10E3/UL
NRBC BLD AUTO-RTO: 0 /100
PLATELET # BLD AUTO: 250 10E3/UL (ref 150–450)
POTASSIUM SERPL-SCNC: 4 MMOL/L (ref 3.4–5.3)
PROT SERPL-MCNC: 7.4 G/DL (ref 6.4–8.3)
RBC # BLD AUTO: 4.77 10E6/UL (ref 4.4–5.9)
SODIUM SERPL-SCNC: 139 MMOL/L (ref 135–145)
WBC # BLD AUTO: 5.2 10E3/UL (ref 4–11)

## 2023-11-17 PROCEDURE — 85025 COMPLETE CBC W/AUTO DIFF WBC: CPT | Performed by: INTERNAL MEDICINE

## 2023-11-17 PROCEDURE — 258N000003 HC RX IP 258 OP 636: Performed by: INTERNAL MEDICINE

## 2023-11-17 PROCEDURE — 36415 COLL VENOUS BLD VENIPUNCTURE: CPT | Performed by: INTERNAL MEDICINE

## 2023-11-17 PROCEDURE — 86140 C-REACTIVE PROTEIN: CPT | Performed by: INTERNAL MEDICINE

## 2023-11-17 PROCEDURE — 80053 COMPREHEN METABOLIC PANEL: CPT | Performed by: INTERNAL MEDICINE

## 2023-11-17 PROCEDURE — 82248 BILIRUBIN DIRECT: CPT | Performed by: INTERNAL MEDICINE

## 2023-11-17 PROCEDURE — 96413 CHEMO IV INFUSION 1 HR: CPT

## 2023-11-17 PROCEDURE — 258N000003 HC RX IP 258 OP 636: Performed by: FAMILY MEDICINE

## 2023-11-17 PROCEDURE — 250N000011 HC RX IP 250 OP 636: Mod: JZ | Performed by: FAMILY MEDICINE

## 2023-11-17 PROCEDURE — 85652 RBC SED RATE AUTOMATED: CPT | Performed by: INTERNAL MEDICINE

## 2023-11-17 RX ORDER — MEPERIDINE HYDROCHLORIDE 50 MG/ML
25 INJECTION INTRAMUSCULAR; INTRAVENOUS; SUBCUTANEOUS EVERY 30 MIN PRN
Status: CANCELLED | OUTPATIENT
Start: 2024-01-12

## 2023-11-17 RX ORDER — METHYLPREDNISOLONE SODIUM SUCCINATE 125 MG/2ML
125 INJECTION, POWDER, LYOPHILIZED, FOR SOLUTION INTRAMUSCULAR; INTRAVENOUS
Status: CANCELLED
Start: 2024-01-12

## 2023-11-17 RX ORDER — ALBUTEROL SULFATE 0.83 MG/ML
2.5 SOLUTION RESPIRATORY (INHALATION)
Status: CANCELLED | OUTPATIENT
Start: 2024-01-12

## 2023-11-17 RX ORDER — DIPHENHYDRAMINE HYDROCHLORIDE 50 MG/ML
50 INJECTION INTRAMUSCULAR; INTRAVENOUS
Status: CANCELLED
Start: 2024-01-12

## 2023-11-17 RX ORDER — DIPHENHYDRAMINE HCL 25 MG
25 CAPSULE ORAL ONCE
Status: CANCELLED
Start: 2024-01-12 | End: 2024-01-12

## 2023-11-17 RX ORDER — EPINEPHRINE 1 MG/ML
0.3 INJECTION, SOLUTION, CONCENTRATE INTRAVENOUS EVERY 5 MIN PRN
Status: CANCELLED | OUTPATIENT
Start: 2024-01-12

## 2023-11-17 RX ORDER — ALBUTEROL SULFATE 90 UG/1
1-2 AEROSOL, METERED RESPIRATORY (INHALATION)
Status: CANCELLED
Start: 2024-01-12

## 2023-11-17 RX ORDER — ACETAMINOPHEN 325 MG/1
650 TABLET ORAL ONCE
Status: CANCELLED
Start: 2024-01-12 | End: 2024-01-12

## 2023-11-17 RX ADMIN — INFLIXIMAB 400 MG: 100 INJECTION, POWDER, LYOPHILIZED, FOR SOLUTION INTRAVENOUS at 13:48

## 2023-11-17 RX ADMIN — SODIUM CHLORIDE 250 ML: 9 INJECTION, SOLUTION INTRAVENOUS at 13:48

## 2023-11-17 NOTE — NURSING NOTE
~~~ NOTE: If the patient answers yes to any of the questions below, hold the infusion and contact ordering provider or on-call provider.    Do you currently have any signs of illness or infection or are you on any antibiotics? No  Have you recently had an elevated temperature, fever, chills, productive cough, coughing for 3 weeks or longer or hemoptysis, abnormal vital signs, night sweats, chest pain or have you noticed a decrease in your appetite, unexplained weight loss or fatigue? Yes, intermittent over the past few weeks  Have you had any new, sudden, or worsening abdominal pain? Yes, intermittent diarrhea  and abdominal pain, taking Bentyl to manage.   Do you have any open wounds or new incisions? (exclude for patients with hidradenitis suppurativa) No  Have you recently been diagnosed with any new nervous system diseases (ie. Multiple sclerosis, Guillain Blackburn, seizures, neurological changes) or cancer diagnosis? Are you on any form of radiation or chemotherapy? No  Have there been any other new onset medical symptoms? No  Are you pregnant or breast feeding or do you have plans of pregnancy in the future? No; N/A  Do you have any upcoming hospitalizations or surgeries? Does not include esophagogastroduodenoscopy, colonoscopy, endoscopic retrograde cholangiopancreatography (ERCP), endoscopic ultrasound (EUS), dental procedures (including cleanings, fillings, implants, extractions)  or joint aspiration/steroid injections No  Have you or anyone in your household received a live vaccination in the past 4 weeks? Please note: No live vaccines while on biologic/chemotherapy until 6 months after the last treatment. Patient can receive the flu vaccine (shot only).  It is optimal for the patient to get it mid cycle, but it can be given at any time as long as it is not on the day of the infusion. No  If applicable to prescribed medication, confirm negative PPD or quantiferon gold MTB. If positive, verify has negative  chest x-ray or the patient is at least 4 weeks post initiation of INH/B6 therapy and have clearance from provider before infusion N/A  If applicable to prescribed medication, confirm negative hepatitis B surface antigen or hepatitis C. If positive, clearance from provider before infusion.N/A  Rheumatology patients receiving tocilizumab (Actemra): If labs were drawn within the past week, hold dosing until cleared to infuse If AST/ALT > 2 X upper limit normal; ANC < 1.0. NO; N/A  Patients receiving belimumab (Benlysta): Have you been having any signs of worsening depression or suicidal ideations? No; N/A

## 2023-11-17 NOTE — NURSING NOTE
Infusion Nursing Note:  Dennis J Goodell presents today for Remicade Rapid.    Patient seen by provider today: No   present during visit today: Not Applicable.    Note: Pt answered yes to 2 biologic infusion questions. Notes that he has had intermittent night sweats for about a month now, and has also had intermittent diarrhea and abdominal pain since September. Spoke with Dr. Helder Sanchez. Ok to proceed with treatment today but would like all labs in plan collected today (as labs are not due per plan until next visit,) as well as a BMP and a fecal calprotectin. Additionally, if patient notes that stools have been watery, he would like a C-Diff and enteric panel ordered. Notified patient of the above orders. He states that his diarrhea has not been watery. Also notes that he does not feel he can provide fecal calprotectin sample at visit today. Patient made aware that order has been placed, and provided collection instructions in the case that he wishes to collect sample at home and bring to clinic. He verbalized understanding.       Intravenous Access:  Labs drawn without difficulty.  Peripheral IV placed.    Treatment Conditions:  Biological Infusion Checklist: Completed    Post Infusion Assessment:  Patient tolerated infusion without incident.  Blood return noted pre and post infusion.  Site patent and intact, free from redness, edema or discomfort.  No evidence of extravasations.  Access discontinued per protocol.    Biologic Infusion Post Education: Call the triage nurse at your clinic or seek medical attention if you have chills and/or temperature greater than or equal to 100.5, uncontrolled nausea/vomiting, diarrhea, constipation, dizziness, shortness of breath, chest pain, heart palpitations, weakness or any other new or concerning symptoms, questions or concerns.  You cannot have any live virus vaccines prior to or during treatment or up to 6 months post infusion.  If you have an upcoming  surgery, medical procedure or dental procedure during treatment, this should be discussed with your ordering physician and your surgeon/dentist.  If you are having any concerning symptom, if you are unsure if you should get your next infusion or wish to speak to a provider before your next infusion, please call your care coordinator or triage nurse at your clinic to notify them so we can adequately serve you.       Discharge Plan:   Discharge instructions reviewed with: Patient.  Patient and/or family verbalized understanding of discharge instructions and all questions answered.  AVS to patient via [employer].  Patient will return 1/8/24 for next appointment.   Patient discharged in stable condition accompanied by: self.  Departure Mode: Ambulatory.      Mary Kate Ospina RN

## 2023-11-17 NOTE — TELEPHONE ENCOUNTER
Received a voicemail from the patient requesting a callback. Attempted to return patient's call, however no answer. Left voicemail.

## 2023-11-17 NOTE — TELEPHONE ENCOUNTER
"Received callback from patient reporting FMLA paperwork is \"too generalized.\"    Writer will review documents.   "

## 2023-11-28 ENCOUNTER — TELEPHONE (OUTPATIENT)
Dept: GASTROENTEROLOGY | Facility: CLINIC | Age: 48
End: 2023-11-28
Payer: COMMERCIAL

## 2023-11-28 DIAGNOSIS — K50.10 CROHN'S DISEASE OF LARGE INTESTINE WITHOUT COMPLICATION (H): Primary | ICD-10-CM

## 2023-11-28 RX ORDER — ALBUTEROL SULFATE 0.83 MG/ML
2.5 SOLUTION RESPIRATORY (INHALATION)
Status: CANCELLED | OUTPATIENT
Start: 2023-11-28

## 2023-11-28 RX ORDER — DIPHENHYDRAMINE HYDROCHLORIDE 50 MG/ML
50 INJECTION INTRAMUSCULAR; INTRAVENOUS
Status: CANCELLED
Start: 2023-11-28

## 2023-11-28 RX ORDER — METHYLPREDNISOLONE SODIUM SUCCINATE 125 MG/2ML
125 INJECTION, POWDER, LYOPHILIZED, FOR SOLUTION INTRAMUSCULAR; INTRAVENOUS
Status: CANCELLED
Start: 2023-11-28

## 2023-11-28 RX ORDER — ALBUTEROL SULFATE 90 UG/1
1-2 AEROSOL, METERED RESPIRATORY (INHALATION)
Status: CANCELLED
Start: 2023-11-28

## 2023-11-28 RX ORDER — MEPERIDINE HYDROCHLORIDE 25 MG/ML
25 INJECTION INTRAMUSCULAR; INTRAVENOUS; SUBCUTANEOUS EVERY 30 MIN PRN
Status: CANCELLED | OUTPATIENT
Start: 2023-11-28

## 2023-11-28 RX ORDER — EPINEPHRINE 1 MG/ML
0.3 INJECTION, SOLUTION, CONCENTRATE INTRAVENOUS EVERY 5 MIN PRN
Status: CANCELLED | OUTPATIENT
Start: 2023-11-28

## 2023-11-28 RX ORDER — DIPHENHYDRAMINE HCL 25 MG
25 CAPSULE ORAL ONCE
Status: CANCELLED
Start: 2023-11-28 | End: 2023-11-28

## 2023-11-28 RX ORDER — ACETAMINOPHEN 325 MG/1
650 TABLET ORAL ONCE
Status: CANCELLED
Start: 2023-11-28 | End: 2023-11-28

## 2023-11-28 NOTE — TELEPHONE ENCOUNTER
"Received call from patient regarding [place] paperwork - patient reports the [employer] company will not reach out to writer as requested. [employer] request was denied due to being \"too vague.\"    Requested [employer] contact - Christy : [phone] ext [phone].    Attempted to contact Christy, however no answer. Left detailed voicemail requesting callback.      Patient also requested updated therapy plan be sent over to [facility], as his current orders are due to  prior to his January infusion. Therapy plan and standing lab orders updated. Patient is due for TB Quantiferon Gold lab draw in January - order included in lab orders and therapy plan.    Inbasket message sent to Ms. Morales requesting therapy plan/lab orders be faxed to [facility].   "

## 2023-11-29 ENCOUNTER — TELEPHONE (OUTPATIENT)
Dept: GASTROENTEROLOGY | Facility: CLINIC | Age: 48
End: 2023-11-29
Payer: COMMERCIAL

## 2023-11-29 NOTE — TELEPHONE ENCOUNTER
Michelle standing and future lab orders    Provider: Helder Dasilva MD    Where: [facility] lab    Lab(s):   -ESR  -CRP  -CBC  -CMP  -TB    Confirmed order received: yes and they stated labs are planned with his next infusion on 1/8/2024.    Yeny Morales LPN

## 2023-11-29 NOTE — TELEPHONE ENCOUNTER
----- Message from Liliana Johnson RN sent at 11/28/2023  3:29 PM CST -----  Regarding: RE: Therapy plan/lab orders  Both sorry! I added the TB after I typed this message valeri    Thanks!!    ----- Message -----  From: Yeny Morales LPN  Sent: 11/28/2023   3:26 PM CST  To: Liliana Johnson RN  Subject: RE: Therapy plan/lab orders                      Only the standing as you have a future TB ordered as well.    Yeny Morales LPN    ----- Message -----  From: Liliana Johnson RN  Sent: 11/28/2023   3:22 PM CST  To: Yeny Morales LPN  Subject: Therapy plan/lab orders                          Brenda Saini,    Can we please fax Reggie' updated infusion therapy plan and standing lab orders to [facility]?    I'm not sure if you have it noted anywhere, but here is the number I had on my sticky note in his chart: [place] Fax Number: [phone]    Thanks!!  Oswaldo

## 2023-11-30 NOTE — TELEPHONE ENCOUNTER
Received call from patient requesting callback.    Contacted patient to discuss [facility] paperwork.     Patient states the intermittent [facility] paperwork is approved. (Intermittent leave ID: [id number])    However, continuous leave from 10/14/23-11/15/23 denied. (Intermittent leave ID: [id number])      Refax to Christy Leal -- [phone]

## 2023-12-05 DIAGNOSIS — R10.11 RUQ ABDOMINAL PAIN: ICD-10-CM

## 2023-12-05 NOTE — TELEPHONE ENCOUNTER
[facility] continuous leave paperwork updated and faxed to Christy Leal at [phone].

## 2023-12-08 RX ORDER — AMITRIPTYLINE HYDROCHLORIDE 10 MG/1
TABLET ORAL
Qty: 30 TABLET | Refills: 3 | Status: SHIPPED | OUTPATIENT
Start: 2023-12-08 | End: 2024-05-22

## 2023-12-09 NOTE — TELEPHONE ENCOUNTER
7/18/2023  [facility] [place]    Helder Sanchez MD  Gastroenterology    Associated Diagnoses        RUQ abdominal pain [R10.11]

## 2024-01-09 ENCOUNTER — MYC REFILL (OUTPATIENT)
Dept: GASTROENTEROLOGY | Facility: CLINIC | Age: 49
End: 2024-01-09
Payer: COMMERCIAL

## 2024-01-09 DIAGNOSIS — K52.9 CHRONIC DIARRHEA: ICD-10-CM

## 2024-01-09 DIAGNOSIS — K50.10 CROHN'S DISEASE OF LARGE INTESTINE WITHOUT COMPLICATION (H): ICD-10-CM

## 2024-01-12 ENCOUNTER — HOSPITAL ENCOUNTER (OUTPATIENT)
Dept: INFUSION THERAPY | Facility: OTHER | Age: 49
Discharge: HOME | End: 2024-01-12
Payer: COMMERCIAL

## 2024-01-12 ENCOUNTER — LAB (OUTPATIENT)
Dept: LAB | Facility: OTHER | Age: 49
End: 2024-01-12
Payer: COMMERCIAL

## 2024-01-12 VITALS
DIASTOLIC BLOOD PRESSURE: 70 MMHG | WEIGHT: 169.6 LBS | HEART RATE: 81 BPM | TEMPERATURE: 97.8 F | SYSTOLIC BLOOD PRESSURE: 104 MMHG | RESPIRATION RATE: 16 BRPM | BODY MASS INDEX: 24.34 KG/M2

## 2024-01-12 DIAGNOSIS — K50.10 CROHN'S DISEASE OF LARGE INTESTINE WITHOUT COMPLICATION (H): ICD-10-CM

## 2024-01-12 DIAGNOSIS — K50.10 CROHN'S DISEASE OF LARGE INTESTINE WITHOUT COMPLICATION (H): Primary | ICD-10-CM

## 2024-01-12 LAB
ALBUMIN SERPL BCG-MCNC: 4.6 G/DL (ref 3.5–5.2)
ALP SERPL-CCNC: 60 U/L (ref 40–150)
ALT SERPL W P-5'-P-CCNC: 13 U/L (ref 0–70)
AST SERPL W P-5'-P-CCNC: 22 U/L (ref 0–45)
BASOPHILS # BLD AUTO: 0.1 10E3/UL (ref 0–0.2)
BASOPHILS NFR BLD AUTO: 1 %
BILIRUB DIRECT SERPL-MCNC: <0.2 MG/DL (ref 0–0.3)
BILIRUB SERPL-MCNC: 0.4 MG/DL
CRP SERPL-MCNC: <3 MG/L
EOSINOPHIL # BLD AUTO: 0.5 10E3/UL (ref 0–0.7)
EOSINOPHIL NFR BLD AUTO: 6 %
ERYTHROCYTE [DISTWIDTH] IN BLOOD BY AUTOMATED COUNT: 12.9 % (ref 10–15)
ERYTHROCYTE [SEDIMENTATION RATE] IN BLOOD BY WESTERGREN METHOD: 1 MM/HR (ref 0–15)
HCT VFR BLD AUTO: 41.5 % (ref 40–53)
HGB BLD-MCNC: 14.7 G/DL (ref 13.3–17.7)
IMM GRANULOCYTES # BLD: 0 10E3/UL
IMM GRANULOCYTES NFR BLD: 0 %
LYMPHOCYTES # BLD AUTO: 3.5 10E3/UL (ref 0.8–5.3)
LYMPHOCYTES NFR BLD AUTO: 51 %
MCH RBC QN AUTO: 30.1 PG (ref 26.5–33)
MCHC RBC AUTO-ENTMCNC: 35.4 G/DL (ref 31.5–36.5)
MCV RBC AUTO: 85 FL (ref 78–100)
MONOCYTES # BLD AUTO: 0.8 10E3/UL (ref 0–1.3)
MONOCYTES NFR BLD AUTO: 12 %
NEUTROPHILS # BLD AUTO: 2.1 10E3/UL (ref 1.6–8.3)
NEUTROPHILS NFR BLD AUTO: 30 %
NRBC # BLD AUTO: 0 10E3/UL
NRBC BLD AUTO-RTO: 0 /100
PLATELET # BLD AUTO: 272 10E3/UL (ref 150–450)
PROT SERPL-MCNC: 7.1 G/DL (ref 6.4–8.3)
RBC # BLD AUTO: 4.88 10E6/UL (ref 4.4–5.9)
WBC # BLD AUTO: 7 10E3/UL (ref 4–11)

## 2024-01-12 PROCEDURE — 85025 COMPLETE CBC W/AUTO DIFF WBC: CPT | Mod: ZL

## 2024-01-12 PROCEDURE — 85652 RBC SED RATE AUTOMATED: CPT | Mod: ZL

## 2024-01-12 PROCEDURE — 96413 CHEMO IV INFUSION 1 HR: CPT

## 2024-01-12 PROCEDURE — 86140 C-REACTIVE PROTEIN: CPT | Mod: ZL

## 2024-01-12 PROCEDURE — 250N000011 HC RX IP 250 OP 636: Mod: JZ | Performed by: INTERNAL MEDICINE

## 2024-01-12 PROCEDURE — 36415 COLL VENOUS BLD VENIPUNCTURE: CPT | Mod: ZL

## 2024-01-12 PROCEDURE — 82040 ASSAY OF SERUM ALBUMIN: CPT | Mod: ZL

## 2024-01-12 PROCEDURE — 86481 TB AG RESPONSE T-CELL SUSP: CPT | Performed by: INTERNAL MEDICINE

## 2024-01-12 PROCEDURE — 258N000003 HC RX IP 258 OP 636: Performed by: INTERNAL MEDICINE

## 2024-01-12 RX ORDER — DIPHENHYDRAMINE HYDROCHLORIDE 50 MG/ML
50 INJECTION INTRAMUSCULAR; INTRAVENOUS
Status: CANCELLED
Start: 2024-02-22

## 2024-01-12 RX ORDER — DIPHENOXYLATE HCL/ATROPINE 2.5-.025MG
1 TABLET ORAL 4 TIMES DAILY PRN
Qty: 90 TABLET | Refills: 3 | OUTPATIENT
Start: 2024-01-12

## 2024-01-12 RX ORDER — MEPERIDINE HYDROCHLORIDE 50 MG/ML
25 INJECTION INTRAMUSCULAR; INTRAVENOUS; SUBCUTANEOUS EVERY 30 MIN PRN
Status: CANCELLED | OUTPATIENT
Start: 2024-02-22

## 2024-01-12 RX ORDER — DIPHENHYDRAMINE HCL 25 MG
25 CAPSULE ORAL ONCE
Status: CANCELLED
Start: 2024-02-22 | End: 2024-02-22

## 2024-01-12 RX ORDER — ALBUTEROL SULFATE 90 UG/1
1-2 AEROSOL, METERED RESPIRATORY (INHALATION)
Status: CANCELLED
Start: 2024-02-22

## 2024-01-12 RX ORDER — ALBUTEROL SULFATE 0.83 MG/ML
2.5 SOLUTION RESPIRATORY (INHALATION)
Status: CANCELLED | OUTPATIENT
Start: 2024-02-22

## 2024-01-12 RX ORDER — EPINEPHRINE 1 MG/ML
0.3 INJECTION, SOLUTION, CONCENTRATE INTRAVENOUS EVERY 5 MIN PRN
Status: CANCELLED | OUTPATIENT
Start: 2024-02-22

## 2024-01-12 RX ORDER — METHYLPREDNISOLONE SODIUM SUCCINATE 125 MG/2ML
125 INJECTION, POWDER, LYOPHILIZED, FOR SOLUTION INTRAMUSCULAR; INTRAVENOUS
Status: CANCELLED
Start: 2024-02-22

## 2024-01-12 RX ORDER — ACETAMINOPHEN 325 MG/1
650 TABLET ORAL ONCE
Status: CANCELLED
Start: 2024-02-22 | End: 2024-02-22

## 2024-01-12 RX ORDER — DIPHENOXYLATE HCL/ATROPINE 2.5-.025MG
1 TABLET ORAL 4 TIMES DAILY PRN
Qty: 90 TABLET | Refills: 3 | Status: SHIPPED | OUTPATIENT
Start: 2024-01-12 | End: 2024-06-27

## 2024-01-12 RX ADMIN — SODIUM CHLORIDE 250 ML: 9 INJECTION, SOLUTION INTRAVENOUS at 13:25

## 2024-01-12 RX ADMIN — INFLIXIMAB 400 MG: 100 INJECTION, POWDER, LYOPHILIZED, FOR SOLUTION INTRAVENOUS at 13:42

## 2024-01-12 NOTE — NURSING NOTE
~~~ NOTE: If the patient answers yes to any of the questions below, hold the infusion and contact ordering provider or on-call provider.    Do you currently have any signs of illness or infection or are you on any antibiotics? No  Have you recently had an elevated temperature, fever, chills, productive cough, coughing for 3 weeks or longer or hemoptysis, abnormal vital signs, night sweats, chest pain or have you noticed a decrease in your appetite, unexplained weight loss or fatigue? No  Have you had any new, sudden, or worsening abdominal pain? No  Do you have any open wounds or new incisions? (exclude for patients with hidradenitis suppurativa) No  Have you recently been diagnosed with any new nervous system diseases (ie. Multiple sclerosis, Guillain [place], seizures, neurological changes) or cancer diagnosis? Are you on any form of radiation or chemotherapy? No  Have there been any other new onset medical symptoms? No  Are you pregnant or breast feeding or do you have plans of pregnancy in the future? No; N/A  Do you have any upcoming hospitalizations or surgeries? Does not include esophagogastroduodenoscopy, colonoscopy, endoscopic retrograde cholangiopancreatography (ERCP), endoscopic ultrasound (EUS), dental procedures (including cleanings, fillings, implants, extractions)  or joint aspiration/steroid injections No  Have you or anyone in your household received a live vaccination in the past 4 weeks? Please note: No live vaccines while on biologic/chemotherapy until 6 months after the last treatment. Patient can receive the flu vaccine (shot only).  It is optimal for the patient to get it mid cycle, but it can be given at any time as long as it is not on the day of the infusion. No  If applicable to prescribed medication, confirm negative PPD or quantiferon gold MTB. If positive, verify has negative chest x-ray or the patient is at least 4 weeks post initiation of INH/B6 therapy and have clearance from provider  before infusion (Y/N:[id number])  If applicable to prescribed medication, confirm negative hepatitis B surface antigen or hepatitis C. If positive, clearance from provider before infusion. (Y/N: [id number])  Rheumatology patients receiving tocilizumab (Actemra): If labs were drawn within the past week, hold dosing until cleared to infuse If AST/ALT > 2 X upper limit normal; ANC < 1.0. NO; N/A  Patients receiving belimumab (Benlysta): Have you been having any signs of worsening depression or suicidal ideations? No; N/A

## 2024-01-12 NOTE — TELEPHONE ENCOUNTER
Received call from patient requesting an update on the status of his lomotil refill.     Writer unable to sign prescription - will forward to Dr. Sanchez to sign off.

## 2024-01-13 LAB
QUANTIFERON MITOGEN: 10 IU/ML
QUANTIFERON NIL TUBE: 0.03 IU/ML
QUANTIFERON TB1 TUBE: 0.04 IU/ML
QUANTIFERON TB2 TUBE: 0.05

## 2024-01-15 LAB
GAMMA INTERFERON BACKGROUND BLD IA-ACNC: 0.03 IU/ML
M TB IFN-G BLD-IMP: NEGATIVE
M TB IFN-G CD4+ BCKGRND COR BLD-ACNC: 9.97 IU/ML
MITOGEN IGNF BCKGRD COR BLD-ACNC: 0.01 IU/ML
MITOGEN IGNF BCKGRD COR BLD-ACNC: 0.02 IU/ML

## 2024-02-20 DIAGNOSIS — K50.10 CROHN'S DISEASE OF LARGE INTESTINE WITHOUT COMPLICATION (H): ICD-10-CM

## 2024-02-26 RX ORDER — DICYCLOMINE HYDROCHLORIDE 10 MG/1
CAPSULE ORAL
Qty: 120 CAPSULE | Refills: 4 | Status: SHIPPED | OUTPATIENT
Start: 2024-02-26 | End: 2024-08-22

## 2024-02-26 NOTE — TELEPHONE ENCOUNTER
dicyclomine (BENTYL) 10 MG capsule     120 capsule 3 10/26/2023       Last Office Visit : 7-18-*2023  Future Office visit:   6-    Oral Anticholinergic Agents Passed

## 2024-03-08 NOTE — ADDENDUM NOTE
Encounter addended by: Zeferino Mosquera [facility] on: 3/8/2024 9:04 AM   Actions taken: Order list changed, Therapy plan modified

## 2024-03-12 ENCOUNTER — TELEPHONE (OUTPATIENT)
Dept: GASTROENTEROLOGY | Facility: CLINIC | Age: 49
End: 2024-03-12
Payer: COMMERCIAL

## 2024-03-12 NOTE — TELEPHONE ENCOUNTER
"Received call from patient reporting he is currently having symptoms -     Reports increased frequency and urgency of diarrhea and intermittent stomach pain.    Denies bloody/black stools and fevers.     Tested positive for Covid - 3 weeks ago. Is still experiencing headaches and fatigue.     Believes the flare is due to his increased anxiety and depressions, and reports experiencing short \"flares\" frequently.     States his \"flare\" is triggered within a few minutes of a stressful event, and typically lasts a couple hours to a couple days.     Patient states he typically stays on time with his Infliximab infusions, however missed his last one and is currently 2 weeks late.     States his next infusion is scheduled for 3/19. Encouraged patient to attempt to schedule sooner, however he declined stating with lack of availability and his work schedule he cannot.    Patient is requesting updated [facility] paperwork due to recent increased absences.     Appointment with MTM next Monday.   "

## 2024-03-13 ENCOUNTER — HOSPITAL ENCOUNTER (EMERGENCY)
Facility: OTHER | Age: 49
Discharge: HOME | End: 2024-03-13
Attending: EMERGENCY MEDICINE | Admitting: EMERGENCY MEDICINE
Payer: COMMERCIAL

## 2024-03-13 ENCOUNTER — APPOINTMENT (OUTPATIENT)
Dept: CT IMAGING | Facility: OTHER | Age: 49
End: 2024-03-13
Attending: EMERGENCY MEDICINE
Payer: COMMERCIAL

## 2024-03-13 VITALS
HEIGHT: 71 IN | DIASTOLIC BLOOD PRESSURE: 78 MMHG | BODY MASS INDEX: 22.4 KG/M2 | RESPIRATION RATE: 18 BRPM | TEMPERATURE: 97.2 F | HEART RATE: 85 BPM | SYSTOLIC BLOOD PRESSURE: 120 MMHG | OXYGEN SATURATION: 98 % | WEIGHT: 160 LBS

## 2024-03-13 DIAGNOSIS — K92.1 BLOOD IN STOOL: ICD-10-CM

## 2024-03-13 DIAGNOSIS — R10.11 RIGHT UPPER QUADRANT ABDOMINAL PAIN: ICD-10-CM

## 2024-03-13 DIAGNOSIS — K50.10 CROHN'S DISEASE OF LARGE INTESTINE WITHOUT COMPLICATION (H): ICD-10-CM

## 2024-03-13 LAB
ALBUMIN SERPL BCG-MCNC: 5.3 G/DL (ref 3.5–5.2)
ALP SERPL-CCNC: 75 U/L (ref 40–150)
ALT SERPL W P-5'-P-CCNC: 12 U/L (ref 0–70)
ANION GAP SERPL CALCULATED.3IONS-SCNC: 12 MMOL/L (ref 7–15)
AST SERPL W P-5'-P-CCNC: 18 U/L (ref 0–45)
BASOPHILS # BLD AUTO: 0.1 10E3/UL (ref 0–0.2)
BASOPHILS NFR BLD AUTO: 1 %
BILIRUB SERPL-MCNC: 0.3 MG/DL
BUN SERPL-MCNC: 14.6 MG/DL (ref 6–20)
CALCIUM SERPL-MCNC: 9.9 MG/DL (ref 8.6–10)
CHLORIDE SERPL-SCNC: 99 MMOL/L (ref 98–107)
CREAT SERPL-MCNC: 1 MG/DL (ref 0.67–1.17)
CRP SERPL-MCNC: <3 MG/L
DEPRECATED HCO3 PLAS-SCNC: 29 MMOL/L (ref 22–29)
EGFRCR SERPLBLD CKD-EPI 2021: >90 ML/MIN/1.73M2
EOSINOPHIL # BLD AUTO: 0.2 10E3/UL (ref 0–0.7)
EOSINOPHIL NFR BLD AUTO: 3 %
ERYTHROCYTE [DISTWIDTH] IN BLOOD BY AUTOMATED COUNT: 12.7 % (ref 10–15)
GLUCOSE SERPL-MCNC: 95 MG/DL (ref 70–99)
HCT VFR BLD AUTO: 46.4 % (ref 40–53)
HGB BLD-MCNC: 16.5 G/DL (ref 13.3–17.7)
HOLD SPECIMEN: NORMAL
IMM GRANULOCYTES # BLD: 0.1 10E3/UL
IMM GRANULOCYTES NFR BLD: 1 %
LYMPHOCYTES # BLD AUTO: 3.3 10E3/UL (ref 0–5.3)
LYMPHOCYTES NFR BLD AUTO: 40 %
MCH RBC QN AUTO: 29.7 PG (ref 26.5–33)
MCHC RBC AUTO-ENTMCNC: 35.6 G/DL (ref 31.5–36.5)
MCV RBC AUTO: 84 FL (ref 78–100)
MONOCYTES # BLD AUTO: 0.9 10E3/UL (ref 0–1.3)
MONOCYTES NFR BLD AUTO: 11 %
NEUTROPHILS # BLD AUTO: 3.7 10E3/UL (ref 1.6–8.3)
NEUTROPHILS NFR BLD AUTO: 45 %
NRBC # BLD AUTO: 0 10E3/UL
NRBC BLD AUTO-RTO: 0 /100
PLATELET # BLD AUTO: 296 10E3/UL (ref 150–450)
POTASSIUM SERPL-SCNC: 4.4 MMOL/L (ref 3.4–5.3)
PROT SERPL-MCNC: 8.4 G/DL (ref 6.4–8.3)
RBC # BLD AUTO: 5.56 10E6/UL (ref 4.4–5.9)
SODIUM SERPL-SCNC: 140 MMOL/L (ref 135–145)
WBC # BLD AUTO: 8.2 10E3/UL (ref 4–11)

## 2024-03-13 PROCEDURE — 74177 CT ABD & PELVIS W/CONTRAST: CPT

## 2024-03-13 PROCEDURE — 250N000011 HC RX IP 250 OP 636: Performed by: STUDENT IN AN ORGANIZED HEALTH CARE EDUCATION/TRAINING PROGRAM

## 2024-03-13 PROCEDURE — 96376 TX/PRO/DX INJ SAME DRUG ADON: CPT | Performed by: EMERGENCY MEDICINE

## 2024-03-13 PROCEDURE — 96374 THER/PROPH/DIAG INJ IV PUSH: CPT | Performed by: EMERGENCY MEDICINE

## 2024-03-13 PROCEDURE — 258N000003 HC RX IP 258 OP 636: Performed by: EMERGENCY MEDICINE

## 2024-03-13 PROCEDURE — 250N000011 HC RX IP 250 OP 636: Performed by: EMERGENCY MEDICINE

## 2024-03-13 PROCEDURE — 86140 C-REACTIVE PROTEIN: CPT | Performed by: EMERGENCY MEDICINE

## 2024-03-13 PROCEDURE — 99285 EMERGENCY DEPT VISIT HI MDM: CPT | Mod: 25 | Performed by: EMERGENCY MEDICINE

## 2024-03-13 PROCEDURE — 99284 EMERGENCY DEPT VISIT MOD MDM: CPT | Performed by: EMERGENCY MEDICINE

## 2024-03-13 PROCEDURE — 80053 COMPREHEN METABOLIC PANEL: CPT | Performed by: EMERGENCY MEDICINE

## 2024-03-13 PROCEDURE — 85025 COMPLETE CBC W/AUTO DIFF WBC: CPT | Performed by: EMERGENCY MEDICINE

## 2024-03-13 PROCEDURE — 36415 COLL VENOUS BLD VENIPUNCTURE: CPT | Performed by: EMERGENCY MEDICINE

## 2024-03-13 RX ORDER — OXYCODONE HYDROCHLORIDE 5 MG/1
5 TABLET ORAL EVERY 6 HOURS PRN
Qty: 12 TABLET | Refills: 0 | Status: SHIPPED | OUTPATIENT
Start: 2024-03-13 | End: 2024-03-18

## 2024-03-13 RX ORDER — SODIUM CHLORIDE 9 MG/ML
INJECTION, SOLUTION INTRAVENOUS CONTINUOUS
Status: DISCONTINUED | OUTPATIENT
Start: 2024-03-13 | End: 2024-03-13 | Stop reason: HOSPADM

## 2024-03-13 RX ORDER — HYDROMORPHONE HYDROCHLORIDE 1 MG/ML
0.5 INJECTION, SOLUTION INTRAMUSCULAR; INTRAVENOUS; SUBCUTANEOUS ONCE
Status: COMPLETED | OUTPATIENT
Start: 2024-03-13 | End: 2024-03-13

## 2024-03-13 RX ORDER — IOPAMIDOL 755 MG/ML
93 INJECTION, SOLUTION INTRAVASCULAR ONCE
Status: COMPLETED | OUTPATIENT
Start: 2024-03-13 | End: 2024-03-13

## 2024-03-13 RX ADMIN — HYDROMORPHONE HYDROCHLORIDE 0.5 MG: 1 INJECTION, SOLUTION INTRAMUSCULAR; INTRAVENOUS; SUBCUTANEOUS at 08:38

## 2024-03-13 RX ADMIN — HYDROMORPHONE HYDROCHLORIDE 0.5 MG: 1 INJECTION, SOLUTION INTRAMUSCULAR; INTRAVENOUS; SUBCUTANEOUS at 06:41

## 2024-03-13 RX ADMIN — HYDROMORPHONE HYDROCHLORIDE 0.5 MG: 1 INJECTION, SOLUTION INTRAMUSCULAR; INTRAVENOUS; SUBCUTANEOUS at 07:20

## 2024-03-13 RX ADMIN — IOPAMIDOL 93 ML: 755 INJECTION, SOLUTION INTRAVENOUS at 08:35

## 2024-03-13 RX ADMIN — SODIUM CHLORIDE: 9 INJECTION, SOLUTION INTRAVENOUS at 06:41

## 2024-03-13 ASSESSMENT — COLUMBIA-SUICIDE SEVERITY RATING SCALE - C-SSRS
2. HAVE YOU ACTUALLY HAD ANY THOUGHTS OF KILLING YOURSELF IN THE PAST MONTH?: NO
1. IN THE PAST MONTH, HAVE YOU WISHED YOU WERE DEAD OR WISHED YOU COULD GO TO SLEEP AND NOT WAKE UP?: NO
6. HAVE YOU EVER DONE ANYTHING, STARTED TO DO ANYTHING, OR PREPARED TO DO ANYTHING TO END YOUR LIFE?: NO

## 2024-03-13 ASSESSMENT — ENCOUNTER SYMPTOMS
VOMITING: 0
CHILLS: 0
ARTHRALGIAS: 0
FEVER: 0
AGITATION: 0
LIGHT-HEADEDNESS: 0
SHORTNESS OF BREATH: 0
CHEST TIGHTNESS: 0
DYSURIA: 0
ABDOMINAL PAIN: 1

## 2024-03-13 ASSESSMENT — ACTIVITIES OF DAILY LIVING (ADL)
ADLS_ACUITY_SCORE: 35

## 2024-03-13 NOTE — TELEPHONE ENCOUNTER
Received call from patient while in the ED -     ED physician was present.     CT scan reassuring with average amount of stool throughout the colon noted.     Patient presented with severe abdominal pain, and concern for constipation after diarrhea abruptly stopped. Had taken Dicyclomine earlier last evening.     Discussed ordering fecal calprotectin with ED physician.     Labs reassuring - WBC and CRP normal.     ED physician planning to provide a small amount of pain medication to bridge the patient to an appointment.     Currently scheduled with MTM next week. Will work on finding an appointment slot with either Dr. Sanchez or PA.     Patient agreed to work on getting infusion scheduled sooner.

## 2024-03-13 NOTE — ED PROVIDER NOTES
Transfer of care from previous shift provider:. Crohn's disease with increasing flares since last Thanksgiving. Gets infusions every 2 months. Worsening diarrhea and abdominal pain over past day but RUQ pain is more severe than in the past and not manageable. Labs normal. CT scan pending. Receiving dilaudid.    Assessment and Plan:  Final diagnoses:   Right upper quadrant abdominal pain      Reassuring CT scan.  Pain has improved s/p dilaudid. Patient and myself did communicate with his GI RN coordinator for his Crohn's disease.  They recommend fecal calprotectin test which we have ordered, and they will continue to work closely with him.  Suspect symptoms are due to his Crohn's disease with symptoms including trace amount of maroon-colored blood in the stool.  Reassurance was provided for his hemoglobin being upper limit of normal and actually the highest it has been on recent record.  Disposition: home with attached instructions on diagnosis provided including ED return precautions.      Labs and CT scan did not show any concerning findings. There was above normal amount of stool.     Please follow up closely with your GI team as planned on 3/18/24 in 5 days.    Fecal calprotectin lab ordered per your GI team recommendation. Please drop off stool sample when able to.    A short course of oxycodone narcotic pain medication has been provided for severe break-through pain not controlled by nsaids (e.g. ibuprofen/alleve) / tylenol. Use carefully as narcotic pain medication can be addictive and dangerous (including life-threatening) if not used as prescribed. Side effects can include sedation, nausea, or constipation. For constipation use stool softener. Don't mix with alcohol, sedating drugs, or drive while taking.    Please review attached instructions including reasons to return to the emergency department, including a large amount of blood in your stool or intolerable pain.    New Prescriptions    OXYCODONE  (ROXICODONE) 5 MG TABLET    Take 1 tablet (5 mg) by mouth every 6 hours as needed for pain       Waldemar Atkins MD  03/13/24 1007       Waldemar Atkins MD  03/13/24 1019

## 2024-03-13 NOTE — ED TRIAGE NOTES
Patient arrived today with c/o abd pain since 10 pm last night.     Patient has a hx of crohnes and raynods.. Patient doctors at the [facility].  Patient states he has had lose stools yesterday and now feel constipated.

## 2024-03-14 NOTE — TELEPHONE ENCOUNTER
Infliximab infusion noted to be moved up to tomorrow - 3/15.     GI team still working on finding sooner appointment slot.     Patient requested [facility] paperwork be updated due to increased absences. Forms completed. Sent to Ms. Morales to be faxed.     [employer] message sent with update.

## 2024-03-15 ENCOUNTER — HOSPITAL ENCOUNTER (OUTPATIENT)
Dept: INFUSION THERAPY | Facility: OTHER | Age: 49
Discharge: HOME | End: 2024-03-15
Attending: INTERNAL MEDICINE | Admitting: FAMILY MEDICINE
Payer: COMMERCIAL

## 2024-03-15 VITALS
SYSTOLIC BLOOD PRESSURE: 111 MMHG | HEART RATE: 79 BPM | RESPIRATION RATE: 18 BRPM | DIASTOLIC BLOOD PRESSURE: 76 MMHG | WEIGHT: 165.6 LBS | BODY MASS INDEX: 23.1 KG/M2 | TEMPERATURE: 97.9 F

## 2024-03-15 DIAGNOSIS — K50.10 CROHN'S DISEASE OF LARGE INTESTINE WITHOUT COMPLICATION (H): Primary | ICD-10-CM

## 2024-03-15 DIAGNOSIS — R10.11 RIGHT UPPER QUADRANT ABDOMINAL PAIN: ICD-10-CM

## 2024-03-15 PROCEDURE — 96413 CHEMO IV INFUSION 1 HR: CPT

## 2024-03-15 PROCEDURE — 258N000003 HC RX IP 258 OP 636: Performed by: INTERNAL MEDICINE

## 2024-03-15 PROCEDURE — 250N000011 HC RX IP 250 OP 636: Mod: JZ | Performed by: INTERNAL MEDICINE

## 2024-03-15 PROCEDURE — 83993 ASSAY FOR CALPROTECTIN FECAL: CPT | Performed by: STUDENT IN AN ORGANIZED HEALTH CARE EDUCATION/TRAINING PROGRAM

## 2024-03-15 RX ORDER — METHYLPREDNISOLONE SODIUM SUCCINATE 125 MG/2ML
125 INJECTION, POWDER, LYOPHILIZED, FOR SOLUTION INTRAMUSCULAR; INTRAVENOUS
Status: CANCELLED
Start: 2024-04-19

## 2024-03-15 RX ORDER — ALBUTEROL SULFATE 0.83 MG/ML
2.5 SOLUTION RESPIRATORY (INHALATION)
Status: CANCELLED | OUTPATIENT
Start: 2024-04-19

## 2024-03-15 RX ORDER — DIPHENHYDRAMINE HYDROCHLORIDE 50 MG/ML
50 INJECTION INTRAMUSCULAR; INTRAVENOUS
Status: CANCELLED
Start: 2024-04-19

## 2024-03-15 RX ORDER — MEPERIDINE HYDROCHLORIDE 50 MG/ML
25 INJECTION INTRAMUSCULAR; INTRAVENOUS; SUBCUTANEOUS EVERY 30 MIN PRN
Status: CANCELLED | OUTPATIENT
Start: 2024-04-19

## 2024-03-15 RX ORDER — ALBUTEROL SULFATE 90 UG/1
1-2 AEROSOL, METERED RESPIRATORY (INHALATION)
Status: CANCELLED
Start: 2024-04-19

## 2024-03-15 RX ORDER — DIPHENHYDRAMINE HCL 25 MG
25 CAPSULE ORAL ONCE
Status: CANCELLED
Start: 2024-04-19 | End: 2024-04-19

## 2024-03-15 RX ORDER — ACETAMINOPHEN 325 MG/1
650 TABLET ORAL ONCE
Status: CANCELLED
Start: 2024-04-19 | End: 2024-04-19

## 2024-03-15 RX ORDER — EPINEPHRINE 1 MG/ML
0.3 INJECTION, SOLUTION, CONCENTRATE INTRAVENOUS EVERY 5 MIN PRN
Status: CANCELLED | OUTPATIENT
Start: 2024-04-19

## 2024-03-15 RX ADMIN — SODIUM CHLORIDE 250 ML: 9 INJECTION, SOLUTION INTRAVENOUS at 10:21

## 2024-03-15 RX ADMIN — INFLIXIMAB 400 MG: 100 INJECTION, POWDER, LYOPHILIZED, FOR SOLUTION INTRAVENOUS at 10:45

## 2024-03-15 NOTE — NURSING NOTE
Infusion Nursing Note:  Dennis J Goodell presents today for Remicade - Rapid.    Patient seen by provider today: No   present during visit today: Not Applicable.    Note: Pt experiencing recent chron's flare, pt's GI team aware and wish for him to receive infusion today, see previous notes from GI care coordinator.      Pt also brought stool sample as was previously ordered. Sample was sent to lab.     Intravenous Access:  Peripheral IV placed.    Treatment Conditions:  Biological Infusion Checklist: Completed     Post Infusion Assessment:  Patient tolerated infusion without incident.  Blood return noted pre and post infusion.  Site patent and intact, free from redness, edema or discomfort.  No evidence of extravasations.  Access discontinued per protocol.    Biologic Infusion Post Education: Call the triage nurse at your clinic or seek medical attention if you have chills and/or temperature greater than or equal to 100.5, uncontrolled nausea/vomiting, diarrhea, constipation, dizziness, shortness of breath, chest pain, heart palpitations, weakness or any other new or concerning symptoms, questions or concerns.  You cannot have any live virus vaccines prior to or during treatment or up to 6 months post infusion.  If you have an upcoming surgery, medical procedure or dental procedure during treatment, this should be discussed with your ordering physician and your surgeon/dentist.  If you are having any concerning symptom, if you are unsure if you should get your next infusion or wish to speak to a provider before your next infusion, please call your care coordinator or triage nurse at your clinic to notify them so we can adequately serve you.       Discharge Plan:   Discharge instructions reviewed with: Patient.  Patient and/or family verbalized understanding of discharge instructions and all questions answered.  Copy of AVS declined. Patient will return 5-10-24 for next appointment. Scheduling request sent  to .   AVS to patient via [employer].    Patient discharged in stable condition accompanied by: self.  Departure Mode: Ambulatory.      Mary Kate Ospina RN

## 2024-03-15 NOTE — PROGRESS NOTES
"Medication Therapy Management (MTM) Encounter    ASSESSMENT:                            Medication Adherence/Access: No issues identified    Crohn's Disease / Constipation:  We discussed concerns related to his bowel pattern, which have been mixed and potentially related to diet. We discussed that the amitriptyline is meant to help abdominal cramping, so if he notices this increases now that he has stopped, he can consider re-starting. We discussed finding balance with medications and some of these are constipating, which may further worsen his reported hard stools. We discussed consideration for re-checking a drug level to ensure his dose is optimized, especially in light of recently elevated fecal calprotectin. Reggie has requested and is open to receiving vaccines to get up to date with recommendations for IBD patients, his preference was to receive a summary of these recommendations, which we can include in the AVS.    Depression / Anxiety:   Stable     PLAN:                            Will review vaccines per chart and send recommendations in AVS:   -- Prevnar-20 for pneumonia   -- Shingrix series for shingles prevention   -- we can consider re-drawing your hepatitis B surface antibody to help determine if you are no longer immune to hepatitis B     2.  We will consider checking a level with your next infusion.   -- Update: Reggie was scheduled with Dr. Sanchez with the following plan:  \"- MRE to assess for active disease  - Check IFX levels at next infusion  - Strongly counseled to avoid/minimize NSAIDs. Avoid if possible\"    Follow-up: after results of infusion/MRE   -- 3 months for MTM    SUBJECTIVE/OBJECTIVE:                          Dennis Goodell is a 48 year old male contacted via secure video for a follow-up visit, along with Kori Arciniega, Pharm D with Reggie permission.      Reason for visit: Crohn's Flare, recent ED visit    Allergies/ADRs: Reviewed in chart  Past Medical History: Reviewed in " "chart  Tobacco: He reports that he has never smoked. His smokeless tobacco use includes chew.    Medication Adherence/Access: no issues reported    Crohn's Disease / Constipation:  - Remicade (Infliximab): 400 mg (5 mg / kg) every 8 weeks   - Dicyclomine 10 mg: not currently taking  - Miralax: currently not taking, stopped 3.17 and started metamucil  - Probiotic: taking when remembers (keeps in fridge, difficult to remember)  - diphenoxylate-atropine: as needed, has worked well previously, not as well currently  - metamucil: recently resumed and has helped to \"feel normal again\"  - Amitriptyline 10 mg: stopped 2 days ago due to feeling physically tired, has not noticed any changes or worsening of symptoms, states he will see how he feels and if cramping restarts he will consider restarting   - reports using \"as needed\" medications about once monthly    Drinking a lot of water, 12 oz of water every hour while at work.  Fells he has been eating too much protein and this led to impacted stool.  He has concerns about total protein in serum being elevated.     States his flaring episodes (stomach pain/ loose bowels) are preventing him from going to work, occurs about 6 times per month. This has led to getting in trouble with HR, he has submitted [place] paperwork (Nurse Oswaldo is handling). At the end of our visit, he asks if he can go back to work. I inquired if he felt well enough to do so and he said yes, but then states he never misses work unless he is sick and wants to feel better. Believes the blood in his stool was from external source like hemorrhoid or from straining. Reports recent flare was very painful when the very hard stool did release.  Described stool as dense, hard, arcelia like consistency not like he has ever experienced. After passed hard stool, then stools became loose but formed, appearance of encased like a sausage / mucous coated, which is normal and not specific to this flare. Since yesterday he is back " to normal stools (last 5 have been normal).     He does not notice any change in symptoms after infusions.  States episodes are occurring all the time, feels triggered by stress. He feels he has had Crohn's since age 7. Last estimated infliximab trough at week 8, was 10 back in June 2023.    Lab Results   Component Value Date    CRPI <3.00 03/13/2024    HGB 16.5 03/13/2024     Lab Results   Component Value Date    CALPRF 232.0 (H) 03/15/2024    CALPRF 34.2 05/30/2023     IBD HISTORY  Age at diagnosis: 47 (2022)  Extent of disease: colonic - possible zane-anal  Disease phenotype: inflammatory with possible penetrating  Zane-anal disease: recent zane-anal abscess  Current CD medications: IFX 5 mg/kg q 8 weeks  Prior IBD surgeries: none  Prior IBD Medications: prednisone, Entocort    Per Colonoscopy 8/10/23:   Impression:     - Simple Endoscopic Score for Crohn's Disease: 0,                          mucosal inflammatory changes secondary to Crohn's                          disease, in remission. Biopsied.                          - Two 3 to 7 mm polyps in the transverse colon,                          removed with a cold snare. Resected and retrieved.                          - The examination was otherwise normal on direct and                          retroflexion views.                          No active inflammation seen. Perhaps some subtle                          scarring in ascending colon and terminal ileum. No                          strictures and no active inflammation. No reason for                          RUQ pain found on this exam.     IBD Health Maintenance    Vaccinations:  All patients on biologics should avoid live vaccines unless specifically indicated.    -- Influenza (every year) not on file for 23-24 season, previously declined  -- TdaP (every 10 years) last 4/28/2021  -- Pneumococcal Pneumonia    Prevnar-13: not on file   Pneumovax-23: 11/12/2010   Prevnar-20: not on file   -- COVID-19 not on  file, previously declined    One time confirmation of immunity or serologies:  -- Hepatitis A (serologies or immunizations) not on file   -- Hepatitis B (serologies or immunizations) serologies indeterminate 1/2023  -- Varicella/Zoster    Varicella presumed based on age   Zoster not on file     Due to the immunosuppression in this patient, I would not advise administration of live vaccines such as varicella/VZV, intranasal influenza, MMR, or yellow fever vaccine (if traveling).      Immunosuppressive Screening:    Lab Results   Component Value Date    AUSAB 8.45 01/31/2023    HEPBANG Nonreactive 01/31/2023    HBCAB Nonreactive 01/31/2023    HCVAB Nonreactive 01/31/2023    TBRES Negative 01/12/2024     Cancer Screening:  Colon cancer screening:  Given 1/3 of colon involved, recommend patient undergo regular dysplasia surveillance   Next dysplasia screening is recommended 2031.     Skin cancer screening: Annual visual exam of skin by dermatologist since patient is immunocompromised    Misc:  -- Avoid tobacco use  -- Avoid NSAIDs as there is potentially a 25% chance of causing an IBD flare    Last provider visit: 7/18/23  Next provider visit: 6/27/24  Last Quantiferon: 1/12/24    Disease activity on current therapy: Simple Endoscopic Score for Crohn's Disease: 0  Last endoscopic evaluation/MRE: 8/10/23    Depression / Anxiety:   Klonopin 1 mg three times daily   Pristiq 100 mg once daily     Reports no side effects or concerns regarding therapy and effectiveness.    ----------------  Post Discharge Medication Reconciliation Status: discharge medications reconciled and changed, per note/orders.      I spent 27 minutes with this patient today. All changes were made via collaborative practice agreement with Helder Sanchez. A copy of the visit note was provided to the patient's provider(s).    A summary of these recommendations was sent via [employer].    Christiana Esquivel PharmD, BCACP  MTM Pharmacist    [facility]   Phone: [phone]    Telemedicine Visit Details  Type of service:  Video Conference via [employer]  Start Time:  1:03 pm  End Time:  1:30 pm     Medication Therapy Recommendations  Crohn's disease of large intestine without complication (H)        Rationale: Preventive therapy - Needs additional medication therapy - Indication   Recommendation: Start Medication - Prevnar 20 0.5 ML Gabriella   Status: Accepted per CPA

## 2024-03-15 NOTE — NURSING NOTE
~~~ NOTE: If the patient answers yes to any of the questions below, hold the infusion and contact ordering provider or on-call provider.    Do you currently have any signs of illness or infection or are you on any antibiotics? No  Have you recently had an elevated temperature, fever, chills, productive cough, coughing for 3 weeks or longer or hemoptysis, abnormal vital signs, night sweats, chest pain or have you noticed a decrease in your appetite, unexplained weight loss or fatigue? No  Have you had any new, sudden, or worsening abdominal pain? Yes, recent chron's flare, pt's GI team aware and wish for him to receive infusion today, see previous notes from GI care coordinator .  Do you have any open wounds or new incisions? (exclude for patients with hidradenitis suppurativa) No  Have you recently been diagnosed with any new nervous system diseases (ie. Multiple sclerosis, Guillain [place], seizures, neurological changes) or cancer diagnosis? Are you on any form of radiation or chemotherapy? No  Have there been any other new onset medical symptoms? No  Are you pregnant or breast feeding or do you have plans of pregnancy in the future? No; N/A  Do you have any upcoming hospitalizations or surgeries? Does not include esophagogastroduodenoscopy, colonoscopy, endoscopic retrograde cholangiopancreatography (ERCP), endoscopic ultrasound (EUS), dental procedures (including cleanings, fillings, implants, extractions)  or joint aspiration/steroid injections No  Have you or anyone in your household received a live vaccination in the past 4 weeks? Please note: No live vaccines while on biologic/chemotherapy until 6 months after the last treatment. Patient can receive the flu vaccine (shot only).  It is optimal for the patient to get it mid cycle, but it can be given at any time as long as it is not on the day of the infusion. No  If applicable to prescribed medication, confirm negative PPD or quantiferon gold MTB. If positive,  verify has negative chest x-ray or the patient is at least 4 weeks post initiation of INH/B6 therapy and have clearance from provider before infusion   If applicable to prescribed medication, confirm negative hepatitis B surface antigen or hepatitis C. If positive, clearance from provider before infusion.   Rheumatology patients receiving tocilizumab (Actemra): If labs were drawn within the past week, hold dosing until cleared to infuse If AST/ALT > 2 X upper limit normal; ANC < 1.0. N/A  Patients receiving belimumab (Benlysta): Have you been having any signs of worsening depression or suicidal ideations?  N/A

## 2024-03-18 ENCOUNTER — VIRTUAL VISIT (OUTPATIENT)
Dept: GASTROENTEROLOGY | Facility: CLINIC | Age: 49
End: 2024-03-18
Attending: INTERNAL MEDICINE
Payer: COMMERCIAL

## 2024-03-18 DIAGNOSIS — K59.00 CONSTIPATION, UNSPECIFIED CONSTIPATION TYPE: ICD-10-CM

## 2024-03-18 DIAGNOSIS — F41.1 GAD (GENERALIZED ANXIETY DISORDER): Chronic | ICD-10-CM

## 2024-03-18 DIAGNOSIS — K50.10 CROHN'S DISEASE OF LARGE INTESTINE WITHOUT COMPLICATION (H): Primary | ICD-10-CM

## 2024-03-18 LAB — CALPROTECTIN STL-MCNT: 232 MG/KG (ref 0–49.9)

## 2024-03-18 NOTE — Clinical Note
3/18/2024         RE: Dennis J Goodell  [address]  [place] [address]        Dear Colleague,    Thank you for referring your patient, Dennis J Goodell, to the [facility]. Please see a copy of my visit note below.    Medication Therapy Management (MTM) Encounter    ASSESSMENT:                            Medication Adherence/Access: {adherencechoices:647951}    Crohn's Disease :      RUQ abdominal pain/GERD:      PLAN:                            ***    Follow-up: {followuptest2:125586}    SUBJECTIVE/OBJECTIVE:                          Dennis Goodell is a 48 year old male { :723899} for {mtmvisit:558630}     Reason for visit: ***.    Allergies/ADRs: {1/2/3/4/5:369141}  Past Medical History: {1/2/3/4/5:493427}  Tobacco: He reports that he has never smoked. His smokeless tobacco use includes chew.  Alcohol: {ALCOHOL CONSUMPTION HX:250869}  {Social and Goals:250032}    Medication Adherence/Access: {fumedadherence:970449}    RUQ abdominal pain/GERD:  Sucralfate 1gm:   Omeprazole 40 mg:     Crohn's Disease:  - Remicade (Infliximab) 400 mg (5 mg / kg) every 8 weeks   - Dicyclomine: 10 mg   - Miralax:   - Probiotic:   - diphenoxylate-atropine:       *** ED visit 3.13 - dropped off dominga protectin sample at infusion appointment 3/15  Final diagnoses:   Right upper quadrant abdominal pain     Lab Results   Component Value Date    CRPI <3.00 03/13/2024    HGB 16.5 03/13/2024     Lab Results   Component Value Date    CALPRF 232.0 (H) 03/15/2024       IBD HISTORY  Age at diagnosis: 47 (2022)  Extent of disease: colonic - possible evangelina-anal  Disease phenotype: inflammatory with possible penetrating  Evangelina-anal disease: recent evangelina-anal abscess  Current CD medications: IFX 5 mg/kg q 8 weeks  Prior IBD surgeries: none  Prior IBD Medications: prednisone, Entocort    Per Colonoscopy 8/10/23:   Impression:     - Simple Endoscopic Score for Crohn's Disease: 0,                          mucosal  inflammatory changes secondary to Crohn's                          disease, in remission. Biopsied.                          - Two 3 to 7 mm polyps in the transverse colon,                          removed with a cold snare. Resected and retrieved.                          - The examination was otherwise normal on direct and                          retroflexion views.                          No active inflammation seen. Perhaps some subtle                          scarring in ascending colon and terminal ileum. No                          strictures and no active inflammation. No reason for                          RUQ pain found on this exam.     IBD Health Care Maintenance:    Vaccinations:  All patients on biologics should avoid live vaccines unless specifically indicated.    -- Influenza (every year):  18  -- TdaP (every 10 years):  21  -- Pneumococcal Pneumonia:       Prevnar-13:  not on file    Pneumovax-23:  11/12/10   Prevnar-20:  not on file   -- COVID-19: not on file   -- Yearly assessment for latent Tb (verbal screening and exam, PPD or QuantiFERON-Tb testing)      result: negative  Lab Results   Component Value Date    TBRES Negative 2024     One time confirmation of immunity or serologies:  -- Hepatitis A (serologies or immunizations):  not on file   -- Hepatitis B (serologies or immunizations):  serologies indicate immunity   -- Varicella/Zoster    Varicella:  not on file    Zoster:  not on file   -- MMR:  vaccine x1 1978  -- Meningococcal meningitis (all patients at risk for meningitis):  not on file     Due to the immunosuppression in this patient, I would not advise administration of live vaccines such as varicella/VZV, intranasal influenza, MMR, or yellow fever vaccine (if traveling).      Pre-Biologic Screenin23  -- Hep B Surface Antibody serologies indicate immunity:    -- Hep B Surface Antigen non-reactive:    -- Hep B Core Antibody non-reactive:    -- Hep C  "Antibody non-reactive:      -- Hep B Surface Antibody    Lab Results   Component Value Date    AUSAB 8.45 2023   -- Hep B Surface Antigen   Lab Results   Component Value Date    HEPBANG Nonreactive 2023   -- Hep B Core Antibody    Lab Results   Component Value Date    HBCAB Nonreactive 2023   -- Hep C Antibody   Lab Results   Component Value Date    HCVAB Nonreactive 2023       Bone mineral density screening   -- Recommend all patients supplement with calcium and vitamin D    Cancer Screening:  Colon cancer screenin/10/23  Next dysplasia screening is recommended in 2030.    Skin cancer screening: Annual visual exam of skin by dermatologist since patient is immunocompromised. Dermatology appointment not on file.     Depression Screening:    PHQ-2 Score: **** DUE***        2023     2:35 PM 2022     1:32 PM   PHQ-2 (  [employer])   Q1: Little interest or pleasure in doing things 0    Q2: Feeling down, depressed or hopeless 3    PHQ-2 Score 3    PHQ-2 Score  Incomplete        Research:  Are you interested in being contacted about enrollment in clinical research studies? {Yes and No:306430}    Would you like to receive a quarterly newsletter on research via email. {YES/NO:951086}      Misc:  -- Avoid tobacco use  -- Avoid NSAIDs as there is potentially a 25% chance of causing an IBD flare      Last provider visit: 23  Next provider visit: 24  Last Quantiferon: 24    Disease activity on current therapy: Simple Endoscopic Score for Crohn's Disease: 0  Last endoscopic evaluation/MRE: 8/10/23      ***    ----------------  {YOLANDA?:625499}  ED count???     I spent {mtm total time 3:042488} with this patient today. { :728882}. A copy of the visit note was provided to the patient's provider(s).    A summary of these recommendations {GIVEN/NOT GIVEN:392621}.    ***    Telemedicine Visit Details  Type of service:  {telemedvisitmtm:026933::\"Telephone visit\"}  Start Time: " "{video/phone visit start time:152948}  End Time: {video/phone visit end time:152948}     Medication Therapy Recommendations  No medication therapy recommendations to display       Medication Therapy Management (MTM) Encounter    ASSESSMENT:                            Medication Adherence/Access: {adherencechoices:801305}    Crohn's Disease :      RUQ abdominal pain/GERD:      PLAN:                            Chart review and give vaccine recommendations:     2.  Infliximab levels with next infusion      Follow-up: {followuptest2:725838}    SUBJECTIVE/OBJECTIVE:                          Dennis Goodell is a 48 year old male contacted via secure video for a follow-up visit, along with Kori Arciniega, Pharm D with Reggie permission.      Reason for visit: Crohn's Flare, recent ED visit    Allergies/ADRs: Reviewed in chart  Past Medical History: Reviewed in chart  Tobacco: He reports that he has never smoked. His smokeless tobacco use includes chew.    Medication Adherence/Access: no issues reported    RUQ abdominal pain/GERD:  Sucralfate 1gm:   Omeprazole 40 mg twice daily - takes consistently     Crohn's Disease:  - Remicade (Infliximab) 400 mg (5 mg / kg) every 8 weeks  *** serum level prior to next infusion  - Dicyclomine: 10 mg - not currently taking  - Miralax: currently not taking, stopped 3.17 and started metamucil  - Probiotic: taking when remembers (kept in fridge difficult to remember)  - diphenoxylate-atropine: as needed, has worked well previously, prev would have to call in sick but this medication has helped  - metamucil: recently resumed and has helped to feel normal again  - Amitriptyline stopped 2 days ago, feeling physically tired, has not noticed any changes or worsening of symptoms, states he will see how he feels and if cramping restarts he will consider restarting   - reports using \"as needed\" medications about once monthly    Reports flaring episodes (stomach pain/ loose bowels) preventing him from " going to work, occurs about 6 times per month. States he is getting in trouble with HR, has submitted FML paperwork (Nurse Oswaldo is handling).    After infusions does not notice any change / improvement in symptoms, episodes are occurring all the time, feels triggered by stress. He feels he has Crohn's since age 7.      *** ED visit 3.13 - dropped off dominga protectin sample at infusion appointment 3/15  Final diagnoses:   Right upper quadrant abdominal pain     Lab Results   Component Value Date    CRPI <3.00 03/13/2024    HGB 16.5 03/13/2024     Lab Results   Component Value Date    CALPRF 232.0 (H) 03/15/2024    CALPRF 34.2 05/30/2023     Eating too much protein and feels this led to impacted stool, yesterday back to normal stools (last 5 have been normal)  Concerns about total protein in serum being elevated    Believes blood in stool was external from hemorrhoid or straining  Very painful and very hard when stool did release, was dense, hard, arcelia like consistency not like he's has ever experienced. After passed hard stool, then went to being loose but formed, looks like it is encased like a sausage, mucous coated, which is normal not specific to this flare.     Drinking a lot of water, 12 oz every hour while at work      IBD HISTORY  Age at diagnosis: 47 (2022)  Extent of disease: colonic - possible evangelina-anal  Disease phenotype: inflammatory with possible penetrating  Evangelina-anal disease: recent evangelina-anal abscess  Current CD medications: IFX 5 mg/kg q 8 weeks  Prior IBD surgeries: none  Prior IBD Medications: prednisone, Entocort    Per Colonoscopy 8/10/23:   Impression:     - Simple Endoscopic Score for Crohn's Disease: 0,                          mucosal inflammatory changes secondary to Crohn's                          disease, in remission. Biopsied.                          - Two 3 to 7 mm polyps in the transverse colon,                          removed with a cold snare. Resected and retrieved.                           - The examination was otherwise normal on direct and                          retroflexion views.                          No active inflammation seen. Perhaps some subtle                          scarring in ascending colon and terminal ileum. No                          strictures and no active inflammation. No reason for                          RUQ pain found on this exam.     IBD Health Care Maintenance:    Vaccinations:  All patients on biologics should avoid live vaccines unless specifically indicated.    -- Influenza (every year):  18  -- TdaP (every 10 years):  21  -- Pneumococcal Pneumonia:       Prevnar-13:  not on file    Pneumovax-23:  11/12/10   Prevnar-20:  not on file   -- COVID-19: not on file   -- Yearly assessment for latent Tb (verbal screening and exam, PPD or QuantiFERON-Tb testing)      result: negative  Lab Results   Component Value Date    TBRES Negative 2024     One time confirmation of immunity or serologies:  -- Hepatitis A (serologies or immunizations):  not on file   -- Hepatitis B (serologies or immunizations):  serologies indicate immunity   -- Varicella/Zoster    Varicella:  not on file    Zoster:  not on file   -- MMR:  vaccine x1 1978  -- Meningococcal meningitis (all patients at risk for meningitis):  not on file     Due to the immunosuppression in this patient, I would not advise administration of live vaccines such as varicella/VZV, intranasal influenza, MMR, or yellow fever vaccine (if traveling).      Pre-Biologic Screenin23  -- Hep B Surface Antibody serologies indicate immunity:    -- Hep B Surface Antigen non-reactive:    -- Hep B Core Antibody non-reactive:    -- Hep C Antibody non-reactive:      -- Hep B Surface Antibody    Lab Results   Component Value Date    AUSAB 8.45 2023   -- Hep B Surface Antigen   Lab Results   Component Value Date    HEPBANG Nonreactive 2023   -- Hep B Core Antibody    Lab Results   Component  Value Date    HBCAB Nonreactive 2023   -- Hep C Antibody   Lab Results   Component Value Date    HCVAB Nonreactive 2023       Bone mineral density screening   -- Recommend all patients supplement with calcium and vitamin D    Cancer Screening:  Colon cancer screenin/10/23  Next dysplasia screening is recommended in 2030.    Skin cancer screening: Annual visual exam of skin by dermatologist since patient is immunocompromised. Dermatology appointment not on file.     Depression Screening:    PHQ-2 Score: **** DUE***        2023     2:35 PM 2022     1:32 PM   PHQ-2 (  [employer])   Q1: Little interest or pleasure in doing things 0    Q2: Feeling down, depressed or hopeless 3    PHQ-2 Score 3    PHQ-2 Score  Incomplete        Research:  Are you interested in being contacted about enrollment in clinical research studies? {Yes and No:118505}    Would you like to receive a quarterly newsletter on research via email. {YES/NO:066213}      Misc:  -- Avoid tobacco use  -- Avoid NSAIDs as there is potentially a 25% chance of causing an IBD flare      Last provider visit: 23  Next provider visit: 24  Last Quantiferon: 24    Disease activity on current therapy: Simple Endoscopic Score for Crohn's Disease: 0  Last endoscopic evaluation/MRE: 8/10/23      Depression / Anxiety:   Klonopin 1 mg three times daily   Pristiq 100 mg once daily     ----------------  {YOLANDA?:648301}  ED count???     I spent 27 minutes with this patient today. All changes were made via collaborative practice agreement with Helder Sanchez. A copy of the visit note was provided to the patient's provider(s).    A summary of these recommendations was sent via [employer].    ***    Telemedicine Visit Details  Type of service:  Video Conference via [employer]  Start Time:  1:03 pm  End Time:  1:30 pm     Medication Therapy Recommendations  No medication therapy recommendations to display         Again, thank you for  allowing me to participate in the care of your patient.        Sincerely,        Christiana Esquivel RP

## 2024-03-19 ENCOUNTER — TELEPHONE (OUTPATIENT)
Dept: GASTROENTEROLOGY | Facility: CLINIC | Age: 49
End: 2024-03-19
Payer: COMMERCIAL

## 2024-03-19 NOTE — TELEPHONE ENCOUNTER
"Received voicemail from patient requesting \"return to work\" letter from Dr. Sanchez after recent worsening symptoms/ED visit.     Letter provided and sent via [employer]. Discussed with patient via telephone.   "

## 2024-03-19 NOTE — LETTER
3/19/2024         RE: Dennis J Goodell  [address]  [place] [address]        To Whom It May Concern:           Reggie is currently a patient in my care in the Gastroenterology Clinic at [facility]. Reggie has been on a leave of absence from work to address an acute worsening of his chronic disease state. During this time, I have monitored the patient closely. This time away from work has allowed the patient sufficient time for rest and recovery. At this time, Regige is demonstrating significant clinical improvement and can safely return to work.             For questions, please call my office at [phone].          Helder Sanchez MD      Infl[facility] Bowel Disease Clinic   [facility]   Division of Gastroenterology, Hepatology and Nutrition

## 2024-03-20 ENCOUNTER — TELEPHONE (OUTPATIENT)
Dept: GASTROENTEROLOGY | Facility: CLINIC | Age: 49
End: 2024-03-20
Payer: COMMERCIAL

## 2024-03-20 NOTE — TELEPHONE ENCOUNTER
Brainr received a message from JAVIER CISNEROS to help call and offer Pt an appointment with Dr. Sanchez on 3/21/2024 at 7:20 AM for a virtual follow up visit. Brainr called and left a message for the Pt, along with call back number. In the voice message, Brainr offered Pt the appointment and asked for Pt to call back and confirm if it will work for the Pt.

## 2024-03-20 NOTE — TELEPHONE ENCOUNTER
Pt called back and accepted the appointment with Dr. Sanchez on 3/21/2024 at 7:20 AM for a virtual visit.

## 2024-03-21 ENCOUNTER — VIRTUAL VISIT (OUTPATIENT)
Dept: GASTROENTEROLOGY | Facility: CLINIC | Age: 49
End: 2024-03-21
Payer: COMMERCIAL

## 2024-03-21 ENCOUNTER — PATIENT OUTREACH (OUTPATIENT)
Dept: GASTROENTEROLOGY | Facility: CLINIC | Age: 49
End: 2024-03-21

## 2024-03-21 ENCOUNTER — MEDICAL CORRESPONDENCE (OUTPATIENT)
Dept: HEALTH INFORMATION MANAGEMENT | Facility: CLINIC | Age: 49
End: 2024-03-21

## 2024-03-21 ENCOUNTER — MYC MEDICAL ADVICE (OUTPATIENT)
Dept: GASTROENTEROLOGY | Facility: CLINIC | Age: 49
End: 2024-03-21

## 2024-03-21 ENCOUNTER — TELEPHONE (OUTPATIENT)
Dept: GASTROENTEROLOGY | Facility: CLINIC | Age: 49
End: 2024-03-21

## 2024-03-21 DIAGNOSIS — K50.10 CROHN'S DISEASE OF LARGE INTESTINE WITHOUT COMPLICATION (H): ICD-10-CM

## 2024-03-21 DIAGNOSIS — K50.10 CROHN'S DISEASE OF LARGE INTESTINE WITHOUT COMPLICATION (H): Primary | ICD-10-CM

## 2024-03-21 DIAGNOSIS — M54.2 NECK PAIN: Primary | ICD-10-CM

## 2024-03-21 PROCEDURE — 99215 OFFICE O/P EST HI 40 MIN: CPT | Mod: 95 | Performed by: INTERNAL MEDICINE

## 2024-03-21 RX ORDER — CELECOXIB 100 MG/1
100 CAPSULE ORAL 2 TIMES DAILY PRN
Qty: 60 CAPSULE | Refills: 3 | Status: SHIPPED | OUTPATIENT
Start: 2024-03-21

## 2024-03-21 NOTE — PATIENT INSTRUCTIONS
It was good to see you virtually in clinic today, here is the plan as we discussed.     --  ONE Miralax bottle (238 g) and TWO 32 once Gatorade (64 ounces). Mix the two. Drink mixture over the course of 3-4 hours.  You should experience loose bowel movements that are watery in consistency when complete.  -- The day after the cleanout, immediately resume Miralax 1 capful daily . Our goal is 1-2 soft bowel movements daily. You can titrate this dose (increase or decrease) based on stool frequency and consistency.      - Stop taking Aleve and Excedrin - I am worried this is making your symptoms worse and caused the narrowing in your small bowel that was seen on your last scope.   - Start taking Celebrex 100mg twice daily as needed for significant pain. This should replace your Aleve and excedrin.  - Deep tissue massage for chronic neck pain as needed   - We will order MRE scan to assess for any inflammation.   - continue dicyclomine as needed for abdominal pain - be aware that this can cause constipation so make sure you are continuing miralax while taking this.     Follow up with Dr. Sanchez in June.

## 2024-03-21 NOTE — PROGRESS NOTES
Virtual Visit Details    Type of service:  Video Visit     Visit Start time: 7:22AM  Visit End time: 7:59AM    Originating Location (pt. Location): Home    Distant Location (provider location):  On-site  Platform used for Video Visit: [facility]      IBD CLINIC VISIT    CC/REFERRING MD:  Helder Sanchez    REASON FOR CONSULTATION: follow up CD    ASSESSMENT/PLAN:    1. Constipation   2. Alternating bowel habits   3. Lower abdominal pain   Fluctuating bowel habits between constipation and then days of diarrhea/emptying. Lower abdominal pain exacerbated by constipation episodes. No inflammation on recent CT scan to suggest active Crohn's disease. Most suspicion of underlying constipation driving symptoms at this time. He had improvement in symptoms after bowel prep last summer for colonoscopy. May also have some underlying IBS/visceral hypersensitivity.   - Home bowel clean out with miralax and gatorade  - After clean out, start miralax 17g daily with goal of 1-2 soft bowel movements daily   - continue dicyclomine as needed for abdominal pain - discussed that this can be constipating.   - continue amitriptyline       4. CD ileum and colon - on IFX 5 mg/kg q 8 weeks with good drug level. No active disease on EGD/colonoscopy 8/2023. Fecal calprotectin mildly elevated at recent ED visit (2/2024).   - MRE to assess for active disease  - Check IFX levels at next infusion  - Strongly counseled to avoid/minimize NSAIDs. Avoid if possible    5. Neck pain   6. Frequent NSAID use  Using NSAIDs on regular basis for chronic neck pain. Previously seen in pain clinic and no benefit with injections.  - Celebrex 100mg twice daily as needed (to replace Aleve and Excedrin)  - Deep tissue massage intermittently for symptoms     7. Duodenal stricture  Stricture noted in 2nd portion of duodenum on 8/2023 EGD. Likely NSAID induced  - Continue omeprazole 40mg BID  - Avoid NSAIDs as noted above   - May consider dilation in future if  ongoing abdominal pain despite optimization of constipation and Crohn's     8. History of zane-anal abscess - resolved. No residual fistula or abscess on exam      IBD HISTORY  Age at diagnosis: 47 (2022)  Extent of disease: colonic - possible zane-anal  Disease phenotype: inflammatory with possible penetrating  Zane-anal disease: zane-anal abscess  Current CD medications: IFX 5 mg/kg q 8 weeks  Prior IBD surgeries: none  Prior IBD Medications: prednisone, Entocort    DRUG MONITORING  TPMT enzyme activity: 19.2 (WNL)    6-TGN/6-MMPN levels: --    Biologic concentration:  -predict PK 6/22/23 - estimated trough 10 (on IFX 5mg/kg q 8)    DISEASE ASSESSMENT  Labs  Recent Labs   Lab Test 03/13/24  0626 01/12/24  1256 11/17/23  1328   SED  --  1 1   HGB 16.5 14.7 14.2     Fecal calprotectin: 232 Feb 2024 (previously 30s - 5/30/23)   - EGD 8/2023 - peptic stricture in second portion of duodenum - no active Crohn's on biopsy of stricture   -colonoscopy 8/2023 - no active inflammation seen to explain RUQ pain  -colonoscopy 12/2022 - normal TI (normal biopsies) - inflammation/stricture 5-6 cm distal to IC valve - able to be passed. Remainder colon normal? (details of scope limited on documentation). 5mm adenoma in rectum. Biopsies TI normal. Right colon chronic active (mild) c/w IBD and left colon normal  Enterography:   - MRE 2/14/23 - IMPRESSION:  Mucosal thickening and hyperemia of the terminal ileum,  compatible with the prior diagnosis of Crohn disease. No evidence of  associated stricture or fistula.  C diff: negative    sIBDQ:   IBDQ Score Date IBDQ - Total Score  (Higher score better)   7/18/2023  12:31 PM Incomplete   6/1/2023   4:13 AM 34       IBD Health Care Maintenance:    Not discussed    Misc:  -- Avoid tobacco use  -- Avoid NSAIDs as there is potentially a 25% chance of causing an IBD flare    Return to clinic in June 2024 as previously scheduled with Dr. Sanchez    Patient seen and discussed with Dr. Sanchez.  "    Rodríguez Villagran MD  GI fellow       HPI:   Patient was last seen by Dr. Sanchez in July 2023 and at that time was having ongoing RUQ pain. RUQ US was unremarkable. He underwent EGD and colonoscopy. EGD showed likely peptic stricture in second portion of duodenum. No inflammation or ulcers in stomach or duodenum. Stricture biopsy showed erosive peptic type injury. No active inflammation on colonoscopy. He reports stool consistency and pain was better or a period of time after colonoscopy. Then reports \"roller coaster\" of diarrhea and constipation symptoms. Reports he will go a few days without a bowel movement and then will pass hard stool followed by diarrhea. He had bright red blood one time after passing hard stool but has not noticed blood again.Takes miralax and fiber as needed but not consistently. He will then have \"flare\" of diarrhea about 1 time per week after passing hard stool were he has 8-10 bowel movements in 2 hour period. Takes lomotil during this time and then becomes constipated again. Lower abdominal pain is worse with constipation episodes.    He went to outside ED for pain and constipation symptoms. Prior to constipation symptoms he had been taking cold medicines for COVID symptoms. Labs were unremarkable. CT scan showed increased stool burden. Fecal calprotectin was collected and mildly elevated at 232 (34 in May 2023).    He is taking Aleve and Excedrin every few days for chronic neck pain. Previously seen in pain clinic and did not have improvement with injections. He has been told that fusion would be next option but this would be extensive surgery. He finds benefit from deep massage.     No change in abdominal pain with eating. Continues to take omeprazole. Also taking amitryptyline at night 10-20mg (not every night) and hasn't noticed benefit.       Extra intestinal manifestations of IBD:  No uveitis/episcleritis  No aphthous ulcers   No arthritis   No erythema nodosum/pyoderma " gangrenosum.     PERTINENT PAST MEDICAL HISTORY:  Past Medical History:   Diagnosis Date    Anxiety disorder 2012    Dysthymic disorder     No Comments Provided    Malignant neoplasm of testis (H) 2005 2005,teratoma    Raynaud's syndrome without gangrene     No Comments Provided    Uncomplicated alcohol abuse     7/25/2012       PREVIOUS SURGERIES:  Past Surgical History:   Procedure Laterality Date    COLONOSCOPY N/A 12/12/2022    Procedure: COLONOSCOPY, WITH POLYPECTOMY AND BIOPSY;  Surgeon: Solomon Arciniega MD;  Location:  OR    COLONOSCOPY N/A 8/10/2023    Procedure: COLONOSCOPY, WITH POLYPECTOMY AND BIOPSY;  Surgeon: Cris Sanchez MD;  Location: [facility] OR    DECOMPRESSION CUBITAL TUNNEL Left 01/2019    ESOPHAGOSCOPY, GASTROSCOPY, DUODENOSCOPY (EGD), COMBINED N/A 8/10/2023    Procedure: ESOPHAGOGASTRODUODENOSCOPY, WITH BIOPSY;  Surgeon: Cris Sanchez MD;  Location: [facility] OR    HERNIA REPAIR      ,HERNIA REPAIR    IR CHEST PORT PLACEMENT > 5 YRS OF AGE Left 04/01/2008    LYMPH NODE BIOPSY  09/05/2008     [facility]. 37 lymph nodes excised    ORCHIECTOMY SCROTAL Right 12/30/2005    teratoma    OTHER SURGICAL HISTORY      9/05/08,[id number],OTHER    RELEASE CARPAL TUNNEL  04/09/2013       PREVIOUS ENDOSCOPY:  Results for orders placed or performed during the hospital encounter of 08/10/23   COLONOSCOPY   Result Value Ref Range    COLONOSCOPY       [facility]  [address] [place] [id number] [phone]     Endoscopy Department  _______________________________________________________________________________  Patient Name: Dennis Goodell          Procedure Date: 8/10/2023 10:41 AM  MRN: [id number]                       Account Number: [id number]  YOB: 1975               Admit Type: Outpatient  Age: 48                               Room: [facility] PROCEDURE ROOM 02  Gender: Male                          Note Status: Finalized  Attending MD: CRIS  JAKUB MADSEN MD,   Total Sedation Time:   _______________________________________________________________________________     Procedure:             Colonoscopy  Indications:           Assess therapeutic response to therapy of Crohn's                          disease of the small bowel and colon  Providers:             CRIS MADSEN MD, Roma Hamilton, RN,                          Leon Concepcion, CRNA  Referring MD:          TALYA MUAÑA MD  Requesting Provider:   TALYA GALINDO MD  Medicines:             Monitored Anesthesia Care  Complications:         No immediate complications.  _______________________________________________________________________________  Procedure:             Pre-Anesthesia Assessment:                         - See EPIC H and P note                         After obtaining informed consent, the colonoscope was                          passed under direct vision. Throughout the procedure,                          the patient's blood pressure, pulse, and oxygen                          saturations were monitored continuously. The                          Colonoscope was introduced through the anus and                          advanced to the terminal ileum, with identification of                          the appendiceal orifice and IC valve. The colonoscopy                          was performed without difficulty. The patient                          tolerated the procedure well. The  quality of the bowel                          preparation was adequate to identify polyps greater                          than 5 mm in size.                                                                                   Findings:       The perianal and digital rectal examinations were normal.       The Simple Endoscopic Score for Crohn's Disease was determined based on        the endoscopic appearance of the mucosa in the following segments:       - Ileum: Findings include  no ulcers present, no ulcerated surfaces, no        affected surfaces and no narrowings. Segment score: 0.       - Right Colon: Findings include no ulcers present, no ulcerated        surfaces, no affected surfaces and no narrowings. Segment score: 0.       - Transverse Colon: Findings include no ulcers present, no ulcerated        surfaces, no affected surfaces and no narrowings. Segment score: 0.       - Left Colon: Findings include no ulcers present, no ulcerated surfaces,        no affected surfaces and no na rrowings. Segment score: 0.       - Rectum: Findings include no ulcers present, no ulcerated surfaces, no        affected surfaces and no narrowings. Segment score: 0.       - Total SES-CD aggregate score: 0. Biopsies were taken with a cold        forceps for histology. Separate biopsies taken of the ileum, right        colon, left colon and rectum and placed in separate jars.       Two flat polyps were found in the transverse colon. The polyps were 3 to        7 mm in size. These polyps were removed with a cold snare. Resection and        retrieval were complete.       The exam was otherwise without abnormality on direct and retroflexion        views.                                                                                   Impression:            - Simple Endoscopic Score for Crohn's Disease: 0,                          mucosal inflammatory changes secondary to Crohn's                          disease, in remission. Biopsied.                         - Two 3 to 7 mm polyps in t he transverse colon,                          removed with a cold snare. Resected and retrieved.                         - The examination was otherwise normal on direct and                          retroflexion views.                         No active inflammation seen. Perhaps some subtle                          scarring in ascending colon and terminal ileum. No                          strictures and no active  inflammation. No reason for                          RUQ pain found on this exam.  Recommendation:        - Discharge patient to home (with escort).                         - Resume previous diet.                         - Continue present medications.                         - Await pathology results.                         - Repeat colonoscopy in 5 years for surveillance based                          on pathology results.                         - See same day EGD report for additional findings are                          recommendations                                                                                      Electronically Signed by: Dr. Helder Madsen  ___________________________  HELDER MADSEN MD  8/10/2023 2:15:55 PM  I was physically present for the entire viewing portion of the exam.  __________________________  Signature of teaching physician  HELDER MADSEN MD  Number of Addenda: 0    Note Initiated On: 8/10/2023 10:41 AM  Scope In: 1:32:44 PM  Scope Out: 1:58:51 PM     ]    ALLERGIES:   No Known Allergies    PERTINENT MEDICATIONS:    Current Outpatient Medications:     albuterol (PROAIR HFA/PROVENTIL HFA/VENTOLIN HFA) 108 (90 Base) MCG/ACT inhaler, Inhale 2 puffs into the lungs every 6 hours as needed for shortness of breath / dyspnea or wheezing, Disp: , Rfl:     ALPRAZolam (XANAX) 1 MG tablet, Take 1 mg by mouth 2 times daily as needed for anxiety (Patient not taking: Reported on 3/18/2024), Disp: , Rfl:     amitriptyline (ELAVIL) 10 MG tablet, TAKE 1 TO 2 TABLETS BY MOUTH NIGHTLY AT BEDTIME (Patient not taking: Reported on 3/18/2024), Disp: 30 tablet, Rfl: 3    Aspirin-Acetaminophen-Caffeine (EXCEDRIN MIGRAINE PO), , Disp: , Rfl:     bismuth subsalicylate (PEPTO BISMOL) 262 MG/15ML suspension, Take 15 mLs by mouth every 6 hours as needed for indigestion, Disp: , Rfl:     calcium carbonate (TUMS) 500 MG chewable tablet, Take 1 chew tab by mouth 4 times daily as needed for  heartburn, Disp: , Rfl:     clonazePAM (KLONOPIN) 1 MG tablet, Take 1 mg by mouth 3 times daily, Disp: , Rfl:     desvenlafaxine succinate (PRISTIQ) 100 MG 24 hr tablet, Take 100 mg by mouth daily, Disp: , Rfl:     dicyclomine (BENTYL) 10 MG capsule, TAKE 2 CAPSULES(20 MG) BY MOUTH TWICE DAILY AS NEEDED FOR ABDOMINAL PAIN (Patient not taking: Reported on 3/18/2024), Disp: 120 capsule, Rfl: 4    diphenoxylate-atropine (LOMOTIL) 2.5-0.025 MG tablet, Take 1 tablet by mouth 4 times daily as needed for diarrhea, Disp: 90 tablet, Rfl: 3    inFLIXimab, Inject into the vein once for 1 dose, Disp: , Rfl:     NIFEdipine ER OSMOTIC (PROCARDIA XL) 90 MG 24 hr tablet, Take 1 tablet (90 mg) by mouth daily, Disp: 90 tablet, Rfl: 0    omeprazole (PRILOSEC) 40 MG DR capsule, Take 1 capsule (40 mg) by mouth 2 times daily (before meals), Disp: 60 capsule, Rfl: 11    polyethylene glycol (MIRALAX) 17 GM/Dose powder, Take 17 g (1 Capful) by mouth daily (Patient not taking: Reported on 3/18/2024), Disp: 578 g, Rfl: 11    Probiotic Product (FLORAJEN DIGESTION) CAPS, Take 1 capsule by mouth daily, Disp: 30 capsule, Rfl: 11    psyllium (METAMUCIL/KONSYL) 58.6 % powder, Take 36 g (2 Tablespoonful) by mouth daily, Disp: , Rfl:     sucralfate (CARAFATE) 1 GM tablet, Take 1 tablet (1 g) by mouth 2 times daily as needed (abdominal pain), Disp: 360 tablet, Rfl: 3    Current Facility-Administered Medications:     ondansetron (ZOFRAN) injection 4 mg, 4 mg, Intravenous, Once, Solomon Arciniega MD    SOCIAL HISTORY:  Social History     Socioeconomic History    Marital status:      Spouse name: Jillian    Number of children: 4    Years of education: 13    Highest education level: Not on file   Occupational History    Occupation: underground utilities/dirt work     Employer: [employer]   Tobacco Use    Smoking status: Never    Smokeless tobacco: Current     Types: Chew    Tobacco comments:     1 tin lasts 1 week   Vaping Use    Vaping Use:  Never used   Substance and Sexual Activity    Alcohol use: Not Currently     Alcohol/week: 6.0 standard drinks of alcohol     Types: 6 Cans of beer per week    Drug use: Never    Sexual activity: Yes     Partners: Female     Birth control/protection: Surgical   Other Topics Concern    Parent/sibling w/ CABG, MI or angioplasty before 65F 55M? Not Asked   Social History Narrative    He is , 4  Girls.    Wife runs a .     Social Determinants of Health     Financial Resource Strain: Not on file   Food Insecurity: Not on file   Transportation Needs: Not on file   Physical Activity: Not on file   Stress: Not on file   Social Connections: Not on file   Interpersonal Safety: Not on file   Housing Stability: Not on file       FAMILY HISTORY:  Family History   Problem Relation Age of Onset    Pulmonary Embolism Mother     Other - See Comments Daughter         recurrent OM    Other - See Comments Other         Suicidality,maternal uncle    No Known Problems Father        Past/family/social history reviewed and no changes    PHYSICAL EXAMINATION:  Vitals reviewed: There were no vitals taken for this visit.  Wt:   Wt Readings from Last 2 Encounters:   03/15/24 75.1 kg (165 lb 9.6 oz)   03/13/24 72.6 kg (160 lb)      GENERAL: alert and no distress  EYES: Eyes grossly normal to inspection.  No discharge or erythema, or obvious scleral/conjunctival abnormalities.  RESP: No audible wheeze, cough, or visible cyanosis.    SKIN: Visible skin clear. No significant rash, abnormal pigmentation or lesions.  NEURO: alert and oriented, fluent speech, no focal deficits   PSYCH: Appropriate affect        PERTINENT STUDIES:  Most recent CBC:  Recent Labs   Lab Test 03/13/24  0626 01/12/24  1256   WBC 8.2 7.0   HGB 16.5 14.7   HCT 46.4 41.5    272     Most recent hepatic panel:  Recent Labs   Lab Test 03/13/24  0626 01/12/24  1256   ALT 12 13   AST 18 22     Most recent creatinine:  Recent Labs   Lab Test 03/13/24 0626  11/17/23  1328   CR 1.00 0.81

## 2024-03-21 NOTE — TELEPHONE ENCOUNTER
Contacted patient to discuss next steps.     MRE ordered; scheduling number provided. [place]    Discussed Predict PK level and the importance of timing. Patient expressed understanding. Lab ordered; Dittit message sent to Ms. Morales to complete [employer] form.     Last infusion received -3/15; Predict PK to be drawn ~4/5 or after.     Discussed disability paperwork that was received. Patient reports he has missed consecutive days of work since 3/8/24. Paperwork completed and faxed to employer.

## 2024-03-21 NOTE — TELEPHONE ENCOUNTER
[employer] form filled out with following information:    Medication:  IFX   Dose: 5mg/kg = 360 mg   Frequency: q8wks   Provider: Dr. Sanchez   When is lab due: 4/5/24 or after   Does the patient need a lab appointment: Yes     Scanned into patient chart. Lab order is in. Added to patient list. Copy also sent via fax to [phone].      Yeny Morales LPN

## 2024-03-21 NOTE — TELEPHONE ENCOUNTER
----- Message from Liliana Johnson RN sent at 3/21/2024  3:08 PM CDT -----  Regarding: Predict PK  Hello,    Can you please fill out and scan into the patients chart a [employer] lab order form for this patient?    PredictrPK - date of most recent infusion: 3/15/24    Medication:  IFX  Dose: 5mg/kg = 360 mg  Frequency: q8wks  Provider: Dr. Sanchez  When is lab due: 4/5/24 or after  Does the patient need a lab appointment: Yes    Thank you,  Oswaldo

## 2024-03-21 NOTE — LETTER
3/21/2024         RE: Dennis J Goodell  [address]   [place] [address]        Dear Colleague,    Thank you for referring your patient, Dennis J Goodell, to the [facility] [place]. Please see a copy of my visit note below.      IBD CLINIC VISIT    CC/REFERRING MD:  Helder Sanchez    REASON FOR CONSULTATION: follow up CD    ASSESSMENT/PLAN:    1. Constipation   2. Alternating bowel habits   3. Lower abdominal pain   Fluctuating bowel habits between constipation and then days of diarrhea/emptying. Lower abdominal pain exacerbated by constipation episodes. No inflammation on recent CT scan to suggest active Crohn's disease. Most suspicion of underlying constipation driving symptoms at this time. He had improvement in symptoms after bowel prep last summer for colonoscopy. May also have some underlying IBS/visceral hypersensitivity.   - Home bowel clean out with miralax and gatorade  - After clean out, start miralax 17g daily with goal of 1-2 soft bowel movements daily   - continue dicyclomine as needed for abdominal pain - discussed that this can be constipating.   - continue amitriptyline       4. CD ileum and colon - on IFX 5 mg/kg q 8 weeks with good drug level. No active disease on EGD/colonoscopy 8/2023. Fecal calprotectin mildly elevated at recent ED visit (2/2024).   - MRE to assess for active disease  - Check IFX levels at next infusion  - Strongly counseled to avoid/minimize NSAIDs. Avoid if possible    5. Neck pain   6. Frequent NSAID use  Using NSAIDs on regular basis for chronic neck pain. Previously seen in pain clinic and no benefit with injections.  - Celebrex 100mg twice daily as needed (to replace Aleve and Excedrin)  - Deep tissue massage intermittently for symptoms     7. Duodenal stricture  Stricture noted in 2nd portion of duodenum on 8/2023 EGD. Likely NSAID induced  - Continue omeprazole 40mg BID  - Avoid NSAIDs as noted above   - May  consider dilation in future if ongoing abdominal pain despite optimization of constipation and Crohn's     8. History of zane-anal abscess - resolved. No residual fistula or abscess on exam      IBD HISTORY  Age at diagnosis: 47 (2022)  Extent of disease: colonic - possible zane-anal  Disease phenotype: inflammatory with possible penetrating  Zane-anal disease: zane-anal abscess  Current CD medications: IFX 5 mg/kg q 8 weeks  Prior IBD surgeries: none  Prior IBD Medications: prednisone, Entocort    DRUG MONITORING  TPMT enzyme activity: 19.2 (WNL)    6-TGN/6-MMPN levels: --    Biologic concentration:  -predict PK 6/22/23 - estimated trough 10 (on IFX 5mg/kg q 8)    DISEASE ASSESSMENT  Labs  Recent Labs   Lab Test 03/13/24  0626 01/12/24  1256 11/17/23  1328   SED  --  1 1   HGB 16.5 14.7 14.2     Fecal calprotectin: 232 Feb 2024 (previously 30s - 5/30/23)   - EGD 8/2023 - peptic stricture in second portion of duodenum - no active Crohn's on biopsy of stricture   -colonoscopy 8/2023 - no active inflammation seen to explain RUQ pain  -colonoscopy 12/2022 - normal TI (normal biopsies) - inflammation/stricture 5-6 cm distal to IC valve - able to be passed. Remainder colon normal? (details of scope limited on documentation). 5mm adenoma in rectum. Biopsies TI normal. Right colon chronic active (mild) c/w IBD and left colon normal  Enterography:   - MRE 2/14/23 - IMPRESSION:  Mucosal thickening and hyperemia of the terminal ileum,  compatible with the prior diagnosis of Crohn disease. No evidence of  associated stricture or fistula.  C diff: negative    sIBDQ:   IBDQ Score Date IBDQ - Total Score  (Higher score better)   7/18/2023  12:31 PM Incomplete   6/1/2023   4:13 AM 34       IBD Health Care Maintenance:    Not discussed    Misc:  -- Avoid tobacco use  -- Avoid NSAIDs as there is potentially a 25% chance of causing an IBD flare    Return to clinic in June 2024 as previously scheduled with Dr. Sanchez    Patient seen  "and discussed with Dr. Sanchez.     Rodríguez Villagran MD  GI fellow       HPI:   Patient was last seen by Dr. Sanchez in July 2023 and at that time was having ongoing RUQ pain. RUQ US was unremarkable. He underwent EGD and colonoscopy. EGD showed likely peptic stricture in second portion of duodenum. No inflammation or ulcers in stomach or duodenum. Stricture biopsy showed erosive peptic type injury. No active inflammation on colonoscopy. He reports stool consistency and pain was better or a period of time after colonoscopy. Then reports \"roller coaster\" of diarrhea and constipation symptoms. Reports he will go a few days without a bowel movement and then will pass hard stool followed by diarrhea. He had bright red blood one time after passing hard stool but has not noticed blood again.Takes miralax and fiber as needed but not consistently. He will then have \"flare\" of diarrhea about 1 time per week after passing hard stool were he has 8-10 bowel movements in 2 hour period. Takes lomotil during this time and then becomes constipated again. Lower abdominal pain is worse with constipation episodes.    He went to outside ED for pain and constipation symptoms. Prior to constipation symptoms he had been taking cold medicines for COVID symptoms. Labs were unremarkable. CT scan showed increased stool burden. Fecal calprotectin was collected and mildly elevated at 232 (34 in May 2023).    He is taking Aleve and Excedrin every few days for chronic neck pain. Previously seen in pain clinic and did not have improvement with injections. He has been told that fusion would be next option but this would be extensive surgery. He finds benefit from deep massage.     No change in abdominal pain with eating. Continues to take omeprazole. Also taking amitryptyline at night 10-20mg (not every night) and hasn't noticed benefit.       Extra intestinal manifestations of IBD:  No uveitis/episcleritis  No aphthous ulcers   No arthritis   No " erythema nodosum/pyoderma gangrenosum.     PERTINENT PAST MEDICAL HISTORY:  Past Medical History:   Diagnosis Date    Anxiety disorder 2012    Dysthymic disorder     No Comments Provided    Malignant neoplasm of testis (H) 2005 2005,teratoma    Raynaud's syndrome without gangrene     No Comments Provided    Uncomplicated alcohol abuse     7/25/2012       PREVIOUS SURGERIES:  Past Surgical History:   Procedure Laterality Date    COLONOSCOPY N/A 12/12/2022    Procedure: COLONOSCOPY, WITH POLYPECTOMY AND BIOPSY;  Surgeon: Solomon Arciniega MD;  Location:  OR    COLONOSCOPY N/A 8/10/2023    Procedure: COLONOSCOPY, WITH POLYPECTOMY AND BIOPSY;  Surgeon: Helder Sanchez MD;  [place] [facility] [place]    DECOMPRESSION CUBITAL TUNNEL Left 01/2019    ESOPHAGOSCOPY, GASTROSCOPY, DUODENOSCOPY (EGD), COMBINED N/A 8/10/2023    Procedure: ESOPHAGOGASTRODUODENOSCOPY, WITH BIOPSY;  Surgeon: Helder Sanchez MD;  Location: [facility] OR    HERNIA REPAIR      ,HERNIA REPAIR    IR CHEST PORT PLACEMENT > 5 YRS OF AGE Left 04/01/2008    LYMPH NODE BIOPSY  09/05/2008     [facility]. 37 lymph nodes excised    ORCHIECTOMY SCROTAL Right 12/30/2005    teratoma    OTHER SURGICAL HISTORY      9/05/08,[id number],OTHER    RELEASE CARPAL TUNNEL  04/09/2013       PREVIOUS ENDOSCOPY:  Results for orders placed or performed during the hospital encounter of 08/10/23   COLONOSCOPY   Result Value Ref Range    COLONOSCOPY       [facility]  [address] [place], [place] [address]., [place] [address] [phone]     Endoscopy Department  _______________________________________________________________________________  Patient Name: Dennis Goodell          Procedure Date: 8/10/2023 10:41 AM  MRN: [id number]                       Account Number: [id number]  YOB: 1975               Admit Type: Outpatient  Age: 48                               [place] [facility] [place] [facility]  Gender: Male                          Note Status:  Finalized  Attending MD: CRIS MADSEN MD,   Total Sedation Time:   _______________________________________________________________________________     Procedure:             Colonoscopy  Indications:           Assess therapeutic response to therapy of Crohn's                          disease of the small bowel and colon  Providers:             CRIS MADSEN MD, Roma Hamilton, RN,                          Leon Concepcion, CRNA  Referring MD:          TALYA UMAÑA MD  Requesting Provider:   TALYA GALINDO MD  Medicines:             Monitored Anesthesia Care  Complications:         No immediate complications.  _______________________________________________________________________________  Procedure:             Pre-Anesthesia Assessment:                         - See EPIC H and P note                         After obtaining informed consent, the colonoscope was                          passed under direct vision. Throughout the procedure,                          the patient's blood pressure, pulse, and oxygen                          saturations were monitored continuously. The                          Colonoscope was introduced through the anus and                          advanced to the terminal ileum, with identification of                          the appendiceal orifice and IC valve. The colonoscopy                          was performed without difficulty. The patient                          tolerated the procedure well. The  quality of the bowel                          preparation was adequate to identify polyps greater                          than 5 mm in size.                                                                                   Findings:       The perianal and digital rectal examinations were normal.       The Simple Endoscopic Score for Crohn's Disease was determined based on        the endoscopic appearance of the mucosa in the following segments:        - Ileum: Findings include no ulcers present, no ulcerated surfaces, no        affected surfaces and no narrowings. Segment score: 0.       - Right Colon: Findings include no ulcers present, no ulcerated        surfaces, no affected surfaces and no narrowings. Segment score: 0.       - Transverse Colon: Findings include no ulcers present, no ulcerated        surfaces, no affected surfaces and no narrowings. Segment score: 0.       - Left Colon: Findings include no ulcers present, no ulcerated surfaces,        no affected surfaces and no na rrowings. Segment score: 0.       - Rectum: Findings include no ulcers present, no ulcerated surfaces, no        affected surfaces and no narrowings. Segment score: 0.       - Total SES-CD aggregate score: 0. Biopsies were taken with a cold        forceps for histology. Separate biopsies taken of the ileum, right        colon, left colon and rectum and placed in separate jars.       Two flat polyps were found in the transverse colon. The polyps were 3 to        7 mm in size. These polyps were removed with a cold snare. Resection and        retrieval were complete.       The exam was otherwise without abnormality on direct and retroflexion        views.                                                                                   Impression:            - Simple Endoscopic Score for Crohn's Disease: 0,                          mucosal inflammatory changes secondary to Crohn's                          disease, in remission. Biopsied.                         - Two 3 to 7 mm polyps in t he transverse colon,                          removed with a cold snare. Resected and retrieved.                         - The examination was otherwise normal on direct and                          retroflexion views.                         No active inflammation seen. Perhaps some subtle                          scarring in ascending colon and terminal ileum. No                           strictures and no active inflammation. No reason for                          RUQ pain found on this exam.  Recommendation:        - Discharge patient to home (with escort).                         - Resume previous diet.                         - Continue present medications.                         - Await pathology results.                         - Repeat colonoscopy in 5 years for surveillance based                          on pathology results.                         - See same day EGD report for additional findings are                          recommendations                                                                                      Electronically Signed by: Dr. Helder Madsen  ___________________________  HELDER MADSEN MD  8/10/2023 2:15:55 PM  I was physically present for the entire viewing portion of the exam.  __________________________  Signature of teaching physician  HELDER MADSEN MD  Number of Addenda: 0    Note Initiated On: 8/10/2023 10:41 AM  Scope In: 1:32:44 PM  Scope Out: 1:58:51 PM     ]    ALLERGIES:   No Known Allergies    PERTINENT MEDICATIONS:    Current Outpatient Medications:     albuterol (PROAIR HFA/PROVENTIL HFA/VENTOLIN HFA) 108 (90 Base) MCG/ACT inhaler, Inhale 2 puffs into the lungs every 6 hours as needed for shortness of breath / dyspnea or wheezing, Disp: , Rfl:     ALPRAZolam (XANAX) 1 MG tablet, Take 1 mg by mouth 2 times daily as needed for anxiety (Patient not taking: Reported on 3/18/2024), Disp: , Rfl:     amitriptyline (ELAVIL) 10 MG tablet, TAKE 1 TO 2 TABLETS BY MOUTH NIGHTLY AT BEDTIME (Patient not taking: Reported on 3/18/2024), Disp: 30 tablet, Rfl: 3    Aspirin-Acetaminophen-Caffeine (EXCEDRIN MIGRAINE PO), , Disp: , Rfl:     bismuth subsalicylate (PEPTO BISMOL) 262 MG/15ML suspension, Take 15 mLs by mouth every 6 hours as needed for indigestion, Disp: , Rfl:     calcium carbonate (TUMS) 500 MG chewable tablet, Take 1 chew tab by mouth 4  times daily as needed for heartburn, Disp: , Rfl:     clonazePAM (KLONOPIN) 1 MG tablet, Take 1 mg by mouth 3 times daily, Disp: , Rfl:     desvenlafaxine succinate (PRISTIQ) 100 MG 24 hr tablet, Take 100 mg by mouth daily, Disp: , Rfl:     dicyclomine (BENTYL) 10 MG capsule, TAKE 2 CAPSULES(20 MG) BY MOUTH TWICE DAILY AS NEEDED FOR ABDOMINAL PAIN (Patient not taking: Reported on 3/18/2024), Disp: 120 capsule, Rfl: 4    diphenoxylate-atropine (LOMOTIL) 2.5-0.025 MG tablet, Take 1 tablet by mouth 4 times daily as needed for diarrhea, Disp: 90 tablet, Rfl: 3    inFLIXimab, Inject into the vein once for 1 dose, Disp: , Rfl:     NIFEdipine ER OSMOTIC (PROCARDIA XL) 90 MG 24 hr tablet, Take 1 tablet (90 mg) by mouth daily, Disp: 90 tablet, Rfl: 0    omeprazole (PRILOSEC) 40 MG DR capsule, Take 1 capsule (40 mg) by mouth 2 times daily (before meals), Disp: 60 capsule, Rfl: 11    polyethylene glycol (MIRALAX) 17 GM/Dose powder, Take 17 g (1 Capful) by mouth daily (Patient not taking: Reported on 3/18/2024), Disp: 578 g, Rfl: 11    Probiotic Product (FLORAJEN DIGESTION) CAPS, Take 1 capsule by mouth daily, Disp: 30 capsule, Rfl: 11    psyllium (METAMUCIL/KONSYL) 58.6 % powder, Take 36 g (2 Tablespoonful) by mouth daily, Disp: , Rfl:     sucralfate (CARAFATE) 1 GM tablet, Take 1 tablet (1 g) by mouth 2 times daily as needed (abdominal pain), Disp: 360 tablet, Rfl: 3    Current Facility-Administered Medications:     ondansetron (ZOFRAN) injection 4 mg, 4 mg, Intravenous, Once, Solomon Arciniega MD    SOCIAL HISTORY:  Social History     Socioeconomic History    Marital status:      Spouse name: Jillian    Number of children: 4    Years of education: 13    Highest education level: Not on file   Occupational History    Occupation: underground utilities/dirt work     Employer: [employer]   Tobacco Use    Smoking status: Never    Smokeless tobacco: Current     Types: Chew    Tobacco comments:     1 tin lasts 1 week    Vaping Use    Vaping Use: Never used   Substance and Sexual Activity    Alcohol use: Not Currently     Alcohol/week: 6.0 standard drinks of alcohol     Types: 6 Cans of beer per week    Drug use: Never    Sexual activity: Yes     Partners: Female     Birth control/protection: Surgical   Other Topics Concern    Parent/sibling w/ CABG, MI or angioplasty before 65F 55M? Not Asked   Social History Narrative    He is , 4  Girls.    Wife runs a .     Social Determinants of Health     Financial Resource Strain: Not on file   Food Insecurity: Not on file   Transportation Needs: Not on file   Physical Activity: Not on file   Stress: Not on file   Social Connections: Not on file   Interpersonal Safety: Not on file   Housing Stability: Not on file       FAMILY HISTORY:  Family History   Problem Relation Age of Onset    Pulmonary Embolism Mother     Other - See Comments Daughter         recurrent OM    Other - See Comments Other         Suicidality,maternal uncle    No Known Problems Father        Past/family/social history reviewed and no changes    PHYSICAL EXAMINATION:  Vitals reviewed: There were no vitals taken for this visit.  Wt:   Wt Readings from Last 2 Encounters:   03/15/24 75.1 kg (165 lb 9.6 oz)   03/13/24 72.6 kg (160 lb)      GENERAL: alert and no distress  EYES: Eyes grossly normal to inspection.  No discharge or erythema, or obvious scleral/conjunctival abnormalities.  RESP: No audible wheeze, cough, or visible cyanosis.    SKIN: Visible skin clear. No significant rash, abnormal pigmentation or lesions.  NEURO: alert and oriented, fluent speech, no focal deficits   PSYCH: Appropriate affect        PERTINENT STUDIES:  Most recent CBC:  Recent Labs   Lab Test 03/13/24  0626 01/12/24  1256   WBC 8.2 7.0   HGB 16.5 14.7   HCT 46.4 41.5    272     Most recent hepatic panel:  Recent Labs   Lab Test 03/13/24  0626 01/12/24  1256   ALT 12 13   AST 18 22     Most recent creatinine:  Recent Labs    Lab Test 03/13/24  0626 11/17/23  1328   CR 1.00 0.81         Attestation signed by Helder Sanchez MD at 3/26/2024  5:14 PM:  ATTENDING ATTESTATION:     DATE SEEN: 3/21/24    Patient was discussed, seen, and examined by me, Helder Sanchez. The plan of care and pertinent data/imaging were reviewed with Dr. Villagran. I agree with the assessment and plan as delineated above with the following additions:     Symptoms are most suggestive of constipation with overflow diarrhea. Will work to maximize treatment of this. Obtain MRE to look for any evidence of active IBD (colonoscopy last summer was good with endoscopic healing). Discussed need to avoid ibuprofen and excedrin. Will give celebrex to replace this. Consider re-evaluation of duodenal stricture in future if no other reason for symptoms.    The plan was discussed with the patient in detail. All questions answered to his stated satisfaction.    Thank you for this consultation.  It was a pleasure to participate in the care of this patient; please contact us with any further questions.  I spent a total of 40 minutes during the day of encounter performed chart review, meeting with patient, patient counseling, care coordination, and documentation.      Please contact me with any further questions.      Again, thank you for allowing me to participate in the care of your patient.      Sincerely,    Helder Sanchez MD

## 2024-03-25 ENCOUNTER — TELEPHONE (OUTPATIENT)
Dept: GASTROENTEROLOGY | Facility: CLINIC | Age: 49
End: 2024-03-25
Payer: COMMERCIAL

## 2024-03-25 NOTE — TELEPHONE ENCOUNTER
Patient reports the recent paperwork for Kenpina Smith was not received (clinic note unsigned, therefore documents had not yet been faxed).    Inbasket message sent to clinic support staff requesting paperwork be sent.

## 2024-03-26 NOTE — PATIENT INSTRUCTIONS
"Recommendations from today's MTM visit:                                                       Will review vaccines per chart and send recommendations in AVS:   -- Prevnar-20 for pneumonia   -- Shingrix series for shingles prevention   -- we can consider re-drawing your hepatitis B surface antibody to help determine if you are no longer immune to hepatitis B     2.  We will consider checking a level with your next infusion.   -- Update: Reggie was scheduled with Dr. Sanchez with the following plan:  \"- MRE to assess for active disease  - Check IFX levels at next infusion  - Strongly counseled to avoid/minimize NSAIDs. Avoid if possible\"    Follow-up: after results of infusion/MRE   -- 3 months for MTM    It was great speaking with you today.  I value your experience and would be very thankful for your time in providing feedback in our clinic survey. In the next few days, you may receive an email or text message from [employer] with a link to a survey related to your  clinical pharmacist.\"     To schedule another MTM appointment, please call the clinic directly or you may call the MTM scheduling line at [phone].    My Clinical Pharmacist's contact information:                                                      Please feel free to contact me with any questions or concerns you have.      Christiana RonquilloD, BCACP  MTM Pharmacist   [facility]   Phone: [phone]    "

## 2024-04-03 ENCOUNTER — TELEPHONE (OUTPATIENT)
Dept: GASTROENTEROLOGY | Facility: CLINIC | Age: 49
End: 2024-04-03
Payer: COMMERCIAL

## 2024-04-03 NOTE — TELEPHONE ENCOUNTER
----- Message from Liliana Johnson RN sent at 4/3/2024  3:05 PM CDT -----  Regarding: MRE  Good afternoon,    Can we please fax this patient's MRE order to [facility] Imaging department asap?    Fax number: [phone]    Thank you!!  Oswaldo

## 2024-04-03 NOTE — TELEPHONE ENCOUNTER
Patient requested labs to be sent to their outside lab.     Clinic: [facility] Imaging department      Fax: [phone]      Faxed:   -MR Enterography wo and w Contrast      Confirmed receipt to the facility? Called imaging department to check if order was received. Spoke to [facility] and confirmed they have the order but have not reached out to patient to schedule yet.      Yeny Morales LPN

## 2024-04-03 NOTE — TELEPHONE ENCOUNTER
Helder Sanchez MD  You3 minutes ago (2:59 PM)     JH  Ok to do it. Then can do over read if needed    J       High priority inbasket message sent to clinic support staff requesting order be faxed to [facility] - [phone].

## 2024-04-03 NOTE — TELEPHONE ENCOUNTER
Received confirmation from clinic support pool that the order has been faxed.     Updated patient via [employer].

## 2024-04-03 NOTE — TELEPHONE ENCOUNTER
"Received numerous calls (x6) in a row from this patient while on the phone with another patient. Patient did not leave any voicemails.     Received another call from the patient and writer was able to answer at that time. Strongly encouraged the patient to leave a voicemail in the future rather than calling repeatedly.     Patient apologetic and reports he is anxious and his \"job is on the line.\"    States he has used up all of this FMLA time and is reportedly going to lose his job.     Reports he has to get the MRE completed sooner per his employer and [facility] has availability next week.    Currently scheduled at [facility] on 4/22.     Writer contacted imaging scheduling and was not able to find a sooner appointment slot at any locations.    Routed to Dr. Sanchez to confirm we can schedule at [facility].  "

## 2024-04-05 ENCOUNTER — TELEPHONE (OUTPATIENT)
Dept: GASTROENTEROLOGY | Facility: CLINIC | Age: 49
End: 2024-04-05

## 2024-04-05 ENCOUNTER — LAB (OUTPATIENT)
Dept: LAB | Facility: OTHER | Age: 49
End: 2024-04-05
Attending: INTERNAL MEDICINE
Payer: COMMERCIAL

## 2024-04-05 DIAGNOSIS — K50.10 CROHN'S DISEASE OF LARGE INTESTINE WITHOUT COMPLICATION (H): ICD-10-CM

## 2024-04-05 LAB
ALBUMIN SERPL BCG-MCNC: 5.3 G/DL (ref 3.5–5.2)
ALP SERPL-CCNC: 64 U/L (ref 40–150)
ALT SERPL W P-5'-P-CCNC: 29 U/L (ref 0–70)
AST SERPL W P-5'-P-CCNC: 22 U/L (ref 0–45)
BASOPHILS # BLD AUTO: 0.1 10E3/UL (ref 0–0.2)
BASOPHILS NFR BLD AUTO: 1 %
BILIRUB DIRECT SERPL-MCNC: <0.2 MG/DL (ref 0–0.3)
BILIRUB SERPL-MCNC: 0.4 MG/DL
CRP SERPL-MCNC: <3 MG/L
EOSINOPHIL # BLD AUTO: 0.1 10E3/UL (ref 0–0.7)
EOSINOPHIL NFR BLD AUTO: 2 %
ERYTHROCYTE [DISTWIDTH] IN BLOOD BY AUTOMATED COUNT: 13.3 % (ref 10–15)
HCT VFR BLD AUTO: 44.8 % (ref 40–53)
HGB BLD-MCNC: 15.3 G/DL (ref 13.3–17.7)
IMM GRANULOCYTES # BLD: 0 10E3/UL
IMM GRANULOCYTES NFR BLD: 0 %
LYMPHOCYTES # BLD AUTO: 2.3 10E3/UL (ref 0.8–5.3)
LYMPHOCYTES NFR BLD AUTO: 32 %
MCH RBC QN AUTO: 28.9 PG (ref 26.5–33)
MCHC RBC AUTO-ENTMCNC: 34.2 G/DL (ref 31.5–36.5)
MCV RBC AUTO: 85 FL (ref 78–100)
MONOCYTES # BLD AUTO: 0.6 10E3/UL (ref 0–1.3)
MONOCYTES NFR BLD AUTO: 8 %
NEUTROPHILS # BLD AUTO: 4.1 10E3/UL (ref 1.6–8.3)
NEUTROPHILS NFR BLD AUTO: 57 %
NRBC # BLD AUTO: 0 10E3/UL
NRBC BLD AUTO-RTO: 0 /100
PLATELET # BLD AUTO: 277 10E3/UL (ref 150–450)
PROT SERPL-MCNC: 8.1 G/DL (ref 6.4–8.3)
RBC # BLD AUTO: 5.29 10E6/UL (ref 4.4–5.9)
WBC # BLD AUTO: 7.2 10E3/UL (ref 4–11)

## 2024-04-05 PROCEDURE — 86140 C-REACTIVE PROTEIN: CPT | Mod: ZL

## 2024-04-05 PROCEDURE — 36415 COLL VENOUS BLD VENIPUNCTURE: CPT | Mod: ZL

## 2024-04-05 PROCEDURE — 80076 HEPATIC FUNCTION PANEL: CPT | Mod: ZL

## 2024-04-05 PROCEDURE — 85025 COMPLETE CBC W/AUTO DIFF WBC: CPT | Mod: ZL

## 2024-04-05 PROCEDURE — 80230 DRUG ASSAY INFLIXIMAB: CPT | Mod: ZL

## 2024-04-05 PROCEDURE — 82542 COL CHROMOTOGRAPHY QUAL/QUAN: CPT

## 2024-04-05 NOTE — TELEPHONE ENCOUNTER
Received voicemail requesting callback.     Patient was at the lab today to have Predict Pk drawn and  was unfamiliar with the order.    Writer was able to connect with  via secure chat and answer questions.     Updated patient regarding the potential cost of the MRE if he completes it at [facility] (scheduled 4/11) and we order an overread. Provided the patient with the cost of care line via [employer]     Patient wanted to confirm if his insurance information in the chart is correct -- inDoPaysket message sent to clinic support pool for assistance.

## 2024-04-08 ENCOUNTER — TELEPHONE (OUTPATIENT)
Dept: GASTROENTEROLOGY | Facility: CLINIC | Age: 49
End: 2024-04-08
Payer: COMMERCIAL

## 2024-04-08 ENCOUNTER — TELEPHONE (OUTPATIENT)
Dept: MRI IMAGING | Facility: OTHER | Age: 49
End: 2024-04-08
Payer: COMMERCIAL

## 2024-04-08 ENCOUNTER — MYC MEDICAL ADVICE (OUTPATIENT)
Dept: GASTROENTEROLOGY | Facility: CLINIC | Age: 49
End: 2024-04-08
Payer: COMMERCIAL

## 2024-04-08 DIAGNOSIS — R11.0 NAUSEA: Primary | ICD-10-CM

## 2024-04-08 RX ORDER — ONDANSETRON 4 MG/1
4 TABLET, ORALLY DISINTEGRATING ORAL EVERY 6 HOURS PRN
Qty: 4 TABLET | Refills: 0 | Status: SHIPPED | OUTPATIENT
Start: 2024-04-08

## 2024-04-08 NOTE — TELEPHONE ENCOUNTER
Writer received a message from GERALDINE CHILEL to help call and verify Pt's health insurance coverage. Writer called and talked with Pt. Pt's health insurance coverage is active and up-to-date.

## 2024-04-08 NOTE — LETTER
4/8/2024         RE: Dennis J Goodell  [address]  [place] [address]        To Whom It May Concern:         Dennis Goodell is currently a patient in my care in the Gastroenterology Clinic at [facility]. Reggie has been on a leave of absence from work to address an acute worsening of his chronic disease state. During this time, I have prescribed treatment and monitored the patient closely. This time away from work has allowed the patient sufficient time for rest and recovery. At this time, Reggie is demonstrating significant clinical improvement and can safely return to work.          For questions, please call my office at [phone].      Sincerely,    Helder Sanchez MD      Infl[facility] Clinic   [facility]   Division of Gastroenterology, Hepatology and Nutrition

## 2024-04-08 NOTE — TELEPHONE ENCOUNTER
Received a voicemail from [facility] imaging RN requesting an order for an antiemetic for this patient's upcoming MRE on 4/11.     Patient had MRE previously in February 2023, PRN Ondansetron was prescribed.     Routed to Dr. Sanchez for approval.

## 2024-04-08 NOTE — TELEPHONE ENCOUNTER
Received voicemail from patient reporting he accidentally accepted a sooner appointment with Dr. Sanchez via automated system.     Was previously scheduled on 6/27; appointment moved to 4/9.    Request sent to GI navigator to correct appointment reschedule.

## 2024-04-08 NOTE — TELEPHONE ENCOUNTER
MRI ENTEROGRAM  Procedure name: MRI Enterogram  Procedure date verified: Yes, 4/11/24.  Arrival time verified: 1:15 PM   Facility location verified: [address] [facility], [place], [place]. Instructed to enter through main clinic entrance, wear a mask, and proceed to Diagnostic Registration.  Plan to be here for approximately 2-2.5 hours. Explain current visitor policy to patient.    No COVID test required for this procedure.     No labs required for this procedure.    Are you allergic to glucagon or lactose? Do you have a condition called insulinoma? Do you have pheochromocytoma? no If yes to any question, glucagon is contraindicated. Inform MRI tech so that rad can decide whether or not to do MRI without glucagon.     Do you have any history of kidney disease? no If so, notify MRI tech (some contrasts affect kidney function).    Any insulin or oral antihyperglycemic meds? No. If so, hold according to policy day of procedure (typically hold any AM diabetic meds r/t NPO status).    Inform patient someone will contact them to review an MRI safety checklist. This is in regards to any metallic items in their body.     Nothing to eat or drink after [place] AM. (8 hours prior to start of procedure.)  Small sip of water with pills is ok. It is ok to take any anti-emetic medications.     On the day of the imaging test, you will arrive and begin drinking contrast solution. An IV will be placed. You will be given a medication to relax the bowel. It can make you feel sick to your stomach. We will give the medication very slowly to help minimize nausea. You will be assisted into the MRI suite where you will lie down. The MRI staff will begin to image your bowel. The imaging usually takes about 30 minutes. After the procedure, we will remove the IV, and we will give you discharge instructions. We want you to drink lots of extra water after your procedure and avoid sugary beverages for the rest of the day. Do not exercise or perform  strenuous activity for 4 hours because you may overheat. The doctor who ordered your study will contact you with results. Results are usually ready within 1-2 days. We'd like you to have a  after the procedure since you may not feel well.     Instructions given: expectations for procedure, expectations for recovery, dietary restrictions, and .  Pre-procedure teaching completed: Yes  Method: verbal per phone.  People present for teaching: Patient  Response to teaching: patient demonstrates understanding of procedure, recovery, dietary restrictions, and .  Transportation after procedure: will arrange transport.  Any questions or patient concerns? Yes, all questions answered.    Message sent to [facility] team for possible antiemetic medication the day of his procedure.

## 2024-04-11 ENCOUNTER — HOSPITAL ENCOUNTER (OUTPATIENT)
Dept: MRI IMAGING | Facility: OTHER | Age: 49
Discharge: HOME | End: 2024-04-11
Attending: INTERNAL MEDICINE | Admitting: INTERNAL MEDICINE
Payer: COMMERCIAL

## 2024-04-11 ENCOUNTER — TRANSFERRED RECORDS (OUTPATIENT)
Dept: HEALTH INFORMATION MANAGEMENT | Facility: CLINIC | Age: 49
End: 2024-04-11
Payer: COMMERCIAL

## 2024-04-11 VITALS — OXYGEN SATURATION: 97 % | HEART RATE: 89 BPM

## 2024-04-11 DIAGNOSIS — K50.10 CROHN'S DISEASE OF LARGE INTESTINE WITHOUT COMPLICATION (H): ICD-10-CM

## 2024-04-11 PROCEDURE — A9575 INJ GADOTERATE MEGLUMI 0.1ML: HCPCS | Mod: JZ | Performed by: INTERNAL MEDICINE

## 2024-04-11 PROCEDURE — 255N000002 HC RX 255 OP 636: Mod: JZ | Performed by: INTERNAL MEDICINE

## 2024-04-11 PROCEDURE — 250N000011 HC RX IP 250 OP 636: Performed by: INTERNAL MEDICINE

## 2024-04-11 PROCEDURE — 74183 MRI ABD W/O CNTR FLWD CNTR: CPT

## 2024-04-11 RX ORDER — GADOTERATE MEGLUMINE 376.9 MG/ML
15 INJECTION INTRAVENOUS ONCE
Status: COMPLETED | OUTPATIENT
Start: 2024-04-11 | End: 2024-04-11

## 2024-04-11 RX ADMIN — Medication 0.5 MG: at 15:42

## 2024-04-11 RX ADMIN — GADOTERATE MEGLUMINE 15 ML: 376.9 INJECTION INTRAVENOUS at 16:07

## 2024-04-11 RX ADMIN — Medication 0.5 MG: at 15:18

## 2024-04-11 NOTE — DISCHARGE INSTRUCTIONS
MR ENTEROGRAM    Magnetic resonance (MR) enterography is an imaging test which produces detailed pictures of your small intestine. It may help your doctor diagnose inflammation, bleeding, obstructions and other problems. It is noninvasive and does not use ionizing radiation.    The exam uses a magnetic field to create detailed images of your organs. A computer analyzes the images. Before the exam, oral and intravenous contrast material are administered to highlight the small intestine. A drug may also be administered to decrease movement of the bowel which can interfere with the images.    MEDICATIONS    Take your medications as directed by your doctor. If you have questions about your medications, please contact your doctor's office directly. If you held your diabetic medications, you can resume them as usual after this exam.    DIET    Drink plenty of water after your procedure. Avoid sugary drinks or regular soda. If you are diabetic, monitor your blood sugars closely for 24-48 hours after the exam.     ACTIVITY    Take it easy today. Avoid vigorous exercise for at least 4 hours after this exam. You may experience unpleasant overheating.    COMFORT    If you have nausea, vomiting, or loose stools, eat bland foods such as soda crackers, rice, banana, or clear soup broth. Take sips of water or sugar-free clear liquids. Watch for dehydration, and treat it early. Signs of dehydration include being thirstier than usual and having less urine than usual. Older adults and young children can quickly get dehydrated. Rest in bed until you feel better.    RESULTS    Results take about 1-2 days. The doctor who ordered this test will contact you with results and help to determine a plan to treat your condition, if applicable. If you haven't heard from your doctor 48 hours after results, please contact your doctor's office.    AFTER THE PROCEDURE    Though unpleasant, these side effects are not  uncommon:    Nausea/vomiting  Loose stools/diarrhea over the next 24 hours  Dizziness  Dry mouth  Drowsiness  Overheating with exercise    WHEN TO RETURN    Return to an emergency room if you experience any of the following:    If you think you are having an allergic reaction to the contrast medication such as:  Rash   Difficulty breathing or swallowing    QUESTIONS, PROBLEMS, OR CONCERNS    Please contact your doctor directly or leave a message for a doctor at [facility] by calling [phone].    [facility] Imaging Nurse [phone]  Monday - Friday 8:00 am - 4:30 pm

## 2024-04-11 NOTE — PROGRESS NOTES
Patient has been NPO x 8 hours prior to procedure. Verified that patient has held medications as directed by MD.     MRI-safe IV started per facility protocol, saline locked.     Bowel sounds active in generalized.     Allergies and medications reviewed.     Fasting blood glucose NA.     First dose of glucagon given immediately prior to walking into MRI suite. Patient tolerated: well.     Assisted with prone positioning on MRI exam table. Patient states positioning is: comfortable.     Second dose of glucagon given immediately prior to contrast injection. Patient tolerated: well.     Adverse effects of medications: none.     After exam complete, IV removed per facility protocol. Discharge instructions given. Patient verbalized understanding of all instructions.

## 2024-04-15 ENCOUNTER — TELEPHONE (OUTPATIENT)
Dept: GASTROENTEROLOGY | Facility: CLINIC | Age: 49
End: 2024-04-15
Payer: COMMERCIAL

## 2024-04-15 LAB
SCAN LAB RESULTS (EXTERNAL): NORMAL
SCANNED LAB RESULT: NORMAL

## 2024-04-15 NOTE — TELEPHONE ENCOUNTER
Return to work letter completed.    [employer] message sent to Ms. Morales requesting letter be faxed to:    CLAUDIA Ornelas Director at [employer]   Fax number: [phone]     Updated patient via [employer].

## 2024-04-15 NOTE — TELEPHONE ENCOUNTER
----- Message from Liliana Johnson RN sent at 4/15/2024  2:16 PM CDT -----  Regarding: RE: Fax  Sorry! [phone]  ----- Message -----  From: Yeny Morales LPN  Sent: 4/15/2024   1:47 PM CDT  To: Liliana Johnson RN  Subject: RE: Fax                                          What's the fax number again? Missing a digit    Yeny Morales LPN  ----- Message -----  From: Liliana Johnson RN  Sent: 4/15/2024  12:28 PM CDT  To: [facility] Support Pool  Subject: Fax                                              Good afternoon,    I wrote a return to work letter for Reggie this morning.     Can we fax it to -    CLAUDIA Ornelas Director at [employer]   Fax number: [phone]    Thank you!!  Oswaldo

## 2024-05-10 ENCOUNTER — HOSPITAL ENCOUNTER (OUTPATIENT)
Dept: INFUSION THERAPY | Facility: OTHER | Age: 49
Discharge: HOME | End: 2024-05-10
Admitting: FAMILY MEDICINE
Payer: COMMERCIAL

## 2024-05-10 VITALS
WEIGHT: 170.4 LBS | DIASTOLIC BLOOD PRESSURE: 85 MMHG | SYSTOLIC BLOOD PRESSURE: 129 MMHG | RESPIRATION RATE: 16 BRPM | TEMPERATURE: 97.3 F | BODY MASS INDEX: 23.77 KG/M2 | HEART RATE: 76 BPM

## 2024-05-10 DIAGNOSIS — K50.10 CROHN'S DISEASE OF LARGE INTESTINE WITHOUT COMPLICATION (H): Primary | ICD-10-CM

## 2024-05-10 LAB
ALBUMIN SERPL BCG-MCNC: 4.7 G/DL (ref 3.5–5.2)
ALP SERPL-CCNC: 61 U/L (ref 40–150)
ALT SERPL W P-5'-P-CCNC: 11 U/L (ref 0–70)
AST SERPL W P-5'-P-CCNC: 21 U/L (ref 0–45)
BASOPHILS # BLD AUTO: 0.1 10E3/UL (ref 0–0.2)
BASOPHILS NFR BLD AUTO: 1 %
BILIRUB DIRECT SERPL-MCNC: <0.2 MG/DL (ref 0–0.3)
BILIRUB SERPL-MCNC: 0.2 MG/DL
CRP SERPL-MCNC: <3 MG/L
EOSINOPHIL # BLD AUTO: 0.4 10E3/UL (ref 0–0.7)
EOSINOPHIL NFR BLD AUTO: 5 %
ERYTHROCYTE [DISTWIDTH] IN BLOOD BY AUTOMATED COUNT: 13.2 % (ref 10–15)
ERYTHROCYTE [SEDIMENTATION RATE] IN BLOOD BY WESTERGREN METHOD: <0 MM/HR (ref 0–15)
HCT VFR BLD AUTO: 40.1 % (ref 40–53)
HGB BLD-MCNC: 13.7 G/DL (ref 13.3–17.7)
IMM GRANULOCYTES # BLD: 0 10E3/UL
IMM GRANULOCYTES NFR BLD: 0 %
LYMPHOCYTES # BLD AUTO: 2.9 10E3/UL (ref 0.8–5.3)
LYMPHOCYTES NFR BLD AUTO: 32 %
MCH RBC QN AUTO: 30.2 PG (ref 26.5–33)
MCHC RBC AUTO-ENTMCNC: 34.2 G/DL (ref 31.5–36.5)
MCV RBC AUTO: 89 FL (ref 78–100)
MONOCYTES # BLD AUTO: 1 10E3/UL (ref 0–1.3)
MONOCYTES NFR BLD AUTO: 11 %
NEUTROPHILS # BLD AUTO: 4.5 10E3/UL (ref 1.6–8.3)
NEUTROPHILS NFR BLD AUTO: 50 %
NRBC # BLD AUTO: 0 10E3/UL
NRBC BLD AUTO-RTO: 0 /100
PLATELET # BLD AUTO: 267 10E3/UL (ref 150–450)
PROT SERPL-MCNC: 7.1 G/DL (ref 6.4–8.3)
RBC # BLD AUTO: 4.53 10E6/UL (ref 4.4–5.9)
WBC # BLD AUTO: 9 10E3/UL (ref 4–11)

## 2024-05-10 PROCEDURE — 86140 C-REACTIVE PROTEIN: CPT | Performed by: INTERNAL MEDICINE

## 2024-05-10 PROCEDURE — 80076 HEPATIC FUNCTION PANEL: CPT | Performed by: INTERNAL MEDICINE

## 2024-05-10 PROCEDURE — 36415 COLL VENOUS BLD VENIPUNCTURE: CPT | Performed by: INTERNAL MEDICINE

## 2024-05-10 PROCEDURE — 258N000003 HC RX IP 258 OP 636: Performed by: INTERNAL MEDICINE

## 2024-05-10 PROCEDURE — 85025 COMPLETE CBC W/AUTO DIFF WBC: CPT | Performed by: INTERNAL MEDICINE

## 2024-05-10 PROCEDURE — 85652 RBC SED RATE AUTOMATED: CPT | Performed by: INTERNAL MEDICINE

## 2024-05-10 PROCEDURE — 250N000011 HC RX IP 250 OP 636: Mod: JZ | Performed by: INTERNAL MEDICINE

## 2024-05-10 PROCEDURE — 96413 CHEMO IV INFUSION 1 HR: CPT

## 2024-05-10 RX ORDER — METHYLPREDNISOLONE SODIUM SUCCINATE 125 MG/2ML
125 INJECTION, POWDER, LYOPHILIZED, FOR SOLUTION INTRAMUSCULAR; INTRAVENOUS
Status: CANCELLED
Start: 2024-07-05

## 2024-05-10 RX ORDER — MEPERIDINE HYDROCHLORIDE 50 MG/ML
25 INJECTION INTRAMUSCULAR; INTRAVENOUS; SUBCUTANEOUS EVERY 30 MIN PRN
Status: CANCELLED | OUTPATIENT
Start: 2024-07-05

## 2024-05-10 RX ORDER — ACETAMINOPHEN 325 MG/1
650 TABLET ORAL ONCE
Status: CANCELLED
Start: 2024-07-05 | End: 2024-07-05

## 2024-05-10 RX ORDER — DIPHENHYDRAMINE HYDROCHLORIDE 50 MG/ML
50 INJECTION INTRAMUSCULAR; INTRAVENOUS
Status: CANCELLED
Start: 2024-07-05

## 2024-05-10 RX ORDER — DIPHENHYDRAMINE HCL 25 MG
25 CAPSULE ORAL ONCE
Status: CANCELLED
Start: 2024-07-05 | End: 2024-07-05

## 2024-05-10 RX ORDER — EPINEPHRINE 1 MG/ML
0.3 INJECTION, SOLUTION, CONCENTRATE INTRAVENOUS EVERY 5 MIN PRN
Status: CANCELLED | OUTPATIENT
Start: 2024-07-05

## 2024-05-10 RX ORDER — ALBUTEROL SULFATE 0.83 MG/ML
2.5 SOLUTION RESPIRATORY (INHALATION)
Status: CANCELLED | OUTPATIENT
Start: 2024-07-05

## 2024-05-10 RX ORDER — ALBUTEROL SULFATE 90 UG/1
1-2 AEROSOL, METERED RESPIRATORY (INHALATION)
Status: CANCELLED
Start: 2024-07-05

## 2024-05-10 RX ADMIN — INFLIXIMAB 400 MG: 100 INJECTION, POWDER, LYOPHILIZED, FOR SOLUTION INTRAVENOUS at 10:15

## 2024-05-10 RX ADMIN — SODIUM CHLORIDE 250 ML: 9 INJECTION, SOLUTION INTRAVENOUS at 09:36

## 2024-05-10 NOTE — NURSING NOTE
Infusion Nursing Note:  Dennis J Goodell presents today for [facility].    Patient seen by provider today: No   present during visit today: Not Applicable.    Note: N/A.      Intravenous Access:  Labs drawn without difficulty.  Peripheral IV placed.    Treatment Conditions:  Biological Infusion Checklist:  ~~~ NOTE: If the patient answers yes to any of the questions below, hold the infusion and contact ordering provider or on-call provider.    Have you recently had an elevated temperature, fever, chills, productive cough, coughing for 3 weeks or longer or hemoptysis,  abnormal vital signs, night sweats,  chest pain or have you noticed a decrease in your appetite, unexplained weight loss or fatigue? No  Do you have any open wounds or new incisions? No  Do you have any upcoming hospitalizations or surgeries? Does not include esophagogastroduodenoscopy, colonoscopy, endoscopic retrograde cholangiopancreatography (ERCP), endoscopic ultrasound (EUS), dental procedures or joint aspiration/steroid injections No  Do you currently have any signs of illness or infection or are you on any antibiotics? No  Have you had any new, sudden or worsening abdominal pain? No  Have you or anyone in your household received a live vaccination in the past 4 weeks? Please note: No live vaccines while on biologic/chemotherapy until 6 months after the last treatment. Patient can receive the flu vaccine (shot only), pneumovax and the Covid vaccine. It is optimal for the patient to get these vaccines mid cycle, but they can be given at any time as long as it is not on the day of the infusion. No  Have you recently been diagnosed with any new nervous system diseases (ie. Multiple sclerosis, Guillain [place], seizures, neurological changes) or cancer diagnosis? Are you on any form of radiation or chemotherapy? No  Are you pregnant or breast feeding or do you have plans of pregnancy in the future? No  Have you been having any signs of  worsening depression or suicidal ideations?  (benlysta only) No  Have there been any other new onset medical symptoms? No  Have you had any new blood clots? (IVIG only) No      Post Infusion Assessment:  Patient tolerated infusion without incident.  Blood return noted pre and post infusion.  Site patent and intact, free from redness, edema or discomfort.  No evidence of extravasations.  Access discontinued per protocol.  Biologic Infusion Post Education: Call the triage nurse at your clinic or seek medical attention if you have chills and/or temperature greater than or equal to 100.5, uncontrolled nausea/vomiting, diarrhea, constipation, dizziness, shortness of breath, chest pain, heart palpitations, weakness or any other new or concerning symptoms, questions or concerns.  You cannot have any live virus vaccines prior to or during treatment or up to 6 months post infusion.  If you have an upcoming surgery, medical procedure or dental procedure during treatment, this should be discussed with your ordering physician and your surgeon/dentist.  If you are having any concerning symptom, if you are unsure if you should get your next infusion or wish to speak to a provider before your next infusion, please call your care coordinator or triage nurse at your clinic to notify them so we can adequately serve you.       Discharge Plan:   Discharge instructions reviewed with: Patient.  Patient and/or family verbalized understanding of discharge instructions and all questions answered.  AVS to patient via [employer].  Patient will return 7/5/24 for next appointment. Declined printed AVS  Patient discharged in stable condition accompanied by: self.  Departure Mode: Ambulatory.      Angela Swanson RN

## 2024-05-17 DIAGNOSIS — R10.11 RUQ ABDOMINAL PAIN: ICD-10-CM

## 2024-05-22 RX ORDER — AMITRIPTYLINE HYDROCHLORIDE 10 MG/1
TABLET ORAL
Qty: 30 TABLET | Refills: 3 | Status: SHIPPED | OUTPATIENT
Start: 2024-05-22

## 2024-05-22 NOTE — TELEPHONE ENCOUNTER
Routing refill request to provider for review/approval because:  Drug-Drug: amitriptyline and desvenlafaxine     Ines Lopez RN on 5/21/2024 at 8:16 PM

## 2024-06-21 DIAGNOSIS — K52.9 CHRONIC DIARRHEA: ICD-10-CM

## 2024-06-21 DIAGNOSIS — K50.10 CROHN'S DISEASE OF LARGE INTESTINE WITHOUT COMPLICATION (H): ICD-10-CM

## 2024-06-22 ENCOUNTER — MYC MEDICAL ADVICE (OUTPATIENT)
Dept: GASTROENTEROLOGY | Facility: CLINIC | Age: 49
End: 2024-06-22
Payer: COMMERCIAL

## 2024-06-22 DIAGNOSIS — K50.10 CROHN'S DISEASE OF LARGE INTESTINE WITHOUT COMPLICATION (H): Primary | ICD-10-CM

## 2024-06-24 PROCEDURE — 83993 ASSAY FOR CALPROTECTIN FECAL: CPT | Mod: ZL

## 2024-06-24 PROCEDURE — 87507 IADNA-DNA/RNA PROBE TQ 12-25: CPT | Mod: ZL

## 2024-06-25 ENCOUNTER — LAB (OUTPATIENT)
Dept: LAB | Facility: OTHER | Age: 49
End: 2024-06-25
Attending: INTERNAL MEDICINE
Payer: COMMERCIAL

## 2024-06-25 ENCOUNTER — HOSPITAL ENCOUNTER (EMERGENCY)
Facility: OTHER | Age: 49
End: 2024-06-25
Payer: COMMERCIAL

## 2024-06-25 DIAGNOSIS — K50.10 CROHN'S DISEASE OF LARGE INTESTINE WITHOUT COMPLICATION (H): ICD-10-CM

## 2024-06-25 LAB
ADV 40+41 DNA STL QL NAA+NON-PROBE: NEGATIVE
ASTRO TYP 1-8 RNA STL QL NAA+NON-PROBE: NEGATIVE
C CAYETANENSIS DNA STL QL NAA+NON-PROBE: NEGATIVE
CAMPYLOBACTER DNA SPEC NAA+PROBE: NEGATIVE
CRYPTOSP DNA STL QL NAA+NON-PROBE: NEGATIVE
E COLI O157 DNA STL QL NAA+NON-PROBE: NORMAL
E HISTOLYT DNA STL QL NAA+NON-PROBE: NEGATIVE
EAEC ASTA GENE ISLT QL NAA+PROBE: NEGATIVE
EC STX1+STX2 GENES STL QL NAA+NON-PROBE: NEGATIVE
EPEC EAE GENE STL QL NAA+NON-PROBE: NEGATIVE
ETEC LTA+ST1A+ST1B TOX ST NAA+NON-PROBE: NEGATIVE
G LAMBLIA DNA STL QL NAA+NON-PROBE: NEGATIVE
NOROVIRUS GI+II RNA STL QL NAA+NON-PROBE: NEGATIVE
P SHIGELLOIDES DNA STL QL NAA+NON-PROBE: NEGATIVE
RVA RNA STL QL NAA+NON-PROBE: NEGATIVE
SALMONELLA SP RPOD STL QL NAA+PROBE: NEGATIVE
SAPO I+II+IV+V RNA STL QL NAA+NON-PROBE: NEGATIVE
SHIGELLA SP+EIEC IPAH ST NAA+NON-PROBE: NEGATIVE
V CHOLERAE DNA SPEC QL NAA+PROBE: NEGATIVE
VIBRIO DNA SPEC NAA+PROBE: NEGATIVE
Y ENTEROCOL DNA STL QL NAA+PROBE: NEGATIVE

## 2024-06-25 NOTE — TELEPHONE ENCOUNTER
DIPHENOX/ATROP 2.5-0.025MG TAB     Last Written Prescription Date:  1/12/24  Last Fill Quantity: 90,   # refills: 3  Last Office Visit : 3/21/24 Daniel ERAZO  Future Office visit:  6/27/24    Routing refill request to provider for review/approval because:  Controlled substance

## 2024-06-26 LAB — CALPROTECTIN STL-MCNT: 150 MG/KG (ref 0–49.9)

## 2024-06-27 ENCOUNTER — PATIENT OUTREACH (OUTPATIENT)
Dept: GASTROENTEROLOGY | Facility: CLINIC | Age: 49
End: 2024-06-27

## 2024-06-27 ENCOUNTER — VIRTUAL VISIT (OUTPATIENT)
Dept: GASTROENTEROLOGY | Facility: CLINIC | Age: 49
End: 2024-06-27
Attending: INTERNAL MEDICINE
Payer: COMMERCIAL

## 2024-06-27 VITALS — BODY MASS INDEX: 23.62 KG/M2 | HEIGHT: 70 IN | WEIGHT: 165 LBS

## 2024-06-27 DIAGNOSIS — K52.9 CHRONIC DIARRHEA: ICD-10-CM

## 2024-06-27 DIAGNOSIS — K50.10 CROHN'S DISEASE OF LARGE INTESTINE WITHOUT COMPLICATION (H): Primary | ICD-10-CM

## 2024-06-27 DIAGNOSIS — K21.00 GASTROESOPHAGEAL REFLUX DISEASE WITH ESOPHAGITIS WITHOUT HEMORRHAGE: ICD-10-CM

## 2024-06-27 DIAGNOSIS — K31.5 DUODENAL STRICTURE: ICD-10-CM

## 2024-06-27 DIAGNOSIS — K59.00 CONSTIPATION, UNSPECIFIED CONSTIPATION TYPE: ICD-10-CM

## 2024-06-27 PROCEDURE — G2211 COMPLEX E/M VISIT ADD ON: HCPCS | Mod: 95 | Performed by: INTERNAL MEDICINE

## 2024-06-27 PROCEDURE — 99215 OFFICE O/P EST HI 40 MIN: CPT | Mod: 95 | Performed by: INTERNAL MEDICINE

## 2024-06-27 RX ORDER — DIPHENOXYLATE HCL/ATROPINE 2.5-.025MG
1 TABLET ORAL 4 TIMES DAILY PRN
Qty: 90 TABLET | Refills: 3 | Status: SHIPPED | OUTPATIENT
Start: 2024-06-27

## 2024-06-27 ASSESSMENT — PAIN SCALES - GENERAL: PAINLEVEL: MILD PAIN (3)

## 2024-06-27 NOTE — TELEPHONE ENCOUNTER
Infusion therapy plan updated to every 42 days, per Dr. Sanchez.    Patient currently scheduled for upcoming infusion at the 8 week interval - 7/5/24.    Will plan to proceed with authorization initiation for every 6 weeks following that infusion. Therefore, next infusion will be due 8/16/24 if authorization is approved.    Updated patient via [employer].

## 2024-06-27 NOTE — NURSING NOTE
Is the patient currently in the state of [place]? YES    Visit mode:VIDEO    If the visit is dropped, the patient can be reconnected by: VIDEO VISIT: Text to cell phone:   Telephone Information:   Mobile [phone]       Will anyone else be joining the visit? NO  (If patient encounters technical issues they should call [phone]    How would you like to obtain your AVS? MyChart    Are changes needed to the allergy or medication list? No    Are refills needed on medications prescribed by this physician? NO    Reason for visit: Video Visit (Recheck IBD)    Kiara RYAN

## 2024-06-27 NOTE — PROGRESS NOTES
Virtual Visit Details    Type of service:  Video Visit     Originating Location (pt. Location): Home    Distant Location (provider location):  On-site  Platform used for Video Visit: Sarahy    Video start: 8:14 AM  Video end: 8:35 AM    IBD CLINIC VISIT    CC/REFERRING MD:  Helder Sanchez  REASON FOR CONSULTATION: follow up CD    ASSESSMENT/PLAN:    CD of ileum and colon - on IFX 5 mg/kg q 8 weeks. He has had improvement but perhaps has breakthrough symptoms at end of most recent cycle - worsened diarrhea. Fecal calpro has been mildly elevated with supports bowel inflammation. MRE with no significant inflammation but may be some smoldering disease. Will seek PA for IFX 5 mg/kg q 6 weeks    -seek PA for IFX 5 mg/kg q 6 weeks  -needs to minimize/avoid NSAIDs    2. Constipation  3. Altered bowel habits  4. Lower abdominal pain    Improved after miralax/gatorade bowel clean out. Also better when he remembers to take miralax daily. This also supports constipation with overflow diarrhea. Has underlying IBS/visceral hypersensitivity too    -remember to take miralax 1 capful daily  -continue dicyclomine  -continue amitriptyline    5. Neck pain  6. Frequent NSAID use    Counseled minimizing/avoiding NSAIDs. Celebrex did not provide improved benefit. Use deep tissue massage as much as able    7. History of zane-anal abscess - resolved. No residual fistula/abscess    8. Duodenal stricture - 2nd portion of duodenum on 8/2023. I think likely NSAID induced over Crohn's. No active inflammation.    -continue omeprazole 40 mg PO BID  -avoid/minimize NSAIDs  -can dilate in future if needed - no symptomatic now      IBD HISTORY  Age at diagnosis: 47 (2022)  Extent of disease: colonic - possible zane-anal  Disease phenotype: inflammatory with possible penetrating  Zane-anal disease: zane-anal abscess  Current CD medications: IFX 5 mg/kg q 8 weeks  Prior IBD surgeries: none  Prior IBD Medications: prednisone, Entocort    DRUG  MONITORING  TPMT enzyme activity: 19.2 (WNL)     6-TGN/6-MMPN levels: --     Biologic concentration:  -predict PK 6/22/23 - estimated trough 10 (on IFX 5mg/kg q 8)  -predict PK 4/2024 - estimated trough 12 (on iFX 5 mg/kg q 8)    DISEASE ASSESSMENT  Labs  Recent Labs   Lab Test 05/10/24  0932 03/13/24  0626 01/12/24  1256   SED <0*  --  1   HGB 13.7   < > 14.7    < > = values in this interval not displayed.     Fecal calprotectin: 232 Feb 2024 (previously 30s - 5/30/23) -> 6/24/24 150s  - EGD 8/2023 - peptic stricture in second portion of duodenum - no active Crohn's on biopsy of stricture   -colonoscopy 8/2023 - no active inflammation seen to explain RUQ pain  -colonoscopy 12/2022 - normal TI (normal biopsies) - inflammation/stricture 5-6 cm distal to IC valve - able to be passed. Remainder colon normal? (details of scope limited on documentation). 5mm adenoma in rectum. Biopsies TI normal. Right colon chronic active (mild) c/w IBD and left colon normal  Enterography:   - MRE 2/14/23 - IMPRESSION:  Mucosal thickening and hyperemia of the terminal ileum,  compatible with the prior diagnosis of Crohn disease. No evidence of  associated stricture or fistula.  - MRE 4/11/2024 - no inflammation of bowel  C diff: negative    sIBDQ:   IBDQ Score Date IBDQ - Total Score  (Higher score better)   6/22/2024   9:49 PM 39   7/18/2023  12:31 PM Incomplete   6/1/2023   4:13 AM 34       IBD Health Care Maintenance:    Not discussed - follow up with MTM    Depression Screening:  -- Over the last month, have you felt down, depressed, or hopeless? no  -- Over the last month, have you felt little interest or pleasure doing things? no    Misc:  -- Avoid tobacco use  -- Avoid NSAIDs as there is potentially a 25% chance of causing an IBD flare    Return to clinic in 6 months with IBD ELIANA and 12 months with Dr. Sanchez    Thank you for this consultation.  It was a pleasure to participate in the care of this patient; please contact us with  any further questions.  I spent a total of 40 minutes during the day of encounter performed chart review, meeting with patient, patient counseling, care coordination, and documentation.      This note was created with voice recognition software, and while reviewed for accuracy, typos may remain.     Helder Sanchez MD  Division of Gastroenterology, Hepatology and Nutrition  [facility]  Pager: [phone]      HPI:   Currently, here for follow up.    Overall things have been better. Only missed one day of work in last 2 months.    However, last 1-2 weeks of infusion may have some breakthrough symptoms. Diarrhea. No blood.    Abdominal pain has been good. Helps when he remembers miralax. When remembers miralax his bowels are more regular.    He is still taking Aleve and excedrin for bad flares of his neck pain. Celebrex did not provide enough help.    Weight is stable.    Tired today because he is in between two night shifts    ROS:    No fevers or chills  No weight loss  No blurry vision, double vision or change in vision  No sore throat  No lymphadenopathy  No headache, paraesthesias, or weakness in a limb  No shortness of breath or wheezing  No chest pain or pressure  No arthralgias or myalgias  No rashes or skin changes  No odynophagia or dysphagia  No BRBPR, hematochezia, melena  No dysuria, frequency or urgency  No hot/cold intolerance or polyria  No anxiety or depression    Extra intestinal manifestations of IBD:  No uveitis/episcleritis  No aphthous ulcers   No arthritis   No erythema nodosum/pyoderma gangrenosum.     PERTINENT PAST MEDICAL HISTORY:  Past Medical History:   Diagnosis Date    Anxiety disorder 2012    Dysthymic disorder     No Comments Provided    Malignant neoplasm of testis (H) 2005 2005,teratoma    Raynaud's syndrome without gangrene     No Comments Provided    Uncomplicated alcohol abuse     7/25/2012       PREVIOUS SURGERIES:  Past Surgical History:   Procedure Laterality Date     COLONOSCOPY N/A 12/12/2022    Procedure: COLONOSCOPY, WITH POLYPECTOMY AND BIOPSY;  Surgeon: Solomon Arciniega MD;  Location:  OR    COLONOSCOPY N/A 8/10/2023    Procedure: COLONOSCOPY, WITH POLYPECTOMY AND BIOPSY;  Surgeon: Cris Sanchez MD;  Location: [facility] [place] [place]    DECOMPRESSION CUBITAL TUNNEL Left 01/2019    ESOPHAGOSCOPY, GASTROSCOPY, DUODENOSCOPY (EGD), COMBINED N/A 8/10/2023    Procedure: ESOPHAGOGASTRODUODENOSCOPY, WITH BIOPSY;  Surgeon: Cris Sanchez MD;  Location: [facility] OR    HERNIA REPAIR      ,HERNIA REPAIR    IR CHEST PORT PLACEMENT > 5 YRS OF AGE Left 04/01/2008    LYMPH NODE BIOPSY  09/05/2008     [facility]. 37 lymph nodes excised    ORCHIECTOMY SCROTAL Right 12/30/2005    teratoma    OTHER SURGICAL HISTORY      9/05/08,[id number],OTHER    RELEASE CARPAL TUNNEL  04/09/2013       PREVIOUS ENDOSCOPY:  Results for orders placed or performed during the hospital encounter of 08/10/23   COLONOSCOPY   Result Value Ref Range    COLONOSCOPY       [facility]  [address] [place] [address], [place] [address] [phone]     Endoscopy Department  _______________________________________________________________________________  Patient Name: Dennis Goodell          Procedure Date: 8/10/2023 10:41 AM  MRN: [id number]                       Account Number: [id number]  YOB: 1975               Admit Type: Outpatient  Age: 48                               Room: [facility] [place] [place] PROCEDURE ROOM 02  Gender: Male                          Note Status: Finalized  Attending MD: CRIS SANCHEZ MD,   Total Sedation Time:   _______________________________________________________________________________     Procedure:             Colonoscopy  Indications:           Assess therapeutic response to therapy of Crohn's                          disease of the small bowel and colon  Providers:             CRIS SANCHEZ MD, Roma Hamilton RN,                          Leon Weinberg  HOLLIE Concepcion  Referring MD:          TALYA UMAÑA MD  Requesting Provider:   TALYA GALINDO MD  Medicines:             Monitored Anesthesia Care  Complications:         No immediate complications.  _______________________________________________________________________________  Procedure:             Pre-Anesthesia Assessment:                         - See EPIC H and P note                         After obtaining informed consent, the colonoscope was                          passed under direct vision. Throughout the procedure,                          the patient's blood pressure, pulse, and oxygen                          saturations were monitored continuously. The                          Colonoscope was introduced through the anus and                          advanced to the terminal ileum, with identification of                          the appendiceal orifice and IC valve. The colonoscopy                          was performed without difficulty. The patient                          tolerated the procedure well. The  quality of the bowel                          preparation was adequate to identify polyps greater                          than 5 mm in size.                                                                                   Findings:       The perianal and digital rectal examinations were normal.       The Simple Endoscopic Score for Crohn's Disease was determined based on        the endoscopic appearance of the mucosa in the following segments:       - Ileum: Findings include no ulcers present, no ulcerated surfaces, no        affected surfaces and no narrowings. Segment score: 0.       - Right Colon: Findings include no ulcers present, no ulcerated        surfaces, no affected surfaces and no narrowings. Segment score: 0.       - Transverse Colon: Findings include no ulcers present, no ulcerated        surfaces, no affected surfaces and no narrowings. Segment score: 0.       - Left  Colon: Findings include no ulcers present, no ulcerated surfaces,        no affected surfaces and no na rrowings. Segment score: 0.       - Rectum: Findings include no ulcers present, no ulcerated surfaces, no        affected surfaces and no narrowings. Segment score: 0.       - Total SES-CD aggregate score: 0. Biopsies were taken with a cold        forceps for histology. Separate biopsies taken of the ileum, right        colon, left colon and rectum and placed in separate jars.       Two flat polyps were found in the transverse colon. The polyps were 3 to        7 mm in size. These polyps were removed with a cold snare. Resection and        retrieval were complete.       The exam was otherwise without abnormality on direct and retroflexion        views.                                                                                   Impression:            - Simple Endoscopic Score for Crohn's Disease: 0,                          mucosal inflammatory changes secondary to Crohn's                          disease, in remission. Biopsied.                         - Two 3 to 7 mm polyps in t he transverse colon,                          removed with a cold snare. Resected and retrieved.                         - The examination was otherwise normal on direct and                          retroflexion views.                         No active inflammation seen. Perhaps some subtle                          scarring in ascending colon and terminal ileum. No                          strictures and no active inflammation. No reason for                          RUQ pain found on this exam.  Recommendation:        - Discharge patient to home (with escort).                         - Resume previous diet.                         - Continue present medications.                         - Await pathology results.                         - Repeat colonoscopy in 5 years for surveillance based                          on pathology  results.                         - See same day EGD report for additional findings are                          recommendations                                                                                      Electronically Signed by: Dr. Helder Madsen  ___________________________  HELDER MADSEN MD  8/10/2023 2:15:55 PM  I was physically present for the entire viewing portion of the exam.  __________________________  Signature of teaching physician  HELDER MADSEN MD  Number of Addenda: 0    Note Initiated On: 8/10/2023 10:41 AM  Scope In: 1:32:44 PM  Scope Out: 1:58:51 PM     ]    ALLERGIES:   No Known Allergies    PERTINENT MEDICATIONS:    Current Outpatient Medications:     albuterol (PROAIR HFA/PROVENTIL HFA/VENTOLIN HFA) 108 (90 Base) MCG/ACT inhaler, Inhale 2 puffs into the lungs every 6 hours as needed for shortness of breath / dyspnea or wheezing, Disp: , Rfl:     ALPRAZolam (XANAX) 1 MG tablet, Take 1 mg by mouth 2 times daily as needed for anxiety (Patient not taking: Reported on 3/18/2024), Disp: , Rfl:     amitriptyline (ELAVIL) 10 MG tablet, TAKE 1 TO 2 TABLETS BY MOUTH NIGHTLY AT BEDTIME, Disp: 30 tablet, Rfl: 3    Aspirin-Acetaminophen-Caffeine (EXCEDRIN MIGRAINE PO), , Disp: , Rfl:     bismuth subsalicylate (PEPTO BISMOL) 262 MG/15ML suspension, Take 15 mLs by mouth every 6 hours as needed for indigestion, Disp: , Rfl:     calcium carbonate (TUMS) 500 MG chewable tablet, Take 1 chew tab by mouth 4 times daily as needed for heartburn, Disp: , Rfl:     celecoxib (CELEBREX) 100 MG capsule, Take 1 capsule (100 mg) by mouth 2 times daily as needed for moderate pain, Disp: 60 capsule, Rfl: 3    clonazePAM (KLONOPIN) 1 MG tablet, Take 1 mg by mouth 3 times daily, Disp: , Rfl:     desvenlafaxine succinate (PRISTIQ) 100 MG 24 hr tablet, Take 100 mg by mouth daily, Disp: , Rfl:     dicyclomine (BENTYL) 10 MG capsule, TAKE 2 CAPSULES(20 MG) BY MOUTH TWICE DAILY AS NEEDED FOR ABDOMINAL PAIN  (Patient not taking: Reported on 3/18/2024), Disp: 120 capsule, Rfl: 4    diphenoxylate-atropine (LOMOTIL) 2.5-0.025 MG tablet, Take 1 tablet by mouth 4 times daily as needed for diarrhea, Disp: 90 tablet, Rfl: 3    inFLIXimab, Inject into the vein once for 1 dose, Disp: , Rfl:     NIFEdipine ER OSMOTIC (PROCARDIA XL) 90 MG 24 hr tablet, Take 1 tablet (90 mg) by mouth daily, Disp: 90 tablet, Rfl: 0    omeprazole (PRILOSEC) 40 MG DR capsule, Take 1 capsule (40 mg) by mouth 2 times daily (before meals), Disp: 60 capsule, Rfl: 11    ondansetron (ZOFRAN ODT) 4 MG ODT tab, Take 1 tablet (4 mg) by mouth every 6 hours as needed for nausea or vomiting (for upcoming MRE), Disp: 4 tablet, Rfl: 0    polyethylene glycol (MIRALAX) 17 GM/Dose powder, Take 17 g (1 Capful) by mouth daily (Patient not taking: Reported on 3/18/2024), Disp: 578 g, Rfl: 11    Probiotic Product (FLORAJEN DIGESTION) CAPS, Take 1 capsule by mouth daily, Disp: 30 capsule, Rfl: 11    psyllium (METAMUCIL/KONSYL) 58.6 % powder, Take 36 g (2 Tablespoonful) by mouth daily, Disp: , Rfl:     sucralfate (CARAFATE) 1 GM tablet, Take 1 tablet (1 g) by mouth 2 times daily as needed (abdominal pain), Disp: 360 tablet, Rfl: 3    Current Facility-Administered Medications:     ondansetron (ZOFRAN) injection 4 mg, 4 mg, Intravenous, Once, Solomon Arciniega MD    SOCIAL HISTORY:  Social History     Socioeconomic History    Marital status:      Spouse name: Jillian    Number of children: 4    Years of education: 13    Highest education level: Not on file   Occupational History    Occupation: underground utilities/dirt work     Employer: [employer]   Tobacco Use    Smoking status: Never    Smokeless tobacco: Current     Types: Chew    Tobacco comments:     1 tin lasts 1 week   Vaping Use    Vaping status: Never Used   Substance and Sexual Activity    Alcohol use: Not Currently     Alcohol/week: 6.0 standard drinks of alcohol     Types: 6 Cans of beer per week  "   Drug use: Never    Sexual activity: Yes     Partners: Female     Birth control/protection: Surgical   Other Topics Concern    Parent/sibling w/ CABG, MI or angioplasty before 65F 55M? Not Asked   Social History Narrative    He is , 4  Girls.    Wife runs a .     Social Determinants of Health     Financial Resource Strain: Not on file   Food Insecurity: Not on file   Transportation Needs: Not on file   Physical Activity: Not on file   Stress: Not on file   Social Connections: Not on file   Interpersonal Safety: Not on file   Housing Stability: Not on file       FAMILY HISTORY:  Family History   Problem Relation Age of Onset    Pulmonary Embolism Mother     Other - See Comments Daughter         recurrent OM    Other - See Comments Other         Suicidality,maternal uncle    No Known Problems Father        Past/family/social history reviewed and no changes    PHYSICAL EXAMINATION:  Constitutional: aaox3, cooperative, pleasant, not dyspneic/diaphoretic, no acute distress  Vitals reviewed: Ht 1.778 m (5' 10\")   Wt 74.8 kg (165 lb)   BMI 23.68 kg/m    Wt:   Wt Readings from Last 2 Encounters:   06/27/24 74.8 kg (165 lb)   05/10/24 77.3 kg (170 lb 6.4 oz)      Eyes: Sclera anicteric/injected  Respiratory: Unlabored breathing  Skin: no jaundice  Psych: Normal affect      PERTINENT STUDIES:  Most recent CBC:  Recent Labs   Lab Test 05/10/24  0932 04/05/24  1402   WBC 9.0 7.2   HGB 13.7 15.3   HCT 40.1 44.8    277     Most recent hepatic panel:  Recent Labs   Lab Test 05/10/24  0932 04/05/24  1402   ALT 11 29   AST 21 22     Most recent creatinine:  Recent Labs   Lab Test 03/13/24  0626 11/17/23  1328   CR 1.00 0.81             "

## 2024-06-27 NOTE — PATIENT INSTRUCTIONS
It was a pleasure meeting with you today and discussing your healthcare plan. Below is a summary of what we covered:    - please continue your infliximab infusions. We will see if we can get your infusions increased to every 6 weeks    - please continue to work to avoid advil, ibuprofen, aleve, excedrin. This can cause inflammation in your GI tract and also flare your Crohn's Disease. Celebrex is safer for your GI tract but I understand it does not help as much    - please remember to take your miralax every day    - please continue your amitriptyline and dicyclomine.      Please see below for any additional questions and scheduling guidelines.    Sign up for WeGoOut: WeGoOut patient portal serves as a secure platform for accessing your medical records from the [facility]. Additionally, WeGoOut facilitates easy, timely, and secure messaging with your care team. If you have not signed up, you may do so by using the provided code or calling [phone].    Coordinating your care after your visit:  There are multiple options for scheduling your follow-up care based on your provider's recommendation.    How do I schedule a follow-up clinic appointment:   After your appointment, you may receive scheduling assistance with the Clinic Coordinators by having a seat in the waiting room and a Clinic Coordinator will call you up to schedule.  Virtual visits or after you leave the clinic:  Your provider has placed a follow-up order in the [employer]Go[employer] portal for scheduling your return appointment. A member of the scheduling team will contact you to schedule.  WeGoOut Scheduling: Timely scheduling through WeGoOut is advised to ensure appointment availability.   Call to schedule: You may schedule your follow-up appointment(s) by calling [phone], option 1.    How do I schedule my endoscopy or colonoscopy procedure:  If a procedure, such as a colonoscopy or upper endoscopy was ordered by your provider, the scheduling  team will contact you to schedule this procedure. Or you may choose to call to schedule at   [phone], option 2.  Please allow 20-30 minutes when scheduling a procedure.    How do I get my blood work done? To get your blood work done, you need to schedule a lab appointment at an [facility]. There are multiple ways to schedule:   At the clinic: The Clinic Coordinator you meet after your visit can help you schedule a lab appointment.   LingoLive scheduling: LingoLive offers online lab scheduling at all [facility] laboratory locations.   Call to schedule: You can call [phone] to schedule your lab appointment.    How do I schedule my imaging study: To schedule imaging studies, such as CT scans, ultrasounds, MRIs, or X-rays, contact Imaging Services at [phone].    How do I schedule a referral to another doctor: If your provider recommended a referral to another specialist(s), the referral order was placed by your provider. You will receive a phone call to schedule this referral, or you may choose to call the number attached to the referral to self-schedule.    For Post-Visit Question(s):  For any inquiries following today's visit:  Please utilize LingoLive messaging and allow 48 hours for reply or contact the Call Center during normal business hours at [phone], option 3.  For Emergent After-hours questions, contact the On-Call GI Fellow through the [facility]  at [phone].  In addition, you may contact your Nurse directly using the provided contact information.    Test Results:  Test results will be accessible via LingoLive in compliance with the 21st Century Cures Act. This means that your results will be available to you at the same time as your provider. Often you may see your results before your provider does. Results are reviewed by staff within two weeks with communication follow-up. Results may be released in the patient portal prior to your care team  review.    Prescription Refill(s):  Medication prescribed by your provider will be addressed during your visit. For future refills, please coordinate with your pharmacy. If you have not had a recent clinic visit or routine labs, for your safety, your provider may not be able to refill your prescription.

## 2024-06-27 NOTE — LETTER
6/27/2024      Dennis J Goodell  [address]   [place] [address]      Dear Colleague,    Thank you for referring your patient, Dennis J Goodell, to the [facility] [place]. Please see a copy of my visit note below.    Virtual Visit Details    Type of service:  Video Visit     Originating Location (pt. Location): Home    Distant Location (provider location):  On-site  Platform used for Video Visit: RedBee    Video start: 8:14 AM  Video end: 8:35 AM    IBD CLINIC VISIT    CC/REFERRING MD:  Helder Sanchez  REASON FOR CONSULTATION: follow up CD    ASSESSMENT/PLAN:    CD of ileum and colon - on IFX 5 mg/kg q 8 weeks. He has had improvement but perhaps has breakthrough symptoms at end of most recent cycle - worsened diarrhea. Fecal calpro has been mildly elevated with supports bowel inflammation. MRE with no significant inflammation but may be some smoldering disease. Will seek PA for IFX 5 mg/kg q 6 weeks    -seek PA for IFX 5 mg/kg q 6 weeks  -needs to minimize/avoid NSAIDs    2. Constipation  3. Altered bowel habits  4. Lower abdominal pain    Improved after miralax/gatorade bowel clean out. Also better when he remembers to take miralax daily. This also supports constipation with overflow diarrhea. Has underlying IBS/visceral hypersensitivity too    -remember to take miralax 1 capful daily  -continue dicyclomine  -continue amitriptyline    5. Neck pain  6. Frequent NSAID use    Counseled minimizing/avoiding NSAIDs. Celebrex did not provide improved benefit. Use deep tissue massage as much as able    7. History of zane-anal abscess - resolved. No residual fistula/abscess    8. Duodenal stricture - 2nd portion of duodenum on 8/2023. I think likely NSAID induced over Crohn's. No active inflammation.    -continue omeprazole 40 mg PO BID  -avoid/minimize NSAIDs  -can dilate in future if needed - no symptomatic now      IBD HISTORY  Age at diagnosis: 47 (2022)  Extent of  disease: colonic - possible zane-anal  Disease phenotype: inflammatory with possible penetrating  Zane-anal disease: zane-anal abscess  Current CD medications: IFX 5 mg/kg q 8 weeks  Prior IBD surgeries: none  Prior IBD Medications: prednisone, Entocort    DRUG MONITORING  TPMT enzyme activity: 19.2 (WNL)     6-TGN/6-MMPN levels: --     Biologic concentration:  -predict PK 6/22/23 - estimated trough 10 (on IFX 5mg/kg q 8)  -predict PK 4/2024 - estimated trough 12 (on iFX 5 mg/kg q 8)    DISEASE ASSESSMENT  Labs  Recent Labs   Lab Test 05/10/24  0932 03/13/24  0626 01/12/24  1256   SED <0*  --  1   HGB 13.7   < > 14.7    < > = values in this interval not displayed.     Fecal calprotectin: 232 Feb 2024 (previously 30s - 5/30/23) -> 6/24/24 150s  - EGD 8/2023 - peptic stricture in second portion of duodenum - no active Crohn's on biopsy of stricture   -colonoscopy 8/2023 - no active inflammation seen to explain RUQ pain  -colonoscopy 12/2022 - normal TI (normal biopsies) - inflammation/stricture 5-6 cm distal to IC valve - able to be passed. Remainder colon normal? (details of scope limited on documentation). 5mm adenoma in rectum. Biopsies TI normal. Right colon chronic active (mild) c/w IBD and left colon normal  Enterography:   - MRE 2/14/23 - IMPRESSION:  Mucosal thickening and hyperemia of the terminal ileum,  compatible with the prior diagnosis of Crohn disease. No evidence of  associated stricture or fistula.  - MRE 4/11/2024 - no inflammation of bowel  C diff: negative    sIBDQ:   IBDQ Score Date IBDQ - Total Score  (Higher score better)   6/22/2024   9:49 PM 39   7/18/2023  12:31 PM Incomplete   6/1/2023   4:13 AM 34       IBD Health Care Maintenance:    Not discussed - follow up with MTM    Depression Screening:  -- Over the last month, have you felt down, depressed, or hopeless? no  -- Over the last month, have you felt little interest or pleasure doing things? no    Misc:  -- Avoid tobacco use  -- Avoid  NSAIDs as there is potentially a 25% chance of causing an IBD flare    Return to clinic in 6 months with IBD ELIANA and 12 months with Dr. Sanchez    Thank you for this consultation.  It was a pleasure to participate in the care of this patient; please contact us with any further questions.  I spent a total of 40 minutes during the day of encounter performed chart review, meeting with patient, patient counseling, care coordination, and documentation.      This note was created with voice recognition software, and while reviewed for accuracy, typos may remain.     Helder Sanchez MD  Division of Gastroenterology, Hepatology and Nutrition  [facility]  Pager: [phone]      HPI:   Currently, here for follow up.    Overall things have been better. Only missed one day of work in last 2 months.    However, last 1-2 weeks of infusion may have some breakthrough symptoms. Diarrhea. No blood.    Abdominal pain has been good. Helps when he remembers miralax. When remembers miralax his bowels are more regular.    He is still taking Aleve and excedrin for bad flares of his neck pain. Celebrex did not provide enough help.    Weight is stable.    Tired today because he is in between two night shifts    ROS:    No fevers or chills  No weight loss  No blurry vision, double vision or change in vision  No sore throat  No lymphadenopathy  No headache, paraesthesias, or weakness in a limb  No shortness of breath or wheezing  No chest pain or pressure  No arthralgias or myalgias  No rashes or skin changes  No odynophagia or dysphagia  No BRBPR, hematochezia, melena  No dysuria, frequency or urgency  No hot/cold intolerance or polyria  No anxiety or depression    Extra intestinal manifestations of IBD:  No uveitis/episcleritis  No aphthous ulcers   No arthritis   No erythema nodosum/pyoderma gangrenosum.     PERTINENT PAST MEDICAL HISTORY:  Past Medical History:   Diagnosis Date    Anxiety disorder 2012    Dysthymic disorder     No  Comments Provided    Malignant neoplasm of testis (H) 2005 2005,teratoma    Raynaud's syndrome without gangrene     No Comments Provided    Uncomplicated alcohol abuse     7/25/2012       PREVIOUS SURGERIES:  Past Surgical History:   Procedure Laterality Date    COLONOSCOPY N/A 12/12/2022    Procedure: COLONOSCOPY, WITH POLYPECTOMY AND BIOPSY;  Surgeon: Solomon Arciniega MD;  Location:  OR    COLONOSCOPY N/A 8/10/2023    Procedure: COLONOSCOPY, WITH POLYPECTOMY AND BIOPSY;  Surgeon: Cris Madsen MD;  Location: [facility] OR    DECOMPRESSION CUBITAL TUNNEL Left 01/2019    ESOPHAGOSCOPY, GASTROSCOPY, DUODENOSCOPY (EGD), COMBINED N/A 8/10/2023    Procedure: ESOPHAGOGASTRODUODENOSCOPY, WITH BIOPSY;  Surgeon: Cris Madsen MD;  Location: [facility] OR    HERNIA REPAIR      ,HERNIA REPAIR    IR CHEST PORT PLACEMENT > 5 YRS OF AGE Left 04/01/2008    LYMPH NODE BIOPSY  09/05/2008     [facility]. 37 lymph nodes excised    ORCHIECTOMY SCROTAL Right 12/30/2005    teratoma    OTHER SURGICAL HISTORY      9/05/08,[id number],OTHER    RELEASE CARPAL TUNNEL  04/09/2013       PREVIOUS ENDOSCOPY:  Results for orders placed or performed during the hospital encounter of 08/10/23   COLONOSCOPY   Result Value Ref Range    COLONOSCOPY       [facility]  [address] [place] [address], [place] [address] [phone]     Endoscopy Department  _______________________________________________________________________________  Patient Name: Dennis Goodell          Procedure Date: 8/10/2023 10:41 AM  MRN: [id number]                       Account Number: [id number]  YOB: 1975               Admit Type: Outpatient  Age: 48                               Room: [facility] PROCEDURE ROOM 02  Gender: Male                          Note Status: Finalized  Attending MD: CRIS MADSEN MD,   Total Sedation Time:   _______________________________________________________________________________     Procedure:              Colonoscopy  Indications:           Assess therapeutic response to therapy of Crohn's                          disease of the small bowel and colon  Providers:             CRIS MADSEN MD, Roma Hamilton RN,                          Leon Concepcion, HOLLIE  Referring MD:          TALYA UMAÑA MD  Requesting Provider:   TALYA GALINDO MD  Medicines:             Monitored Anesthesia Care  Complications:         No immediate complications.  _______________________________________________________________________________  Procedure:             Pre-Anesthesia Assessment:                         - See EPIC H and P note                         After obtaining informed consent, the colonoscope was                          passed under direct vision. Throughout the procedure,                          the patient's blood pressure, pulse, and oxygen                          saturations were monitored continuously. The                          Colonoscope was introduced through the anus and                          advanced to the terminal ileum, with identification of                          the appendiceal orifice and IC valve. The colonoscopy                          was performed without difficulty. The patient                          tolerated the procedure well. The  quality of the bowel                          preparation was adequate to identify polyps greater                          than 5 mm in size.                                                                                   Findings:       The perianal and digital rectal examinations were normal.       The Simple Endoscopic Score for Crohn's Disease was determined based on        the endoscopic appearance of the mucosa in the following segments:       - Ileum: Findings include no ulcers present, no ulcerated surfaces, no        affected surfaces and no narrowings. Segment score: 0.       - Right Colon: Findings include no  ulcers present, no ulcerated        surfaces, no affected surfaces and no narrowings. Segment score: 0.       - Transverse Colon: Findings include no ulcers present, no ulcerated        surfaces, no affected surfaces and no narrowings. Segment score: 0.       - Left Colon: Findings include no ulcers present, no ulcerated surfaces,        no affected surfaces and no na rrowings. Segment score: 0.       - Rectum: Findings include no ulcers present, no ulcerated surfaces, no        affected surfaces and no narrowings. Segment score: 0.       - Total SES-CD aggregate score: 0. Biopsies were taken with a cold        forceps for histology. Separate biopsies taken of the ileum, right        colon, left colon and rectum and placed in separate jars.       Two flat polyps were found in the transverse colon. The polyps were 3 to        7 mm in size. These polyps were removed with a cold snare. Resection and        retrieval were complete.       The exam was otherwise without abnormality on direct and retroflexion        views.                                                                                   Impression:            - Simple Endoscopic Score for Crohn's Disease: 0,                          mucosal inflammatory changes secondary to Crohn's                          disease, in remission. Biopsied.                         - Two 3 to 7 mm polyps in t he transverse colon,                          removed with a cold snare. Resected and retrieved.                         - The examination was otherwise normal on direct and                          retroflexion views.                         No active inflammation seen. Perhaps some subtle                          scarring in ascending colon and terminal ileum. No                          strictures and no active inflammation. No reason for                          RUQ pain found on this exam.  Recommendation:        - Discharge patient to home (with escort).                          - Resume previous diet.                         - Continue present medications.                         - Await pathology results.                         - Repeat colonoscopy in 5 years for surveillance based                          on pathology results.                         - See same day EGD report for additional findings are                          recommendations                                                                                      Electronically Signed by: Dr. Helder Madsen  ___________________________  HELDER MADSEN MD  8/10/2023 2:15:55 PM  I was physically present for the entire viewing portion of the exam.  __________________________  Signature of teaching physician  HELDER MADSEN MD  Number of Addenda: 0    Note Initiated On: 8/10/2023 10:41 AM  Scope In: 1:32:44 PM  Scope Out: 1:58:51 PM     ]    ALLERGIES:   No Known Allergies    PERTINENT MEDICATIONS:    Current Outpatient Medications:     albuterol (PROAIR HFA/PROVENTIL HFA/VENTOLIN HFA) 108 (90 Base) MCG/ACT inhaler, Inhale 2 puffs into the lungs every 6 hours as needed for shortness of breath / dyspnea or wheezing, Disp: , Rfl:     ALPRAZolam (XANAX) 1 MG tablet, Take 1 mg by mouth 2 times daily as needed for anxiety (Patient not taking: Reported on 3/18/2024), Disp: , Rfl:     amitriptyline (ELAVIL) 10 MG tablet, TAKE 1 TO 2 TABLETS BY MOUTH NIGHTLY AT BEDTIME, Disp: 30 tablet, Rfl: 3    Aspirin-Acetaminophen-Caffeine (EXCEDRIN MIGRAINE PO), , Disp: , Rfl:     bismuth subsalicylate (PEPTO BISMOL) 262 MG/15ML suspension, Take 15 mLs by mouth every 6 hours as needed for indigestion, Disp: , Rfl:     calcium carbonate (TUMS) 500 MG chewable tablet, Take 1 chew tab by mouth 4 times daily as needed for heartburn, Disp: , Rfl:     celecoxib (CELEBREX) 100 MG capsule, Take 1 capsule (100 mg) by mouth 2 times daily as needed for moderate pain, Disp: 60 capsule, Rfl: 3    clonazePAM (KLONOPIN) 1 MG tablet,  Take 1 mg by mouth 3 times daily, Disp: , Rfl:     desvenlafaxine succinate (PRISTIQ) 100 MG 24 hr tablet, Take 100 mg by mouth daily, Disp: , Rfl:     dicyclomine (BENTYL) 10 MG capsule, TAKE 2 CAPSULES(20 MG) BY MOUTH TWICE DAILY AS NEEDED FOR ABDOMINAL PAIN (Patient not taking: Reported on 3/18/2024), Disp: 120 capsule, Rfl: 4    diphenoxylate-atropine (LOMOTIL) 2.5-0.025 MG tablet, Take 1 tablet by mouth 4 times daily as needed for diarrhea, Disp: 90 tablet, Rfl: 3    inFLIXimab, Inject into the vein once for 1 dose, Disp: , Rfl:     NIFEdipine ER OSMOTIC (PROCARDIA XL) 90 MG 24 hr tablet, Take 1 tablet (90 mg) by mouth daily, Disp: 90 tablet, Rfl: 0    omeprazole (PRILOSEC) 40 MG DR capsule, Take 1 capsule (40 mg) by mouth 2 times daily (before meals), Disp: 60 capsule, Rfl: 11    ondansetron (ZOFRAN ODT) 4 MG ODT tab, Take 1 tablet (4 mg) by mouth every 6 hours as needed for nausea or vomiting (for upcoming MRE), Disp: 4 tablet, Rfl: 0    polyethylene glycol (MIRALAX) 17 GM/Dose powder, Take 17 g (1 Capful) by mouth daily (Patient not taking: Reported on 3/18/2024), Disp: 578 g, Rfl: 11    Probiotic Product (FLORAJEN DIGESTION) CAPS, Take 1 capsule by mouth daily, Disp: 30 capsule, Rfl: 11    psyllium (METAMUCIL/KONSYL) 58.6 % powder, Take 36 g (2 Tablespoonful) by mouth daily, Disp: , Rfl:     sucralfate (CARAFATE) 1 GM tablet, Take 1 tablet (1 g) by mouth 2 times daily as needed (abdominal pain), Disp: 360 tablet, Rfl: 3    Current Facility-Administered Medications:     ondansetron (ZOFRAN) injection 4 mg, 4 mg, Intravenous, Once, Solomon Arciniega MD    SOCIAL HISTORY:  Social History     Socioeconomic History    Marital status:      Spouse name: Jillian    Number of children: 4    Years of education: 13    Highest education level: Not on file   Occupational History    Occupation: underground utilities/dirt work     Employer: [employer]   Tobacco Use    Smoking status: Never    Smokeless  "tobacco: Current     Types: Chew    Tobacco comments:     1 tin lasts 1 week   Vaping Use    Vaping status: Never Used   Substance and Sexual Activity    Alcohol use: Not Currently     Alcohol/week: 6.0 standard drinks of alcohol     Types: 6 Cans of beer per week    Drug use: Never    Sexual activity: Yes     Partners: Female     Birth control/protection: Surgical   Other Topics Concern    Parent/sibling w/ CABG, MI or angioplasty before 65F 55M? Not Asked   Social History Narrative    He is , 4  Girls.    Wife runs a .     Social Determinants of Health     Financial Resource Strain: Not on file   Food Insecurity: Not on file   Transportation Needs: Not on file   Physical Activity: Not on file   Stress: Not on file   Social Connections: Not on file   Interpersonal Safety: Not on file   Housing Stability: Not on file       FAMILY HISTORY:  Family History   Problem Relation Age of Onset    Pulmonary Embolism Mother     Other - See Comments Daughter         recurrent OM    Other - See Comments Other         Suicidality,maternal uncle    No Known Problems Father        Past/family/social history reviewed and no changes    PHYSICAL EXAMINATION:  Constitutional: aaox3, cooperative, pleasant, not dyspneic/diaphoretic, no acute distress  Vitals reviewed: Ht 1.778 m (5' 10\")   Wt 74.8 kg (165 lb)   BMI 23.68 kg/m    Wt:   Wt Readings from Last 2 Encounters:   06/27/24 74.8 kg (165 lb)   05/10/24 77.3 kg (170 lb 6.4 oz)      Eyes: Sclera anicteric/injected  Respiratory: Unlabored breathing  Skin: no jaundice  Psych: Normal affect      PERTINENT STUDIES:  Most recent CBC:  Recent Labs   Lab Test 05/10/24  0932 04/05/24  1402   WBC 9.0 7.2   HGB 13.7 15.3   HCT 40.1 44.8    277     Most recent hepatic panel:  Recent Labs   Lab Test 05/10/24  0932 04/05/24  1402   ALT 11 29   AST 21 22     Most recent creatinine:  Recent Labs   Lab Test 03/13/24  0626 11/17/23  1328   CR 1.00 0.81             Again, thank " you for allowing me to participate in the care of your patient.      Sincerely,    Helder Sanchez MD

## 2024-06-28 RX ORDER — DIPHENOXYLATE HCL/ATROPINE 2.5-.025MG
1 TABLET ORAL 4 TIMES DAILY PRN
Qty: 90 TABLET | Refills: 3 | OUTPATIENT
Start: 2024-06-28

## 2024-07-05 ENCOUNTER — HOSPITAL ENCOUNTER (OUTPATIENT)
Dept: INFUSION THERAPY | Facility: OTHER | Age: 49
Discharge: HOME | End: 2024-07-05
Admitting: FAMILY MEDICINE
Payer: COMMERCIAL

## 2024-07-05 VITALS
SYSTOLIC BLOOD PRESSURE: 112 MMHG | HEART RATE: 76 BPM | TEMPERATURE: 97.6 F | RESPIRATION RATE: 16 BRPM | BODY MASS INDEX: 23.9 KG/M2 | WEIGHT: 166.6 LBS | DIASTOLIC BLOOD PRESSURE: 79 MMHG

## 2024-07-05 DIAGNOSIS — K50.10 CROHN'S DISEASE OF LARGE INTESTINE WITHOUT COMPLICATION (H): Primary | ICD-10-CM

## 2024-07-05 LAB
ALBUMIN SERPL BCG-MCNC: 4.6 G/DL (ref 3.5–5.2)
ALP SERPL-CCNC: 65 U/L (ref 40–150)
ALT SERPL W P-5'-P-CCNC: 12 U/L (ref 0–70)
AST SERPL W P-5'-P-CCNC: 17 U/L (ref 0–45)
BASOPHILS # BLD AUTO: 0.1 10E3/UL (ref 0–0.2)
BASOPHILS NFR BLD AUTO: 1 %
BILIRUB DIRECT SERPL-MCNC: <0.2 MG/DL (ref 0–0.3)
BILIRUB SERPL-MCNC: 0.2 MG/DL
CRP SERPL-MCNC: <3 MG/L
EOSINOPHIL # BLD AUTO: 0.2 10E3/UL (ref 0–0.7)
EOSINOPHIL NFR BLD AUTO: 4 %
ERYTHROCYTE [DISTWIDTH] IN BLOOD BY AUTOMATED COUNT: 12.2 % (ref 10–15)
HCT VFR BLD AUTO: 42.1 % (ref 40–53)
HGB BLD-MCNC: 14.1 G/DL (ref 13.3–17.7)
IMM GRANULOCYTES # BLD: 0 10E3/UL
IMM GRANULOCYTES NFR BLD: 0 %
LYMPHOCYTES # BLD AUTO: 3 10E3/UL (ref 0.8–5.3)
LYMPHOCYTES NFR BLD AUTO: 46 %
MCH RBC QN AUTO: 29.5 PG (ref 26.5–33)
MCHC RBC AUTO-ENTMCNC: 33.5 G/DL (ref 31.5–36.5)
MCV RBC AUTO: 88 FL (ref 78–100)
MONOCYTES # BLD AUTO: 0.7 10E3/UL (ref 0–1.3)
MONOCYTES NFR BLD AUTO: 10 %
NEUTROPHILS # BLD AUTO: 2.6 10E3/UL (ref 1.6–8.3)
NEUTROPHILS NFR BLD AUTO: 39 %
NRBC # BLD AUTO: 0 10E3/UL
NRBC BLD AUTO-RTO: 0 /100
PLATELET # BLD AUTO: 252 10E3/UL (ref 150–450)
PROT SERPL-MCNC: 7.2 G/DL (ref 6.4–8.3)
RBC # BLD AUTO: 4.78 10E6/UL (ref 4.4–5.9)
WBC # BLD AUTO: 6.6 10E3/UL (ref 4–11)

## 2024-07-05 PROCEDURE — 86140 C-REACTIVE PROTEIN: CPT | Performed by: INTERNAL MEDICINE

## 2024-07-05 PROCEDURE — 80076 HEPATIC FUNCTION PANEL: CPT | Performed by: INTERNAL MEDICINE

## 2024-07-05 PROCEDURE — 258N000003 HC RX IP 258 OP 636: Performed by: INTERNAL MEDICINE

## 2024-07-05 PROCEDURE — 85048 AUTOMATED LEUKOCYTE COUNT: CPT | Performed by: INTERNAL MEDICINE

## 2024-07-05 PROCEDURE — 250N000011 HC RX IP 250 OP 636: Performed by: INTERNAL MEDICINE

## 2024-07-05 PROCEDURE — 36415 COLL VENOUS BLD VENIPUNCTURE: CPT | Performed by: INTERNAL MEDICINE

## 2024-07-05 PROCEDURE — 96413 CHEMO IV INFUSION 1 HR: CPT

## 2024-07-05 RX ORDER — MEPERIDINE HYDROCHLORIDE 50 MG/ML
25 INJECTION INTRAMUSCULAR; INTRAVENOUS; SUBCUTANEOUS EVERY 30 MIN PRN
OUTPATIENT
Start: 2024-08-08

## 2024-07-05 RX ORDER — DIPHENHYDRAMINE HYDROCHLORIDE 50 MG/ML
50 INJECTION INTRAMUSCULAR; INTRAVENOUS
Start: 2024-08-08

## 2024-07-05 RX ORDER — ALBUTEROL SULFATE 0.83 MG/ML
2.5 SOLUTION RESPIRATORY (INHALATION)
OUTPATIENT
Start: 2024-08-08

## 2024-07-05 RX ORDER — METHYLPREDNISOLONE SODIUM SUCCINATE 125 MG/2ML
125 INJECTION, POWDER, LYOPHILIZED, FOR SOLUTION INTRAMUSCULAR; INTRAVENOUS
Start: 2024-08-08

## 2024-07-05 RX ORDER — ACETAMINOPHEN 325 MG/1
650 TABLET ORAL ONCE
Start: 2024-08-08 | End: 2024-08-08

## 2024-07-05 RX ORDER — ALBUTEROL SULFATE 90 UG/1
1-2 AEROSOL, METERED RESPIRATORY (INHALATION)
Start: 2024-08-08

## 2024-07-05 RX ORDER — DIPHENHYDRAMINE HCL 25 MG
25 CAPSULE ORAL ONCE
Start: 2024-08-08 | End: 2024-08-08

## 2024-07-05 RX ORDER — EPINEPHRINE 1 MG/ML
0.3 INJECTION, SOLUTION, CONCENTRATE INTRAVENOUS EVERY 5 MIN PRN
OUTPATIENT
Start: 2024-08-08

## 2024-07-05 RX ADMIN — INFLIXIMAB 400 MG: 100 INJECTION, POWDER, LYOPHILIZED, FOR SOLUTION INTRAVENOUS at 10:09

## 2024-07-05 RX ADMIN — SODIUM CHLORIDE 250 ML: 9 INJECTION, SOLUTION INTRAVENOUS at 09:41

## 2024-07-05 NOTE — NURSING NOTE
Infusion Nursing Note:  Dennis J Goodell presents today for Remicade Rapid.    Patient seen by provider today: No   present during visit today: Not Applicable.    Note: N/A.      Intravenous Access:  Labs drawn without difficulty.  Peripheral IV placed.    Treatment Conditions:  Biological Infusion Checklist: Completed    Post Infusion Assessment:  Patient tolerated infusion without incident.  Blood return noted pre and post infusion.  Site patent and intact, free from redness, edema or discomfort.  No evidence of extravasations.  Access discontinued per protocol.    Biologic Infusion Post Education: Call the triage nurse at your clinic or seek medical attention if you have chills and/or temperature greater than or equal to 100.5, uncontrolled nausea/vomiting, diarrhea, constipation, dizziness, shortness of breath, chest pain, heart palpitations, weakness or any other new or concerning symptoms, questions or concerns.  You cannot have any live virus vaccines prior to or during treatment or up to 6 months post infusion.  If you have an upcoming surgery, medical procedure or dental procedure during treatment, this should be discussed with your ordering physician and your surgeon/dentist.  If you are having any concerning symptom, if you are unsure if you should get your next infusion or wish to speak to a provider before your next infusion, please call your care coordinator or triage nurse at your clinic to notify them so we can adequately serve you.       Discharge Plan:   Discharge instructions reviewed with: Patient.  Patient and/or family verbalized understanding of discharge instructions and all questions answered.  Copy of AVS reviewed with patient and/or family.  Patient will return 8-16-24 for next appointment. Scheduling request sent to [facility] to make appointment.   AVS to patient via [employer].    Patient discharged in stable condition accompanied by: self.  Departure Mode: Ambulatory.     Mary Kate  Bhavesh RN

## 2024-07-05 NOTE — NURSING NOTE
~~~ NOTE: If the patient answers yes to any of the questions below, hold the infusion and contact ordering provider or on-call provider.    Do you currently have any signs of illness or infection or are you on any antibiotics? No  Have you recently had an elevated temperature, fever, chills, productive cough, coughing for 3 weeks or longer or hemoptysis, abnormal vital signs, night sweats, chest pain or have you noticed a decrease in your appetite, unexplained weight loss or fatigue? No  Have you had any new, sudden, or worsening abdominal pain? No  Do you have any open wounds or new incisions? (exclude for patients with hidradenitis suppurativa) No  Have you recently been diagnosed with any new nervous system diseases (ie. Multiple sclerosis, Guillain [place], seizures, neurological changes) or cancer diagnosis? Are you on any form of radiation or chemotherapy? No  Have there been any other new onset medical symptoms? No  Are you pregnant or breast feeding or do you have plans of pregnancy in the future? N/A  Do you have any upcoming hospitalizations or surgeries? Does not include esophagogastroduodenoscopy, colonoscopy, endoscopic retrograde cholangiopancreatography (ERCP), endoscopic ultrasound (EUS), dental procedures (including cleanings, fillings, implants, extractions)  or joint aspiration/steroid injections No  Have you or anyone in your household received a live vaccination in the past 4 weeks? Please note: No live vaccines while on biologic/chemotherapy until 6 months after the last treatment. Patient can receive the flu vaccine (shot only).  It is optimal for the patient to get it mid cycle, but it can be given at any time as long as it is not on the day of the infusion. No  If applicable to prescribed medication, confirm negative PPD or quantiferon gold MTB. If positive, verify has negative chest x-ray or the patient is at least 4 weeks post initiation of INH/B6 therapy and have clearance from provider  before infusion   If applicable to prescribed medication, confirm negative hepatitis B surface antigen or hepatitis C. If positive, clearance from provider before infusion.  Rheumatology patients receiving tocilizumab (Actemra): If labs were drawn within the past week, hold dosing until cleared to infuse If AST/ALT > 2 X upper limit normal; ANC < 1.0. N/A  Patients receiving belimumab (Benlysta): Have you been having any signs of worsening depression or suicidal ideations? N/A

## 2024-07-06 ENCOUNTER — HEALTH MAINTENANCE LETTER (OUTPATIENT)
Age: 49
End: 2024-07-06

## 2024-07-08 ENCOUNTER — VIRTUAL VISIT (OUTPATIENT)
Dept: GASTROENTEROLOGY | Facility: CLINIC | Age: 49
End: 2024-07-08
Attending: SURGERY
Payer: COMMERCIAL

## 2024-07-08 DIAGNOSIS — K50.10 CROHN'S DISEASE OF LARGE INTESTINE WITHOUT COMPLICATION (H): Primary | ICD-10-CM

## 2024-07-08 NOTE — Clinical Note
7/8/2024      Dennis J Goodell  [address] [place] [address]      Dear Colleague,    Thank you for referring your patient, Dennis J Goodell, to the [facility]. Please see a copy of my visit note below.    Medication Therapy Management (MTM) Encounter    ASSESSMENT:                            Medication Adherence/Access: {adherencechoices:[id number]}    ***:  ***      PLAN:                            Reggie to continue with plan for infliximab 5 mg/kg every 6 weeks.    Follow-up:    -- 6 months for MTM, sooner if needed    SUBJECTIVE/OBJECTIVE:                          Dennis Goodell is a 48 year old male seen for a follow-up visit.       Reason for visit: infliximab check-in    Allergies/ADRs: None  Tobacco: He reports that he has never smoked. His smokeless tobacco use includes chew.    Medication Adherence/Access: no issues reported  -- infliximab every six week dosing authorized    Crohn's Disease:   Infliximab 5 mg/kg every 8 weeks (changing to every six weeks)  Lomotil as needed   Amitriptyline 10-20 mg nightly (when he remembers)  Ondansetron ODT as needed (not needing)  Dicyclomine 2 capsules as needed   Miralax daily   Omeprazole 40 mg twice daily   Sucralfate 1 g twice daily as needed     Last infusion was on 7/5 -- notes the next one is scheduled at the six week jose rafael. Notes he is feeling well today. Notes he hasn't been eating at work but is eating at home. Notes he is having a daily bowel movement. Sometimes this are loose, sometimes formed.    Lab Results   Component Value Date    CALPRF 150.0 (H) 06/24/2024    CALPRF 232.0 (H) 03/15/2024    CALPRF 34.2 05/30/2023       IBD HISTORY  Age at diagnosis: 47 (2022)  Extent of disease: colonic - possible zane-anal  Disease phenotype: inflammatory with possible penetrating  Zane-anal disease: zane-anal abscess  Prior IBD surgeries: none  Prior IBD Medications: prednisone, Entocort    ----------------      I spent 12 minutes  with this patient today. All changes were made via collaborative practice agreement with Dr. Sanchez. A copy of the visit note was provided to the patient's provider(s).    A summary of these recommendations was sent via [employer].    Christiana RonquilloD, BCACP  MTM Pharmacist   [facility]   Phone: [phone]    Telemedicine Visit Details  Type of service:  Telephone visit  Start Time:  1:04 PM  End Time: 1:17 PM     Medication Therapy Recommendations  No medication therapy recommendations to display       Medication Therapy Management (MTM) Encounter    ASSESSMENT:                            Medication Adherence/Access: No issues identified    Crohn's Disease: Reggie would benefit from continuing with plan to increase infliximab frequency to see if this will capture additional response/inflammation. It is reassuring that the fecal calprotectin has trended down.    PLAN:                            Reggie to continue with plan for infliximab 5 mg/kg every 6 weeks.  Reminder to schedule GI provider follow-up when able.    Follow-up:    -- 6 months for MTM, sooner if needed    SUBJECTIVE/OBJECTIVE:                          Dennis Goodell is a 48 year old male seen for a follow-up visit.       Reason for visit: infliximab check-in    Allergies/ADRs: None  Tobacco: He reports that he has never smoked. His smokeless tobacco use includes chew.    Medication Adherence/Access: no issues reported  -- infliximab every six week dosing authorized    Crohn's Disease:   Infliximab 5 mg/kg every 8 weeks (changing to every six weeks)  Lomotil as needed   Amitriptyline 10-20 mg nightly (when he remembers)  Ondansetron ODT as needed (not needing)  Dicyclomine 2 capsules as needed   Miralax daily   Omeprazole 40 mg twice daily   Sucralfate 1 g twice daily as needed     Last infusion was on 7/5 -- notes the next one is scheduled at the six week jose rafael. Notes he is feeling well today, but notes that before his  infusion was due he had an increase in symptoms. He did have to take off for work for this recently. Notes he hasn't been eating at work but is eating at home. He thinks he does have some fear of eating while at work due to unpredictable bowel activity at times. Notes he is having a daily bowel movement. Sometimes this are loose, sometimes formed. He does feel things have improved since starting daily Miralax.    Last provider visit: 6/27/2024 with Dr. Sanchez  Next provider visit: not on file  Last Quantiferon: 1/2024    Lab Results   Component Value Date    CALPRF 150.0 (H) 06/24/2024    CALPRF 232.0 (H) 03/15/2024    CALPRF 34.2 05/30/2023       IBD HISTORY  Age at diagnosis: 47 (2022)  Extent of disease: colonic - possible zane-anal  Disease phenotype: inflammatory with possible penetrating  Zane-anal disease: zane-anal abscess  Prior IBD surgeries: none  Prior IBD Medications: prednisone, Entocort    ----------------    I spent 12 minutes with this patient today. All changes were made via collaborative practice agreement with Dr. Sanchez. A copy of the visit note was provided to the patient's provider(s).    A summary of these recommendations was sent via [employer].    Christiana Esquivel PharmD, BCACP  MTM Pharmacist   [facility]   Phone: [phone]    Telemedicine Visit Details  Type of service:  Telephone visit  Start Time:  1:04 PM  End Time: 1:17 PM     Medication Therapy Recommendations  No medication therapy recommendations to display         Again, thank you for allowing me to participate in the care of your patient.        Sincerely,        Christiana Esquivel [facility]

## 2024-07-08 NOTE — PATIENT INSTRUCTIONS
"Recommendations from today's MTM visit:                                                       Reggie to continue with plan for infliximab 5 mg/kg every 6 weeks.  Reminder to schedule GI provider follow-up when able.    Follow-up:    -- 6 months for MTM, sooner if needed    It was great speaking with you today.  I value your experience and would be very thankful for your time in providing feedback in our clinic survey. In the next few days, you may receive an email or text message from [facility] [employer] with a link to a survey related to your  clinical pharmacist.\"     To schedule another MTM appointment, please call the clinic directly or you may call the MTM scheduling line at [phone].    My Clinical Pharmacist's contact information:                                                      Please feel free to contact me with any questions or concerns you have.      Christiana RonquilloD, BCACP  MTM Pharmacist   [facility]   Phone: [phone]    "

## 2024-07-08 NOTE — PROGRESS NOTES
Medication Therapy Management (MTM) Encounter    ASSESSMENT:                            Medication Adherence/Access: No issues identified    Crohn's Disease: Reggie would benefit from continuing with plan to increase infliximab frequency to see if this will capture additional response/inflammation. It is reassuring that the fecal calprotectin has trended down.    PLAN:                            Reggie to continue with plan for infliximab 5 mg/kg every 6 weeks.  Reminder to schedule GI provider follow-up when able.    Follow-up:    -- 6 months for MTM, sooner if needed    SUBJECTIVE/OBJECTIVE:                          Dennis Goodell is a 48 year old male seen for a follow-up visit.       Reason for visit: infliximab check-in    Allergies/ADRs: None  Tobacco: He reports that he has never smoked. His smokeless tobacco use includes chew.    Medication Adherence/Access: no issues reported  -- infliximab every six week dosing authorized    Crohn's Disease:   Infliximab 5 mg/kg every 8 weeks (changing to every six weeks)  Lomotil as needed   Amitriptyline 10-20 mg nightly (when he remembers)  Ondansetron ODT as needed (not needing)  Dicyclomine 2 capsules as needed   Miralax daily   Omeprazole 40 mg twice daily   Sucralfate 1 g twice daily as needed     Last infusion was on 7/5 -- notes the next one is scheduled at the six week jose rafael. Notes he is feeling well today, but notes that before his infusion was due he had an increase in symptoms. He did have to take off for work for this recently. Notes he hasn't been eating at work but is eating at home. He thinks he does have some fear of eating while at work due to unpredictable bowel activity at times. Notes he is having a daily bowel movement. Sometimes this are loose, sometimes formed. He does feel things have improved since starting daily Miralax. He is aware that he should be limiting aspirin/Aleve as much as possible, which is tough for him given long-term chronic neck  pain with arthritis.    Last provider visit: 6/27/2024 with Dr. Sanchez  Next provider visit: not on file  Last Quantiferon: 1/2024    Lab Results   Component Value Date    CALPRF 150.0 (H) 06/24/2024    CALPRF 232.0 (H) 03/15/2024    CALPRF 34.2 05/30/2023       IBD HISTORY  Age at diagnosis: 47 (2022)  Extent of disease: colonic - possible zane-anal  Disease phenotype: inflammatory with possible penetrating  Zane-anal disease: zane-anal abscess  Prior IBD surgeries: none  Prior IBD Medications: prednisone, Entocort    ----------------    I spent 12 minutes with this patient today. All changes were made via collaborative practice agreement with Dr. Sanchez. A copy of the visit note was provided to the patient's provider(s).    A summary of these recommendations was sent via [employer].    Christiana RonquilloD, BCACP  MTM Pharmacist   [facility]   Phone: [phone]    Telemedicine Visit Details  Type of service:  Telephone visit  Start Time:  1:04 PM  End Time: 1:17 PM     Medication Therapy Recommendations  No medication therapy recommendations to display

## 2024-07-09 ENCOUNTER — TELEPHONE (OUTPATIENT)
Dept: GASTROENTEROLOGY | Facility: CLINIC | Age: 49
End: 2024-07-09
Payer: COMMERCIAL

## 2024-07-09 NOTE — TELEPHONE ENCOUNTER
Left Voicemail (1st Attempt) and Sent Mychart (1st Attempt) for the patient to call back and schedule the following:    Appointment type: Return IBD  Provider: Tyshawn Nixon Pothoulakis, or Richa Diamond  Return date: 12/27/24

## 2024-07-10 NOTE — TELEPHONE ENCOUNTER
Received callback from Eda at [facility].    Patient has been approved to proceed with infusions every 6 weeks.     Updated patient via [employer].

## 2024-07-10 NOTE — TELEPHONE ENCOUNTER
"Attempted to call prior authorization team member - Kim, who typically handles [employer] prior authorizations. Voicemail reported she is out of the office until 7/22.    Alternative number was provided - [phone]. Contacted that number and spoke to \"Kiesha.\"    Per Kiesha, their team does not handle infusion PAs and another team member, \"Eda\" would call writer back. Direct callback number provided.   "

## 2024-07-11 NOTE — TELEPHONE ENCOUNTER
Left Voicemail (2nd Attempt) and Sent Mychart (2nd Attempt) for the patient to call back and schedule the following:    Appointment type: Return IBD  Provider: Tyshawn Nixon Pothoulakis, or Richa Diamond  Return date: 12/27/24 and 6/27/25

## 2024-07-18 DIAGNOSIS — R10.11 RUQ ABDOMINAL PAIN: ICD-10-CM

## 2024-07-23 NOTE — TELEPHONE ENCOUNTER
" Disp Refills Start End JAMIE   sucralfate (CARAFATE) 1 GM tablet 360 tablet 3 7/18/2023 -- --   Sig - Route: Take 1 tablet (1 g) by mouth 2 times daily as needed (abdominal pain) - Oral     ----------------------  Last Office Visit : 6/27/2024  [facility] [place]      Future Office visit:  0  ----------------------    Routing refill request to provider for review/approval because:  Managed by MTM  Protocol fail d/t \"medication not indicated for associated diagnosis\":    RUQ abdominal pain [R10.11]                    Pass/Fail Protocol Criteria:    Miscellaneous Gastrointestinal Agents [id number]/23/2024 12:58 PM   Protocol Details Check if Medication is Managed by MTM    Medication indicated for associated diagnosis            "

## 2024-07-24 RX ORDER — SUCRALFATE 1 G/1
1 TABLET ORAL 2 TIMES DAILY PRN
Qty: 360 TABLET | Refills: 1 | Status: SHIPPED | OUTPATIENT
Start: 2024-07-24

## 2024-08-09 DIAGNOSIS — K21.00 GASTROESOPHAGEAL REFLUX DISEASE WITH ESOPHAGITIS WITHOUT HEMORRHAGE: ICD-10-CM

## 2024-08-14 RX ORDER — OMEPRAZOLE 40 MG/1
40 CAPSULE, DELAYED RELEASE ORAL 2 TIMES DAILY
Qty: 60 CAPSULE | Refills: 11 | Status: SHIPPED | OUTPATIENT
Start: 2024-08-14

## 2024-08-16 ENCOUNTER — HOSPITAL ENCOUNTER (OUTPATIENT)
Dept: INFUSION THERAPY | Facility: OTHER | Age: 49
Discharge: HOME | End: 2024-08-16
Admitting: FAMILY MEDICINE
Payer: COMMERCIAL

## 2024-08-16 VITALS
BODY MASS INDEX: 23.79 KG/M2 | HEART RATE: 71 BPM | TEMPERATURE: 97.7 F | WEIGHT: 165.8 LBS | SYSTOLIC BLOOD PRESSURE: 109 MMHG | RESPIRATION RATE: 18 BRPM | DIASTOLIC BLOOD PRESSURE: 71 MMHG

## 2024-08-16 DIAGNOSIS — K50.10 CROHN'S DISEASE OF LARGE INTESTINE WITHOUT COMPLICATION (H): Primary | ICD-10-CM

## 2024-08-16 LAB
ALBUMIN SERPL BCG-MCNC: 4.4 G/DL (ref 3.5–5.2)
ALP SERPL-CCNC: 74 U/L (ref 40–150)
ALT SERPL W P-5'-P-CCNC: 14 U/L (ref 0–70)
AST SERPL W P-5'-P-CCNC: 25 U/L (ref 0–45)
BASOPHILS # BLD AUTO: 0.1 10E3/UL (ref 0–0.2)
BASOPHILS NFR BLD AUTO: 1 %
BILIRUB DIRECT SERPL-MCNC: <0.2 MG/DL (ref 0–0.3)
BILIRUB SERPL-MCNC: 0.2 MG/DL
CRP SERPL-MCNC: <3 MG/L
EOSINOPHIL # BLD AUTO: 0.3 10E3/UL (ref 0–0.7)
EOSINOPHIL NFR BLD AUTO: 4 %
ERYTHROCYTE [DISTWIDTH] IN BLOOD BY AUTOMATED COUNT: 12.4 % (ref 10–15)
HCT VFR BLD AUTO: 41.8 % (ref 40–53)
HGB BLD-MCNC: 14.2 G/DL (ref 13.3–17.7)
IMM GRANULOCYTES # BLD: 0 10E3/UL
IMM GRANULOCYTES NFR BLD: 0 %
LYMPHOCYTES # BLD AUTO: 3.7 10E3/UL (ref 0.8–5.3)
LYMPHOCYTES NFR BLD AUTO: 52 %
MCH RBC QN AUTO: 29.2 PG (ref 26.5–33)
MCHC RBC AUTO-ENTMCNC: 34 G/DL (ref 31.5–36.5)
MCV RBC AUTO: 86 FL (ref 78–100)
MONOCYTES # BLD AUTO: 0.7 10E3/UL (ref 0–1.3)
MONOCYTES NFR BLD AUTO: 9 %
NEUTROPHILS # BLD AUTO: 2.4 10E3/UL (ref 1.6–8.3)
NEUTROPHILS NFR BLD AUTO: 34 %
NRBC # BLD AUTO: 0 10E3/UL
NRBC BLD AUTO-RTO: 0 /100
PLATELET # BLD AUTO: 275 10E3/UL (ref 150–450)
PROT SERPL-MCNC: 6.8 G/DL (ref 6.4–8.3)
RBC # BLD AUTO: 4.86 10E6/UL (ref 4.4–5.9)
WBC # BLD AUTO: 7.1 10E3/UL (ref 4–11)

## 2024-08-16 PROCEDURE — 86140 C-REACTIVE PROTEIN: CPT | Performed by: INTERNAL MEDICINE

## 2024-08-16 PROCEDURE — 85049 AUTOMATED PLATELET COUNT: CPT | Performed by: INTERNAL MEDICINE

## 2024-08-16 PROCEDURE — 80076 HEPATIC FUNCTION PANEL: CPT | Performed by: INTERNAL MEDICINE

## 2024-08-16 PROCEDURE — 258N000003 HC RX IP 258 OP 636: Performed by: INTERNAL MEDICINE

## 2024-08-16 PROCEDURE — 250N000011 HC RX IP 250 OP 636: Performed by: INTERNAL MEDICINE

## 2024-08-16 PROCEDURE — 36415 COLL VENOUS BLD VENIPUNCTURE: CPT | Performed by: INTERNAL MEDICINE

## 2024-08-16 PROCEDURE — 96413 CHEMO IV INFUSION 1 HR: CPT

## 2024-08-16 RX ORDER — METHYLPREDNISOLONE SODIUM SUCCINATE 125 MG/2ML
125 INJECTION, POWDER, LYOPHILIZED, FOR SOLUTION INTRAMUSCULAR; INTRAVENOUS
Start: 2024-09-27

## 2024-08-16 RX ORDER — DIPHENHYDRAMINE HYDROCHLORIDE 50 MG/ML
50 INJECTION INTRAMUSCULAR; INTRAVENOUS
Start: 2024-09-27

## 2024-08-16 RX ORDER — ACETAMINOPHEN 325 MG/1
650 TABLET ORAL ONCE
Start: 2024-09-27 | End: 2024-09-27

## 2024-08-16 RX ORDER — MEPERIDINE HYDROCHLORIDE 50 MG/ML
25 INJECTION INTRAMUSCULAR; INTRAVENOUS; SUBCUTANEOUS EVERY 30 MIN PRN
OUTPATIENT
Start: 2024-09-27

## 2024-08-16 RX ORDER — ALBUTEROL SULFATE 90 UG/1
1-2 AEROSOL, METERED RESPIRATORY (INHALATION)
Start: 2024-09-27

## 2024-08-16 RX ORDER — ALBUTEROL SULFATE 0.83 MG/ML
2.5 SOLUTION RESPIRATORY (INHALATION)
OUTPATIENT
Start: 2024-09-27

## 2024-08-16 RX ORDER — DIPHENHYDRAMINE HCL 25 MG
25 CAPSULE ORAL ONCE
Start: 2024-09-27 | End: 2024-09-27

## 2024-08-16 RX ORDER — EPINEPHRINE 1 MG/ML
0.3 INJECTION, SOLUTION, CONCENTRATE INTRAVENOUS EVERY 5 MIN PRN
OUTPATIENT
Start: 2024-09-27

## 2024-08-16 RX ADMIN — SODIUM CHLORIDE 250 ML: 9 INJECTION, SOLUTION INTRAVENOUS at 09:53

## 2024-08-16 RX ADMIN — INFLIXIMAB 400 MG: 100 INJECTION, POWDER, LYOPHILIZED, FOR SOLUTION INTRAVENOUS at 10:16

## 2024-08-16 NOTE — NURSING NOTE
Infusion Nursing Note:  Dennis J Goodell presents today for Remicade.    Patient seen by provider today: No   present during visit today: Not Applicable.    Note: N/A.    Intravenous Access:  Labs drawn without difficulty.  Peripheral IV placed.    Treatment Conditions:  Biological Infusion Checklist:  ~~~ NOTE: If the patient answers yes to any of the questions below, hold the infusion and contact ordering provider or on-call provider.    Have you recently had an elevated temperature, fever, chills, productive cough, coughing for 3 weeks or longer or hemoptysis,  abnormal vital signs, night sweats,  chest pain or have you noticed a decrease in your appetite, unexplained weight loss or fatigue? No  Do you have any open wounds or new incisions? No  Do you have any upcoming hospitalizations or surgeries? Does not include esophagogastroduodenoscopy, colonoscopy, endoscopic retrograde cholangiopancreatography (ERCP), endoscopic ultrasound (EUS), dental procedures or joint aspiration/steroid injections No  Do you currently have any signs of illness or infection or are you on any antibiotics? No  Have you had any new, sudden or worsening abdominal pain? No  Have you or anyone in your household received a live vaccination in the past 4 weeks? Please note: No live vaccines while on biologic/chemotherapy until 6 months after the last treatment. Patient can receive the flu vaccine (shot only), pneumovax and the Covid vaccine. It is optimal for the patient to get these vaccines mid cycle, but they can be given at any time as long as it is not on the day of the infusion. No  Have you recently been diagnosed with any new nervous system diseases (ie. Multiple sclerosis, Guillain [place], seizures, neurological changes) or cancer diagnosis? Are you on any form of radiation or chemotherapy? No  Are you pregnant or breast feeding or do you have plans of pregnancy in the future? No  Have you been having any signs of  worsening depression or suicidal ideations?  (benlysta only) No  Have there been any other new onset medical symptoms? No  Have you had any new blood clots? (IVIG only) No  Post Infusion Assessment:  Patient tolerated infusion without incident.  Blood return noted pre and post infusion.  Site patent and intact, free from redness, edema or discomfort.  No evidence of extravasations.  Access discontinued per protocol.  Biologic Infusion Post Education: Call the triage nurse at your clinic or seek medical attention if you have chills and/or temperature greater than or equal to 100.5, uncontrolled nausea/vomiting, diarrhea, constipation, dizziness, shortness of breath, chest pain, heart palpitations, weakness or any other new or concerning symptoms, questions or concerns.  You cannot have any live virus vaccines prior to or during treatment or up to 6 months post infusion.  If you have an upcoming surgery, medical procedure or dental procedure during treatment, this should be discussed with your ordering physician and your surgeon/dentist.  If you are having any concerning symptom, if you are unsure if you should get your next infusion or wish to speak to a provider before your next infusion, please call your care coordinator or triage nurse at your clinic to notify them so we can adequately serve you.     Discharge Plan:   Discharge instructions reviewed with: Patient.  Patient and/or family verbalized understanding of discharge instructions and all questions answered.  Copy of AVS declined by patient and/or family.  Patient will return 9/27/24 for next appointment.  AVS to patient via [employer].  Patient discharged in stable condition accompanied by: self.  Departure Mode: Ambulatory.      Katherin Llamas RN

## 2024-08-21 DIAGNOSIS — K59.00 CONSTIPATION, UNSPECIFIED CONSTIPATION TYPE: ICD-10-CM

## 2024-08-21 DIAGNOSIS — K50.10 CROHN'S DISEASE OF LARGE INTESTINE WITHOUT COMPLICATION (H): ICD-10-CM

## 2024-08-22 ENCOUNTER — MYC MEDICAL ADVICE (OUTPATIENT)
Dept: GASTROENTEROLOGY | Facility: CLINIC | Age: 49
End: 2024-08-22
Payer: COMMERCIAL

## 2024-08-22 DIAGNOSIS — K50.10 CROHN'S DISEASE OF LARGE INTESTINE WITHOUT COMPLICATION (H): ICD-10-CM

## 2024-08-22 RX ORDER — DICYCLOMINE HYDROCHLORIDE 10 MG/1
CAPSULE ORAL
Qty: 120 CAPSULE | Refills: 4 | Status: SHIPPED | OUTPATIENT
Start: 2024-08-22

## 2024-08-25 RX ORDER — DICYCLOMINE HYDROCHLORIDE 10 MG/1
CAPSULE ORAL
Qty: 120 CAPSULE | Refills: 4 | OUTPATIENT
Start: 2024-08-25

## 2024-08-25 RX ORDER — POLYETHYLENE GLYCOL 3350 17 G/17G
1 POWDER, FOR SOLUTION ORAL DAILY
Qty: 578 G | Refills: 9 | Status: SHIPPED | OUTPATIENT
Start: 2024-08-25

## 2024-08-25 NOTE — TELEPHONE ENCOUNTER
polyethylene glycol (MIRALAX) 17 GM/Dose powder   Disp-578 g, R-11   Start: 07/18/2023     dicyclomine (BENTYL) 10 MG capsule   Disp-120 capsule, R-4,   Start: 08/22/2024   Refused, duplicate    6/27/2024  [facility] [place]    Helder Sanchez MD  Gastroenterology  Filled as per last order. Passed protocol

## 2024-08-27 ENCOUNTER — TELEPHONE (OUTPATIENT)
Dept: FAMILY MEDICINE | Facility: OTHER | Age: 49
End: 2024-08-27
Payer: COMMERCIAL

## 2024-08-27 DIAGNOSIS — I73.00 RAYNAUD'S PHENOMENON WITHOUT GANGRENE: ICD-10-CM

## 2024-08-27 RX ORDER — NIFEDIPINE 90 MG/1
90 TABLET, EXTENDED RELEASE ORAL DAILY
Qty: 30 TABLET | Refills: 0 | Status: SHIPPED | OUTPATIENT
Start: 2024-08-27 | End: 2024-08-28

## 2024-08-27 NOTE — TELEPHONE ENCOUNTER
Called patient to inform him of prescription sent to pharmacy.    Kiesha Multani LPN on 8/27/2024 at 12:32 PM

## 2024-08-27 NOTE — TELEPHONE ENCOUNTER
Called and spoke with patient and patient states that he needs a refill on his Procardia 90 mg daily as needed for Raynauds   Patient states he has an appointment scheduled on 9/23 with Dr. Melton.  Informed patient Dr. Melton out of office will route to Dr. Donny Murguia for review and consideration.  Kathi Stover RN on 8/27/2024 at 11:07 AM

## 2024-08-27 NOTE — TELEPHONE ENCOUNTER
"Patient states that he has been talking to someone about getting a refill on a medication and was told to set an appt. Appt is set 09.23.24     He is relaying that he set an appointment and wants a medication \"pushed\" thorough now     Please call and advise    Thank You    Franny Jones on 8/27/2024 at 10:07 AM    "

## 2024-08-28 RX ORDER — NIFEDIPINE 90 MG/1
90 TABLET, EXTENDED RELEASE ORAL DAILY
Qty: 30 TABLET | Refills: 0 | Status: SHIPPED | OUTPATIENT
Start: 2024-08-28

## 2024-08-28 NOTE — TELEPHONE ENCOUNTER
E-Prescribed Message Needing Your Attention  Received: Yesterday  Interface, Eprescribing  P Gh Refill  An error occurred while processing the e-prescribing message.    The message was not sent electronically to the requested pharmacy. Contact the pharmacy about the new prescription.    Code: 600 - Communication problem - try again later  Fax receipt could not be confirmed          Outpatient Medication Detail     Disp Refills Start End    NIFEdipine ER OSMOTIC (PROCARDIA XL) 90 MG 24 hr tablet 30 tablet 0 8/27/2024 --    Sig - Route: Take 1 tablet (90 mg) by mouth daily. - Oral    Sent to pharmacy as: NIFEdipine ER Osmotic Release 90 MG Oral Tablet Extended Release 24 Hour (PROCARDIA XL)    Class: E-Prescribe    E-Prescribing Status: Transmission to pharmacy failed (8/27/2024 12:44 PM CDT)      Order resent.    Uyen Leonardo RN .............. 8/28/2024  10:56 AM

## 2024-08-29 ENCOUNTER — MEDICAL CORRESPONDENCE (OUTPATIENT)
Dept: HEALTH INFORMATION MANAGEMENT | Facility: OTHER | Age: 49
End: 2024-08-29
Payer: COMMERCIAL

## 2024-08-30 ENCOUNTER — HOSPITAL ENCOUNTER (OUTPATIENT)
Dept: MRI IMAGING | Facility: OTHER | Age: 49
Discharge: HOME | End: 2024-08-30
Admitting: FAMILY MEDICINE
Payer: COMMERCIAL

## 2024-08-30 DIAGNOSIS — M47.26 OTHER SPONDYLOSIS WITH RADICULOPATHY, LUMBAR REGION: ICD-10-CM

## 2024-08-30 PROCEDURE — 72148 MRI LUMBAR SPINE W/O DYE: CPT

## 2024-09-03 ENCOUNTER — OFFICE VISIT (OUTPATIENT)
Dept: FAMILY MEDICINE | Facility: OTHER | Age: 49
End: 2024-09-03
Attending: NURSE PRACTITIONER
Payer: COMMERCIAL

## 2024-09-03 VITALS
WEIGHT: 172 LBS | OXYGEN SATURATION: 97 % | HEART RATE: 108 BPM | RESPIRATION RATE: 16 BRPM | SYSTOLIC BLOOD PRESSURE: 130 MMHG | BODY MASS INDEX: 24.62 KG/M2 | DIASTOLIC BLOOD PRESSURE: 96 MMHG | HEIGHT: 70 IN

## 2024-09-03 DIAGNOSIS — M71.38 SYNOVIAL CYST OF LUMBAR SPINE: ICD-10-CM

## 2024-09-03 DIAGNOSIS — M47.26 OTHER SPONDYLOSIS WITH RADICULOPATHY, LUMBAR REGION: Primary | ICD-10-CM

## 2024-09-03 PROCEDURE — 99213 OFFICE O/P EST LOW 20 MIN: CPT | Performed by: NURSE PRACTITIONER

## 2024-09-03 ASSESSMENT — PAIN SCALES - GENERAL: PAINLEVEL: MODERATE PAIN (4)

## 2024-09-03 ASSESSMENT — PATIENT HEALTH QUESTIONNAIRE - PHQ9
SUM OF ALL RESPONSES TO PHQ QUESTIONS 1-9: 7
10. IF YOU CHECKED OFF ANY PROBLEMS, HOW DIFFICULT HAVE THESE PROBLEMS MADE IT FOR YOU TO DO YOUR WORK, TAKE CARE OF THINGS AT HOME, OR GET ALONG WITH OTHER PEOPLE: SOMEWHAT DIFFICULT
SUM OF ALL RESPONSES TO PHQ QUESTIONS 1-9: 7

## 2024-09-03 ASSESSMENT — ENCOUNTER SYMPTOMS: BACK PAIN: 1

## 2024-09-03 NOTE — PROGRESS NOTES
Assessment & Plan   Problem List Items Addressed This Visit    None  Visit Diagnoses       Other spondylosis with radiculopathy, lumbar region    -  Primary    Synovial cyst of lumbar spine                 FMLA paperwork completed, faxed to employer and copy scanned into his chart. He requested a copy be given to his sister to bring home to him. No return to work until orthopedic appointments as he is unable to walk without crutches, could not safely work.     No follow-ups on file.      Petra Paredes is a 49 year old, presenting for the following health issues:  Back Pain    Back Pain     History of Present Illness       Back Pain:  He presents for follow up of back pain. Patient's back pain is a new problem.    Original cause of back pain: not sure  First noticed back pain: 1-4 weeks ago  Patient feels back pain: constantlyLocation of back pain:  Right lower back, right buttock, left buttock, right hip, right side of waist and other  Description of back pain: burning, cramping, dull ache, fullness, gnawing, sharp, shooting, stabbing and other  Back pain spreads: right thigh, right knee and right foot    Since patient first noticed back pain, pain is: gradually worsening  Does back pain interfere with his job:  Yes  On a scale of 1-10 (10 being the worst), patient describes pain as:  10  What makes back pain worse: certain positions, standing and twisting   Acupuncture: not tried  Acetaminophen: not helpful  Activity or exercise: not helpful  Chiropractor:  Not helpful  Cold: helpful  Heat: helpful  Massage: not tried  Muscle relaxants: not tried  NSAIDS: not helpful  Opioids: not tried  Physical Therapy: not tried  Rest: helpful  Steroid Injection: not tried  Stretching: not helpful  Surgery: not tried  TENS unit: not tried  Topical pain relievers: not tried  Other healthcare providers patient is seeing for back pain: Chiropractor   He is taking medications regularly.       He presents to clinic today to  "have FMLA paperwork completed for work.  He has had acute onset of low back pain with radicular symptoms into right leg.  He reports symptoms started on August 22, no known injuries.  He called into work on August 27 and has not been able to work since.  He was seen in [place] at [facility] x-ray was obtained, MRI ordered and completed on August 30, 2024.  MRI showed large right side facet synovial cyst at L3-4 extending into the central spinal canal.  Moderately narrowing the central spinal canal and severely narrowing the right lateral recess with mass effect upon the right L4 nerve root.  He is needing to use crutches for ambulation, shooting pain down his right leg.  He does have an appointment at [facility] orthopedics on September 12 and another appointment scheduled on September 24 for follow-up.  He is needing FMLA paperwork completed by primary care provider.          Objective    BP (!) 130/96   Pulse 108   Resp 16   Ht 1.778 m (5' 10\")   Wt 78 kg (172 lb)   SpO2 97%   BMI 24.68 kg/m    Body mass index is 24.68 kg/m .  Physical Exam   GENERAL: alert, appears uncomfortable  EYES: Eyes grossly normal to inspection  MS: no gross musculoskeletal defects noted, walking very slowly using crutches  SKIN: no suspicious lesions or rashes  NEURO: Normal strength and tone, mentation intact and speech normal  PSYCH: mentation appears normal, affect normal/bright            Signed Electronically by: DONTRELL Urena CNP    "

## 2024-09-03 NOTE — NURSING NOTE
Patient presents today for back pain and to fill out FMLA paperwork.    Medication Reconciliation Complete    Katherin Harden LPN  9/3/2024 2:36 PM

## 2024-09-16 ENCOUNTER — TELEPHONE (OUTPATIENT)
Dept: GASTROENTEROLOGY | Facility: CLINIC | Age: 49
End: 2024-09-16
Payer: COMMERCIAL

## 2024-09-16 ENCOUNTER — MYC MEDICAL ADVICE (OUTPATIENT)
Dept: GASTROENTEROLOGY | Facility: CLINIC | Age: 49
End: 2024-09-16
Payer: COMMERCIAL

## 2024-09-16 NOTE — TELEPHONE ENCOUNTER
Dr. Sanchez and MTM discussed -     Plan to infuse two weeks postoperatively, unless any complications arise.     Updated patient.

## 2024-09-16 NOTE — TELEPHONE ENCOUNTER
"Received voicemail from patient requesting callback.     Per the patient he is scheduled for a surgical procedure on 9/27 to \"remove a cyst near his L3-L4 area on his spine.\"    Patient is currently scheduled for Infliximab infusion on the same date, and will need to reschedule. Requesting appropriate timeframe to reschedule.     Routed to Dr. Sanchez and MTM.  "

## 2024-09-17 ENCOUNTER — OFFICE VISIT (OUTPATIENT)
Dept: INTERNAL MEDICINE | Facility: OTHER | Age: 49
End: 2024-09-17
Payer: COMMERCIAL

## 2024-09-17 VITALS
SYSTOLIC BLOOD PRESSURE: 132 MMHG | OXYGEN SATURATION: 96 % | TEMPERATURE: 98.2 F | BODY MASS INDEX: 25.11 KG/M2 | DIASTOLIC BLOOD PRESSURE: 79 MMHG | HEART RATE: 87 BPM | RESPIRATION RATE: 17 BRPM | WEIGHT: 175.4 LBS | HEIGHT: 70 IN

## 2024-09-17 DIAGNOSIS — I73.00 RAYNAUD'S PHENOMENON WITHOUT GANGRENE: ICD-10-CM

## 2024-09-17 DIAGNOSIS — L72.0 EPIDERMOID CYST OF SKIN OF CHEEK: ICD-10-CM

## 2024-09-17 DIAGNOSIS — K50.10 CROHN'S DISEASE OF LARGE INTESTINE WITHOUT COMPLICATION (H): ICD-10-CM

## 2024-09-17 DIAGNOSIS — Z01.818 PREOP GENERAL PHYSICAL EXAM: Primary | ICD-10-CM

## 2024-09-17 DIAGNOSIS — F43.29 MIXED EMOTIONAL FEATURES AS ADJUSTMENT REACTION: ICD-10-CM

## 2024-09-17 DIAGNOSIS — Z85.47 HISTORY OF TESTICULAR CANCER: Chronic | ICD-10-CM

## 2024-09-17 DIAGNOSIS — F41.1 GAD (GENERALIZED ANXIETY DISORDER): Chronic | ICD-10-CM

## 2024-09-17 DIAGNOSIS — F34.1 DYSTHYMIC DISORDER: ICD-10-CM

## 2024-09-17 DIAGNOSIS — Z13.220 LIPID SCREENING: ICD-10-CM

## 2024-09-17 DIAGNOSIS — D72.829 LEUKOCYTOSIS, UNSPECIFIED TYPE: ICD-10-CM

## 2024-09-17 DIAGNOSIS — B07.9 VIRAL WARTS, UNSPECIFIED TYPE: ICD-10-CM

## 2024-09-17 DIAGNOSIS — M47.812 FACET ARTHROPATHY, CERVICAL: ICD-10-CM

## 2024-09-17 DIAGNOSIS — M71.30 SYNOVIAL CYST: ICD-10-CM

## 2024-09-17 DIAGNOSIS — G43.711 INTRACTABLE CHRONIC MIGRAINE WITHOUT AURA AND WITH STATUS MIGRAINOSUS: ICD-10-CM

## 2024-09-17 DIAGNOSIS — C80.1 GERM CELL TUMOR (H): ICD-10-CM

## 2024-09-17 PROBLEM — K50.90 CROHN'S DISEASE (H): Status: ACTIVE | Noted: 2022-11-29

## 2024-09-17 LAB
ALBUMIN SERPL BCG-MCNC: 4.8 G/DL (ref 3.5–5.2)
ALP SERPL-CCNC: 69 U/L (ref 40–150)
ALT SERPL W P-5'-P-CCNC: 12 U/L (ref 0–70)
ANION GAP SERPL CALCULATED.3IONS-SCNC: 8 MMOL/L (ref 7–15)
AST SERPL W P-5'-P-CCNC: 19 U/L (ref 0–45)
BILIRUB SERPL-MCNC: <0.2 MG/DL
BUN SERPL-MCNC: 15.4 MG/DL (ref 6–20)
CALCIUM SERPL-MCNC: 9.3 MG/DL (ref 8.8–10.4)
CHLORIDE SERPL-SCNC: 100 MMOL/L (ref 98–107)
CHOLEST SERPL-MCNC: 187 MG/DL
CREAT SERPL-MCNC: 1.03 MG/DL (ref 0.67–1.17)
EGFRCR SERPLBLD CKD-EPI 2021: 90 ML/MIN/1.73M2
ERYTHROCYTE [DISTWIDTH] IN BLOOD BY AUTOMATED COUNT: 13.1 % (ref 10–15)
FASTING STATUS PATIENT QL REPORTED: ABNORMAL
FASTING STATUS PATIENT QL REPORTED: ABNORMAL
GLUCOSE SERPL-MCNC: 91 MG/DL (ref 70–99)
HCO3 SERPL-SCNC: 31 MMOL/L (ref 22–29)
HCT VFR BLD AUTO: 42.1 % (ref 40–53)
HDLC SERPL-MCNC: 44 MG/DL
HGB BLD-MCNC: 14.1 G/DL (ref 13.3–17.7)
LDLC SERPL CALC-MCNC: 105 MG/DL
MCH RBC QN AUTO: 29 PG (ref 26.5–33)
MCHC RBC AUTO-ENTMCNC: 33.5 G/DL (ref 31.5–36.5)
MCV RBC AUTO: 86 FL (ref 78–100)
NONHDLC SERPL-MCNC: 143 MG/DL
PLATELET # BLD AUTO: 266 10E3/UL (ref 150–450)
POTASSIUM SERPL-SCNC: 4.2 MMOL/L (ref 3.4–5.3)
PROT SERPL-MCNC: 7.4 G/DL (ref 6.4–8.3)
RBC # BLD AUTO: 4.87 10E6/UL (ref 4.4–5.9)
SODIUM SERPL-SCNC: 139 MMOL/L (ref 135–145)
TRIGL SERPL-MCNC: 190 MG/DL
WBC # BLD AUTO: 7.4 10E3/UL (ref 4–11)

## 2024-09-17 PROCEDURE — 17110 DESTRUCTION B9 LES UP TO 14: CPT

## 2024-09-17 PROCEDURE — 93000 ELECTROCARDIOGRAM COMPLETE: CPT | Performed by: INTERNAL MEDICINE

## 2024-09-17 PROCEDURE — 99214 OFFICE O/P EST MOD 30 MIN: CPT | Mod: 25

## 2024-09-17 PROCEDURE — 85027 COMPLETE CBC AUTOMATED: CPT | Mod: ZL

## 2024-09-17 PROCEDURE — 36415 COLL VENOUS BLD VENIPUNCTURE: CPT | Mod: ZL

## 2024-09-17 PROCEDURE — 80061 LIPID PANEL: CPT | Mod: ZL

## 2024-09-17 PROCEDURE — 80053 COMPREHEN METABOLIC PANEL: CPT | Mod: ZL

## 2024-09-17 RX ORDER — GABAPENTIN 300 MG/1
300 CAPSULE ORAL
COMMUNITY
Start: 2024-09-12

## 2024-09-17 RX ORDER — L.ACIDOPH/B.ANIMALIS/B.LONGUM 15B CELL
CAPSULE ORAL
COMMUNITY
Start: 2024-06-21

## 2024-09-17 RX ORDER — NAPROXEN SODIUM 220 MG
440 TABLET ORAL
COMMUNITY

## 2024-09-17 RX ORDER — HYDROCODONE BITARTRATE AND ACETAMINOPHEN 5; 325 MG/1; MG/1
1 TABLET ORAL EVERY 6 HOURS PRN
COMMUNITY
Start: 2024-09-12

## 2024-09-17 RX ORDER — OMEGA-3 FATTY ACIDS/FISH OIL 300-1000MG
1 CAPSULE ORAL
COMMUNITY

## 2024-09-17 ASSESSMENT — PAIN SCALES - GENERAL: PAINLEVEL: EXTREME PAIN (8)

## 2024-09-17 ASSESSMENT — PATIENT HEALTH QUESTIONNAIRE - PHQ9
10. IF YOU CHECKED OFF ANY PROBLEMS, HOW DIFFICULT HAVE THESE PROBLEMS MADE IT FOR YOU TO DO YOUR WORK, TAKE CARE OF THINGS AT HOME, OR GET ALONG WITH OTHER PEOPLE: EXTREMELY DIFFICULT
SUM OF ALL RESPONSES TO PHQ QUESTIONS 1-9: 14
SUM OF ALL RESPONSES TO PHQ QUESTIONS 1-9: 14

## 2024-09-17 NOTE — NURSING NOTE
"Chief Complaint   Patient presents with    Pre-Op Exam     Patient here for Pre-op.  Initial /79   Pulse 87   Temp 98.2  F (36.8  C) (Tympanic)   Resp 17   Ht 1.778 m (5' 10\")   Wt 79.6 kg (175 lb 6.4 oz)   SpO2 96%   BMI 25.17 kg/m   Estimated body mass index is 25.17 kg/m  as calculated from the following:    Height as of this encounter: 1.778 m (5' 10\").    Weight as of this encounter: 79.6 kg (175 lb 6.4 oz).  Medication Review: complete    The next two questions are to help us understand your food security.  If you are feeling you need any assistance in this area, we have resources available to support you today.          9/17/2024   SDOH- Food Insecurity   Within the past 12 months, did you worry that your food would run out before you got money to buy more? N   Within the past 12 months, did the food you bought just not last and you didn t have money to get more? N            Health Care Directive:  Patient does not have a Health Care Directive or Living Will: Discussed advance care planning with patient; however, patient declined at this time.    Freda Akhtar MA      "

## 2024-09-17 NOTE — PATIENT INSTRUCTIONS
How to Take Your Medication Before Surgery  Preoperative Medication Instructions   Antiplatelet or Anticoagulation Medication Instructions   - aspirin: Discontinue aspirin 7-10 days prior to procedure to reduce bleeding risk. It should be resumed postoperatively.     Additional Medication Instructions  Take all scheduled medications on the day of surgery EXCEPT for modifications listed below:   - Herbal medications and vitamins: DO NOT TAKE 14 days prior to surgery.   - celecoxib (Celebrex): DO NOT TAKE 3 days before surgery. May continue without modification for management of severe pain.    - ibuprofen (Advil, Motrin): DO NOT TAKE 1 day before surgery.    - naproxen (Aleve, Naprosyn): DO NOT TAKE 4 days before surgery.        Patient Education   Preparing for Your Surgery  Getting started  A nurse will call you to review your health history and instructions. They will give you an arrival time based on your scheduled surgery time. Please be ready to share:  Your doctor's clinic name and phone number  Your medical, surgical, and anesthesia history  A list of allergies and sensitivities  A list of medicines, including herbal treatments and over-the-counter drugs  Whether the patient has a legal guardian (ask how to send us the papers in advance)  Please tell us if you're pregnant--or if there's any chance you might be pregnant. Some surgeries may injure a fetus (unborn baby), so they require a pregnancy test. Surgeries that are safe for a fetus don't always need a test, and you can choose whether to have one.   If you have a child who's having surgery, please ask for a copy of Preparing for Your Child's Surgery.    Preparing for surgery  Within 10 to 30 days of surgery: Have a pre-op exam (sometimes called an H&P, or History and Physical). This can be done at a clinic or pre-operative center.  If you're having a , you may not need this exam. Talk to your care team.  At your pre-op exam, talk to your care  team about all medicines you take. If you need to stop any medicines before surgery, ask when to start taking them again.  We do this for your safety. Many medicines can make you bleed too much during surgery. Some change how well surgery (anesthesia) drugs work.  Call your insurance company to let them know you're having surgery. (If you don't have insurance, call [phone].)  Call your clinic if there's any change in your health. This includes signs of a cold or flu (sore throat, runny nose, cough, rash, fever). It also includes a scrape or scratch near the surgery site.  If you have questions on the day of surgery, call your hospital or surgery center.  Eating and drinking guidelines  For your safety: Unless your surgeon tells you otherwise, follow the guidelines below.  Eat and drink as usual until 8 hours before you arrive for surgery. After that, no food or milk.  Drink clear liquids until 2 hours before you arrive. These are liquids you can see through, like water, Gatorade, and Propel Water. They also include plain black coffee and tea (no cream or milk), candy, and breath mints. You can spit out gum when you arrive.  If you drink alcohol: Stop drinking it the night before surgery.  If your care team tells you to take medicine on the morning of surgery, it's okay to take it with a sip of water.  Preventing infection  Shower or bathe the night before and morning of your surgery. Follow the instructions your clinic gave you. (If no instructions, use regular soap.)  Don't shave or clip hair near your surgery site. We'll remove the hair if needed.  Don't smoke or vape the morning of surgery. You may chew nicotine gum up to 2 hours before surgery. A nicotine patch is okay.  Note: Some surgeries require you to completely quit smoking and nicotine. Check with your surgeon.  Your care team will make every effort to keep you safe from infection. We will:  Clean our hands often with soap and water (or an  alcohol-based hand rub).  Clean the skin at your surgery site with a special soap that kills germs.  Give you a special gown to keep you warm. (Cold raises the risk of infection.)  Wear special hair covers, masks, gowns and gloves during surgery.  Give antibiotic medicine, if prescribed. Not all surgeries need antibiotics.  What to bring on the day of surgery  Photo ID and insurance card  Copy of your health care directive, if you have one  Glasses and hearing aids (bring cases)  You can't wear contacts during surgery  Inhaler and eye drops, if you use them (tell us about these when you arrive)  CPAP machine or breathing device, if you use them  A few personal items, if spending the night  If you have . . .  A pacemaker, ICD (cardiac defibrillator) or other implant: Bring the ID card.  An implanted stimulator: Bring the remote control.  A legal guardian: Bring a copy of the certified (court-stamped) guardianship papers.  Please remove any jewelry, including body piercings. Leave jewelry and other valuables at home.  If you're going home the day of surgery  You must have a responsible adult drive you home. They should stay with you overnight as well.  If you don't have someone to stay with you, and you aren't safe to go home alone, we may keep you overnight. Insurance often won't pay for this.  After surgery  If it's hard to control your pain or you need more pain medicine, please call your surgeon's office.  Questions?   If you have any questions for your care team, list them here: _________________________________________________________________________________________________________________________________________________________________________ ____________________________________ ____________________________________ ____________________________________  For informational purposes only. Not to replace the advice of your health care provider. Copyright   2003, 2019 [facility]. All rights reserved.  Clinically reviewed by Estella Garza MD. [employer] [id number] - REV 12/22.       Please call if frozen lesion has not disappeared in 1 month.
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
## 2024-09-18 DIAGNOSIS — M54.2 NECK PAIN: ICD-10-CM

## 2024-09-19 LAB
ATRIAL RATE - MUSE: 84 BPM
DIASTOLIC BLOOD PRESSURE - MUSE: NORMAL MMHG
INTERPRETATION ECG - MUSE: NORMAL
P AXIS - MUSE: 45 DEGREES
PR INTERVAL - MUSE: 176 MS
QRS DURATION - MUSE: 92 MS
QT - MUSE: 378 MS
QTC - MUSE: 446 MS
R AXIS - MUSE: 20 DEGREES
SYSTOLIC BLOOD PRESSURE - MUSE: NORMAL MMHG
T AXIS - MUSE: 32 DEGREES
VENTRICULAR RATE- MUSE: 84 BPM

## 2024-09-24 ENCOUNTER — HOSPITAL ENCOUNTER (OUTPATIENT)
Dept: MRI IMAGING | Facility: OTHER | Age: 49
Discharge: HOME | End: 2024-09-24
Attending: SPECIALIST | Admitting: SPECIALIST
Payer: COMMERCIAL

## 2024-09-24 ENCOUNTER — TELEPHONE (OUTPATIENT)
Dept: FAMILY MEDICINE | Facility: OTHER | Age: 49
End: 2024-09-24
Payer: COMMERCIAL

## 2024-09-24 DIAGNOSIS — M71.38 CYST OF LUMBAR FACET JOINT: ICD-10-CM

## 2024-09-24 DIAGNOSIS — M54.16 LUMBAR RADICULOPATHY: ICD-10-CM

## 2024-09-24 DIAGNOSIS — M54.16 LUMBAR RADICULAR PAIN: ICD-10-CM

## 2024-09-24 PROCEDURE — 72148 MRI LUMBAR SPINE W/O DYE: CPT

## 2024-09-24 NOTE — TELEPHONE ENCOUNTER
Called and spoke with patient, letting him know that he was a no show for his appointment with  yesterday 09/23/2024 and I will not work him in. Declined to be transferred tot he appt line to schedule but he said he will try the appt line at 7 am for cancellations or openings. Saige Parsons LPN .......................9/24/2024  11:48 AM

## 2024-09-24 NOTE — TELEPHONE ENCOUNTER
He wants to come in today for a refill and his cancer testing.  Does not want to see anyone but Dr Melton, he is going to make him his primary.      Anne Mccarthy on 9/24/2024 at 7:16 AM

## 2024-09-25 NOTE — TELEPHONE ENCOUNTER
celecoxib (CELEBREX) 100 MG capsule     60 capsule 3 3/21/2024     Last Office Visit : 6-  Future Office visit:  none      NSAID Medications [id number]/18/2024 06:25 PM   Protocol Details Always Fail Criteria - Chart Review Required

## 2024-09-27 RX ORDER — CELECOXIB 100 MG/1
100 CAPSULE ORAL 2 TIMES DAILY PRN
Qty: 60 CAPSULE | OUTPATIENT
Start: 2024-09-27

## 2024-09-27 NOTE — TELEPHONE ENCOUNTER
"Per clinic visit with Dr. Sanchez in June 2024:    \"Counseled minimizing/avoiding NSAIDs. Celebrex did not provide improved benefit. Use deep tissue massage as much as able.\"    Refill refused.   "

## 2024-10-09 ENCOUNTER — TELEPHONE (OUTPATIENT)
Dept: GASTROENTEROLOGY | Facility: CLINIC | Age: 49
End: 2024-10-09
Payer: COMMERCIAL

## 2024-10-09 NOTE — LETTER
2024    To:     Fax [phone]    RE:   Dennis J Goodell  [address]   [place] [address]  : 1975  Policy #: [id number]   Case/Reference: [id number]    To Whom It May Concern,    Below is the clinical summary pertinent to the appeal of ZYMFENTRA 120MG/ML INJECT ONE PEN EVERY 14 DAYS. We understand that you are denying our request because the patient hasn't tried and failed the preferred products.    Background   Diagnosis: Crohn's disease of large intestine without complication (H) [K50.10]   Current IBD medications:   - IFX 5 mg/kg q 8 weeks     Previous IBD Therapies:  prednisone, Entocort   Clinical disease assessment on current medications: ***    Objective measures of inflammation:    - Flex-sig/colonoscopy: - Simple Endoscopic Score for Crohn's Disease: 0,                          mucosal inflammatory changes secondary to Crohn's                          disease, in remission. Biopsied.                          - Two 3 to 7 mm polyps in the transverse colon,                          removed with a cold snare. Resected and retrieved.                          - The examination was otherwise normal on direct and                          retroflexion views.                          No active inflammation seen. Perhaps some subtle                          scarring in ascending colon and terminal ileum. No                          strictures and no active inflammation. No reason for                          RUQ pain found on this exam.      CRP Inflammation   Date Value Ref Range Status   2024 <3.00 <5.00 mg/L Final         Lab Results   Component Value Date    CALPRF 150.0 (H) 2024         Rationale for requested therapy  ***    How we will measure success:   Based on the above, we will measure success defined as an improvement of both symptoms and inflammatory parameters, specifically *** over *** timeframe. Should we not achieve these measures we will pursue a  different line of therapy.    Duration  12 months    Summary  In summary, ZYMFENTRA 120MG/ML INJECT ONE PEN EVERY 14 DAYS is medically necessary for this patient s medical condition. Please call my office at [phone] if I can provide you with any additional information to approve my request. I look forward to receiving your timely response and approval of this request.     Sincerely,    ***

## 2024-10-09 NOTE — TELEPHONE ENCOUNTER
PA Initiation    Medication: ZYMFENTRA (2 PEN) 120 MG/ML SC AJKT  Insurance Company: [employer] - Phone [phone] Fax [phone]  Pharmacy Filling the Rx:    Filling Pharmacy Phone:    Filling Pharmacy Fax:    Start Date: 10/9/2024  [id number]

## 2024-10-16 ENCOUNTER — HOSPITAL ENCOUNTER (OUTPATIENT)
Dept: INFUSION THERAPY | Facility: OTHER | Age: 49
Discharge: HOME | End: 2024-10-16
Admitting: FAMILY MEDICINE
Payer: COMMERCIAL

## 2024-10-16 VITALS
WEIGHT: 171.4 LBS | SYSTOLIC BLOOD PRESSURE: 130 MMHG | HEART RATE: 81 BPM | BODY MASS INDEX: 24.59 KG/M2 | DIASTOLIC BLOOD PRESSURE: 84 MMHG | TEMPERATURE: 98.2 F | RESPIRATION RATE: 18 BRPM

## 2024-10-16 DIAGNOSIS — K50.10 CROHN'S DISEASE OF LARGE INTESTINE WITHOUT COMPLICATION (H): Primary | ICD-10-CM

## 2024-10-16 PROCEDURE — 258N000003 HC RX IP 258 OP 636: Performed by: INTERNAL MEDICINE

## 2024-10-16 PROCEDURE — 250N000011 HC RX IP 250 OP 636: Mod: JZ | Performed by: INTERNAL MEDICINE

## 2024-10-16 PROCEDURE — 96413 CHEMO IV INFUSION 1 HR: CPT

## 2024-10-16 RX ORDER — DIPHENHYDRAMINE HCL 25 MG
25 CAPSULE ORAL ONCE
Start: 2024-11-08 | End: 2024-11-08

## 2024-10-16 RX ORDER — ALBUTEROL SULFATE 0.83 MG/ML
2.5 SOLUTION RESPIRATORY (INHALATION)
OUTPATIENT
Start: 2024-11-08

## 2024-10-16 RX ORDER — ALBUTEROL SULFATE 90 UG/1
1-2 INHALANT RESPIRATORY (INHALATION)
Start: 2024-11-08

## 2024-10-16 RX ORDER — ACETAMINOPHEN 325 MG/1
650 TABLET ORAL ONCE
Start: 2024-11-08 | End: 2024-11-08

## 2024-10-16 RX ORDER — METHYLPREDNISOLONE SODIUM SUCCINATE 125 MG/2ML
125 INJECTION INTRAMUSCULAR; INTRAVENOUS
Start: 2024-11-08

## 2024-10-16 RX ORDER — OXYCODONE HYDROCHLORIDE 5 MG/1
5 TABLET ORAL EVERY 6 HOURS PRN
COMMUNITY

## 2024-10-16 RX ORDER — EPINEPHRINE 1 MG/ML
0.3 INJECTION, SOLUTION, CONCENTRATE INTRAVENOUS EVERY 5 MIN PRN
OUTPATIENT
Start: 2024-11-08

## 2024-10-16 RX ORDER — DIPHENHYDRAMINE HYDROCHLORIDE 50 MG/ML
50 INJECTION INTRAMUSCULAR; INTRAVENOUS
Start: 2024-11-08

## 2024-10-16 RX ORDER — MEPERIDINE HYDROCHLORIDE 50 MG/ML
25 INJECTION INTRAMUSCULAR; INTRAVENOUS; SUBCUTANEOUS EVERY 30 MIN PRN
OUTPATIENT
Start: 2024-11-08

## 2024-10-16 RX ADMIN — INFLIXIMAB 400 MG: 100 INJECTION, POWDER, LYOPHILIZED, FOR SOLUTION INTRAVENOUS at 10:36

## 2024-10-16 NOTE — NURSING NOTE
~~~ NOTE: If the patient answers yes to any of the questions below, hold the infusion and contact ordering provider or on-call provider.    Do you currently have any signs of illness or infection or are you on any antibiotics? No  Have you recently had an elevated temperature, fever, chills, productive cough, coughing for 3 weeks or longer or hemoptysis, abnormal vital signs, night sweats, chest pain or have you noticed a decrease in your appetite, unexplained weight loss or fatigue? No  Have you had any new, sudden, or worsening abdominal pain? No  Do you have any open wounds or new incisions? (exclude for patients with hidradenitis suppurativa) No - has closed/healed incision to lower back from surgery last month, no s/s infection noted.   Have you recently been diagnosed with any new nervous system diseases (ie. Multiple sclerosis, Guillain [place], seizures, neurological changes) or cancer diagnosis? Are you on any form of radiation or chemotherapy? No  Have there been any other new onset medical symptoms? No  Are you pregnant or breast feeding or do you have plans of pregnancy in the future? N/A  Do you have any upcoming hospitalizations or surgeries? Does not include esophagogastroduodenoscopy, colonoscopy, endoscopic retrograde cholangiopancreatography (ERCP), endoscopic ultrasound (EUS), dental procedures (including cleanings, fillings, implants, extractions)  or joint aspiration/steroid injections No  Have you or anyone in your household received a live vaccination in the past 4 weeks? Please note: No live vaccines while on biologic/chemotherapy until 6 months after the last treatment. Patient can receive the flu vaccine (shot only).  It is optimal for the patient to get it mid cycle, but it can be given at any time as long as it is not on the day of the infusion. No  If applicable to prescribed medication, confirm negative PPD or quantiferon gold MTB. If positive, verify has negative chest x-ray or the  patient is at least 4 weeks post initiation of INH/B6 therapy and have clearance from provider before infusion  If applicable to prescribed medication, confirm negative hepatitis B surface antigen or hepatitis C. If positive, clearance from provider before infusion.   Rheumatology patients receiving tocilizumab (Actemra): If labs were drawn within the past week, hold dosing until cleared to infuse If AST/ALT > 2 X upper limit normal; ANC < 1.0. N/A  Patients receiving belimumab (Benlysta): Have you been having any signs of worsening depression or suicidal ideations? N/A

## 2024-10-16 NOTE — NURSING NOTE
Infusion Nursing Note:  Dennis J Goodell presents today for Remicade.    Patient seen by provider today: No   present during visit today: Not Applicable.    Note: N/A.      Intravenous Access:  Labs drawn last infusion, due next visit.  Peripheral IV placed.    Treatment Conditions:  Biological Infusion Checklist: Completed       Post Infusion Assessment:  Patient tolerated infusion without incident.  Blood return noted pre and post infusion.  Site patent and intact, free from redness, edema or discomfort.  No evidence of extravasations.  Access discontinued per protocol.    Biologic Infusion Post Education: Call the triage nurse at your clinic or seek medical attention if you have chills and/or temperature greater than or equal to 100.5, uncontrolled nausea/vomiting, diarrhea, constipation, dizziness, shortness of breath, chest pain, heart palpitations, weakness or any other new or concerning symptoms, questions or concerns.  You cannot have any live virus vaccines prior to or during treatment or up to 6 months post infusion.  If you have an upcoming surgery, medical procedure or dental procedure during treatment, this should be discussed with your ordering physician and your surgeon/dentist.  If you are having any concerning symptom, if you are unsure if you should get your next infusion or wish to speak to a provider before your next infusion, please call your care coordinator or triage nurse at your clinic to notify them so we can adequately serve you.       Discharge Plan:   Discharge instructions reviewed with: Patient.  Patient and/or family verbalized understanding of discharge instructions and all questions answered.  Copy of AVS declined. Patient will return 11-27-24 for next appointment.  AVS to patient via [employer].   Patient discharged in stable condition accompanied by: self.  Departure Mode: Ambulatory.      Mary Kate Ospina RN

## 2024-10-16 NOTE — TELEPHONE ENCOUNTER
Routed LMN to [facility] pool    PRIOR AUTHORIZATION DENIED    Medication: ZYMFENTRA (2 PEN) 120 MG/ML SC AJKT  Insurance Company: [employer] - Phone [phone] Fax [phone]  Denial Date: 10/16/2024  Denial Reason(s):      Appeal Information:      Patient Notified:

## 2024-10-30 ENCOUNTER — OFFICE VISIT (OUTPATIENT)
Dept: FAMILY MEDICINE | Facility: OTHER | Age: 49
End: 2024-10-30
Attending: FAMILY MEDICINE
Payer: COMMERCIAL

## 2024-10-30 VITALS
RESPIRATION RATE: 20 BRPM | WEIGHT: 171.8 LBS | HEIGHT: 70 IN | DIASTOLIC BLOOD PRESSURE: 80 MMHG | BODY MASS INDEX: 24.6 KG/M2 | OXYGEN SATURATION: 98 % | HEART RATE: 90 BPM | SYSTOLIC BLOOD PRESSURE: 126 MMHG | TEMPERATURE: 97.7 F

## 2024-10-30 DIAGNOSIS — Z85.47 HISTORY OF TESTICULAR CANCER: Chronic | ICD-10-CM

## 2024-10-30 DIAGNOSIS — F34.1 DYSTHYMIC DISORDER: Primary | ICD-10-CM

## 2024-10-30 DIAGNOSIS — I73.00 RAYNAUD'S PHENOMENON WITHOUT GANGRENE: ICD-10-CM

## 2024-10-30 DIAGNOSIS — F41.1 GAD (GENERALIZED ANXIETY DISORDER): Chronic | ICD-10-CM

## 2024-10-30 DIAGNOSIS — K50.10 CROHN'S DISEASE OF LARGE INTESTINE WITHOUT COMPLICATION (H): ICD-10-CM

## 2024-10-30 DIAGNOSIS — C80.1 GERM CELL TUMOR (H): ICD-10-CM

## 2024-10-30 PROBLEM — K50.90 CROHN'S DISEASE (H): Status: RESOLVED | Noted: 2022-11-29 | Resolved: 2024-10-30

## 2024-10-30 PROCEDURE — 99396 PREV VISIT EST AGE 40-64: CPT | Performed by: FAMILY MEDICINE

## 2024-10-30 PROCEDURE — 99213 OFFICE O/P EST LOW 20 MIN: CPT | Mod: 25 | Performed by: FAMILY MEDICINE

## 2024-10-30 RX ORDER — NIFEDIPINE 90 MG/1
90 TABLET, EXTENDED RELEASE ORAL DAILY
Qty: 30 TABLET | Refills: 5 | Status: SHIPPED | OUTPATIENT
Start: 2024-10-30

## 2024-10-30 SDOH — HEALTH STABILITY: PHYSICAL HEALTH: ON AVERAGE, HOW MANY MINUTES DO YOU ENGAGE IN EXERCISE AT THIS LEVEL?: 120 MIN

## 2024-10-30 SDOH — HEALTH STABILITY: PHYSICAL HEALTH: ON AVERAGE, HOW MANY DAYS PER WEEK DO YOU ENGAGE IN MODERATE TO STRENUOUS EXERCISE (LIKE A BRISK WALK)?: 7 DAYS

## 2024-10-30 ASSESSMENT — SOCIAL DETERMINANTS OF HEALTH (SDOH): HOW OFTEN DO YOU GET TOGETHER WITH FRIENDS OR RELATIVES?: PATIENT DECLINED

## 2024-10-30 ASSESSMENT — PAIN SCALES - GENERAL: PAINLEVEL_OUTOF10: SEVERE PAIN (7)

## 2024-10-30 NOTE — NURSING NOTE
Patient here for annual physical , establish care with a new provider, medication refills. Last eye exam 2023 and last dental exam 3 years ago. Medication Reconciliation: complete.    Saige Parsons LPN  10/30/2024 2:25 PM

## 2024-10-30 NOTE — PROGRESS NOTES
Preventive Care Visit  [facility]  Samir Melton MD, Family Medicine  Oct 30, 2024      Assessment & Plan     Raynaud's phenomenon without gangrene    - NIFEdipine ER OSMOTIC (PROCARDIA XL) 90 MG 24 hr tablet; Take 1 tablet (90 mg) by mouth daily.    Dysthymic disorder  Follow through [facility]    MARLEN (generalized anxiety disorder)      Germ cell tumor (H)  Discussed with oncology.  Advised to be reasonable to proceed with hCG tumor and AFP tumor markers.  - HCG tumor marker; Future  - AFP tumor marker; Future    History of testicular cancer    - HCG tumor marker; Future  - AFP tumor marker; Future  Patient will have his labs drawn when he gets labs done through his Crohn's disease treatment.  Crohn's disease of large intestine without complication (H)  Through GI            Counseling  Appropriate preventive services were addressed with this patient via screening, questionnaire, or discussion as appropriate for fall prevention, nutrition, physical activity, Tobacco-use cessation, social engagement, weight loss and cognition.  Checklist reviewing preventive services available has been given to the patient.  Reviewed patient's diet, addressing concerns and/or questions.   The patient was instructed to see the dentist every 6 months.         No follow-ups on file.    Petra Paredes is a 49 year old, presenting for the following:  Physical           Physical follow-up testicle cancer.  Patient arrives here for physical and follow-up testicle cancer.  He had testicle cancer number of years ago.  States he gets periodic testing.  Sounds like it might have been metastatic metastatic.  He discussed about a lump in his chest.  Otherwise he does go to [facility] for his mental health issues.  He is depressed has history of dysthymia.  Transfer text              Health Care Directive  Patient does not have a Health Care Directive: Discussed advance care planning with patient; information given to  patient to review.      10/30/2024   General Health   How would you rate your overall physical health? (!) FAIR   Feel stress (tense, anxious, or unable to sleep) Very much      (!) STRESS CONCERN      10/30/2024   Nutrition   Three or more servings of calcium each day? Yes   Diet: I don't know   How many servings of fruit and vegetables per day? (!) 0-1   How many sweetened beverages each day? 0-1            10/30/2024   Exercise   Days per week of moderate/strenous exercise 7 days   Average minutes spent exercising at this level 120 min            10/30/2024   Social Factors   Frequency of gathering with friends or relatives Patient declined   Worry food won't last until get money to buy more No   Food not last or not have enough money for food? No   Do you have housing? (Housing is defined as stable permanent housing and does not include staying ouside in a car, in a tent, in an abandoned building, in an overnight shelter, or couch-surfing.) Yes   Are you worried about losing your housing? No   Lack of transportation? No   Unable to get utilities (heat,electricity)? No            10/30/2024   Dental   Dentist two times every year? (!) NO               Followed through [facility] today's PHQ-9 Score:       9/17/2024     9:59 AM   PHQ-9 SCORE   PHQ-9 Total Score MyChart 14 (Moderate depression)   PHQ-9 Total Score 14           10/30/2024   Substance Use   Alcohol more than 3/day or more than 7/wk No   Do you use any other substances recreationally? (!) PRESCRIPTION DRUGS    (!) XANAX OR OTHER BENZOS       Multiple values from one day are sorted in reverse-chronological order     Social History     Tobacco Use    Smoking status: Never    Smokeless tobacco: Current     Types: Chew    Tobacco comments:     1 tin lasts 1 week   Vaping Use    Vaping status: Never Used   Substance Use Topics    Alcohol use: Not Currently     Alcohol/week: 6.0 standard drinks of alcohol     Types: 6 Cans of beer per week    Drug use: Never  "            10/30/2024   One time HIV Screening   Previous HIV test? I don't know          10/30/2024   STI Screening   New sexual partner(s) since last STI/HIV test? No      ASCVD Risk   The 10-year ASCVD risk score (Shalini KAMARA, et al., 2019) is: 2.6%    Values used to calculate the score:      Age: 49 years      Sex: Male      Is Non- : No      Diabetic: No      Tobacco smoker: No      Systolic Blood Pressure: 126 mmHg      Is BP treated: No      HDL Cholesterol: 44 mg/dL      Total Cholesterol: 187 mg/dL        10/30/2024   Contraception/Family Planning   Questions about contraception or family planning No           Reviewed and updated as needed this visit by Provider                             Objective    Exam  /80   Pulse 90   Temp 97.7  F (36.5  C)   Resp 20   Ht 1.778 m (5' 10\")   Wt 77.9 kg (171 lb 12.8 oz)   SpO2 98%   BMI 24.65 kg/m     Estimated body mass index is 24.65 kg/m  as calculated from the following:    Height as of this encounter: 1.778 m (5' 10\").    Weight as of this encounter: 77.9 kg (171 lb 12.8 oz).    Physical Exam  Constitutional:       Appearance: Normal appearance.   HENT:      Head: Normocephalic.   Cardiovascular:      Rate and Rhythm: Normal rate and regular rhythm.   Abdominal:      General: Abdomen is flat.      Palpations: Abdomen is soft.   Neurological:      Mental Status: He is alert.               Signed Electronically by: Samir Melton MD    "

## 2024-10-31 ENCOUNTER — TRANSFERRED RECORDS (OUTPATIENT)
Dept: HEALTH INFORMATION MANAGEMENT | Facility: OTHER | Age: 49
End: 2024-10-31
Payer: COMMERCIAL

## 2024-10-31 PROBLEM — K52.9 COLITIS: Status: RESOLVED | Noted: 2022-11-18 | Resolved: 2024-10-31

## 2024-11-20 DIAGNOSIS — M54.2 NECK PAIN: Primary | ICD-10-CM

## 2024-11-20 DIAGNOSIS — K50.10 CROHN'S DISEASE OF LARGE INTESTINE WITHOUT COMPLICATION (H): ICD-10-CM

## 2024-11-20 NOTE — PROGRESS NOTES
Infliximab therapy plan orders ; plan has been updated. Patient remains on the same medication regimen - 5mg/kg every 6 weeks (decreased interval from q8wks to q6wks in 2024). Standing lab orders placed. TB Quantiferon Gold due in 2025. Order placed and requested added into therapy plan for upcoming infusion - 24. Hepatitis B serologies are up to date. Next office visit scheduled for 2025 with Dr. Sanchez.

## 2024-11-26 DIAGNOSIS — R10.11 RUQ ABDOMINAL PAIN: ICD-10-CM

## 2024-12-01 NOTE — TELEPHONE ENCOUNTER
amitriptyline (ELAVIL) 10 MG tablet   Disp-30 tablet, R-3   Start: 05/22/2024 6/27/2024  [facility] Helder Walker MD  Gastroenterology        Tricyclic Agents Passed         Routed because: #qty rf?  Last fill #30 3 rf

## 2024-12-02 RX ORDER — AMITRIPTYLINE HYDROCHLORIDE 10 MG/1
TABLET ORAL
Qty: 30 TABLET | Refills: 3 | Status: SHIPPED | OUTPATIENT
Start: 2024-12-02

## 2024-12-03 NOTE — TELEPHONE ENCOUNTER
"Per clinic visit with Dr. Sanchez on 6/27/24:    \"-continue amitriptyline     RTC in 6 months with IBD ELIANA and 12 months with Dr. Sanchez\"    Follow up appointment with Dr. Sanchez scheduled.     Refill sent.   "

## 2024-12-23 ENCOUNTER — HOSPITAL ENCOUNTER (OUTPATIENT)
Dept: INFUSION THERAPY | Facility: OTHER | Age: 49
Discharge: HOME | End: 2024-12-23
Payer: COMMERCIAL

## 2024-12-23 VITALS
HEART RATE: 91 BPM | DIASTOLIC BLOOD PRESSURE: 79 MMHG | SYSTOLIC BLOOD PRESSURE: 108 MMHG | RESPIRATION RATE: 16 BRPM | TEMPERATURE: 97.7 F | WEIGHT: 174.4 LBS | BODY MASS INDEX: 25.02 KG/M2

## 2024-12-23 DIAGNOSIS — K52.9 CHRONIC DIARRHEA: ICD-10-CM

## 2024-12-23 DIAGNOSIS — K50.10 CROHN'S DISEASE OF LARGE INTESTINE WITHOUT COMPLICATION (H): Primary | ICD-10-CM

## 2024-12-23 DIAGNOSIS — Z85.47 HISTORY OF TESTICULAR CANCER: Chronic | ICD-10-CM

## 2024-12-23 DIAGNOSIS — C80.1 GERM CELL TUMOR (H): ICD-10-CM

## 2024-12-23 DIAGNOSIS — K50.10 CROHN'S DISEASE OF LARGE INTESTINE WITHOUT COMPLICATION (H): ICD-10-CM

## 2024-12-23 LAB
ALBUMIN SERPL BCG-MCNC: 4.7 G/DL (ref 3.5–5.2)
ALP SERPL-CCNC: 78 U/L (ref 40–150)
ALT SERPL W P-5'-P-CCNC: 14 U/L (ref 0–70)
AST SERPL W P-5'-P-CCNC: 19 U/L (ref 0–45)
BASOPHILS # BLD AUTO: 0.1 10E3/UL (ref 0–0.2)
BASOPHILS NFR BLD AUTO: 1 %
BILIRUB DIRECT SERPL-MCNC: <0.2 MG/DL (ref 0–0.3)
BILIRUB SERPL-MCNC: <0.2 MG/DL
CRP SERPL-MCNC: <3 MG/L
EOSINOPHIL # BLD AUTO: 0.3 10E3/UL (ref 0–0.7)
EOSINOPHIL NFR BLD AUTO: 4 %
ERYTHROCYTE [DISTWIDTH] IN BLOOD BY AUTOMATED COUNT: 13.2 % (ref 10–15)
HCT VFR BLD AUTO: 41.3 % (ref 40–53)
HGB BLD-MCNC: 14.3 G/DL (ref 13.3–17.7)
IMM GRANULOCYTES # BLD: 0 10E3/UL
IMM GRANULOCYTES NFR BLD: 0 %
LYMPHOCYTES # BLD AUTO: 4.5 10E3/UL (ref 0.8–5.3)
LYMPHOCYTES NFR BLD AUTO: 53 %
MCH RBC QN AUTO: 28.5 PG (ref 26.5–33)
MCHC RBC AUTO-ENTMCNC: 34.6 G/DL (ref 31.5–36.5)
MCV RBC AUTO: 82 FL (ref 78–100)
MONOCYTES # BLD AUTO: 0.9 10E3/UL (ref 0–1.3)
MONOCYTES NFR BLD AUTO: 11 %
NEUTROPHILS # BLD AUTO: 2.6 10E3/UL (ref 1.6–8.3)
NEUTROPHILS NFR BLD AUTO: 31 %
NRBC # BLD AUTO: 0 10E3/UL
NRBC BLD AUTO-RTO: 0 /100
PLATELET # BLD AUTO: 295 10E3/UL (ref 150–450)
PROT SERPL-MCNC: 7.3 G/DL (ref 6.4–8.3)
RBC # BLD AUTO: 5.01 10E6/UL (ref 4.4–5.9)
WBC # BLD AUTO: 8.4 10E3/UL (ref 4–11)

## 2024-12-23 PROCEDURE — 82105 ALPHA-FETOPROTEIN SERUM: CPT

## 2024-12-23 PROCEDURE — 86481 TB AG RESPONSE T-CELL SUSP: CPT | Performed by: INTERNAL MEDICINE

## 2024-12-23 PROCEDURE — 84460 ALANINE AMINO (ALT) (SGPT): CPT | Performed by: INTERNAL MEDICINE

## 2024-12-23 PROCEDURE — 258N000003 HC RX IP 258 OP 636: Performed by: INTERNAL MEDICINE

## 2024-12-23 PROCEDURE — 36415 COLL VENOUS BLD VENIPUNCTURE: CPT

## 2024-12-23 PROCEDURE — 84702 CHORIONIC GONADOTROPIN TEST: CPT

## 2024-12-23 PROCEDURE — 96413 CHEMO IV INFUSION 1 HR: CPT

## 2024-12-23 PROCEDURE — 250N000011 HC RX IP 250 OP 636: Performed by: INTERNAL MEDICINE

## 2024-12-23 PROCEDURE — 85025 COMPLETE CBC W/AUTO DIFF WBC: CPT | Performed by: INTERNAL MEDICINE

## 2024-12-23 PROCEDURE — 86140 C-REACTIVE PROTEIN: CPT | Performed by: INTERNAL MEDICINE

## 2024-12-23 RX ORDER — DIPHENHYDRAMINE HCL 25 MG
25 CAPSULE ORAL ONCE
Start: 2025-01-08 | End: 2025-01-08

## 2024-12-23 RX ORDER — ALBUTEROL SULFATE 0.83 MG/ML
2.5 SOLUTION RESPIRATORY (INHALATION)
OUTPATIENT
Start: 2025-01-08

## 2024-12-23 RX ORDER — ACETAMINOPHEN 325 MG/1
650 TABLET ORAL ONCE
Start: 2025-01-08 | End: 2025-01-08

## 2024-12-23 RX ORDER — EPINEPHRINE 1 MG/ML
0.3 INJECTION, SOLUTION, CONCENTRATE INTRAVENOUS EVERY 5 MIN PRN
OUTPATIENT
Start: 2025-01-08

## 2024-12-23 RX ORDER — METHYLPREDNISOLONE SODIUM SUCCINATE 125 MG/2ML
125 INJECTION INTRAMUSCULAR; INTRAVENOUS
Start: 2025-01-08

## 2024-12-23 RX ORDER — MEPERIDINE HYDROCHLORIDE 25 MG/ML
25 INJECTION INTRAMUSCULAR; INTRAVENOUS; SUBCUTANEOUS EVERY 30 MIN PRN
OUTPATIENT
Start: 2025-01-08

## 2024-12-23 RX ORDER — ALBUTEROL SULFATE 90 UG/1
1-2 INHALANT RESPIRATORY (INHALATION)
Start: 2025-01-08

## 2024-12-23 RX ORDER — METHOCARBAMOL 500 MG/1
500 TABLET, FILM COATED ORAL 3 TIMES DAILY
COMMUNITY

## 2024-12-23 RX ORDER — DIPHENHYDRAMINE HYDROCHLORIDE 50 MG/ML
50 INJECTION INTRAMUSCULAR; INTRAVENOUS
Start: 2025-01-08

## 2024-12-23 RX ADMIN — INFLIXIMAB 400 MG: 100 INJECTION, POWDER, LYOPHILIZED, FOR SOLUTION INTRAVENOUS at 14:15

## 2024-12-23 NOTE — NURSING NOTE
Infusion Nursing Note:  Dennis J Goodell presents today for Remicade.    Patient seen by provider today: No   present during visit today: Not Applicable.    Note: Orders noted for stool sample collection, pt provided with supplies and instructions to collect samples.       Intravenous Access:  Labs drawn without difficulty.  Peripheral IV placed.    Treatment Conditions:  Biological Infusion Checklist:  ~~~ NOTE: If the patient answers yes to any of the questions below, hold the infusion and contact ordering provider or on-call provider.    Have you recently had an elevated temperature, fever, chills, productive cough, coughing for 3 weeks or longer or hemoptysis,  abnormal vital signs, night sweats,  chest pain or have you noticed a decrease in your appetite, unexplained weight loss or fatigue? No  Do you have any open wounds or new incisions? No  Do you have any upcoming hospitalizations or surgeries? Does not include esophagogastroduodenoscopy, colonoscopy, endoscopic retrograde cholangiopancreatography (ERCP), endoscopic ultrasound (EUS), dental procedures or joint aspiration/steroid injections No  Do you currently have any signs of illness or infection or are you on any antibiotics? No  Have you had any new, sudden or worsening abdominal pain? No  Have you or anyone in your household received a live vaccination in the past 4 weeks? Please note: No live vaccines while on biologic/chemotherapy until 6 months after the last treatment. Patient can receive the flu vaccine (shot only), pneumovax and the Covid vaccine. It is optimal for the patient to get these vaccines mid cycle, but they can be given at any time as long as it is not on the day of the infusion. No  Have you recently been diagnosed with any new nervous system diseases (ie. Multiple sclerosis, Guillain [place], seizures, neurological changes) or cancer diagnosis? Are you on any form of radiation or chemotherapy? No  Are you pregnant or breast  feeding or do you have plans of pregnancy in the future? No  Have you been having any signs of worsening depression or suicidal ideations?  (benlysta only) No  Have there been any other new onset medical symptoms? No  Have you had any new blood clots? (IVIG only) No      Post Infusion Assessment:  Patient tolerated infusion without incident.  Blood return noted pre and post infusion.  Site patent and intact, free from redness, edema or discomfort.  No evidence of extravasations.  Access discontinued per protocol.    Biologic Infusion Post Education: Call the triage nurse at your clinic or seek medical attention if you have chills and/or temperature greater than or equal to 100.5, uncontrolled nausea/vomiting, diarrhea, constipation, dizziness, shortness of breath, chest pain, heart palpitations, weakness or any other new or concerning symptoms, questions or concerns.  You cannot have any live virus vaccines prior to or during treatment or up to 6 months post infusion.  If you have an upcoming surgery, medical procedure or dental procedure during treatment, this should be discussed with your ordering physician and your surgeon/dentist.  If you are having any concerning symptom, if you are unsure if you should get your next infusion or wish to speak to a provider before your next infusion, please call your care coordinator or triage nurse at your clinic to notify them so we can adequately serve you.       Discharge Plan:   Discharge instructions reviewed with: Patient.  Patient and/or family verbalized understanding of discharge instructions and all questions answered.  Copy of AVS declined. Patient will return 2-7-24 for next appointment.  AVS to patient via [employer].    Patient discharged in stable condition accompanied by: self.  Departure Mode: Ambulatory.      Mary Kate Ospina RN

## 2024-12-24 LAB — AFP SERPL-MCNC: 3.9 NG/ML

## 2024-12-25 LAB
GAMMA INTERFERON BACKGROUND BLD IA-ACNC: 0.01 IU/ML
M TB IFN-G BLD-IMP: NEGATIVE
M TB IFN-G CD4+ BCKGRND COR BLD-ACNC: 9.99 IU/ML
MITOGEN IGNF BCKGRD COR BLD-ACNC: 0.01 IU/ML
MITOGEN IGNF BCKGRD COR BLD-ACNC: 0.01 IU/ML
QUANTIFERON MITOGEN: 10 IU/ML
QUANTIFERON NIL TUBE: 0.01 IU/ML
QUANTIFERON TB1 TUBE: 0.02 IU/ML
QUANTIFERON TB2 TUBE: 0.02

## 2024-12-26 LAB — HCG-TM SERPL-ACNC: <3 IU/L

## 2024-12-26 NOTE — TELEPHONE ENCOUNTER
DIPHENOX/ATROP 2.5-0.025MG TAB       Last Written Prescription Date:  6-27-24  Last Fill Quantity: 90,   # refills: 3  Last Office Visit : 6-27-24  Future Office visit:  7-9-25    Routing refill request to provider for review/approval because:  Drug not on the [facility], [facility] or [facility] refill protocol or controlled substance

## 2024-12-30 RX ORDER — DIPHENOXYLATE HYDROCHLORIDE AND ATROPINE SULFATE 2.5; .025 MG/1; MG/1
1 TABLET ORAL 4 TIMES DAILY PRN
Qty: 90 TABLET | Refills: 3 | Status: SHIPPED | OUTPATIENT
Start: 2024-12-30

## 2025-01-08 PROBLEM — Z98.890 HX OF DECOMPRESSIVE LUMBAR LAMINECTOMY: Status: ACTIVE | Noted: 2025-01-08

## 2025-01-09 ENCOUNTER — OFFICE VISIT (OUTPATIENT)
Dept: INTERNAL MEDICINE | Facility: OTHER | Age: 50
End: 2025-01-09
Attending: INTERNAL MEDICINE
Payer: COMMERCIAL

## 2025-01-09 VITALS
RESPIRATION RATE: 16 BRPM | BODY MASS INDEX: 25.34 KG/M2 | WEIGHT: 177 LBS | DIASTOLIC BLOOD PRESSURE: 86 MMHG | TEMPERATURE: 98 F | HEIGHT: 70 IN | OXYGEN SATURATION: 96 % | SYSTOLIC BLOOD PRESSURE: 128 MMHG | HEART RATE: 110 BPM

## 2025-01-09 DIAGNOSIS — Z85.47 HISTORY OF TESTICULAR CANCER: Chronic | ICD-10-CM

## 2025-01-09 DIAGNOSIS — M62.830 BACK MUSCLE SPASM: ICD-10-CM

## 2025-01-09 DIAGNOSIS — G89.29 CHRONIC RADICULAR LOW BACK PAIN: Primary | ICD-10-CM

## 2025-01-09 DIAGNOSIS — Z98.890 HX OF DECOMPRESSIVE LUMBAR LAMINECTOMY: ICD-10-CM

## 2025-01-09 DIAGNOSIS — C80.1 GERM CELL TUMOR (H): ICD-10-CM

## 2025-01-09 DIAGNOSIS — I73.00 RAYNAUD'S PHENOMENON WITHOUT GANGRENE: ICD-10-CM

## 2025-01-09 DIAGNOSIS — F33.42 RECURRENT MAJOR DEPRESSION IN COMPLETE REMISSION: ICD-10-CM

## 2025-01-09 DIAGNOSIS — M54.16 CHRONIC RADICULAR LOW BACK PAIN: Primary | ICD-10-CM

## 2025-01-09 DIAGNOSIS — K50.80 CROHN'S DISEASE OF BOTH SMALL AND LARGE INTESTINE WITHOUT COMPLICATIONS (H): ICD-10-CM

## 2025-01-09 RX ORDER — OXYCODONE HYDROCHLORIDE 5 MG/1
5 TABLET ORAL EVERY 6 HOURS PRN
Qty: 40 TABLET | Refills: 0 | Status: SHIPPED | OUTPATIENT
Start: 2025-01-09

## 2025-01-09 RX ORDER — INFLIXIMAB 100 MG/10ML
INJECTION, POWDER, LYOPHILIZED, FOR SOLUTION INTRAVENOUS
COMMUNITY
Start: 2025-01-09

## 2025-01-09 RX ORDER — METHOCARBAMOL 500 MG/1
500-1000 TABLET, FILM COATED ORAL 4 TIMES DAILY PRN
Qty: 240 TABLET | Refills: 11 | Status: SHIPPED | OUTPATIENT
Start: 2025-01-09

## 2025-01-09 RX ORDER — GABAPENTIN 300 MG/1
600 CAPSULE ORAL 3 TIMES DAILY
COMMUNITY
Start: 2025-01-09

## 2025-01-09 RX ORDER — NIFEDIPINE 90 MG/1
90 TABLET, EXTENDED RELEASE ORAL DAILY
Qty: 90 TABLET | Refills: 1 | Status: SHIPPED | OUTPATIENT
Start: 2025-01-09

## 2025-01-09 ASSESSMENT — ENCOUNTER SYMPTOMS
MYALGIAS: 1
BACK PAIN: 1
DIARRHEA: 1

## 2025-01-09 ASSESSMENT — PATIENT HEALTH QUESTIONNAIRE - PHQ9
SUM OF ALL RESPONSES TO PHQ QUESTIONS 1-9: 11
10. IF YOU CHECKED OFF ANY PROBLEMS, HOW DIFFICULT HAVE THESE PROBLEMS MADE IT FOR YOU TO DO YOUR WORK, TAKE CARE OF THINGS AT HOME, OR GET ALONG WITH OTHER PEOPLE: SOMEWHAT DIFFICULT
SUM OF ALL RESPONSES TO PHQ QUESTIONS 1-9: 11

## 2025-01-09 ASSESSMENT — PAIN SCALES - GENERAL: PAINLEVEL_OUTOF10: SEVERE PAIN (6)

## 2025-01-09 NOTE — PATIENT INSTRUCTIONS
Chronic radicular low back pain - Right    Continue with dose increase:    - methocarbamol (ROBAXIN) 500 MG tablet; Take 1-2 tablets (500-1,000 mg) by mouth 4 times daily as needed for muscle spasms.    Med list updated.   - gabapentin (NEURONTIN) 300 MG capsule; Take 2 capsules (600 mg) by mouth 3 times daily. -- Last rx by Dr. Bernardo --    Small Rx refilled today.   - oxyCODONE (ROXICODONE) 5 MG tablet; Take 1 tablet (5 mg) by mouth every 6 hours as needed for severe pain.        Back muscle spasm  - methocarbamol (ROBAXIN) 500 MG tablet; Take 1-2 tablets (500-1,000 mg) by mouth 4 times daily as needed for muscle spasms.  - oxyCODONE (ROXICODONE) 5 MG tablet; Take 1 tablet (5 mg) by mouth every 6 hours as needed for severe pain.        Return as needed for follow-up for new / worsening symptoms.    Clinic : [phone]  Appointment line: [phone]

## 2025-01-09 NOTE — NURSING NOTE
"Chief Complaint   Patient presents with    Follow Up    Back Pain     Initial /86   Pulse 110   Temp 98  F (36.7  C)   Resp 16   Ht 1.778 m (5' 10\")   Wt 80.3 kg (177 lb)   SpO2 96%   BMI 25.40 kg/m   Estimated body mass index is 25.4 kg/m  as calculated from the following:    Height as of this encounter: 1.778 m (5' 10\").    Weight as of this encounter: 80.3 kg (177 lb).  Medication Reconciliation: complete    Anjana Jack LPN  " Normal

## 2025-01-09 NOTE — PROGRESS NOTES
Assessment & Plan     ICD-10-CM    1. Chronic radicular low back pain - Right  M54.16 methocarbamol (ROBAXIN) 500 MG tablet    G89.29 gabapentin (NEURONTIN) 300 MG capsule     oxyCODONE (ROXICODONE) 5 MG tablet      2. Back muscle spasm  M62.830 methocarbamol (ROBAXIN) 500 MG tablet     oxyCODONE (ROXICODONE) 5 MG tablet      3. Hx of decompressive lumbar laminectomy - s/p right L3-4 decompression and synovial cyst by Dr Beverly on 9/27/24  Z98.890       4. Crohn's disease of both small and large intestine without complications (H)  K50.80 inFLIXimab, REMICADE, 100 MG injection      5. 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 History of testicular cancer  Z85.47       7. Raynaud's phenomenon without gangrene  I73.00 NIFEdipine ER OSMOTIC (PROCARDIA XL) 90 MG 24 hr tablet      8. Recurrent major depression in complete remission  F33.42          Patient presents for evaluation of back pain.    Patient had back surgery earlier this fall with a large cyst that was compressing his lumbar spine.  The last few days has been having far worsening pain, low back down the right leg primarily.  Does have some postoperative small prescription left of methocarbamol.  Feels like it helps some but does not cause significant drowsiness.  Recommend trying slightly higher dose 500-1000 milligrams per dose rather than 500 mg.  Updated prescription sent to pharmacy.  Has been taking gabapentin for quite some time due to radicular symptoms.  Continues.  Med list updated.    Given the significant low back pain, ineffectiveness of prednisone taper in the past, multiple surgeries, history of cancer, cancer treatment, Crohn's disease with history of flareups of his bowel disease and multiple hospitalizations.  Has found oxycodone to be the most helpful for pain.  He still has 4 tablets of his 5 mg, 20 tablet prescription that was prescribed after surgery.  He took 2 of those last night has 2 left for today.  Small prescription renewed  "today.    Patient is chronically been suppressed with Remicade/infliximab.  For his Crohn's disease.    Also has Raynaud's phenomenon without gangrene.  Takes nifedipine for this.  Needs refills.  Uncertain if he has underlying connective tissue disorder to bridge his other symptoms or syndromes.    Depression seems reasonably controlled.  Following with mental health regularly.    The longitudinal plan of care for the diagnosis(es)/condition(s) as documented were addressed during this visit. Due to the added complexity in care, I will continue to support Reggie in the subsequent management and with ongoing continuity of care.            BMI  Estimated body mass index is 25.4 kg/m  as calculated from the following:    Height as of this encounter: 1.778 m (5' 10\").    Weight as of this encounter: 80.3 kg (177 lb).         Return for follow-up as needed for new or worsening symptoms, Appointments: [phone].      Maximino Tapia MD  [facility]    Review of Systems   Gastrointestinal:  Positive for diarrhea.   Musculoskeletal:  Positive for back pain and myalgias (+ Back muscle spasms).   Allergic/Immunologic: Positive for immunocompromised state.       Subjective   Reggie is a 49 year old, presenting for the following health issues:  Follow Up and Back Pain        1/9/2025     3:03 PM   Additional Questions   Roomed by Eulogio LANGSTON LPN     History of Present Illness       Back Pain:  He presents for follow up of back pain. Patient's back pain is a chronic problem.  Location of back pain:  Right lower back, right buttock, right hip, right side of waist and other  Description of back pain: burning, cramping, sharp, shooting, stabbing and other  Back pain spreads: right buttocks and right thigh    Since patient first noticed back pain, pain is: always present, but gets better and worse  Does back pain interfere with his job:  Yes       He eats 0-1 servings of fruits and vegetables daily.He consumes 0 " "sweetened beverage(s) daily.He exercises with enough effort to increase his heart rate 60 or more minutes per day.  He exercises with enough effort to increase his heart rate 7 days per week.   He is taking medications regularly.                     Objective    /86   Pulse 110   Temp 98  F (36.7  C)   Resp 16   Ht 1.778 m (5' 10\")   Wt 80.3 kg (177 lb)   SpO2 96%   BMI 25.40 kg/m    Body mass index is 25.4 kg/m .  Physical Exam  Constitutional:       Appearance: Normal appearance.   Cardiovascular:      Rate and Rhythm: Normal rate and regular rhythm.   Pulmonary:      Effort: Pulmonary effort is normal.   Skin:     Findings: No rash.      Comments: + Cool fingers, dry skin   Neurological:      Mental Status: He is alert. Mental status is at baseline.   Psychiatric:         Mood and Affect: Mood normal.                    Signed Electronically by: Maximino Tapia MD    "

## 2025-01-16 DIAGNOSIS — K50.10 CROHN'S DISEASE OF LARGE INTESTINE WITHOUT COMPLICATION (H): ICD-10-CM

## 2025-01-21 RX ORDER — DICYCLOMINE HYDROCHLORIDE 10 MG/1
CAPSULE ORAL
Qty: 120 CAPSULE | Refills: 0 | Status: SHIPPED | OUTPATIENT
Start: 2025-01-21

## 2025-01-21 NOTE — TELEPHONE ENCOUNTER
Last Written Prescription:    dicyclomine (BENTYL) 10 MG capsule 120 capsule 4 8/22/2024     ----------------------  Last Visit Date: 6/27/24  continue dicyclomine   Return to clinic in 6 months with IBD ELIANA and 12 months with Dr. Sanchez   Future Visit Date: 2/11/25  ----------------------      Refill decision: Medication refilled per  Medication Refill in Ambulatory Care  policy.         Request from pharmacy:  Requested Prescriptions   Pending Prescriptions Disp Refills    dicyclomine (BENTYL) 10 MG capsule [Pharmacy Med Name: DICYCLOMINE 10MG CAPSULE] 120 capsule 4     Sig: TAKE 2 CAPSULES (20 MG) BY MOUTH TWICE DAILY AS NEEDEDFOR ABDOMINAL PAIN       Oral Anticholinergic Agents Passed - 1/21/2025  4:04 PM        Passed - Patient is of age 12 or older        Passed - Recent (12 mo) or future (30 days) visit with authorizing provider's specialty     The patient must have completed an in-person or virtual visit within the past 12 months or has a future visit scheduled within the next 90 days with the authorizing provider s specialty.  Urgent care and e-visits do not qualify as an office visit for this protocol.          Passed - Medication is active on med list

## 2025-02-11 ENCOUNTER — VIRTUAL VISIT (OUTPATIENT)
Dept: GASTROENTEROLOGY | Facility: CLINIC | Age: 50
End: 2025-02-11
Payer: COMMERCIAL

## 2025-02-11 DIAGNOSIS — M47.812 FACET ARTHROPATHY, CERVICAL: ICD-10-CM

## 2025-02-11 DIAGNOSIS — M62.830 BACK MUSCLE SPASM: ICD-10-CM

## 2025-02-11 DIAGNOSIS — M54.16 CHRONIC RADICULAR LOW BACK PAIN: ICD-10-CM

## 2025-02-11 DIAGNOSIS — G89.29 CHRONIC RADICULAR LOW BACK PAIN: ICD-10-CM

## 2025-02-11 DIAGNOSIS — K59.00 CONSTIPATION, UNSPECIFIED CONSTIPATION TYPE: ICD-10-CM

## 2025-02-11 DIAGNOSIS — K50.80 CROHN'S DISEASE OF BOTH SMALL AND LARGE INTESTINE WITHOUT COMPLICATIONS (H): Primary | ICD-10-CM

## 2025-02-11 PROCEDURE — G2211 COMPLEX E/M VISIT ADD ON: HCPCS | Performed by: INTERNAL MEDICINE

## 2025-02-11 PROCEDURE — 98007 SYNCH AUDIO-VIDEO EST HI 40: CPT | Performed by: INTERNAL MEDICINE

## 2025-02-11 RX ORDER — OXYCODONE HYDROCHLORIDE 5 MG/1
5 TABLET ORAL EVERY 6 HOURS PRN
Qty: 40 TABLET | Refills: 0 | OUTPATIENT
Start: 2025-02-11

## 2025-02-11 NOTE — NURSING NOTE
Current patient location: [address] [place] [id number]    Is the patient currently in the state of [place]? YES    Visit mode: VIDEO    If the visit is dropped, the patient can be reconnected by:VIDEO VISIT: Text to cell phone:   Telephone Information:   Mobile [phone]       Will anyone else be joining the visit? NO  (If patient encounters technical issues they should call [phone]    Are changes needed to the allergy or medication list? No    Are refills needed on medications prescribed by this physician? NO    Rooming Documentation:  Not applicable    Reason for visit: RECHFLETCHER RYAN

## 2025-02-11 NOTE — PROGRESS NOTES
Virtual Visit Details    Type of service:  Video Visit     Originating Location (pt. Location): Home    Distant Location (provider location):  On-site  Platform used for Video Visit: [facility]    IBD CLINIC VISIT    CC/REFERRING MD:  Helder Sanchez    REASON FOR CONSULTATION: follow up CD    ASSESSMENT/PLAN:    Crohn's Disease of ileuma nd colon - on IFX 5 mg/kg q 6 weeks. Doing well in symptomatic remission. Had endoscopic healing 2023. MRE without significant inflammation    -continue IFX 5 mg/kg q 6 weeks  -labs every 3 months    2. Constipation  3. Altered bowel habits  4. Lower abdominal pain    Improved. Have been suspicious for constipation with overflow diarrhea. Was on miralax daily but only needs PRN now    -continue miralax as needed    5. Neck pain  6. Frequent NSAID use    Continued to  minimizing/avoiding NSAIDs    7. History of zane-anal abscess - resolved    8. Duodenal stricture - 2nd portion of duodenum seen 8/2023 - likely related to NSAIDs (less likely Crohn's)    -avoid/minimize NSAIDs  -omeprazole 40 mg daily (reiterated need to take this)  -PRN dilation if symptomatic (not needed now) - with advanced endoscopy      IBD HISTORY  Age at diagnosis: 47 (2022)  Extent of disease: colonic - possible zane-anal  Disease phenotype: inflammatory with possible penetrating  Zane-anal disease: zane-anal abscess  Current CD medications: IFX 5 mg/kg q 6 weeks (since 6/2024)  Prior IBD surgeries: none  Prior IBD Medications: prednisone, Entocort    DRUG MONITORING  TPMT enzyme activity: 19.2 (WNL)     6-TGN/6-MMPN levels: --     Biologic concentration:  -predict PK 6/22/23 - estimated trough 10 (on IFX 5mg/kg q 8)  -predict PK 4/2024 - estimated trough 12 (on iFX 5 mg/kg q 8)    DISEASE ASSESSMENT  Labs  Recent Labs   Lab Test 12/23/24  1344 07/05/24  0934 05/10/24  0932 03/13/24  0626 01/12/24  1256   SED  --   --  <0*  --  1   HGB 14.3   < > 13.7   < > 14.7    < > = values in this interval not  displayed.     Fecal calprotectin: 232 Feb 2024 (previously 30s - 5/30/23) -> 6/24/24 150s  - EGD 8/2023 - peptic stricture in second portion of duodenum - no active Crohn's on biopsy of stricture   -colonoscopy 8/2023 - no active inflammation seen to explain RUQ pain  -colonoscopy 12/2022 - normal TI (normal biopsies) - inflammation/stricture 5-6 cm distal to IC valve - able to be passed. Remainder colon normal? (details of scope limited on documentation). 5mm adenoma in rectum. Biopsies TI normal. Right colon chronic active (mild) c/w IBD and left colon normal  Enterography:   - MRE 2/14/23 - IMPRESSION:  Mucosal thickening and hyperemia of the terminal ileum,  compatible with the prior diagnosis of Crohn disease. No evidence of  associated stricture or fistula.  - MRE 4/11/2024 - no inflammation of bowel  C diff: negative    sIBDQ:   IBDQ Score Date IBDQ - Total Score  (Higher score better)   6/22/2024   9:49 PM 39   7/18/2023  12:31 PM Incomplete   6/1/2023   4:13 AM 34       IBD Health Care Maintenance:    Follows with [facility]    Depression Screening:  -- Over the last month, have you felt down, depressed, or hopeless? no  -- Over the last month, have you felt little interest or pleasure doing things? no    Misc:  -- Avoid tobacco use  -- Avoid NSAIDs as there is potentially a 25% chance of causing an IBD flare    Return to clinic in 6 months with IBD PA    Thank you for this consultation.  It was a pleasure to participate in the care of this patient; please contact us with any further questions.  I spent a total of 40 minutes during the day of encounter performed chart review, meeting with patient, patient counseling, care coordination, and documentation.      This note was created with voice recognition software, and while reviewed for accuracy, typos may remain.     Hleder Sanchez MD  Division of Gastroenterology, Hepatology and Nutrition  [facility]  Pager: [phone]      HPI:   Currently, here today  for routine follow up.    So far 2025 is much better than 2024.    He had back surgery in the latter part of 2024. 1.1 cm synovial cyst removed from the L3/L4 space that was pressing on spinal cord. Feels much better.    Still taking 4 excedrin migraines daily.    GI tract doing well. Eating more consistently. Having 1-2 Bms per day without blood. Has not needed miralax - feels constipation is better.    Denies abdominal pain.     He is back to work.    No EIM    ROS:    No fevers or chills  No weight loss  No blurry vision, double vision or change in vision  No sore throat  No lymphadenopathy  No headache, paraesthesias, or weakness in a limb  No shortness of breath or wheezing  No chest pain or pressure  No arthralgias or myalgias  No rashes or skin changes  No odynophagia or dysphagia  No BRBPR, hematochezia, melena  No dysuria, frequency or urgency  No hot/cold intolerance or polyria  No anxiety or depression    Extra intestinal manifestations of IBD:  No uveitis/episcleritis  No aphthous ulcers   No arthritis   No erythema nodosum/pyoderma gangrenosum.     PERTINENT PAST MEDICAL HISTORY:  Past Medical History:   Diagnosis Date    Anxiety disorder 2012    Dysthymic disorder     No Comments Provided    Malignant neoplasm of testis (H) 2005 2005,teratoma    Raynaud's syndrome without gangrene     No Comments Provided    Uncomplicated alcohol abuse     7/25/2012       PREVIOUS SURGERIES:  Past Surgical History:   Procedure Laterality Date    COLONOSCOPY N/A 12/12/2022    Procedure: COLONOSCOPY, WITH POLYPECTOMY AND BIOPSY;  Surgeon: Solomon Arciniega MD;  Location:  OR    COLONOSCOPY N/A 8/10/2023    Procedure: COLONOSCOPY, WITH POLYPECTOMY AND BIOPSY;  Surgeon: Helder Sanchez MD;  Location: [facility] OR    DECOMPRESSION CUBITAL TUNNEL Left 01/2019    ESOPHAGOSCOPY, GASTROSCOPY, DUODENOSCOPY (EGD), COMBINED N/A 8/10/2023    Procedure: ESOPHAGOGASTRODUODENOSCOPY, WITH BIOPSY;  Surgeon: Helder Sanchez  MD Bill;  Location: [facility] OR    HERNIA REPAIR      ,HERNIA REPAIR    IR CHEST PORT PLACEMENT > 5 YRS OF AGE Left 04/01/2008    LYMPH NODE BIOPSY  09/05/2008     [facility]. 37 lymph nodes excised    ORCHIECTOMY SCROTAL Right 12/30/2005    teratoma    OTHER SURGICAL HISTORY      9/05/08,[id number],OTHER    RELEASE CARPAL TUNNEL  04/09/2013       PREVIOUS ENDOSCOPY:  Results for orders placed or performed during the hospital encounter of 08/10/23   COLONOSCOPY    Collection Time: 08/10/23 10:41 AM   Result Value Ref Range    COLONOSCOPY       [facility]  [address] [place], [place] [address]., [place] [address] [phone]     Endoscopy Department  _______________________________________________________________________________  Patient Name: Dennis Goodell          Procedure Date: 8/10/2023 10:41 AM  MRN: [id number]                       Account Number: [id number]  YOB: 1975               Admit Type: Outpatient  Age: 48                               Room: [facility] PROCEDURE [facility]OM 02  Gender: Male                          Note Status: Finalized  Attending MD: CRIS MADSEN MD,   Total Sedation Time:   _______________________________________________________________________________     Procedure:             Colonoscopy  Indications:           Assess therapeutic response to therapy of Crohn's                          disease of the small bowel and colon  Providers:             CRIS MADSEN MD, Roma Hamilton RN,                          Leon Concepcion, CRNA  Referring MD:          TALYA UMAÑA MD  Requesting Provider:   TALYA GALINDO MD  Medicines:             Monitored Anesthesia Care  Complications:         No immediate complications.  _______________________________________________________________________________  Procedure:             Pre-Anesthesia Assessment:                         - See EPIC H and P note                         After obtaining informed  consent, the colonoscope was                          passed under direct vision. Throughout the procedure,                          the patient's blood pressure, pulse, and oxygen                          saturations were monitored continuously. The                          Colonoscope was introduced through the anus and                          advanced to the terminal ileum, with identification of                          the appendiceal orifice and IC valve. The colonoscopy                          was performed without difficulty. The patient                          tolerated the procedure well. The  quality of the bowel                          preparation was adequate to identify polyps greater                          than 5 mm in size.                                                                                   Findings:       The perianal and digital rectal examinations were normal.       The Simple Endoscopic Score for Crohn's Disease was determined based on        the endoscopic appearance of the mucosa in the following segments:       - Ileum: Findings include no ulcers present, no ulcerated surfaces, no        affected surfaces and no narrowings. Segment score: 0.       - Right Colon: Findings include no ulcers present, no ulcerated        surfaces, no affected surfaces and no narrowings. Segment score: 0.       - Transverse Colon: Findings include no ulcers present, no ulcerated        surfaces, no affected surfaces and no narrowings. Segment score: 0.       - Left Colon: Findings include no ulcers present, no ulcerated surfaces,        no affected surfaces and no na rrowings. Segment score: 0.       - Rectum: Findings include no ulcers present, no ulcerated surfaces, no        affected surfaces and no narrowings. Segment score: 0.       - Total SES-CD aggregate score: 0. Biopsies were taken with a cold        forceps for histology. Separate biopsies taken of the ileum, right        colon, left  colon and rectum and placed in separate jars.       Two flat polyps were found in the transverse colon. The polyps were 3 to        7 mm in size. These polyps were removed with a cold snare. Resection and        retrieval were complete.       The exam was otherwise without abnormality on direct and retroflexion        views.                                                                                   Impression:            - Simple Endoscopic Score for Crohn's Disease: 0,                          mucosal inflammatory changes secondary to Crohn's                          disease, in remission. Biopsied.                         - Two 3 to 7 mm polyps in t he transverse colon,                          removed with a cold snare. Resected and retrieved.                         - The examination was otherwise normal on direct and                          retroflexion views.                         No active inflammation seen. Perhaps some subtle                          scarring in ascending colon and terminal ileum. No                          strictures and no active inflammation. No reason for                          RUQ pain found on this exam.  Recommendation:        - Discharge patient to home (with escort).                         - Resume previous diet.                         - Continue present medications.                         - Await pathology results.                         - Repeat colonoscopy in 5 years for surveillance based                          on pathology results.                         - See same day EGD report for additional findings are                          recommendations                                                                                      Electronically Signed by: Dr. Helder Madsen  ___________________________  HELDER MADSEN MD  8/10/2023 2:15:55 PM  I was physically present for the entire viewing portion of the  exam.  __________________________  Signature of teaching physician  CRIS MADSEN MD  Number of Addenda: 0    Note Initiated On: 8/10/2023 10:41 AM  Scope In: 1:32:44 PM  Scope Out: 1:58:51 PM     ]    ALLERGIES:   No Known Allergies    PERTINENT MEDICATIONS:    Current Outpatient Medications:     albuterol (PROAIR HFA/PROVENTIL HFA/VENTOLIN HFA) 108 (90 Base) MCG/ACT inhaler, Inhale 2 puffs into the lungs every 6 hours as needed for shortness of breath / dyspnea or wheezing, Disp: , Rfl:     ALPRAZolam (XANAX) 1 MG tablet, Take 1 mg by mouth 2 times daily as needed for anxiety., Disp: , Rfl:     amitriptyline (ELAVIL) 10 MG tablet, TAKE 1 TO 2 TABLETS BY MOUTH NIGHTLY AT BEDTIME, Disp: 30 tablet, Rfl: 3    Aspirin-Acetaminophen-Caffeine (EXCEDRIN MIGRAINE PO), , Disp: , Rfl:     calcium carbonate (TUMS) 500 MG chewable tablet, Take 1 chew tab by mouth 4 times daily as needed for heartburn, Disp: , Rfl:     clonazePAM (KLONOPIN) 1 MG tablet, Take 1 mg by mouth 3 times daily, Disp: , Rfl:     desvenlafaxine succinate (PRISTIQ) 100 MG 24 hr tablet, Take 100 mg by mouth daily, Disp: , Rfl:     dicyclomine (BENTYL) 10 MG capsule, TAKE 2 CAPSULES (20 MG) BY MOUTH TWICE DAILY AS NEEDEDFOR ABDOMINAL PAIN, Disp: 120 capsule, Rfl: 0    diphenoxylate-atropine (LOMOTIL) 2.5-0.025 MG tablet, TAKE 1 TABLET BY MOUTH 4 TIMES DAILY AS NEEDED FOR DIARRHEA, Disp: 90 tablet, Rfl: 3    gabapentin (NEURONTIN) 300 MG capsule, Take 2 capsules (600 mg) by mouth 3 times daily. -- Last rx by Dr. Bernardo --, Disp: , Rfl:     ibuprofen (ADVIL/MOTRIN) 200 MG capsule, Take 1 capsule by mouth., Disp: , Rfl:     inFLIXimab, REMICADE, 100 MG injection, - Management by GI with [facility] -, Disp: , Rfl:     methocarbamol (ROBAXIN) 500 MG tablet, Take 1-2 tablets (500-1,000 mg) by mouth 4 times daily as needed for muscle spasms., Disp: 240 tablet, Rfl: 11    naproxen sodium (ANAPROX) 220 MG tablet, Take 440 mg by mouth., Disp: , Rfl:     NIFEdipine ER  OSMOTIC (PROCARDIA XL) 90 MG 24 hr tablet, Take 1 tablet (90 mg) by mouth daily., Disp: 90 tablet, Rfl: 1    omeprazole (PRILOSEC) 40 MG DR capsule, Take 1 capsule (40 mg) by mouth 2 times daily, Disp: 60 capsule, Rfl: 11    ondansetron (ZOFRAN ODT) 4 MG ODT tab, Take 1 tablet (4 mg) by mouth every 6 hours as needed for nausea or vomiting (for upcoming MRE), Disp: 4 tablet, Rfl: 0    oxyCODONE (ROXICODONE) 5 MG tablet, Take 1 tablet (5 mg) by mouth every 6 hours as needed for severe pain., Disp: 40 tablet, Rfl: 0    polyethylene glycol (MIRALAX) 17 GM/Dose powder, TAKE 17 G (1 CAPFUL) BY MOUTH DAILY, Disp: 578 g, Rfl: 9    Probiotic Product (FLORAJEN3) CAPS, take 1 capsule by mouth daily, Disp: , Rfl:     sucralfate (CARAFATE) 1 GM tablet, Take 1 tablet (1 g) by mouth 2 times daily as needed (abdominal pain), Disp: 360 tablet, Rfl: 1    Current Facility-Administered Medications:     ondansetron (ZOFRAN) injection 4 mg, 4 mg, Intravenous, Once, Solomon Arciniega MD    SOCIAL HISTORY:  Social History     Socioeconomic History    Marital status:      Spouse name: Jillian    Number of children: 4    Years of education: 13    Highest education level: Not on file   Occupational History    Occupation: underground utilities/dirt work     Employer: [employer]   Tobacco Use    Smoking status: Never    Smokeless tobacco: Current     Types: Chew    Tobacco comments:     1 tin lasts 1 week   Vaping Use    Vaping status: Never Used   Substance and Sexual Activity    Alcohol use: Not Currently     Alcohol/week: 6.0 standard drinks of alcohol     Types: 6 Cans of beer per week    Drug use: Never    Sexual activity: Yes     Partners: Female     Birth control/protection: Surgical   Other Topics Concern    Parent/sibling w/ CABG, MI or angioplasty before 65F 55M? Not Asked   Social History Narrative    He is , 4  Girls.    Wife runs a .     Social Drivers of Health     Financial Resource Strain: Low Risk   (10/30/2024)    Financial Resource Strain     Within the past 12 months, have you or your family members you live with been unable to get utilities (heat, electricity) when it was really needed?: No   Food Insecurity: Low Risk  (10/30/2024)    Food Insecurity     Within the past 12 months, did you worry that your food would run out before you got money to buy more?: No     Within the past 12 months, did the food you bought just not last and you didn t have money to get more?: No   Transportation Needs: Low Risk  (10/30/2024)    Transportation Needs     Within the past 12 months, has lack of transportation kept you from medical appointments, getting your medicines, non-medical meetings or appointments, work, or from getting things that you need?: No   Physical Activity: Sufficiently Active (10/30/2024)    Exercise Vital Sign     Days of Exercise per Week: 7 days     Minutes of Exercise per Session: 120 min   Stress: Stress Concern Present (10/30/2024)    [employer] - Occupational Stress Questionnaire     Feeling of Stress : Very much   Social Connections: Unknown (10/30/2024)    Social Connection and Isolation Panel [NHANES]     Frequency of Communication with Friends and Family: Not on file     Frequency of Social Gatherings with Friends and Family: Patient declined     Attends [employer] Services: Not on file     Active Member of Clubs or Organizations: Not on file     Attends Club or Organization Meetings: Not on file     Marital Status: Not on file   Interpersonal Safety: Low Risk  (1/9/2025)    Interpersonal Safety     Do you feel physically and emotionally safe where you currently live?: Yes     Within the past 12 months, have you been hit, slapped, kicked or otherwise physically hurt by someone?: No     Within the past 12 months, have you been humiliated or emotionally abused in other ways by your partner or ex-partner?: No   Housing Stability: Low Risk  (10/30/2024)    Housing  Stability     Do you have housing? : Yes     Are you worried about losing your housing?: No       FAMILY HISTORY:  Family History   Problem Relation Age of Onset    Pulmonary Embolism Mother     Other - See Comments Daughter         recurrent OM    Other - See Comments Other         Suicidality,maternal uncle    No Known Problems Father        Past/family/social history reviewed and no changes    PHYSICAL EXAMINATION:  Constitutional: aaox3, cooperative, pleasant, not dyspneic/diaphoretic, no acute distress  Vitals reviewed: There were no vitals taken for this visit.  Wt:   Wt Readings from Last 2 Encounters:   01/09/25 80.3 kg (177 lb)   12/23/24 79.1 kg (174 lb 6.4 oz)      Eyes: Sclera anicteric/injected  Respiratory: Unlabored breathing  Skin: no jaundice  Psych: Normal affect      PERTINENT STUDIES:  Most recent CBC:  Recent Labs   Lab Test 12/23/24  1344 09/17/24  1054   WBC 8.4 7.4   HGB 14.3 14.1   HCT 41.3 42.1    266     Most recent hepatic panel:  Recent Labs   Lab Test 12/23/24  1344 09/17/24  1054   ALT 14 12   AST 19 19     Most recent creatinine:  Recent Labs   Lab Test 09/17/24  1054 03/13/24  0626   CR 1.03 1.00

## 2025-02-11 NOTE — LETTER
2/11/2025      Dennis J Goodell  [address] [facility] [address]      Dear Colleague,    Thank you for referring your patient, Dennis J Goodell, to the [facility] [place]. Please see a copy of my visit note below.    Virtual Visit Details    Type of service:  Video Visit     Originating Location (pt. Location): Home    Distant Location (provider location):  On-site  Platform used for Video Visit: [facility]    IBD CLINIC VISIT    CC/REFERRING MD:  Helder Sanchez    REASON FOR CONSULTATION: follow up CD    ASSESSMENT/PLAN:    Crohn's Disease of ileuma nd colon - on IFX 5 mg/kg q 6 weeks. Doing well in symptomatic remission. Had endoscopic healing 2023. MRE without significant inflammation    -continue IFX 5 mg/kg q 6 weeks  -labs every 3 months    2. Constipation  3. Altered bowel habits  4. Lower abdominal pain    Improved. Have been suspicious for constipation with overflow diarrhea. Was on miralax daily but only needs PRN now    -continue miralax as needed    5. Neck pain  6. Frequent NSAID use    Continued to  minimizing/avoiding NSAIDs    7. History of zane-anal abscess - resolved    8. Duodenal stricture - 2nd portion of duodenum seen 8/2023 - likely related to NSAIDs (less likely Crohn's)    -avoid/minimize NSAIDs  -omeprazole 40 mg daily (reiterated need to take this)  -PRN dilation if symptomatic (not needed now) - with advanced endoscopy      IBD HISTORY  Age at diagnosis: 47 (2022)  Extent of disease: colonic - possible zane-anal  Disease phenotype: inflammatory with possible penetrating  Zane-anal disease: zane-anal abscess  Current CD medications: IFX 5 mg/kg q 6 weeks (since 6/2024)  Prior IBD surgeries: none  Prior IBD Medications: prednisone, Entocort    DRUG MONITORING  TPMT enzyme activity: 19.2 (WNL)     6-TGN/6-MMPN levels: --     Biologic concentration:  -predict PK 6/22/23 - estimated trough 10 (on IFX 5mg/kg q 8)  -predict PK 4/2024 -  estimated trough 12 (on iFX 5 mg/kg q 8)    DISEASE ASSESSMENT  Labs  Recent Labs   Lab Test 12/23/24  1344 07/05/24  0934 05/10/24  0932 03/13/24  0626 01/12/24  1256   SED  --   --  <0*  --  1   HGB 14.3   < > 13.7   < > 14.7    < > = values in this interval not displayed.     Fecal calprotectin: 232 Feb 2024 (previously 30s - 5/30/23) -> 6/24/24 150s  - EGD 8/2023 - peptic stricture in second portion of duodenum - no active Crohn's on biopsy of stricture   -colonoscopy 8/2023 - no active inflammation seen to explain RUQ pain  -colonoscopy 12/2022 - normal TI (normal biopsies) - inflammation/stricture 5-6 cm distal to IC valve - able to be passed. Remainder colon normal? (details of scope limited on documentation). 5mm adenoma in rectum. Biopsies TI normal. Right colon chronic active (mild) c/w IBD and left colon normal  Enterography:   - MRE 2/14/23 - IMPRESSION:  Mucosal thickening and hyperemia of the terminal ileum,  compatible with the prior diagnosis of Crohn disease. No evidence of  associated stricture or fistula.  - MRE 4/11/2024 - no inflammation of bowel  C diff: negative    sIBDQ:   IBDQ Score Date IBDQ - Total Score  (Higher score better)   6/22/2024   9:49 PM 39   7/18/2023  12:31 PM Incomplete   6/1/2023   4:13 AM 34       IBD Health Care Maintenance:    Follows with [facility]    Depression Screening:  -- Over the last month, have you felt down, depressed, or hopeless? no  -- Over the last month, have you felt little interest or pleasure doing things? no    Misc:  -- Avoid tobacco use  -- Avoid NSAIDs as there is potentially a 25% chance of causing an IBD flare    Return to clinic in 6 months with IBD PA    Thank you for this consultation.  It was a pleasure to participate in the care of this patient; please contact us with any further questions.  I spent a total of 40 minutes during the day of encounter performed chart review, meeting with patient, patient counseling, care coordination, and  documentation.      This note was created with voice recognition software, and while reviewed for accuracy, typos may remain.     Helder Sanchez MD  Division of Gastroenterology, Hepatology and Nutrition  [facility]  Pager: [phone]      HPI:   Currently, here today for routine follow up.    So far 2025 is much better than 2024.    He had back surgery in the latter part of 2024. 1.1 cm synovial cyst removed from the L3/L4 space that was pressing on spinal cord. Feels much better.    Still taking 4 excedrin migraines daily.    GI tract doing well. Eating more consistently. Having 1-2 Bms per day without blood. Has not needed miralax - feels constipation is better.    Denies abdominal pain.     He is back to work.    No EIM    ROS:    No fevers or chills  No weight loss  No blurry vision, double vision or change in vision  No sore throat  No lymphadenopathy  No headache, paraesthesias, or weakness in a limb  No shortness of breath or wheezing  No chest pain or pressure  No arthralgias or myalgias  No rashes or skin changes  No odynophagia or dysphagia  No BRBPR, hematochezia, melena  No dysuria, frequency or urgency  No hot/cold intolerance or polyria  No anxiety or depression    Extra intestinal manifestations of IBD:  No uveitis/episcleritis  No aphthous ulcers   No arthritis   No erythema nodosum/pyoderma gangrenosum.     PERTINENT PAST MEDICAL HISTORY:  Past Medical History:   Diagnosis Date     Anxiety disorder 2012     Dysthymic disorder     No Comments Provided     Malignant neoplasm of testis (H) 2005 2005,teratoma     Raynaud's syndrome without gangrene     No Comments Provided     Uncomplicated alcohol abuse     7/25/2012       PREVIOUS SURGERIES:  Past Surgical History:   Procedure Laterality Date     COLONOSCOPY N/A 12/12/2022    Procedure: COLONOSCOPY, WITH POLYPECTOMY AND BIOPSY;  Surgeon: Solomon Arciniega MD;  Location: [facility] OR     COLONOSCOPY N/A 8/10/2023    Procedure: COLONOSCOPY, WITH  POLYPECTOMY AND BIOPSY;  Surgeon: Cris Sanchez MD;  Location: [facility] OR     DECOMPRESSION CUBITAL TUNNEL Left 01/2019     ESOPHAGOSCOPY, GASTROSCOPY, DUODENOSCOPY (EGD), COMBINED N/A 8/10/2023    Procedure: ESOPHAGOGASTRODUODENOSCOPY, WITH BIOPSY;  Surgeon: Cris Sanchez MD;  Location: [facility] OR     HERNIA REPAIR      ,HERNIA REPAIR     IR CHEST PORT PLACEMENT > 5 YRS OF AGE Left 04/01/2008     LYMPH NODE BIOPSY  09/05/2008     [facility]. 37 lymph nodes excised     ORCHIECTOMY SCROTAL Right 12/30/2005    teratoma     OTHER SURGICAL HISTORY      9/05/08,[id number],OTHER     RELEASE CARPAL TUNNEL  04/09/2013       PREVIOUS ENDOSCOPY:  Results for orders placed or performed during the hospital encounter of 08/10/23   COLONOSCOPY    Collection Time: 08/10/23 10:41 AM   Result Value Ref Range    COLONOSCOPY       [facility]  [address] [place] [address], [place] [address] [phone]     Endoscopy Department  _______________________________________________________________________________  Patient Name: Dennis Goodell          Procedure Date: 8/10/2023 10:41 AM  MRN: [id number]                       Account Number: [id number]  YOB: 1975               Admit Type: Outpatient  Age: 48                               [facility] [facility]  Gender: Male                          Note Status: Finalized  Attending MD: CRIS SANCHEZ MD,   Total Sedation Time:   _______________________________________________________________________________     Procedure:             Colonoscopy  Indications:           Assess therapeutic response to therapy of Crohn's                          disease of the small bowel and colon  Providers:             CRIS SANCHEZ MD, Roma Hamilton RN,                          Leon Concepcion, CRNA  Referring MD:          TALYA UMAÑA MD  Requesting Provider:   TALYA GALINDO MD  Medicines:             Monitored  Anesthesia Care  Complications:         No immediate complications.  _______________________________________________________________________________  Procedure:             Pre-Anesthesia Assessment:                         - See EPIC H and P note                         After obtaining informed consent, the colonoscope was                          passed under direct vision. Throughout the procedure,                          the patient's blood pressure, pulse, and oxygen                          saturations were monitored continuously. The                          Colonoscope was introduced through the anus and                          advanced to the terminal ileum, with identification of                          the appendiceal orifice and IC valve. The colonoscopy                          was performed without difficulty. The patient                          tolerated the procedure well. The  quality of the bowel                          preparation was adequate to identify polyps greater                          than 5 mm in size.                                                                                   Findings:       The perianal and digital rectal examinations were normal.       The Simple Endoscopic Score for Crohn's Disease was determined based on        the endoscopic appearance of the mucosa in the following segments:       - Ileum: Findings include no ulcers present, no ulcerated surfaces, no        affected surfaces and no narrowings. Segment score: 0.       - Right Colon: Findings include no ulcers present, no ulcerated        surfaces, no affected surfaces and no narrowings. Segment score: 0.       - Transverse Colon: Findings include no ulcers present, no ulcerated        surfaces, no affected surfaces and no narrowings. Segment score: 0.       - Left Colon: Findings include no ulcers present, no ulcerated surfaces,        no affected surfaces and no na rrowings. Segment score: 0.       -  Rectum: Findings include no ulcers present, no ulcerated surfaces, no        affected surfaces and no narrowings. Segment score: 0.       - Total SES-CD aggregate score: 0. Biopsies were taken with a cold        forceps for histology. Separate biopsies taken of the ileum, right        colon, left colon and rectum and placed in separate jars.       Two flat polyps were found in the transverse colon. The polyps were 3 to        7 mm in size. These polyps were removed with a cold snare. Resection and        retrieval were complete.       The exam was otherwise without abnormality on direct and retroflexion        views.                                                                                   Impression:            - Simple Endoscopic Score for Crohn's Disease: 0,                          mucosal inflammatory changes secondary to Crohn's                          disease, in remission. Biopsied.                         - Two 3 to 7 mm polyps in t he transverse colon,                          removed with a cold snare. Resected and retrieved.                         - The examination was otherwise normal on direct and                          retroflexion views.                         No active inflammation seen. Perhaps some subtle                          scarring in ascending colon and terminal ileum. No                          strictures and no active inflammation. No reason for                          RUQ pain found on this exam.  Recommendation:        - Discharge patient to home (with escort).                         - Resume previous diet.                         - Continue present medications.                         - Await pathology results.                         - Repeat colonoscopy in 5 years for surveillance based                          on pathology results.                         - See same day EGD report for additional findings are                          recommendations                                                                                       Electronically Signed by: Dr. Helder Madsen  ___________________________  HELDER MADSEN MD  8/10/2023 2:15:55 PM  I was physically present for the entire viewing portion of the exam.  __________________________  Signature of teaching physician  HELDER MADSEN MD  Number of Addenda: 0    Note Initiated On: 8/10/2023 10:41 AM  Scope In: 1:32:44 PM  Scope Out: 1:58:51 PM     ]    ALLERGIES:   No Known Allergies    PERTINENT MEDICATIONS:    Current Outpatient Medications:      albuterol (PROAIR HFA/PROVENTIL HFA/VENTOLIN HFA) 108 (90 Base) MCG/ACT inhaler, Inhale 2 puffs into the lungs every 6 hours as needed for shortness of breath / dyspnea or wheezing, Disp: , Rfl:      ALPRAZolam (XANAX) 1 MG tablet, Take 1 mg by mouth 2 times daily as needed for anxiety., Disp: , Rfl:      amitriptyline (ELAVIL) 10 MG tablet, TAKE 1 TO 2 TABLETS BY MOUTH NIGHTLY AT BEDTIME, Disp: 30 tablet, Rfl: 3     Aspirin-Acetaminophen-Caffeine (EXCEDRIN MIGRAINE PO), , Disp: , Rfl:      calcium carbonate (TUMS) 500 MG chewable tablet, Take 1 chew tab by mouth 4 times daily as needed for heartburn, Disp: , Rfl:      clonazePAM (KLONOPIN) 1 MG tablet, Take 1 mg by mouth 3 times daily, Disp: , Rfl:      desvenlafaxine succinate (PRISTIQ) 100 MG 24 hr tablet, Take 100 mg by mouth daily, Disp: , Rfl:      dicyclomine (BENTYL) 10 MG capsule, TAKE 2 CAPSULES (20 MG) BY MOUTH TWICE DAILY AS NEEDEDFOR ABDOMINAL PAIN, Disp: 120 capsule, Rfl: 0     diphenoxylate-atropine (LOMOTIL) 2.5-0.025 MG tablet, TAKE 1 TABLET BY MOUTH 4 TIMES DAILY AS NEEDED FOR DIARRHEA, Disp: 90 tablet, Rfl: 3     gabapentin (NEURONTIN) 300 MG capsule, Take 2 capsules (600 mg) by mouth 3 times daily. -- Last rx by Dr. Bernardo --, Disp: , Rfl:      ibuprofen (ADVIL/MOTRIN) 200 MG capsule, Take 1 capsule by mouth., Disp: , Rfl:      inFLIXimab, REMICADE, 100 MG injection, - Management by GI with  Taylor -, Disp: , Rfl:      methocarbamol (ROBAXIN) 500 MG tablet, Take 1-2 tablets (500-1,000 mg) by mouth 4 times daily as needed for muscle spasms., Disp: 240 tablet, Rfl: 11     naproxen sodium (ANAPROX) 220 MG tablet, Take 440 mg by mouth., Disp: , Rfl:      NIFEdipine ER OSMOTIC (PROCARDIA XL) 90 MG 24 hr tablet, Take 1 tablet (90 mg) by mouth daily., Disp: 90 tablet, Rfl: 1     omeprazole (PRILOSEC) 40 MG DR capsule, Take 1 capsule (40 mg) by mouth 2 times daily, Disp: 60 capsule, Rfl: 11     ondansetron (ZOFRAN ODT) 4 MG ODT tab, Take 1 tablet (4 mg) by mouth every 6 hours as needed for nausea or vomiting (for upcoming MRE), Disp: 4 tablet, Rfl: 0     oxyCODONE (ROXICODONE) 5 MG tablet, Take 1 tablet (5 mg) by mouth every 6 hours as needed for severe pain., Disp: 40 tablet, Rfl: 0     polyethylene glycol (MIRALAX) 17 GM/Dose powder, TAKE 17 G (1 CAPFUL) BY MOUTH DAILY, Disp: 578 g, Rfl: 9     Probiotic Product (FLORAJEN3) CAPS, take 1 capsule by mouth daily, Disp: , Rfl:      sucralfate (CARAFATE) 1 GM tablet, Take 1 tablet (1 g) by mouth 2 times daily as needed (abdominal pain), Disp: 360 tablet, Rfl: 1    Current Facility-Administered Medications:      ondansetron (ZOFRAN) injection 4 mg, 4 mg, Intravenous, Once, Solomon Arciniega MD    SOCIAL HISTORY:  Social History     Socioeconomic History     Marital status:      Spouse name: Jillian     Number of children: 4     Years of education: 13     Highest education level: Not on file   Occupational History     Occupation: underground utilities/dirt work     Employer: [employer]   Tobacco Use     Smoking status: Never     Smokeless tobacco: Current     Types: Chew     Tobacco comments:     1 tin lasts 1 week   Vaping Use     Vaping status: Never Used   Substance and Sexual Activity     Alcohol use: Not Currently     Alcohol/week: 6.0 standard drinks of alcohol     Types: 6 Cans of beer per week     Drug use: Never     Sexual activity: Yes      Partners: Female     Birth control/protection: Surgical   Other Topics Concern     Parent/sibling w/ CABG, MI or angioplasty before 65F 55M? Not Asked   Social History Narrative    He is , 4  Girls.    Wife runs a .     Social Drivers of Health     Financial Resource Strain: Low Risk  (10/30/2024)    Financial Resource Strain      Within the past 12 months, have you or your family members you live with been unable to get utilities (heat, electricity) when it was really needed?: No   Food Insecurity: Low Risk  (10/30/2024)    Food Insecurity      Within the past 12 months, did you worry that your food would run out before you got money to buy more?: No      Within the past 12 months, did the food you bought just not last and you didn t have money to get more?: No   Transportation Needs: Low Risk  (10/30/2024)    Transportation Needs      Within the past 12 months, has lack of transportation kept you from medical appointments, getting your medicines, non-medical meetings or appointments, work, or from getting things that you need?: No   Physical Activity: Sufficiently Active (10/30/2024)    Exercise Vital Sign      Days of Exercise per Week: 7 days      Minutes of Exercise per Session: 120 min   Stress: Stress Concern Present (10/30/2024)    [employer] [place] [employer] - Occupational Stress Questionnaire      Feeling of Stress : Very much   Social Connections: Unknown (10/30/2024)    Social Connection and Isolation Panel [NHANES]      Frequency of Communication with Friends and Family: Not on file      Frequency of Social Gatherings with Friends and Family: Patient declined      Attends Restorationism Services: Not on file      Active Member of Clubs or Organizations: Not on file      Attends Club or Organization Meetings: Not on file      Marital Status: Not on file   Interpersonal Safety: Low Risk  (1/9/2025)    Interpersonal Safety      Do you feel physically and emotionally safe where you  currently live?: Yes      Within the past 12 months, have you been hit, slapped, kicked or otherwise physically hurt by someone?: No      Within the past 12 months, have you been humiliated or emotionally abused in other ways by your partner or ex-partner?: No   Housing Stability: Low Risk  (10/30/2024)    Housing Stability      Do you have housing? : Yes      Are you worried about losing your housing?: No       FAMILY HISTORY:  Family History   Problem Relation Age of Onset     Pulmonary Embolism Mother      Other - See Comments Daughter         recurrent OM     Other - See Comments Other         Suicidality,maternal uncle     No Known Problems Father        Past/family/social history reviewed and no changes    PHYSICAL EXAMINATION:  Constitutional: aaox3, cooperative, pleasant, not dyspneic/diaphoretic, no acute distress  Vitals reviewed: There were no vitals taken for this visit.  Wt:   Wt Readings from Last 2 Encounters:   01/09/25 80.3 kg (177 lb)   12/23/24 79.1 kg (174 lb 6.4 oz)      Eyes: Sclera anicteric/injected  Respiratory: Unlabored breathing  Skin: no jaundice  Psych: Normal affect      PERTINENT STUDIES:  Most recent CBC:  Recent Labs   Lab Test 12/23/24  1344 09/17/24  1054   WBC 8.4 7.4   HGB 14.3 14.1   HCT 41.3 42.1    266     Most recent hepatic panel:  Recent Labs   Lab Test 12/23/24  1344 09/17/24  1054   ALT 14 12   AST 19 19     Most recent creatinine:  Recent Labs   Lab Test 09/17/24  1054 03/13/24  0626   CR 1.03 1.00             Again, thank you for allowing me to participate in the care of your patient.        Sincerely,        Helder Sanchez MD    Electronically signed

## 2025-02-11 NOTE — TELEPHONE ENCOUNTER
Called and notified patient that he will need to schedule an appointment for medication refill. Transferred to the appointment line. Saige Parsons LPN .......................2/11/2025  1:45 PM

## 2025-02-11 NOTE — TELEPHONE ENCOUNTER
Reason for call: Medication or medication refill    Have you contacted your pharmacy regarding this refill? No, would not    If No, direct the patient to contact the Pharmacy and discontinue telephone note using Erroneous Encounter.  If Yes, continue.    Name of medication requested: oxycodone    How many days of medication do you have left? None    What pharmacy do you use? [facility]    Preferred method for responding to this message: Telephone Call    Phone number patient can be reached at: Cell number on file:    Telephone Information:   Mobile [phone]       If we cannot reach you directly, may we leave a detailed response at the number you provided? Yes      Anne Mccarhty on 2/11/2025 at 7:14 AM

## 2025-02-11 NOTE — TELEPHONE ENCOUNTER
Patient sent Rx request for the following:     Patient is out  Requested Prescriptions   Pending Prescriptions Disp Refills    oxyCODONE (ROXICODONE) 5 MG tablet 40 tablet 0     Sig: Take 1 tablet (5 mg) by mouth every 6 hours as needed for severe pain.       There is no refill protocol information for this order          Last Prescription Date:   1/9/25  Last Fill Qty/Refills:         40, R-0    Last Office Visit:              1/9/25   Future Office visit:             Next 5 appointments (look out 90 days)      Feb 28, 2025 11:00 AM  (Arrive by 10:45 AM)  Pharmacist Visit with Christiana Esquivel RPH  [facility] ([facility] [place] ) [address] [place] [id number] [phone]        Unable to complete prescription refill per RN Medication Refill Policy.     Waldemar Gutierrez RN on 2/11/2025 at 8:24 AM

## 2025-02-11 NOTE — PATIENT INSTRUCTIONS
It was a pleasure meeting with you today and discussing your healthcare plan. Below is a summary of what we covered:    Continue infliximab infusions    Take omeprazole 40 mg daily as long as you are on excedrin or other NSAIDs     By careful to limit all NSAIDs (including Excedrin) as much as you can    Continue current medications    Follow up in 6 months      Please see below for any additional questions and scheduling guidelines.    Sign up for [employer]: [employer] patient portal serves as a secure platform for accessing your medical records from the [facility]. Additionally, [employer] facilitates easy, timely, and secure messaging with your care team. If you have not signed up, you may do so by using the provided code or calling [phone].    Coordinating your care after your visit:  There are multiple options for scheduling your follow-up care based on your provider's recommendation.    How do I schedule a follow-up clinic appointment:   After your appointment, you may receive scheduling assistance with the Clinic Coordinators by having a seat in the waiting room and a Clinic Coordinator will call you up to schedule.  Virtual visits or after you leave the clinic:  Your provider has placed a follow-up order in the [employer] portal for scheduling your return appointment. A member of the scheduling team will contact you to schedule.  [employer] Scheduling: Timely scheduling through [employer] is advised to ensure appointment availability.   Call to schedule: You may schedule your follow-up appointment(s) by calling [phone], option 1.    How do I schedule my endoscopy or colonoscopy procedure:  If a procedure, such as a colonoscopy or upper endoscopy was ordered by your provider, the scheduling team will contact you to schedule this procedure. Or you may choose to call to schedule at   [phone], option 2.  Please allow 20-30 minutes when scheduling a procedure.    How do I get my blood work done? To get  your blood work done, you need to schedule a lab appointment at an [facility]. There are multiple ways to schedule:   At the clinic: The Clinic Coordinator you meet after your visit can help you schedule a lab appointment.   [employer] scheduling: [employer] offers online lab scheduling at all [facility] laboratory locations.   Call to schedule: You can call [phone] to schedule your lab appointment.    How do I schedule my imaging study: To schedule imaging studies, such as CT scans, ultrasounds, MRIs, or X-rays, contact Imaging Services at [phone].    How do I schedule a referral to another doctor: If your provider recommended a referral to another specialist(s), the referral order was placed by your provider. You will receive a phone call to schedule this referral, or you may choose to call the number attached to the referral to self-schedule.    For Post-Visit Question(s):  For any inquiries following today's visit:  Please utilize [employer] messaging and allow 48 hours for reply or contact the Call Center during normal business hours at [phone], option 3.  For Emergent After-hours questions, contact the On-Call GI Fellow through the [facility]  at [phone].  In addition, you may contact your Nurse directly using the provided contact information.    Test Results:  Test results will be accessible via [employer] in compliance with the 21st Century Cures Act. This means that your results will be available to you at the same time as your provider. Often you may see your results before your provider does. Results are reviewed by staff within two weeks with communication follow-up. Results may be released in the patient portal prior to your care team review.    Prescription Refill(s):  Medication prescribed by your provider will be addressed during your visit. For future refills, please coordinate with your pharmacy. If you have not had a recent clinic visit or  routine labs, for your safety, your provider may not be able to refill your prescription.

## 2025-02-12 ENCOUNTER — TELEPHONE (OUTPATIENT)
Dept: GASTROENTEROLOGY | Facility: CLINIC | Age: 50
End: 2025-02-12
Payer: COMMERCIAL

## 2025-02-12 NOTE — TELEPHONE ENCOUNTER
Left Voicemail (1st Attempt) and Sent Mychart (1st Attempt) for the patient to call back and schedule the following:    Appointment type: Return  Provider: IBD ELIANA's - Per 's req  Return date: TBD  Specialty phone number: [phone]  Additional appointment(s) needed:   Additonal Notes:     Follow Up with ELIANA's in August 2025 - per provider.     2/12 AA

## 2025-02-13 DIAGNOSIS — G89.29 CHRONIC RADICULAR LOW BACK PAIN: ICD-10-CM

## 2025-02-13 DIAGNOSIS — M54.16 CHRONIC RADICULAR LOW BACK PAIN: ICD-10-CM

## 2025-02-13 NOTE — TELEPHONE ENCOUNTER
gabapentin (NEURONTIN) 300 MG capsule     Sig: TAKE 2 CAPSULES (600 MG) BYMOUTH THREE TIMES A DAY.         Last Written Prescription Date:  medication historical last fill 1/16/25 per pharmacy  Last Fill Quantity: 180,     Last Office Visit: 1/9/25  Future Office visit:    Next 5 appointments (look out 90 days)      Feb 28, 2025 11:00 AM  (Arrive by 10:45 AM)  Pharmacist Visit with Christiana Esquivel RPH  [facility] ([facility] ) [address] [place] [id number]  [phone]             Routing refill request to provider for review/approval because:  Drug not on the [facility] [facility] or [facility] refill protocol or controlled substance  Medication is reported/historical  Medication was addressed at LOV at current dosing of refill request, patient to follow-up as need per note. Angela Swanson RN on 2/13/2025 at 4:41 PM

## 2025-02-18 RX ORDER — GABAPENTIN 300 MG/1
CAPSULE ORAL
Qty: 180 CAPSULE | Refills: 5 | Status: SHIPPED | OUTPATIENT
Start: 2025-02-18

## 2025-03-11 DIAGNOSIS — K50.10 CROHN'S DISEASE OF LARGE INTESTINE WITHOUT COMPLICATION (H): ICD-10-CM

## 2025-03-12 ENCOUNTER — OFFICE VISIT (OUTPATIENT)
Dept: FAMILY MEDICINE | Facility: OTHER | Age: 50
End: 2025-03-12
Attending: NURSE PRACTITIONER
Payer: COMMERCIAL

## 2025-03-12 VITALS
HEART RATE: 111 BPM | BODY MASS INDEX: 25.77 KG/M2 | TEMPERATURE: 98.8 F | WEIGHT: 180 LBS | SYSTOLIC BLOOD PRESSURE: 137 MMHG | RESPIRATION RATE: 19 BRPM | DIASTOLIC BLOOD PRESSURE: 84 MMHG | HEIGHT: 70 IN | OXYGEN SATURATION: 94 %

## 2025-03-12 DIAGNOSIS — R05.1 ACUTE COUGH: ICD-10-CM

## 2025-03-12 DIAGNOSIS — R49.0 HOARSENESS: ICD-10-CM

## 2025-03-12 DIAGNOSIS — J02.0 STREPTOCOCCAL PHARYNGITIS: Primary | ICD-10-CM

## 2025-03-12 DIAGNOSIS — J02.9 SORE THROAT: ICD-10-CM

## 2025-03-12 DIAGNOSIS — R53.83 FATIGUE, UNSPECIFIED TYPE: ICD-10-CM

## 2025-03-12 LAB
FLUAV RNA SPEC QL NAA+PROBE: NEGATIVE
FLUBV RNA RESP QL NAA+PROBE: NEGATIVE
RSV RNA SPEC NAA+PROBE: NEGATIVE
S PYO DNA THROAT QL NAA+PROBE: DETECTED
SARS-COV-2 RNA RESP QL NAA+PROBE: NEGATIVE

## 2025-03-12 PROCEDURE — 87637 SARSCOV2&INF A&B&RSV AMP PRB: CPT | Mod: ZL

## 2025-03-12 PROCEDURE — 87651 STREP A DNA AMP PROBE: CPT | Mod: ZL

## 2025-03-12 PROCEDURE — 250N000009 HC RX 250

## 2025-03-12 RX ORDER — DEXAMETHASONE SODIUM PHOSPHATE 4 MG/ML
12 VIAL (ML) INJECTION ONCE
Status: COMPLETED | OUTPATIENT
Start: 2025-03-12 | End: 2025-03-12

## 2025-03-12 RX ORDER — PENICILLIN V POTASSIUM 500 MG/1
500 TABLET, FILM COATED ORAL 2 TIMES DAILY
Qty: 20 TABLET | Refills: 0 | Status: SHIPPED | OUTPATIENT
Start: 2025-03-12 | End: 2025-03-22

## 2025-03-12 RX ADMIN — DEXAMETHASONE SODIUM PHOSPHATE 12 MG: 4 INJECTION, SOLUTION INTRAMUSCULAR; INTRAVENOUS at 17:47

## 2025-03-12 ASSESSMENT — ENCOUNTER SYMPTOMS
NAUSEA: 0
VOMITING: 0
SORE THROAT: 1
DIARRHEA: 0
COUGH: 1
FEVER: 0
SINUS PAIN: 0
CARDIOVASCULAR NEGATIVE: 1
CHILLS: 0

## 2025-03-12 ASSESSMENT — PATIENT HEALTH QUESTIONNAIRE - PHQ9
SUM OF ALL RESPONSES TO PHQ QUESTIONS 1-9: 0
SUM OF ALL RESPONSES TO PHQ QUESTIONS 1-9: 0
10. IF YOU CHECKED OFF ANY PROBLEMS, HOW DIFFICULT HAVE THESE PROBLEMS MADE IT FOR YOU TO DO YOUR WORK, TAKE CARE OF THINGS AT HOME, OR GET ALONG WITH OTHER PEOPLE: NOT DIFFICULT AT ALL

## 2025-03-12 ASSESSMENT — PAIN SCALES - GENERAL: PAINLEVEL_OUTOF10: SEVERE PAIN (7)

## 2025-03-12 NOTE — PROGRESS NOTES
Dennis J Goodell  1975    ASSESSMENT/PLAN    1. Streptococcal pharyngitis (Primary)  - penicillin V (VEETID) 500 MG tablet; Take 1 tablet (500 mg) by mouth 2 times daily for 10 days.  Dispense: 20 tablet; Refill: 0  2. Sore throat  - Group A Streptococcus PCR Throat Swab  - dexAMETHasone (DECADRON) injectable solution used ORALLY 12 mg  3. Hoarseness  4. Acute cough  - Influenza A/B, RSV and SARS-CoV2 PCR (COVID-19) Nose  5. Fatigue, unspecified type      - Group A strep positive.  Influenza, COVID, RSV testing negative.  - Penicillin V twice daily for 10 days provided for streptococcal pharyngitis.  - Decadron 12 mg provided in clinic for sore throat.  Tolerated well.  - May use over-the-counter Tylenol or ibuprofen PRN  - Follow up as needed for new or worsening symptoms          *Explanation of diagnosis, treatment options and risk and benefits of medications reviewed with patient. Patient agrees with plan of care.  *All questions were answered.    *Red flags symptoms were discussed and patient was advised when they should return for reevaluation or for prompt emergency evaluation.   *Patient was given verbal and written instructions on plan of care. Instructions were printed or are available on [employer] on electronic AVS.   *We discussed potential side effects of any prescribed or recommended therapies, as well as expectations for response to treatments.  *Patient discharged in stable condition    Júnior Shoemaker, KIM, APRN, FNP-C  [facility]    SUBJECTIVE  CHIEF COMPLAINT/ REASON FOR VISIT  Patient presents with:  Pharyngitis  Fatigue  Dizziness     HISTORY OF PRESENT ILLNESS  Dennis J Goodell is a pleasant 49 year old male presents to rapid clinic today with sore throat.  In the middle of the night yesterday patient developed a sore throat with associated mouth dryness.  He is unsure about any fever but does have a slight cough.  Patient has had known exposures at work to illness and  "strep pharyngitis from his wife approximately 1 week ago.  Patient reports he does have a history of Crohn's.  Patient denies any nausea, vomiting or diarrhea.    History provided by patient      I have reviewed the nursing notes.  I have reviewed allergies, medication list, problem list, and past medical history.    REVIEW OF SYSTEMS  Review of Systems   Constitutional:  Negative for chills and fever.   HENT:  Positive for sore throat. Negative for congestion, ear pain and sinus pain.    Respiratory:  Positive for cough.    Cardiovascular: Negative.    Gastrointestinal:  Negative for diarrhea, nausea and vomiting.   Skin: Negative.         VITAL SIGNS  Vitals:    03/12/25 1723   BP: 137/84   BP Location: Right arm   Patient Position: Sitting   Cuff Size: Adult Regular   Pulse: 111   Resp: 19   Temp: 98.8  F (37.1  C)   TempSrc: Tympanic   SpO2: 94%   Weight: 81.6 kg (180 lb)   Height: 1.778 m (5' 10\")      Body mass index is 25.83 kg/m .      OBJECTIVE  PHYSICAL EXAM  Physical Exam  Vitals and nursing note reviewed.   Constitutional:       General: He is not in acute distress.     Appearance: Normal appearance. He is normal weight. He is not ill-appearing, toxic-appearing or diaphoretic.   HENT:      Head: Normocephalic and atraumatic.      Right Ear: Tympanic membrane, ear canal and external ear normal. There is no impacted cerumen.      Left Ear: Tympanic membrane, ear canal and external ear normal. There is no impacted cerumen.      Nose: Nose normal. No congestion or rhinorrhea.      Mouth/Throat:      Mouth: Mucous membranes are moist.      Pharynx: Uvula midline. Pharyngeal swelling, oropharyngeal exudate and posterior oropharyngeal erythema present.      Tonsils: Tonsillar exudate present. 2+ on the right. 2+ on the left.   Eyes:      Extraocular Movements: Extraocular movements intact.      Conjunctiva/sclera: Conjunctivae normal.      Pupils: Pupils are equal, round, and reactive to light. "   Cardiovascular:      Rate and Rhythm: Normal rate and regular rhythm.      Pulses: Normal pulses.      Heart sounds: Normal heart sounds.   Pulmonary:      Effort: Pulmonary effort is normal. No respiratory distress.      Breath sounds: Normal breath sounds. No wheezing or rhonchi.   Musculoskeletal:      Cervical back: Normal range of motion. No rigidity or tenderness.   Lymphadenopathy:      Cervical: No cervical adenopathy.   Neurological:      Mental Status: He is alert.            DIAGNOSTICS  Results for orders placed or performed in visit on 03/12/25   Influenza A/B, RSV and SARS-CoV2 PCR (COVID-19) Nose     Status: Normal    Specimen: Nose; Swab   Result Value Ref Range    Influenza A PCR Negative Negative    Influenza B PCR Negative Negative    RSV PCR Negative Negative    SARS CoV2 PCR Negative Negative    Narrative    Testing was performed using the Xpert Xpress CoV2/Flu/RSV Assay on the Cepheid GeneXpert Instrument. This test should be ordered for the detection of SARS-CoV2, influenza, and RSV viruses in individuals with signs and symptoms of respiratory tract infection. This test is for in vitro diagnostic use under the US FDA for laboratories certified under CLIA to perform high or moderate complexity testing. This test has been US FDA cleared. A negative result does not rule out the presence of PCR inhibitors in the specimen or target RNA in concentration below the limit of detection for the assay. If only one viral target is positive but coinfection with multiple targets is suspected, the sample should be re-tested with another FDA cleared, approved, or authorized test, if coninfection would change clinical management. This test was validated by the [facility]. These laboratories are certified under the Clinical Laboratory Improvement Amendments of 1988 (CLIA-88) as qualified to perfom high complexity laboratory testing.   Group A Streptococcus PCR Throat Swab     Status:  Abnormal    Specimen: Throat; Swab   Result Value Ref Range    Group A strep by PCR Detected (A) Not Detected    Narrative    The Xpert Xpress Strep A test, performed on the [employer], is a rapid, qualitative in vitro diagnostic test for the detection of Streptococcus pyogenes (Group A ß-hemolytic Streptococcus, Strep A) in throat swab specimens from patients with signs and symptoms of pharyngitis. The Xpert Xpress Strep A test can be used as an aid in the diagnosis of Group A Streptococcal pharyngitis. The assay is not intended to monitor treatment for Group A Streptococcus infections. The Xpert Xpress Strep A test utilizes an automated real-time polymerase chain reaction (PCR) to detect Streptococcus pyogenes DNA.

## 2025-03-17 ENCOUNTER — TELEPHONE (OUTPATIENT)
Dept: FAMILY MEDICINE | Facility: OTHER | Age: 50
End: 2025-03-17
Payer: COMMERCIAL

## 2025-03-17 RX ORDER — DICYCLOMINE HYDROCHLORIDE 10 MG/1
CAPSULE ORAL
Qty: 120 CAPSULE | Refills: 2 | Status: SHIPPED | OUTPATIENT
Start: 2025-03-17

## 2025-03-17 NOTE — TELEPHONE ENCOUNTER
Left message fro patient on VM that he should be seen again. Call if he has any questions.  Beena Ferrera LPN on 3/17/2025 at 3:49 PM  EXT. [phone]

## 2025-03-17 NOTE — TELEPHONE ENCOUNTER
The patient was seen in the rapid clinic and had strep throat, giving medication for this but his throat is still sore and he feels lousy.  Feels the medicine is not working. [facility] is his sore.  Okay to leave message. Please advise.

## 2025-03-17 NOTE — TELEPHONE ENCOUNTER
After proper verification, patient was seen in [facility] on 03/12/25. He was prescribed Penicillin V potassium 500 mg. Patient stated that he is not feeling any better, He has bed sweats,weak, fatigues and still has a sore throat. Patient would like to not have to come back in if possible due to no insurance. He uses [facility]. Please Advise.  Beena Ferrera LPN on 3/17/2025 at 1:17 PM  EXT. [phone]

## 2025-03-17 NOTE — TELEPHONE ENCOUNTER
dicyclomine (BENTYL) 10 MG capsule   120 capsule 0 1/21/2025     Last Office Visit : 2-  Future Office visit:  7-9-2025  re appointment scheduled. Scheduling has been notified to contact the pt for appointment.    Refill decision: Medication unable to be refilled by RN due to:  Other:   MTM    Request from pharmacy:  Requested Prescriptions   Pending Prescriptions Disp Refills    dicyclomine (BENTYL) 10 MG capsule [Pharmacy Med Name: DICYCLOMINE 10MG CAPSULE] 120 capsule 0     Sig: TAKE 2 CAPSULES (20 MG) BY MOUTH TWICE DAILY AS NEEDEDFOR ABDOMINAL PAIN       Oral Anticholinergic Agents Passed - 3/17/2025  8:17 AM        Passed - Patient is of age 12 or older        Passed - Medication is active on med list and the sig matches. RN to manually verify dose and sig if red X/fail.     If the protocol passes (green check), you do not need to verify med dose and sig.    A prescription matches if they are the same clinical intention.    For Example: once daily and every morning are the same.    The protocol can not identify upper and lower case letters as matching and will fail.     For Example: Take 1 tablet (50 mg) by mouth daily     TAKE 1 TABLET (50 MG) BY MOUTH DAILY    For all fails (red x), verify dose and sig.    If the refill does match what is on file, the RN can still proceed to approve the refill request.       If they do not match, route to the appropriate provider.             Passed - Recent (12 mo) or future (90 days) visit with authorizing provider's specialty     The patient must have completed an in-person or virtual visit within the past 12 months or has a future visit scheduled within the next 90 days with the authorizing provider s specialty.  Urgent care and e-visits do not qualify as an office visit for this protocol.

## 2025-03-18 DIAGNOSIS — I73.00 RAYNAUD'S PHENOMENON WITHOUT GANGRENE: ICD-10-CM

## 2025-03-18 RX ORDER — NIFEDIPINE 90 MG/1
90 TABLET, EXTENDED RELEASE ORAL DAILY
Qty: 90 TABLET | Refills: 1 | OUTPATIENT
Start: 2025-03-18

## 2025-03-18 NOTE — TELEPHONE ENCOUNTER
[id number] sent Rx request for the following:      Requested Prescriptions   Refused Prescriptions Disp Refills    NIFEdipine ER OSMOTIC (PROCARDIA XL) 90 MG 24 hr tablet [Pharmacy Med Name: NIFEDIPINE 90MG ER TABLET] 90 tablet 1     Sig: TAKE 1 TABLET (90 MG) BY MOUTH DAILY.     Unable to complete prescription refill per RN Medication Refill Policy. Refill request refused as this was just filled on 01/09/25 for #90 and 1 refill.  Molly Butterfield RN on 3/18/2025 at 3:57 PM

## 2025-03-24 ENCOUNTER — VIRTUAL VISIT (OUTPATIENT)
Dept: PHARMACY | Facility: CLINIC | Age: 50
End: 2025-03-24
Attending: INTERNAL MEDICINE
Payer: COMMERCIAL

## 2025-03-24 VITALS — WEIGHT: 175 LBS | BODY MASS INDEX: 25.05 KG/M2 | HEIGHT: 70 IN

## 2025-03-24 DIAGNOSIS — K50.80 CROHN'S DISEASE OF BOTH SMALL AND LARGE INTESTINE WITHOUT COMPLICATIONS (H): Primary | ICD-10-CM

## 2025-03-24 ASSESSMENT — PAIN SCALES - GENERAL: PAINLEVEL_OUTOF10: SEVERE PAIN (7)

## 2025-03-24 NOTE — PROGRESS NOTES
Medication Therapy Management (MTM) Encounter    ASSESSMENT:                            Medication Adherence/Access: No issues identified.    Crohn's Disease: Reggie would benefit from continuing on infliximab 5 mg/kg IV every six weeks as maintenance therapy. Clinical response has been reassuring. He is up-to-date on labs, and has IBD provider follow-up in place. Reviewed vaccine status today, however, he is disinterested in most vaccinations. He is open to discussing hepatitis B status. Reviewed indeterminate serology result from 2023. Would recommend repeating this, which he is agreeable to. Discussed that if this returns negative, we would encourage consideration for repeating series.     PLAN:                            Will plan on repeating Hepatitis B surface antibody with your next set of infusion labs to check immunity to hepatitis B.    Continue current medications as directed.    Follow-up:    -- 6 months for MTM (needs CMR), sooner if needed    SUBJECTIVE/OBJECTIVE:                          Dennis Goodell is a 49 year old male seen for a follow-up visit.       Reason for visit: infliximab check-in    Allergies/ADRs: None  Tobacco: He reports that he has never smoked. His smokeless tobacco use includes chew.    Medication Adherence/Access: no issues reported.    Crohn's Disease:   Infliximab 5 mg/kg IV every six weeks  Amitriptyline 10-20 mg nightly as needed (mostly 20 mg)  Lomotil 2.5-0.025 mg four times daily as needed   Dicyclomine 20 mg twice daily as needed   Miralax daily (as needed)  Omeprazole 40 mg twice daily   Sucralfate 1 g twice daily as needed   Probiotic (stopped)  Ondansetron 4 mg ODT as needed (MRE)    Notes no concerns, or he would have called [facility]. Having 1-4 BM daily, just depends on the day. Overall, he feels the every six week dosing is working. Notes abdominal cramping depends on his stress, and notes a lot of times he is not having any. Notes he is only interested in making  sure his hepatitis B is up-to-date, otherwise he would like to decline other vaccines.    Last provider visit: 2/11/2025 with Dr. Sanchez  Next provider visit: 7/9/2025 with Dr. Sanchez  Last Quantiferon: 12/2024    Therapy Start Date: 2/2023    Last endoscopic evaluation/MRE:    MRE 4/2024  IMPRESSION:   1.  No active inflammatory changes of the bowel. No bowel dilatation.   2.  Mild focal descending colonic bowel wall thickening, likely  represents sequela of prior inflammatory change.    8/2023 Colonoscopy  Impression:            - Simple Endoscopic Score for Crohn's Disease: 0,                          mucosal inflammatory changes secondary to Crohn's                          disease, in remission. Biopsied.                          - Two 3 to 7 mm polyps in the transverse colon,                          removed with a cold snare. Resected and retrieved.                          - The examination was otherwise normal on direct and                          retroflexion views.                          No active inflammation seen. Perhaps some subtle                          scarring in ascending colon and terminal ileum. No                          strictures and no active inflammation. No reason for                          RUQ pain found on this exam.     IBD Health Maintenance    Vaccinations:  All patients on immunosuppression should avoid live vaccines unless specifically indicated.    -- Influenza (last dose) 2018  -- Tetanus booster (last dose) 4/28/2021  -- Pneumococcal Pneumonia    PCV-13: not on file   PPSV-23: 11/12/2010   PCV-20/PCV-21: not on file  -- COVID-19    Initial series: not on file    One time confirmation of immunity or serologies:  -- Hepatitis A (serologies or immunizations) not on file  -- Hepatitis B (serologies or immunizations) previously vaccinated x3, serologies 2023 indeterminate  -- Varicella/Zoster    Varicella presumed based on age   Zoster not on file    Due to the  immunosuppression in this patient, I would not advise administration of live vaccines such as varicella/VZV, intranasal influenza, MMR, or yellow fever vaccine (if traveling).      Immunosuppressive Screening:    Lab Results   Component Value Date    AUSAB 8.45 01/31/2023    HEPBANG Nonreactive 01/31/2023    HBCAB Nonreactive 01/31/2023    HCVAB Nonreactive 01/31/2023    TBRES Negative 12/23/2024     Misc:  -- Avoid tobacco use  -- Avoid NSAIDs as there is potentially a 25% chance of causing an IBD flare    ----------------    MED REC REQUIRED  Post Medication Reconciliation Status:  Discharge medications reconciled and changed, see notes/orders     I spent 11 minutes with this patient today. All changes were made via collaborative practice agreement with Helder Sanchez.     A summary of these recommendations was sent via [employer].    Christiana RonquilloD, BCACP  MTM Pharmacist   [facility]   Phone: [phone]    Telemedicine Visit Details  The patient's medications can be safely assessed via a telemedicine encounter.  Type of service:  Telephone visit  Originating Location (pt. Location): Home    Distant Location (provider location):  Off-site  Start Time:  1:34 PM  End Time: 1:45 PM     Medication Therapy Recommendations  No medication therapy recommendations to display

## 2025-03-24 NOTE — NURSING NOTE
Patient reviewed medications and allergies in [employer] during e-check in and said everything looked correct.      Current patient location: [address] [place] [id number]    Is the patient currently in the state of [place]? YES    Visit mode: TELEPHONE    If the visit is dropped, the patient can be reconnected by:TELEPHONE VISIT: Phone number:   Telephone Information:   Mobile [phone]       Will anyone else be joining the visit? NO  (If patient encounters technical issues they should call [phone]    Are changes needed to the allergy or medication list? Pt declined med review and Pt stated no med changes    Are refills needed on medications prescribed by this physician? NO    Rooming Documentation:  Not applicable    Reason for visit: TIANA CARRILLOF

## 2025-03-25 NOTE — PATIENT INSTRUCTIONS
"Recommendations from today's MTM visit:                                                       Will plan on repeating Hepatitis B surface antibody with your next set of infusion labs to check your immunity to hepatitis B.    Continue current medications as directed.    Follow-up:    -- 6 months for MTM, sooner if needed    It was great speaking with you today.  I value your experience and would be very thankful for your time in providing feedback in our clinic survey. In the next few days, you may receive an email or text message from [facility] [employer] with a link to a survey related to your  clinical pharmacist.\"     To schedule another MTM appointment, please call the clinic directly or you may call the MTM scheduling line at [phone].    My Clinical Pharmacist's contact information:                                                      Please feel free to contact me with any questions or concerns you have.      Christiana RonquilloD, BCACP  MTM Pharmacist   [facility]   Phone: [phone]    "

## 2025-04-17 ENCOUNTER — HOSPITAL ENCOUNTER (OUTPATIENT)
Dept: INFUSION THERAPY | Facility: OTHER | Age: 50
Discharge: HOME | End: 2025-04-17
Payer: COMMERCIAL

## 2025-04-17 VITALS
WEIGHT: 176 LBS | DIASTOLIC BLOOD PRESSURE: 84 MMHG | RESPIRATION RATE: 18 BRPM | BODY MASS INDEX: 25.25 KG/M2 | TEMPERATURE: 96.9 F | HEART RATE: 82 BPM | SYSTOLIC BLOOD PRESSURE: 133 MMHG

## 2025-04-17 DIAGNOSIS — K50.80 CROHN'S DISEASE OF BOTH SMALL AND LARGE INTESTINE WITHOUT COMPLICATIONS (H): Primary | ICD-10-CM

## 2025-04-17 LAB
ALBUMIN SERPL BCG-MCNC: 4.5 G/DL (ref 3.5–5.2)
ALP SERPL-CCNC: 79 U/L (ref 40–150)
ALT SERPL W P-5'-P-CCNC: 13 U/L (ref 0–70)
AST SERPL W P-5'-P-CCNC: 23 U/L (ref 0–45)
BASOPHILS # BLD AUTO: 0.1 10E3/UL (ref 0–0.2)
BASOPHILS NFR BLD AUTO: 1 %
BILIRUB DIRECT SERPL-MCNC: 0.13 MG/DL (ref 0–0.3)
BILIRUB SERPL-MCNC: 0.4 MG/DL
CRP SERPL-MCNC: <3 MG/L
EOSINOPHIL # BLD AUTO: 0.3 10E3/UL (ref 0–0.7)
EOSINOPHIL NFR BLD AUTO: 4 %
ERYTHROCYTE [DISTWIDTH] IN BLOOD BY AUTOMATED COUNT: 13.6 % (ref 10–15)
HBV SURFACE AB SERPL IA-ACNC: 7.89 M[IU]/ML
HBV SURFACE AB SERPL IA-ACNC: NONREACTIVE M[IU]/ML
HCT VFR BLD AUTO: 41.4 % (ref 40–53)
HGB BLD-MCNC: 14.2 G/DL (ref 13.3–17.7)
HOLD SPECIMEN: NORMAL
IMM GRANULOCYTES # BLD: 0 10E3/UL
IMM GRANULOCYTES NFR BLD: 0 %
LYMPHOCYTES # BLD AUTO: 2.6 10E3/UL (ref 0.8–5.3)
LYMPHOCYTES NFR BLD AUTO: 36 %
MCH RBC QN AUTO: 29.5 PG (ref 26.5–33)
MCHC RBC AUTO-ENTMCNC: 34.3 G/DL (ref 31.5–36.5)
MCV RBC AUTO: 86 FL (ref 78–100)
MONOCYTES # BLD AUTO: 0.9 10E3/UL (ref 0–1.3)
MONOCYTES NFR BLD AUTO: 12 %
NEUTROPHILS # BLD AUTO: 3.3 10E3/UL (ref 1.6–8.3)
NEUTROPHILS NFR BLD AUTO: 47 %
NRBC # BLD AUTO: 0 10E3/UL
NRBC BLD AUTO-RTO: 0 /100
PLATELET # BLD AUTO: 274 10E3/UL (ref 150–450)
PROT SERPL-MCNC: 7 G/DL (ref 6.4–8.3)
RBC # BLD AUTO: 4.82 10E6/UL (ref 4.4–5.9)
WBC # BLD AUTO: 7.2 10E3/UL (ref 4–11)

## 2025-04-17 PROCEDURE — 84155 ASSAY OF PROTEIN SERUM: CPT | Performed by: INTERNAL MEDICINE

## 2025-04-17 PROCEDURE — 86140 C-REACTIVE PROTEIN: CPT | Performed by: INTERNAL MEDICINE

## 2025-04-17 PROCEDURE — 85004 AUTOMATED DIFF WBC COUNT: CPT | Performed by: INTERNAL MEDICINE

## 2025-04-17 PROCEDURE — 80076 HEPATIC FUNCTION PANEL: CPT | Performed by: INTERNAL MEDICINE

## 2025-04-17 PROCEDURE — 258N000003 HC RX IP 258 OP 636: Performed by: INTERNAL MEDICINE

## 2025-04-17 PROCEDURE — 36415 COLL VENOUS BLD VENIPUNCTURE: CPT | Performed by: INTERNAL MEDICINE

## 2025-04-17 PROCEDURE — 86706 HEP B SURFACE ANTIBODY: CPT

## 2025-04-17 PROCEDURE — 250N000011 HC RX IP 250 OP 636: Mod: JZ | Performed by: INTERNAL MEDICINE

## 2025-04-17 PROCEDURE — 85041 AUTOMATED RBC COUNT: CPT | Performed by: INTERNAL MEDICINE

## 2025-04-17 PROCEDURE — 258N000003 HC RX IP 258 OP 636: Performed by: FAMILY MEDICINE

## 2025-04-17 PROCEDURE — 84450 TRANSFERASE (AST) (SGOT): CPT | Performed by: INTERNAL MEDICINE

## 2025-04-17 PROCEDURE — 85048 AUTOMATED LEUKOCYTE COUNT: CPT | Performed by: INTERNAL MEDICINE

## 2025-04-17 RX ORDER — ACETAMINOPHEN 325 MG/1
650 TABLET ORAL ONCE
Start: 2025-05-23 | End: 2025-05-23

## 2025-04-17 RX ORDER — ALBUTEROL SULFATE 90 UG/1
1-2 INHALANT RESPIRATORY (INHALATION)
Start: 2025-05-23

## 2025-04-17 RX ORDER — METHYLPREDNISOLONE SODIUM SUCCINATE 125 MG/2ML
125 INJECTION INTRAMUSCULAR; INTRAVENOUS
Start: 2025-05-23

## 2025-04-17 RX ORDER — MEPERIDINE HYDROCHLORIDE 25 MG/ML
25 INJECTION INTRAMUSCULAR; INTRAVENOUS; SUBCUTANEOUS EVERY 30 MIN PRN
OUTPATIENT
Start: 2025-05-23

## 2025-04-17 RX ORDER — EPINEPHRINE 1 MG/ML
0.3 INJECTION, SOLUTION, CONCENTRATE INTRAVENOUS EVERY 5 MIN PRN
OUTPATIENT
Start: 2025-05-23

## 2025-04-17 RX ORDER — ALBUTEROL SULFATE 0.83 MG/ML
2.5 SOLUTION RESPIRATORY (INHALATION)
OUTPATIENT
Start: 2025-05-23

## 2025-04-17 RX ORDER — DIPHENHYDRAMINE HYDROCHLORIDE 50 MG/ML
50 INJECTION, SOLUTION INTRAMUSCULAR; INTRAVENOUS
Start: 2025-05-23

## 2025-04-17 RX ORDER — DIPHENHYDRAMINE HCL 25 MG
25 CAPSULE ORAL ONCE
Start: 2025-05-23 | End: 2025-05-23

## 2025-04-17 RX ADMIN — SODIUM CHLORIDE 250 ML: 0.9 INJECTION, SOLUTION INTRAVENOUS at 08:27

## 2025-04-17 RX ADMIN — INFLIXIMAB 400 MG: 100 INJECTION, POWDER, LYOPHILIZED, FOR SOLUTION INTRAVENOUS at 08:42

## 2025-04-17 NOTE — NURSING NOTE
Infusion Nursing Note:  Dennis J Goodell presents today for Remicade   Q 6 weeks.    Patient seen by provider today: No   present during visit today: Not Applicable.    Note: N/A.      Intravenous Access:  Peripheral IV placed to left arm and labs drawn.    Treatment Conditions:  Biological Infusion Checklist:  ~~~ NOTE: If the patient answers yes to any of the questions below, hold the infusion and contact ordering provider or on-call provider.    Have you recently had an elevated temperature, fever, chills, productive cough, coughing for 3 weeks or longer or hemoptysis,  abnormal vital signs, night sweats,  chest pain or have you noticed a decrease in your appetite, unexplained weight loss or fatigue? No  Do you have any open wounds or new incisions? No  Do you have any upcoming hospitalizations or surgeries? Does not include esophagogastroduodenoscopy, colonoscopy, endoscopic retrograde cholangiopancreatography (ERCP), endoscopic ultrasound (EUS), dental procedures or joint aspiration/steroid injections No  Do you currently have any signs of illness or infection or are you on any antibiotics? No  Have you had any new, sudden or worsening abdominal pain? No  Have you or anyone in your household received a live vaccination in the past 4 weeks? Please note: No live vaccines while on biologic/chemotherapy until 6 months after the last treatment. Patient can receive the flu vaccine (shot only), pneumovax and the Covid vaccine. It is optimal for the patient to get these vaccines mid cycle, but they can be given at any time as long as it is not on the day of the infusion. No  Have you recently been diagnosed with any new nervous system diseases (ie. Multiple sclerosis, Guillain [place], seizures, neurological changes) or cancer diagnosis? Are you on any form of radiation or chemotherapy? No  Are you pregnant or breast feeding or do you have plans of pregnancy in the future? NA  Have you been having any signs of  worsening depression or suicidal ideations?  (benlysta only)NA  Have there been any other new onset medical symptoms? No  Have you had any new blood clots? (IVIG only) NA      Post Infusion Assessment:  Patient tolerated infusion without incident.  Site patent and intact, free from redness, edema or discomfort.  No evidence of extravasations.  Access discontinued per protocol.  Biologic Infusion Post Education: Call the triage nurse at your clinic or seek medical attention if you have chills and/or temperature greater than or equal to 100.5, uncontrolled nausea/vomiting, diarrhea, constipation, dizziness, shortness of breath, chest pain, heart palpitations, weakness or any other new or concerning symptoms, questions or concerns.  You cannot have any live virus vaccines prior to or during treatment or up to 6 months post infusion.  If you have an upcoming surgery, medical procedure or dental procedure during treatment, this should be discussed with your ordering physician and your surgeon/dentist.  If you are having any concerning symptom, if you are unsure if you should get your next infusion or wish to speak to a provider before your next infusion, please call your care coordinator or triage nurse at your clinic to notify them so we can adequately serve you.       Discharge Plan:   Patient and/or family verbalized understanding of discharge instructions and all questions answered.  AVS to patient via [employer].  Patient will return 6 weeks for next appointment.   Patient discharged in stable condition accompanied by: self.  Departure Mode: Ambulatory.      Nicolette Workman RN

## 2025-05-05 DIAGNOSIS — I73.00 RAYNAUD'S PHENOMENON WITHOUT GANGRENE: ICD-10-CM

## 2025-05-06 ENCOUNTER — TELEPHONE (OUTPATIENT)
Dept: GASTROENTEROLOGY | Facility: CLINIC | Age: 50
End: 2025-05-06
Payer: COMMERCIAL

## 2025-05-06 NOTE — TELEPHONE ENCOUNTER
Left Voicemail (1st Attempt) for the patient to call back and schedule the following:    Appointment type: return    Provider: Dr. Sanchez   Return date: 7/9/2025  Specialty phone number: [phone]  Additional appointment(s) needed:   Additional Notes:

## 2025-05-07 RX ORDER — NIFEDIPINE 90 MG/1
90 TABLET, EXTENDED RELEASE ORAL DAILY
Qty: 90 TABLET | Refills: 1 | Status: SHIPPED | OUTPATIENT
Start: 2025-05-07

## 2025-05-07 NOTE — TELEPHONE ENCOUNTER
Antelmo Wilson sent Rx request for the following:      Requested Prescriptions   Pending Prescriptions Disp Refills    NIFEdipine ER OSMOTIC (PROCARDIA XL) 90 MG 24 hr tablet [Pharmacy Med Name: NIFEDIPINE 90MG ER TABLET] 90 tablet 1     Sig: TAKE 1 TABLET (90 MG) BY MOUTH DAILY.          Last Prescription Date:   1/9/25  Last Fill Qty/Refills:         90, R-1    Last Office Visit:              1/9/25   Future Office visit:          None    Prescription approved per [facility] Refill Protocol.  Hayley Tamez RN on 5/7/2025 at 11:52 AM

## 2025-05-08 ENCOUNTER — TELEPHONE (OUTPATIENT)
Dept: GASTROENTEROLOGY | Facility: CLINIC | Age: 50
End: 2025-05-08
Payer: COMMERCIAL

## 2025-05-08 NOTE — TELEPHONE ENCOUNTER
Left Voicemail (2nd Attempt) for the patient to call back and schedule the following:    Appointment type: return   Provider: Dr. Sanchez   Return date: 7/9/2025  Specialty phone number: [phone]  Additional appointment(s) needed:   Additonal Notes:

## 2025-07-10 ENCOUNTER — TELEPHONE (OUTPATIENT)
Dept: FAMILY MEDICINE | Facility: OTHER | Age: 50
End: 2025-07-10
Payer: MEDICAID

## 2025-07-10 NOTE — TELEPHONE ENCOUNTER
Geronimo is PCP.  Patient has never seen Genie Sutton MD and does not have an appointment scheduled. Re-routing to Dr. Melton.  Aditi Montoya LPN   7/10/2025  4:01 PM

## 2025-07-10 NOTE — TELEPHONE ENCOUNTER
Patient needs disability paperwork done.  He wants to talk to you before he sets up an appointment.  Please call.    Park [place] on 7/10/2025 at 3:01 PM     Pt breathing is no longer labored after duoneb tx   Pt states breathing is better but anxiety level is extremely high

## 2025-07-11 NOTE — TELEPHONE ENCOUNTER
Called patient and he is wanting to re-establish care with [facility] and schedule appointment for disability.  Has disability appointment 7/22/25.  Patient was let go from work at [employer] 4/2025 and has extreme anxiety and depression.  Patient's last appointment with [facility] was 3/20/2019; last phone encounter was 4/13/21. Informed patient that [facility].      Kiesha Multani LPN on 7/11/2025 at 4:48 PM

## 2025-07-11 NOTE — TELEPHONE ENCOUNTER
"Patient reports that he wants to re-establish care with Genie Sutton MD.      Patient reports that the next available isn't until September and patient would like to be worked in sooner.   I asked patient if he could disclose what the paperwork is and he reports that, \"I have Chemo brain and I'm overwhelmed with medical stuff so I don't know.\"    Patient would either like a call back or My Chart response back.  Pam Agosto LPN 7/11/2025 4:13 PM    "

## 2025-07-13 NOTE — TELEPHONE ENCOUNTER
I have a closed practice now.  It also sounds like he needs an appointment with someone who does disability exams (Michelle Arciniega, MICAELA).    Genie Sutton MD

## 2025-07-14 NOTE — TELEPHONE ENCOUNTER
Called patient and relayed message in regards to establishing care per [facility].    Kiesha Multani LPN on 7/14/2025 at 12:03 PM

## 2025-07-16 ENCOUNTER — HOSPITAL ENCOUNTER (OUTPATIENT)
Dept: INFUSION THERAPY | Facility: OTHER | Age: 50
Discharge: HOME | End: 2025-07-16
Payer: MEDICAID

## 2025-07-16 VITALS
BODY MASS INDEX: 24.36 KG/M2 | HEART RATE: 77 BPM | WEIGHT: 169.8 LBS | SYSTOLIC BLOOD PRESSURE: 104 MMHG | DIASTOLIC BLOOD PRESSURE: 73 MMHG

## 2025-07-16 DIAGNOSIS — K50.80 CROHN'S DISEASE OF BOTH SMALL AND LARGE INTESTINE WITHOUT COMPLICATIONS (H): Primary | ICD-10-CM

## 2025-07-16 LAB
ALBUMIN SERPL BCG-MCNC: 4.7 G/DL (ref 3.5–5.2)
ALP SERPL-CCNC: 69 U/L (ref 40–150)
ALT SERPL W P-5'-P-CCNC: 11 U/L (ref 0–70)
AST SERPL W P-5'-P-CCNC: 21 U/L (ref 0–45)
BASOPHILS # BLD AUTO: 0.1 10E3/UL (ref 0–0.2)
BASOPHILS NFR BLD AUTO: 1 %
BILIRUB DIRECT SERPL-MCNC: 0.14 MG/DL (ref 0–0.3)
BILIRUB SERPL-MCNC: 0.6 MG/DL
CRP SERPL-MCNC: <3 MG/L
EOSINOPHIL # BLD AUTO: 0.3 10E3/UL (ref 0–0.7)
EOSINOPHIL NFR BLD AUTO: 4 %
ERYTHROCYTE [DISTWIDTH] IN BLOOD BY AUTOMATED COUNT: 13.1 % (ref 10–15)
HCT VFR BLD AUTO: 47.3 % (ref 40–53)
HGB BLD-MCNC: 16.2 G/DL (ref 13.3–17.7)
IMM GRANULOCYTES # BLD: 0 10E3/UL
IMM GRANULOCYTES NFR BLD: 0 %
LYMPHOCYTES # BLD AUTO: 3.2 10E3/UL (ref 0.8–5.3)
LYMPHOCYTES NFR BLD AUTO: 46 %
MCH RBC QN AUTO: 29 PG (ref 26.5–33)
MCHC RBC AUTO-ENTMCNC: 34.2 G/DL (ref 31.5–36.5)
MCV RBC AUTO: 85 FL (ref 78–100)
MONOCYTES # BLD AUTO: 0.8 10E3/UL (ref 0–1.3)
MONOCYTES NFR BLD AUTO: 11 %
NEUTROPHILS # BLD AUTO: 2.7 10E3/UL (ref 1.6–8.3)
NEUTROPHILS NFR BLD AUTO: 38 %
NRBC # BLD AUTO: 0 10E3/UL
NRBC BLD AUTO-RTO: 0 /100
PLATELET # BLD AUTO: 279 10E3/UL (ref 150–450)
PROT SERPL-MCNC: 7.8 G/DL (ref 6.4–8.3)
RBC # BLD AUTO: 5.59 10E6/UL (ref 4.4–5.9)
WBC # BLD AUTO: 7.1 10E3/UL (ref 4–11)

## 2025-07-16 PROCEDURE — 258N000003 HC RX IP 258 OP 636: Performed by: INTERNAL MEDICINE

## 2025-07-16 PROCEDURE — 36415 COLL VENOUS BLD VENIPUNCTURE: CPT | Performed by: INTERNAL MEDICINE

## 2025-07-16 PROCEDURE — 85025 COMPLETE CBC W/AUTO DIFF WBC: CPT | Performed by: INTERNAL MEDICINE

## 2025-07-16 PROCEDURE — 250N000011 HC RX IP 250 OP 636: Mod: JZ | Performed by: INTERNAL MEDICINE

## 2025-07-16 PROCEDURE — 84155 ASSAY OF PROTEIN SERUM: CPT | Performed by: INTERNAL MEDICINE

## 2025-07-16 PROCEDURE — 86140 C-REACTIVE PROTEIN: CPT | Performed by: INTERNAL MEDICINE

## 2025-07-16 RX ORDER — ACETAMINOPHEN 325 MG/1
650 TABLET ORAL ONCE
Start: 2025-08-21 | End: 2025-08-21

## 2025-07-16 RX ORDER — DIPHENHYDRAMINE HCL 25 MG
25 CAPSULE ORAL ONCE
Start: 2025-08-21 | End: 2025-08-21

## 2025-07-16 RX ORDER — DIPHENHYDRAMINE HYDROCHLORIDE 50 MG/ML
50 INJECTION, SOLUTION INTRAMUSCULAR; INTRAVENOUS
Start: 2025-08-21

## 2025-07-16 RX ORDER — METHYLPREDNISOLONE SODIUM SUCCINATE 125 MG/2ML
125 INJECTION INTRAMUSCULAR; INTRAVENOUS
Start: 2025-08-21

## 2025-07-16 RX ORDER — MEPERIDINE HYDROCHLORIDE 25 MG/ML
25 INJECTION INTRAMUSCULAR; INTRAVENOUS; SUBCUTANEOUS EVERY 30 MIN PRN
OUTPATIENT
Start: 2025-08-21

## 2025-07-16 RX ORDER — ALBUTEROL SULFATE 0.83 MG/ML
2.5 SOLUTION RESPIRATORY (INHALATION)
OUTPATIENT
Start: 2025-08-21

## 2025-07-16 RX ORDER — EPINEPHRINE 1 MG/ML
0.3 INJECTION, SOLUTION, CONCENTRATE INTRAVENOUS EVERY 5 MIN PRN
OUTPATIENT
Start: 2025-08-21

## 2025-07-16 RX ORDER — ALBUTEROL SULFATE 90 UG/1
1-2 INHALANT RESPIRATORY (INHALATION)
Start: 2025-08-21

## 2025-07-16 RX ADMIN — SODIUM CHLORIDE 250 ML: 0.9 INJECTION, SOLUTION INTRAVENOUS at 08:26

## 2025-07-16 RX ADMIN — INFLIXIMAB 400 MG: 100 INJECTION, POWDER, LYOPHILIZED, FOR SOLUTION INTRAVENOUS at 08:49

## 2025-07-16 NOTE — NURSING NOTE
Infusion Nursing Note:  Dennis J Goodell presents today for Remicade.    Patient seen by provider today: No   present during visit today: Not Applicable.    Note: N/A.      Intravenous Access:  Labs drawn without difficulty from IV site .  Peripheral IV placed to left antecubital space .    Treatment Conditions:  Biological Infusion Checklist:  ~~~ NOTE: If the patient answers yes to any of the questions below, hold the infusion and contact ordering provider or on-call provider.    Have you recently had an elevated temperature, fever, chills, productive cough, coughing for 3 weeks or longer or hemoptysis,  abnormal vital signs, night sweats,  chest pain or have you noticed a decrease in your appetite, unexplained weight loss or fatigue? No  Do you have any open wounds or new incisions? No  Do you have any upcoming hospitalizations or surgeries? Does not include esophagogastroduodenoscopy, colonoscopy, endoscopic retrograde cholangiopancreatography (ERCP), endoscopic ultrasound (EUS), dental procedures or joint aspiration/steroid injections No  Do you currently have any signs of illness or infection or are you on any antibiotics? No  Have you had any new, sudden or worsening abdominal pain? No  Have you or anyone in your household received a live vaccination in the past 4 weeks? Please note: No live vaccines while on biologic/chemotherapy until 6 months after the last treatment. Patient can receive the flu vaccine (shot only), pneumovax and the Covid vaccine. It is optimal for the patient to get these vaccines mid cycle, but they can be given at any time as long as it is not on the day of the infusion. No  Have you recently been diagnosed with any new nervous system diseases (ie. Multiple sclerosis, Guillain [place], seizures, neurological changes) or cancer diagnosis? Are you on any form of radiation or chemotherapy? No  Are you pregnant or breast feeding or do you have plans of pregnancy in the future?  NA  Have you been having any signs of worsening depression or suicidal ideations?  (benlysta only) NA  Have there been any other new onset medical symptoms? No  Have you had any new blood clots? (IVIG only) NA      Post Infusion Assessment:  Patient tolerated infusion without incident.  Blood return noted pre and post infusion.  Site patent and intact, free from redness, edema or discomfort.  No evidence of extravasations.  Access discontinued per protocol.  Biologic Infusion Post Education: Call the triage nurse at your clinic or seek medical attention if you have chills and/or temperature greater than or equal to 100.5, uncontrolled nausea/vomiting, diarrhea, constipation, dizziness, shortness of breath, chest pain, heart palpitations, weakness or any other new or concerning symptoms, questions or concerns.  You cannot have any live virus vaccines prior to or during treatment or up to 6 months post infusion.  If you have an upcoming surgery, medical procedure or dental procedure during treatment, this should be discussed with your ordering physician and your surgeon/dentist.  If you are having any concerning symptom, if you are unsure if you should get your next infusion or wish to speak to a provider before your next infusion, please call your care coordinator or triage nurse at your clinic to notify them so we can adequately serve you.       Discharge Plan:   Discharge instructions reviewed with: Patient.  Patient and/or family verbalized understanding of discharge instructions and all questions answered.  Copy of AVS reviewed with patient and/or family.  Patient will return  6 weeks as scheduled for next appointment.  Patient discharged in stable condition accompanied by: self.  Departure Mode: Ambulatory.      Nicolette Workman RN

## 2025-07-28 ENCOUNTER — OFFICE VISIT (OUTPATIENT)
Dept: FAMILY MEDICINE | Facility: OTHER | Age: 50
End: 2025-07-28
Attending: FAMILY MEDICINE
Payer: MEDICAID

## 2025-07-28 VITALS
HEART RATE: 80 BPM | WEIGHT: 168 LBS | SYSTOLIC BLOOD PRESSURE: 108 MMHG | TEMPERATURE: 96.8 F | OXYGEN SATURATION: 98 % | BODY MASS INDEX: 24.11 KG/M2 | RESPIRATION RATE: 20 BRPM | DIASTOLIC BLOOD PRESSURE: 72 MMHG

## 2025-07-28 DIAGNOSIS — K50.10 CROHN'S DISEASE OF LARGE INTESTINE WITHOUT COMPLICATION (H): ICD-10-CM

## 2025-07-28 DIAGNOSIS — R41.3 MEMORY LOSS: Primary | ICD-10-CM

## 2025-07-28 DIAGNOSIS — E03.9 HYPOTHYROIDISM, UNSPECIFIED TYPE: ICD-10-CM

## 2025-07-28 LAB
FOLATE SERPL-MCNC: 6.8 NG/ML (ref 4.6–34.8)
T4 FREE SERPL-MCNC: 1.23 NG/DL (ref 0.9–1.7)
TSH SERPL DL<=0.005 MIU/L-ACNC: 9.6 UIU/ML (ref 0.3–4.2)
VIT B12 SERPL-MCNC: 470 PG/ML (ref 232–1245)

## 2025-07-28 PROCEDURE — G2211 COMPLEX E/M VISIT ADD ON: HCPCS | Performed by: FAMILY MEDICINE

## 2025-07-28 PROCEDURE — G0463 HOSPITAL OUTPT CLINIC VISIT: HCPCS

## 2025-07-28 PROCEDURE — 84443 ASSAY THYROID STIM HORMONE: CPT | Mod: ZL | Performed by: FAMILY MEDICINE

## 2025-07-28 PROCEDURE — 3074F SYST BP LT 130 MM HG: CPT | Performed by: FAMILY MEDICINE

## 2025-07-28 PROCEDURE — 1125F AMNT PAIN NOTED PAIN PRSNT: CPT | Performed by: FAMILY MEDICINE

## 2025-07-28 PROCEDURE — 82607 VITAMIN B-12: CPT | Mod: ZL | Performed by: FAMILY MEDICINE

## 2025-07-28 PROCEDURE — 82746 ASSAY OF FOLIC ACID SERUM: CPT | Mod: ZL | Performed by: FAMILY MEDICINE

## 2025-07-28 PROCEDURE — 84439 ASSAY OF FREE THYROXINE: CPT | Mod: ZL | Performed by: FAMILY MEDICINE

## 2025-07-28 PROCEDURE — 36415 COLL VENOUS BLD VENIPUNCTURE: CPT | Mod: ZL | Performed by: FAMILY MEDICINE

## 2025-07-28 PROCEDURE — 99214 OFFICE O/P EST MOD 30 MIN: CPT | Performed by: FAMILY MEDICINE

## 2025-07-28 PROCEDURE — 3078F DIAST BP <80 MM HG: CPT | Performed by: FAMILY MEDICINE

## 2025-07-28 RX ORDER — DESVENLAFAXINE 25 MG/1
1 TABLET, EXTENDED RELEASE ORAL DAILY
COMMUNITY
Start: 2025-07-23

## 2025-07-28 RX ORDER — LEVOTHYROXINE SODIUM 25 UG/1
25 TABLET ORAL
Qty: 90 TABLET | Refills: 1 | Status: SHIPPED | OUTPATIENT
Start: 2025-07-28

## 2025-07-28 ASSESSMENT — PAIN SCALES - GENERAL: PAINLEVEL_OUTOF10: MODERATE PAIN (5)

## 2025-07-28 ASSESSMENT — PATIENT HEALTH QUESTIONNAIRE - PHQ9
SUM OF ALL RESPONSES TO PHQ QUESTIONS 1-9: 20
10. IF YOU CHECKED OFF ANY PROBLEMS, HOW DIFFICULT HAVE THESE PROBLEMS MADE IT FOR YOU TO DO YOUR WORK, TAKE CARE OF THINGS AT HOME, OR GET ALONG WITH OTHER PEOPLE: EXTREMELY DIFFICULT
SUM OF ALL RESPONSES TO PHQ QUESTIONS 1-9: 20

## 2025-07-28 NOTE — PROGRESS NOTES
"  SUBJECTIVE:   Dennis J Goodell is a 49 year old male who presents to clinic today for the following health issues:    History of Present Illness       Reason for visit:  Disability/problems with memory   He is taking medications regularly.    Patient arrives here for memory concerns and forgetfulness.  He does bring with him a note from his wife.  She indicates making poor decisions.  Poor work performance.  He reports he is becoming more more confused.  He has been having problems with memory for the last year but 3 months has gotten worse.  He lost his job the end of April from [employer].  He feels it is due to his memory.  Patient does see psychiatry for his medication management.  He is on clonazepam Elavil Xanax Neurontin Lomotil.  Patient states typically when his pain increases to his neck or back his memory gets worse.  He does not complain of short-term or long-term memory states it involves both.  States \"I cannot hold the job down\".  States he will forget what he is wants to say midsentence and then remember it 5 minutes later.  He denies any other neurologic conditions such as weakness slurred speech double vision.  He does complain of blurry vision but states has been going on for years.  He is applying for SSI for neck pain Crohn's disease and anxiety and depression.  He reports a headache all day long which is normal for him.  His Crohn's disease medication was recently adjusted.  He states he did bring this up with GI and they did not feel it was causing any of his memory difficulties.  Patient also reports he cannot go without his anxiety medications.        Review of Systems     OBJECTIVE:     /72   Pulse 80   Temp 96.8  F (36  C)   Resp 20   Wt 76.2 kg (168 lb)   SpO2 98%   BMI 24.11 kg/m    Body mass index is 24.11 kg/m .  Physical Exam  Constitutional:       Comments: Mini cog 5 out of 5   HENT:      Head: Normocephalic.   Cardiovascular:      Rate and Rhythm: Normal rate and regular " rhythm.   Pulmonary:      Effort: Pulmonary effort is normal.   Neurological:      General: No focal deficit present.      Mental Status: He is alert.      Comments: Finger-nose-finger.  Deep tendon reflex.  Toe tapping all normal   Psychiatric:         Mood and Affect: Mood normal.      Comments: Patient has a flat affect.  Slow to respond to questions.  But speech regular rate         Diagnostic Test Results:    ASSESSMENT/PLAN:         (R41.3) Memory loss  (primary encounter diagnosis)  Comment:   Plan: Vitamin B12, Folic Acid, TSH Reflex GH, MR         Brain w/o Contrast      Had a long discussion with the patient on memory issues.  I it does not seem to be short-term or long-term memory.  Advised the patient we should proceed with MRI.  Also advised that if the MRI is normal I highly suspect his medications causing his difficulty with memory and that should be addressed with his treating therapist.  Wife's note is placed in the chart.  The longitudinal plan of care for the diagnosis(es)/condition(s) as documented were addressed during this visit. Due to the added complexity in care, I will continue to support Reggie in the subsequent management and with ongoing continuity of care.        Samir Melton MD  [facility]

## 2025-07-28 NOTE — NURSING NOTE
Patient here with multiple concerns of memory loss and cognition. He lost his job, increased anxiety and depression. Medication Reconciliation: complete.    Saige Parsons LPN  7/28/2025 9:07 AM

## 2025-08-12 ENCOUNTER — VIRTUAL VISIT (OUTPATIENT)
Dept: GASTROENTEROLOGY | Facility: CLINIC | Age: 50
End: 2025-08-12
Attending: INTERNAL MEDICINE
Payer: MEDICAID

## 2025-08-12 DIAGNOSIS — K50.10 CROHN'S DISEASE OF LARGE INTESTINE WITHOUT COMPLICATION (H): Primary | ICD-10-CM

## 2025-08-12 PROCEDURE — 1125F AMNT PAIN NOTED PAIN PRSNT: CPT | Mod: 95 | Performed by: PHYSICIAN ASSISTANT

## 2025-08-12 PROCEDURE — 98006 SYNCH AUDIO-VIDEO EST MOD 30: CPT | Performed by: PHYSICIAN ASSISTANT

## 2025-08-13 RX ORDER — DICYCLOMINE HYDROCHLORIDE 10 MG/1
CAPSULE ORAL
Qty: 120 CAPSULE | Refills: 2 | Status: SHIPPED | OUTPATIENT
Start: 2025-08-13

## 2025-08-18 ENCOUNTER — HOSPITAL ENCOUNTER (OUTPATIENT)
Dept: GENERAL RADIOLOGY | Facility: OTHER | Age: 50
Discharge: HOME | End: 2025-08-18
Attending: PHYSICIAN ASSISTANT | Admitting: PHYSICIAN ASSISTANT
Payer: MEDICAID

## 2025-08-18 DIAGNOSIS — M54.12 CERVICAL RADICULOPATHY: ICD-10-CM

## 2025-08-18 DIAGNOSIS — M48.02 CERVICAL SPINAL STENOSIS: ICD-10-CM

## 2025-08-18 PROCEDURE — 62321 NJX INTERLAMINAR CRV/THRC: CPT

## 2025-08-18 PROCEDURE — 250N000011 HC RX IP 250 OP 636: Performed by: STUDENT IN AN ORGANIZED HEALTH CARE EDUCATION/TRAINING PROGRAM

## 2025-08-18 PROCEDURE — 250N000009 HC RX 250: Performed by: STUDENT IN AN ORGANIZED HEALTH CARE EDUCATION/TRAINING PROGRAM

## 2025-08-18 PROCEDURE — 255N000002 HC RX 255 OP 636: Performed by: STUDENT IN AN ORGANIZED HEALTH CARE EDUCATION/TRAINING PROGRAM

## 2025-08-18 RX ORDER — LIDOCAINE HYDROCHLORIDE 10 MG/ML
2 INJECTION, SOLUTION INFILTRATION; PERINEURAL ONCE
Status: COMPLETED | OUTPATIENT
Start: 2025-08-18 | End: 2025-08-18

## 2025-08-18 RX ORDER — BETAMETHASONE SODIUM PHOSPHATE AND BETAMETHASONE ACETATE 3; 3 MG/ML; MG/ML
2 INJECTION, SUSPENSION INTRA-ARTICULAR; INTRALESIONAL; INTRAMUSCULAR; SOFT TISSUE ONCE
Status: COMPLETED | OUTPATIENT
Start: 2025-08-18 | End: 2025-08-18

## 2025-08-18 RX ADMIN — LIDOCAINE HYDROCHLORIDE 3 ML: 10 INJECTION, SOLUTION INFILTRATION; PERINEURAL at 12:20

## 2025-08-18 RX ADMIN — IOHEXOL 1 ML: 240 INJECTION, SOLUTION INTRATHECAL; INTRAVASCULAR; INTRAVENOUS; ORAL at 12:20

## 2025-08-18 RX ADMIN — BETAMETHASONE SODIUM PHOSPHATE AND BETAMETHASONE ACETATE 2 ML: 3; 3 INJECTION, SUSPENSION INTRA-ARTICULAR; INTRALESIONAL; INTRAMUSCULAR at 12:20

## 2025-08-25 ENCOUNTER — MYC MEDICAL ADVICE (OUTPATIENT)
Dept: FAMILY MEDICINE | Facility: OTHER | Age: 50
End: 2025-08-25
Payer: MEDICAID

## 2025-08-26 ENCOUNTER — APPOINTMENT (OUTPATIENT)
Dept: CT IMAGING | Facility: OTHER | Age: 50
End: 2025-08-26
Attending: FAMILY MEDICINE
Payer: COMMERCIAL

## 2025-08-26 ENCOUNTER — HOSPITAL ENCOUNTER (INPATIENT)
Facility: OTHER | Age: 50
LOS: 1 days | Discharge: HOME | End: 2025-08-27
Attending: FAMILY MEDICINE | Admitting: INTERNAL MEDICINE
Payer: COMMERCIAL

## 2025-08-26 DIAGNOSIS — R14.0 GASTRIC TYMPANY: ICD-10-CM

## 2025-08-26 DIAGNOSIS — R11.0 NAUSEA: ICD-10-CM

## 2025-08-26 DIAGNOSIS — M54.16 CHRONIC RADICULAR LOW BACK PAIN: ICD-10-CM

## 2025-08-26 DIAGNOSIS — G43.001 MIGRAINE WITHOUT AURA AND WITH STATUS MIGRAINOSUS, NOT INTRACTABLE: ICD-10-CM

## 2025-08-26 DIAGNOSIS — K50.119 CROHN'S COLITIS, UNSPECIFIED COMPLICATION (H): Primary | ICD-10-CM

## 2025-08-26 DIAGNOSIS — R11.2 NAUSEA AND VOMITING, UNSPECIFIED VOMITING TYPE: ICD-10-CM

## 2025-08-26 DIAGNOSIS — G89.29 CHRONIC RADICULAR LOW BACK PAIN: ICD-10-CM

## 2025-08-26 DIAGNOSIS — M62.830 BACK MUSCLE SPASM: ICD-10-CM

## 2025-08-26 DIAGNOSIS — K52.9 GASTROENTERITIS: ICD-10-CM

## 2025-08-26 DIAGNOSIS — Z87.19 HEALED YELLOW ATROPHY OF LIVER: ICD-10-CM

## 2025-08-26 DIAGNOSIS — R10.817 GENERALIZED ABDOMINAL TENDERNESS, REBOUND TENDERNESS PRESENCE NOT SPECIFIED: ICD-10-CM

## 2025-08-26 LAB
ALBUMIN SERPL BCG-MCNC: 5.3 G/DL (ref 3.5–5.2)
ALP SERPL-CCNC: 71 U/L (ref 40–150)
ALT SERPL W P-5'-P-CCNC: 23 U/L (ref 0–70)
ANION GAP SERPL CALCULATED.3IONS-SCNC: 16 MMOL/L (ref 7–15)
AST SERPL W P-5'-P-CCNC: 31 U/L (ref 0–45)
BASOPHILS # BLD AUTO: 0.11 10E3/UL (ref 0–0.2)
BASOPHILS NFR BLD AUTO: 0.8 %
BILIRUB SERPL-MCNC: <0.2 MG/DL
BUN SERPL-MCNC: 17.3 MG/DL (ref 6–20)
CALCIUM SERPL-MCNC: 9.9 MG/DL (ref 8.8–10.4)
CHLORIDE SERPL-SCNC: 101 MMOL/L (ref 98–107)
CREAT SERPL-MCNC: 0.92 MG/DL (ref 0.67–1.17)
EGFRCR SERPLBLD CKD-EPI 2021: >90 ML/MIN/1.73M2
EOSINOPHIL # BLD AUTO: 0.26 10E3/UL (ref 0–0.7)
EOSINOPHIL NFR BLD AUTO: 1.9 %
ERYTHROCYTE [DISTWIDTH] IN BLOOD BY AUTOMATED COUNT: 13.6 % (ref 10–15)
GLUCOSE SERPL-MCNC: 106 MG/DL (ref 70–99)
HCO3 SERPL-SCNC: 23 MMOL/L (ref 22–29)
HCT VFR BLD AUTO: 49.3 % (ref 40–53)
HGB BLD-MCNC: 17.1 G/DL (ref 13.3–17.7)
HOLD SPECIMEN: NORMAL
IMM GRANULOCYTES # BLD: 0.08 10E3/UL
IMM GRANULOCYTES NFR BLD: 0.6 %
LACTATE SERPL-SCNC: 2 MMOL/L (ref 0.7–2)
LIPASE SERPL-CCNC: 34 U/L (ref 13–60)
LYMPHOCYTES # BLD AUTO: 4.15 10E3/UL (ref 0.8–5.3)
LYMPHOCYTES NFR BLD AUTO: 29.6 %
MCH RBC QN AUTO: 29.4 PG (ref 26.5–33)
MCHC RBC AUTO-ENTMCNC: 34.7 G/DL (ref 31.5–36.5)
MCV RBC AUTO: 84.7 FL (ref 78–100)
MONOCYTES # BLD AUTO: 1.72 10E3/UL (ref 0–1.3)
MONOCYTES NFR BLD AUTO: 12.3 %
NEUTROPHILS # BLD AUTO: 7.69 10E3/UL (ref 1.6–8.3)
NEUTROPHILS NFR BLD AUTO: 54.8 %
NRBC # BLD AUTO: <0.03 10E3/UL
NRBC BLD AUTO-RTO: 0 /100
PLATELET # BLD AUTO: 353 10E3/UL (ref 150–450)
POTASSIUM SERPL-SCNC: 3.8 MMOL/L (ref 3.4–5.3)
PROT SERPL-MCNC: 8.7 G/DL (ref 6.4–8.3)
RBC # BLD AUTO: 5.82 10E6/UL (ref 4.4–5.9)
SODIUM SERPL-SCNC: 140 MMOL/L (ref 135–145)
WBC # BLD AUTO: 14.01 10E3/UL (ref 4–11)

## 2025-08-26 PROCEDURE — 74177 CT ABD & PELVIS W/CONTRAST: CPT | Mod: 26 | Performed by: RADIOLOGY

## 2025-08-26 PROCEDURE — 96374 THER/PROPH/DIAG INJ IV PUSH: CPT | Mod: XU | Performed by: FAMILY MEDICINE

## 2025-08-26 PROCEDURE — 96375 TX/PRO/DX INJ NEW DRUG ADDON: CPT | Performed by: FAMILY MEDICINE

## 2025-08-26 PROCEDURE — 250N000011 HC RX IP 250 OP 636: Mod: JZ | Performed by: FAMILY MEDICINE

## 2025-08-26 PROCEDURE — 84155 ASSAY OF PROTEIN SERUM: CPT | Performed by: FAMILY MEDICINE

## 2025-08-26 PROCEDURE — 36415 COLL VENOUS BLD VENIPUNCTURE: CPT | Performed by: FAMILY MEDICINE

## 2025-08-26 PROCEDURE — 258N000003 HC RX IP 258 OP 636: Performed by: FAMILY MEDICINE

## 2025-08-26 PROCEDURE — 74177 CT ABD & PELVIS W/CONTRAST: CPT

## 2025-08-26 PROCEDURE — 99285 EMERGENCY DEPT VISIT HI MDM: CPT | Performed by: FAMILY MEDICINE

## 2025-08-26 PROCEDURE — 85025 COMPLETE CBC W/AUTO DIFF WBC: CPT | Performed by: FAMILY MEDICINE

## 2025-08-26 PROCEDURE — 99285 EMERGENCY DEPT VISIT HI MDM: CPT | Mod: 25 | Performed by: FAMILY MEDICINE

## 2025-08-26 PROCEDURE — 83690 ASSAY OF LIPASE: CPT | Performed by: FAMILY MEDICINE

## 2025-08-26 PROCEDURE — 83605 ASSAY OF LACTIC ACID: CPT | Performed by: FAMILY MEDICINE

## 2025-08-26 PROCEDURE — 96361 HYDRATE IV INFUSION ADD-ON: CPT | Performed by: FAMILY MEDICINE

## 2025-08-26 RX ORDER — IOPAMIDOL 755 MG/ML
100 INJECTION, SOLUTION INTRAVASCULAR ONCE
Status: COMPLETED | OUTPATIENT
Start: 2025-08-26 | End: 2025-08-27

## 2025-08-26 RX ORDER — ONDANSETRON 2 MG/ML
4 INJECTION INTRAMUSCULAR; INTRAVENOUS EVERY 30 MIN PRN
Status: DISCONTINUED | OUTPATIENT
Start: 2025-08-26 | End: 2025-08-27 | Stop reason: HOSPADM

## 2025-08-26 RX ORDER — HYDROMORPHONE HYDROCHLORIDE 1 MG/ML
0.3 INJECTION, SOLUTION INTRAMUSCULAR; INTRAVENOUS; SUBCUTANEOUS EVERY 30 MIN PRN
Status: DISCONTINUED | OUTPATIENT
Start: 2025-08-26 | End: 2025-08-27

## 2025-08-26 RX ORDER — METHYLPREDNISOLONE SODIUM SUCCINATE 125 MG/2ML
250 INJECTION INTRAMUSCULAR; INTRAVENOUS ONCE
Status: COMPLETED | OUTPATIENT
Start: 2025-08-26 | End: 2025-08-26

## 2025-08-26 RX ADMIN — HYDROMORPHONE HYDROCHLORIDE 0.3 MG: 1 INJECTION, SOLUTION INTRAMUSCULAR; INTRAVENOUS; SUBCUTANEOUS at 23:02

## 2025-08-26 RX ADMIN — ONDANSETRON 4 MG: 2 INJECTION INTRAMUSCULAR; INTRAVENOUS at 23:02

## 2025-08-26 RX ADMIN — SODIUM CHLORIDE 1000 ML: 0.9 INJECTION, SOLUTION INTRAVENOUS at 22:49

## 2025-08-26 RX ADMIN — METHYLPREDNISOLONE SODIUM SUCCINATE 250 MG: 125 INJECTION, POWDER, FOR SOLUTION INTRAMUSCULAR; INTRAVENOUS at 23:10

## 2025-08-26 ASSESSMENT — COLUMBIA-SUICIDE SEVERITY RATING SCALE - C-SSRS
6. HAVE YOU EVER DONE ANYTHING, STARTED TO DO ANYTHING, OR PREPARED TO DO ANYTHING TO END YOUR LIFE?: NO
2. HAVE YOU ACTUALLY HAD ANY THOUGHTS OF KILLING YOURSELF IN THE PAST MONTH?: NO
1. IN THE PAST MONTH, HAVE YOU WISHED YOU WERE DEAD OR WISHED YOU COULD GO TO SLEEP AND NOT WAKE UP?: NO

## 2025-08-26 ASSESSMENT — ACTIVITIES OF DAILY LIVING (ADL): ADLS_ACUITY_SCORE: 50

## 2025-08-27 ENCOUNTER — PATIENT OUTREACH (OUTPATIENT)
Dept: CARE COORDINATION | Facility: CLINIC | Age: 50
End: 2025-08-27
Payer: MEDICAID

## 2025-08-27 VITALS
SYSTOLIC BLOOD PRESSURE: 122 MMHG | DIASTOLIC BLOOD PRESSURE: 83 MMHG | BODY MASS INDEX: 24.65 KG/M2 | HEART RATE: 96 BPM | TEMPERATURE: 97 F | OXYGEN SATURATION: 95 % | RESPIRATION RATE: 16 BRPM | WEIGHT: 171.8 LBS

## 2025-08-27 PROBLEM — R10.9 ABDOMINAL PAIN: Status: ACTIVE | Noted: 2025-08-27

## 2025-08-27 PROBLEM — K50.119 CROHN'S COLITIS, UNSPECIFIED COMPLICATION (H): Status: ACTIVE | Noted: 2025-08-27

## 2025-08-27 LAB
ADV 40+41 DNA STL QL NAA+NON-PROBE: NEGATIVE
ANION GAP SERPL CALCULATED.3IONS-SCNC: 12 MMOL/L (ref 7–15)
ASTRO TYP 1-8 RNA STL QL NAA+NON-PROBE: NEGATIVE
BUN SERPL-MCNC: 15.2 MG/DL (ref 6–20)
C CAYETANENSIS DNA STL QL NAA+NON-PROBE: NEGATIVE
CALCIUM SERPL-MCNC: 8.6 MG/DL (ref 8.8–10.4)
CAMPYLOBACTER DNA SPEC NAA+PROBE: NEGATIVE
CHLORIDE SERPL-SCNC: 104 MMOL/L (ref 98–107)
CREAT SERPL-MCNC: 0.82 MG/DL (ref 0.67–1.17)
CRP SERPL-MCNC: <3 MG/L
CRYPTOSP DNA STL QL NAA+NON-PROBE: NEGATIVE
E COLI O157 DNA STL QL NAA+NON-PROBE: NORMAL
E HISTOLYT DNA STL QL NAA+NON-PROBE: NEGATIVE
EAEC ASTA GENE ISLT QL NAA+PROBE: NEGATIVE
EC STX1+STX2 GENES STL QL NAA+NON-PROBE: NEGATIVE
EGFRCR SERPLBLD CKD-EPI 2021: >90 ML/MIN/1.73M2
EPEC EAE GENE STL QL NAA+NON-PROBE: NEGATIVE
ERYTHROCYTE [DISTWIDTH] IN BLOOD BY AUTOMATED COUNT: 13.3 % (ref 10–15)
ETEC LTA+ST1A+ST1B TOX ST NAA+NON-PROBE: NEGATIVE
G LAMBLIA DNA STL QL NAA+NON-PROBE: NEGATIVE
GLUCOSE SERPL-MCNC: 143 MG/DL (ref 70–99)
HCO3 SERPL-SCNC: 22 MMOL/L (ref 22–29)
HCT VFR BLD AUTO: 41.6 % (ref 40–53)
HGB BLD-MCNC: 14.1 G/DL (ref 13.3–17.7)
HGB BLD-MCNC: 14.3 G/DL (ref 13.3–17.7)
HOLD SPECIMEN: NORMAL
MCH RBC QN AUTO: 29.2 PG (ref 26.5–33)
MCHC RBC AUTO-ENTMCNC: 34.4 G/DL (ref 31.5–36.5)
MCV RBC AUTO: 85 FL (ref 78–100)
MCV RBC AUTO: 85.1 FL (ref 78–100)
NOROVIRUS GI+II RNA STL QL NAA+NON-PROBE: NEGATIVE
P SHIGELLOIDES DNA STL QL NAA+NON-PROBE: NEGATIVE
PLATELET # BLD AUTO: 288 10E3/UL (ref 150–450)
POTASSIUM SERPL-SCNC: 4.8 MMOL/L (ref 3.4–5.3)
RBC # BLD AUTO: 4.89 10E6/UL (ref 4.4–5.9)
RVA RNA STL QL NAA+NON-PROBE: NEGATIVE
SALMONELLA SP RPOD STL QL NAA+PROBE: NEGATIVE
SAPO I+II+IV+V RNA STL QL NAA+NON-PROBE: NEGATIVE
SHIGELLA SP+EIEC IPAH ST NAA+NON-PROBE: NEGATIVE
SODIUM SERPL-SCNC: 138 MMOL/L (ref 135–145)
V CHOLERAE DNA SPEC QL NAA+PROBE: NEGATIVE
VIBRIO DNA SPEC NAA+PROBE: NEGATIVE
WBC # BLD AUTO: 8.12 10E3/UL (ref 4–11)
Y ENTEROCOL DNA STL QL NAA+PROBE: NEGATIVE

## 2025-08-27 PROCEDURE — 258N000003 HC RX IP 258 OP 636: Performed by: INTERNAL MEDICINE

## 2025-08-27 PROCEDURE — 85018 HEMOGLOBIN: CPT | Performed by: INTERNAL MEDICINE

## 2025-08-27 PROCEDURE — 120N000001 HC R&B MED SURG/OB

## 2025-08-27 PROCEDURE — 36415 COLL VENOUS BLD VENIPUNCTURE: CPT | Performed by: INTERNAL MEDICINE

## 2025-08-27 PROCEDURE — 87507 IADNA-DNA/RNA PROBE TQ 12-25: CPT | Performed by: FAMILY MEDICINE

## 2025-08-27 PROCEDURE — 250N000011 HC RX IP 250 OP 636: Performed by: INTERNAL MEDICINE

## 2025-08-27 PROCEDURE — 250N000011 HC RX IP 250 OP 636: Performed by: FAMILY MEDICINE

## 2025-08-27 PROCEDURE — 86140 C-REACTIVE PROTEIN: CPT | Performed by: INTERNAL MEDICINE

## 2025-08-27 PROCEDURE — 80048 BASIC METABOLIC PNL TOTAL CA: CPT | Performed by: INTERNAL MEDICINE

## 2025-08-27 PROCEDURE — 96376 TX/PRO/DX INJ SAME DRUG ADON: CPT | Performed by: FAMILY MEDICINE

## 2025-08-27 PROCEDURE — 250N000013 HC RX MED GY IP 250 OP 250 PS 637: Performed by: INTERNAL MEDICINE

## 2025-08-27 PROCEDURE — 250N000009 HC RX 250: Performed by: FAMILY MEDICINE

## 2025-08-27 PROCEDURE — 250N000012 HC RX MED GY IP 250 OP 636 PS 637: Performed by: INTERNAL MEDICINE

## 2025-08-27 RX ORDER — ONDANSETRON 4 MG/1
4 TABLET, ORALLY DISINTEGRATING ORAL EVERY 8 HOURS PRN
Qty: 18 TABLET | Refills: 0 | Status: SHIPPED | OUTPATIENT
Start: 2025-08-27

## 2025-08-27 RX ORDER — NALOXONE HYDROCHLORIDE 0.4 MG/ML
0.4 INJECTION, SOLUTION INTRAMUSCULAR; INTRAVENOUS; SUBCUTANEOUS
Status: DISCONTINUED | OUTPATIENT
Start: 2025-08-27 | End: 2025-08-27 | Stop reason: HOSPADM

## 2025-08-27 RX ORDER — DESVENLAFAXINE 50 MG/1
100 TABLET, FILM COATED, EXTENDED RELEASE ORAL DAILY
Status: DISCONTINUED | OUTPATIENT
Start: 2025-08-27 | End: 2025-08-27 | Stop reason: HOSPADM

## 2025-08-27 RX ORDER — INFLIXIMAB 100 MG/10ML
100 INJECTION, POWDER, LYOPHILIZED, FOR SOLUTION INTRAVENOUS ONCE
Status: DISCONTINUED | OUTPATIENT
Start: 2025-08-27 | End: 2025-08-27

## 2025-08-27 RX ORDER — CELECOXIB 100 MG/1
100-200 CAPSULE ORAL 2 TIMES DAILY PRN
Qty: 60 CAPSULE | Refills: 0 | Status: SHIPPED | OUTPATIENT
Start: 2025-08-27

## 2025-08-27 RX ORDER — SUCRALFATE 1 G/1
1 TABLET ORAL 2 TIMES DAILY PRN
Status: DISCONTINUED | OUTPATIENT
Start: 2025-08-27 | End: 2025-08-27 | Stop reason: HOSPADM

## 2025-08-27 RX ORDER — NIFEDIPINE 30 MG/1
90 TABLET, EXTENDED RELEASE ORAL DAILY
Status: DISCONTINUED | OUTPATIENT
Start: 2025-08-27 | End: 2025-08-27 | Stop reason: HOSPADM

## 2025-08-27 RX ORDER — ALBUTEROL SULFATE 90 UG/1
2 INHALANT RESPIRATORY (INHALATION) EVERY 6 HOURS PRN
Status: DISCONTINUED | OUTPATIENT
Start: 2025-08-27 | End: 2025-08-27 | Stop reason: HOSPADM

## 2025-08-27 RX ORDER — CALCIUM CARBONATE 500 MG/1
1000 TABLET, CHEWABLE ORAL 4 TIMES DAILY PRN
Status: DISCONTINUED | OUTPATIENT
Start: 2025-08-27 | End: 2025-08-27 | Stop reason: HOSPADM

## 2025-08-27 RX ORDER — NALOXONE HYDROCHLORIDE 0.4 MG/ML
0.2 INJECTION, SOLUTION INTRAMUSCULAR; INTRAVENOUS; SUBCUTANEOUS
Status: DISCONTINUED | OUTPATIENT
Start: 2025-08-27 | End: 2025-08-27 | Stop reason: HOSPADM

## 2025-08-27 RX ORDER — LIDOCAINE 40 MG/G
CREAM TOPICAL
Status: DISCONTINUED | OUTPATIENT
Start: 2025-08-27 | End: 2025-08-27 | Stop reason: HOSPADM

## 2025-08-27 RX ORDER — LEVOTHYROXINE SODIUM 25 UG/1
25 TABLET ORAL
Status: DISCONTINUED | OUTPATIENT
Start: 2025-08-27 | End: 2025-08-27 | Stop reason: HOSPADM

## 2025-08-27 RX ORDER — HYDROXYZINE HYDROCHLORIDE 25 MG/1
50 TABLET, FILM COATED ORAL EVERY 6 HOURS PRN
Status: DISCONTINUED | OUTPATIENT
Start: 2025-08-27 | End: 2025-08-27 | Stop reason: HOSPADM

## 2025-08-27 RX ORDER — ENOXAPARIN SODIUM 100 MG/ML
40 INJECTION SUBCUTANEOUS EVERY 24 HOURS
Status: DISCONTINUED | OUTPATIENT
Start: 2025-08-27 | End: 2025-08-27 | Stop reason: HOSPADM

## 2025-08-27 RX ORDER — MORPHINE SULFATE 4 MG/ML
4 INJECTION, SOLUTION INTRAMUSCULAR; INTRAVENOUS EVERY 4 HOURS PRN
Status: DISCONTINUED | OUTPATIENT
Start: 2025-08-27 | End: 2025-08-27 | Stop reason: HOSPADM

## 2025-08-27 RX ORDER — CLONAZEPAM 0.5 MG/1
1 TABLET ORAL 3 TIMES DAILY
Status: DISCONTINUED | OUTPATIENT
Start: 2025-08-27 | End: 2025-08-27 | Stop reason: HOSPADM

## 2025-08-27 RX ORDER — PREDNISONE 20 MG/1
60 TABLET ORAL DAILY
Status: DISCONTINUED | OUTPATIENT
Start: 2025-08-27 | End: 2025-08-27 | Stop reason: HOSPADM

## 2025-08-27 RX ORDER — METHOCARBAMOL 500 MG/1
500 TABLET, FILM COATED ORAL 4 TIMES DAILY PRN
Status: DISCONTINUED | OUTPATIENT
Start: 2025-08-27 | End: 2025-08-27 | Stop reason: HOSPADM

## 2025-08-27 RX ORDER — AMOXICILLIN 250 MG
2 CAPSULE ORAL 2 TIMES DAILY PRN
Status: DISCONTINUED | OUTPATIENT
Start: 2025-08-27 | End: 2025-08-27 | Stop reason: HOSPADM

## 2025-08-27 RX ORDER — DIPHENOXYLATE HYDROCHLORIDE AND ATROPINE SULFATE 2.5; .025 MG/1; MG/1
2 TABLET ORAL 4 TIMES DAILY PRN
Status: DISCONTINUED | OUTPATIENT
Start: 2025-08-27 | End: 2025-08-27 | Stop reason: HOSPADM

## 2025-08-27 RX ORDER — SODIUM CHLORIDE, SODIUM LACTATE, POTASSIUM CHLORIDE, CALCIUM CHLORIDE 600; 310; 30; 20 MG/100ML; MG/100ML; MG/100ML; MG/100ML
INJECTION, SOLUTION INTRAVENOUS CONTINUOUS
Status: DISPENSED | OUTPATIENT
Start: 2025-08-27 | End: 2025-08-27

## 2025-08-27 RX ORDER — ALPRAZOLAM 0.5 MG
1 TABLET ORAL 2 TIMES DAILY PRN
Status: DISCONTINUED | OUTPATIENT
Start: 2025-08-27 | End: 2025-08-27 | Stop reason: HOSPADM

## 2025-08-27 RX ORDER — ACETAMINOPHEN, ASPIRIN AND CAFFEINE 250; 250; 65 MG/1; MG/1; MG/1
1-2 TABLET ORAL EVERY 8 HOURS PRN
Status: SHIPPED
Start: 2025-08-27

## 2025-08-27 RX ORDER — AMOXICILLIN 250 MG
1 CAPSULE ORAL 2 TIMES DAILY PRN
Status: DISCONTINUED | OUTPATIENT
Start: 2025-08-27 | End: 2025-08-27 | Stop reason: HOSPADM

## 2025-08-27 RX ORDER — DESVENLAFAXINE 25 MG/1
25 TABLET, EXTENDED RELEASE ORAL DAILY
Status: DISCONTINUED | OUTPATIENT
Start: 2025-08-27 | End: 2025-08-27 | Stop reason: HOSPADM

## 2025-08-27 RX ORDER — AMITRIPTYLINE HYDROCHLORIDE 10 MG/1
20 TABLET ORAL AT BEDTIME
Status: DISCONTINUED | OUTPATIENT
Start: 2025-08-27 | End: 2025-08-27 | Stop reason: HOSPADM

## 2025-08-27 RX ADMIN — MORPHINE SULFATE 4 MG: 4 INJECTION, SOLUTION INTRAMUSCULAR; INTRAVENOUS at 07:45

## 2025-08-27 RX ADMIN — LEVOTHYROXINE SODIUM 25 MCG: 0.03 TABLET ORAL at 07:17

## 2025-08-27 RX ADMIN — IOPAMIDOL 100 ML: 755 INJECTION, SOLUTION INTRAVENOUS at 00:02

## 2025-08-27 RX ADMIN — DESVENLAFAXINE SUCCINATE 100 MG: 50 TABLET, FILM COATED, EXTENDED RELEASE ORAL at 10:21

## 2025-08-27 RX ADMIN — MORPHINE SULFATE 4 MG: 4 INJECTION, SOLUTION INTRAMUSCULAR; INTRAVENOUS at 03:31

## 2025-08-27 RX ADMIN — DESVENLAFAXINE SUCCINATE 25 MG: 25 TABLET, EXTENDED RELEASE ORAL at 10:21

## 2025-08-27 RX ADMIN — ALPRAZOLAM 1 MG: 0.5 TABLET ORAL at 04:06

## 2025-08-27 RX ADMIN — SODIUM CHLORIDE, SODIUM LACTATE, POTASSIUM CHLORIDE, AND CALCIUM CHLORIDE: .6; .31; .03; .02 INJECTION, SOLUTION INTRAVENOUS at 03:46

## 2025-08-27 RX ADMIN — SUCRALFATE 1 G: 1 TABLET ORAL at 07:45

## 2025-08-27 RX ADMIN — HYDROMORPHONE HYDROCHLORIDE 0.3 MG: 1 INJECTION, SOLUTION INTRAMUSCULAR; INTRAVENOUS; SUBCUTANEOUS at 00:14

## 2025-08-27 RX ADMIN — SODIUM CHLORIDE 1000 ML: 0.9 INJECTION, SOLUTION INTRAVENOUS at 02:30

## 2025-08-27 RX ADMIN — SODIUM CHLORIDE 60 ML: 9 INJECTION, SOLUTION INTRAVENOUS at 00:02

## 2025-08-27 RX ADMIN — ENOXAPARIN SODIUM 40 MG: 40 INJECTION SUBCUTANEOUS at 10:21

## 2025-08-27 RX ADMIN — PREDNISONE 60 MG: 20 TABLET ORAL at 10:21

## 2025-08-27 RX ADMIN — HYDROXYZINE HYDROCHLORIDE 25 MG: 25 TABLET, FILM COATED ORAL at 03:30

## 2025-08-27 RX ADMIN — DIPHENOXYLATE HYDROCHLORIDE AND ATROPINE SULFATE 2 TABLET: .025; 2.5 TABLET ORAL at 04:06

## 2025-08-27 RX ADMIN — AMITRIPTYLINE HYDROCHLORIDE 20 MG: 10 TABLET, FILM COATED ORAL at 02:48

## 2025-08-27 RX ADMIN — CLONAZEPAM 1 MG: 0.5 TABLET ORAL at 06:23

## 2025-08-27 RX ADMIN — PANTOPRAZOLE SODIUM 40 MG: 40 INJECTION, POWDER, FOR SOLUTION INTRAVENOUS at 07:17

## 2025-08-27 RX ADMIN — NIFEDIPINE 90 MG: 30 TABLET, FILM COATED, EXTENDED RELEASE ORAL at 10:21

## 2025-08-27 ASSESSMENT — ENCOUNTER SYMPTOMS
NAUSEA: 1
ABDOMINAL PAIN: 1
VOMITING: 1
FEVER: 0
CHILLS: 1
ABDOMINAL DISTENTION: 1
DYSURIA: 0
ANAL BLEEDING: 0
RECTAL PAIN: 0
DIARRHEA: 0

## 2025-08-27 ASSESSMENT — ACTIVITIES OF DAILY LIVING (ADL)
ADLS_ACUITY_SCORE: 38
ADLS_ACUITY_SCORE: 38
ADLS_ACUITY_SCORE: 34
ADLS_ACUITY_SCORE: 38
ADLS_ACUITY_SCORE: 34
ADLS_ACUITY_SCORE: 50
ADLS_ACUITY_SCORE: 38
ADLS_ACUITY_SCORE: 34
ADLS_ACUITY_SCORE: 50
ADLS_ACUITY_SCORE: 38

## 2025-08-29 ENCOUNTER — TELEPHONE (OUTPATIENT)
Dept: FAMILY MEDICINE | Facility: OTHER | Age: 50
End: 2025-08-29

## 2025-09-02 ENCOUNTER — TELEPHONE (OUTPATIENT)
Dept: GENERAL RADIOLOGY | Facility: OTHER | Age: 50
End: 2025-09-02
Payer: COMMERCIAL

## 2025-09-04 ENCOUNTER — OFFICE VISIT (OUTPATIENT)
Dept: FAMILY MEDICINE | Facility: OTHER | Age: 50
End: 2025-09-04
Attending: FAMILY MEDICINE
Payer: COMMERCIAL

## 2025-09-04 VITALS
DIASTOLIC BLOOD PRESSURE: 68 MMHG | SYSTOLIC BLOOD PRESSURE: 102 MMHG | BODY MASS INDEX: 24.71 KG/M2 | OXYGEN SATURATION: 95 % | TEMPERATURE: 97.1 F | RESPIRATION RATE: 20 BRPM | HEART RATE: 102 BPM | WEIGHT: 172.2 LBS

## 2025-09-04 DIAGNOSIS — R10.84 GENERALIZED ABDOMINAL PAIN: ICD-10-CM

## 2025-09-04 DIAGNOSIS — F34.1 DYSTHYMIC DISORDER: ICD-10-CM

## 2025-09-04 DIAGNOSIS — I73.00 RAYNAUD'S PHENOMENON WITHOUT GANGRENE: ICD-10-CM

## 2025-09-04 DIAGNOSIS — K50.80 CROHN'S DISEASE OF BOTH SMALL AND LARGE INTESTINE WITHOUT COMPLICATIONS (H): Primary | ICD-10-CM

## 2025-09-04 DIAGNOSIS — F33.42 RECURRENT MAJOR DEPRESSION IN COMPLETE REMISSION: ICD-10-CM

## 2025-09-04 PROBLEM — K50.119 CROHN'S COLITIS, UNSPECIFIED COMPLICATION (H): Status: RESOLVED | Noted: 2025-08-27 | Resolved: 2025-09-04

## 2025-09-04 PROBLEM — M62.830 BACK MUSCLE SPASM: Status: RESOLVED | Noted: 2025-01-09 | Resolved: 2025-09-04

## 2025-09-04 PROCEDURE — 99495 TRANSJ CARE MGMT MOD F2F 14D: CPT | Performed by: FAMILY MEDICINE

## 2025-09-04 RX ORDER — NIFEDIPINE 90 MG/1
90 TABLET, EXTENDED RELEASE ORAL DAILY PRN
COMMUNITY
Start: 2025-09-04

## 2025-09-04 ASSESSMENT — PAIN SCALES - GENERAL: PAINLEVEL_OUTOF10: MODERATE PAIN (6)

## (undated) DEVICE — ENDO FORCEP BX CAPTURA PRO SPIKE G50696

## (undated) DEVICE — ENDO FORCEP ENDOJAW BIOPSY 2.8MMX230CM FB-220U

## (undated) DEVICE — ENDO BITE BLOCK ADULT OMNI-BLOC

## (undated) DEVICE — KIT ENDO FIRST STEP DISINFECTANT 200ML W/POUCH EP-4

## (undated) DEVICE — GOWN IMPERVIOUS 2XL BLUE

## (undated) DEVICE — ENDO TRAP POLYP E-TRAP 00711099

## (undated) DEVICE — TUBING SUCTION MEDI-VAC 1/4"X20' N620A

## (undated) DEVICE — SNARE CAPIVATOR ROUND COLD SNR BX10 M00561101

## (undated) DEVICE — SPECIMEN CONTAINER 3OZ W/FORMALIN 59901

## (undated) DEVICE — SOL WATER IRRIG 500ML BOTTLE 2F7113

## (undated) DEVICE — KIT ENDO TURNOVER/PROCEDURE CARRY-ON 101822

## (undated) DEVICE — ENDO KIT COMPLIANCE DYKENDOCMPLY

## (undated) DEVICE — ENDO BRUSH CHANNEL MASTER CLEANING 2-4.2MM BW-412T

## (undated) DEVICE — SUCTION MANIFOLD NEPTUNE 2 SYS 1 PORT 702-025-000

## (undated) DEVICE — GLOVE EXAM NITRILE LG PF LATEX FREE 5064

## (undated) DEVICE — SOL WATER 1500ML

## (undated) DEVICE — ENDO SNARE POLYPECTOMY OVAL 25MM LOOP SD-240U-25

## (undated) DEVICE — SYR 30ML SLIP TIP W/O NDL 302833

## (undated) DEVICE — TUBING SUCTION 10'X3/16" N510

## (undated) DEVICE — SUCTION CATH AIRLIFE TRI-FLO W/CONTROL PORT 14FR  T60C

## (undated) DEVICE — SUCTION MANIFOLD NEPTUNE 2 SYS 4 PORT 0702-020-000

## (undated) RX ORDER — LIDOCAINE HYDROCHLORIDE 10 MG/ML
INJECTION, SOLUTION INFILTRATION; PERINEURAL
Status: DISPENSED
Start: 2021-05-05

## (undated) RX ORDER — HYDROMORPHONE HYDROCHLORIDE 1 MG/ML
INJECTION, SOLUTION INTRAMUSCULAR; INTRAVENOUS; SUBCUTANEOUS
Status: DISPENSED
Start: 2024-03-13

## (undated) RX ORDER — LIDOCAINE HYDROCHLORIDE 10 MG/ML
INJECTION, SOLUTION INFILTRATION; PERINEURAL
Status: DISPENSED
Start: 2021-05-10

## (undated) RX ORDER — LIDOCAINE HYDROCHLORIDE AND EPINEPHRINE 10; 10 MG/ML; UG/ML
INJECTION, SOLUTION INFILTRATION; PERINEURAL
Status: DISPENSED
Start: 2023-01-22

## (undated) RX ORDER — TRIAMCINOLONE ACETONIDE 40 MG/ML
INJECTION, SUSPENSION INTRA-ARTICULAR; INTRAMUSCULAR
Status: DISPENSED
Start: 2021-05-10

## (undated) RX ORDER — MORPHINE SULFATE 4 MG/ML
INJECTION, SOLUTION INTRAMUSCULAR; INTRAVENOUS
Status: DISPENSED
Start: 2023-07-06

## (undated) RX ORDER — LIDOCAINE HYDROCHLORIDE 10 MG/ML
INJECTION, SOLUTION INFILTRATION; PERINEURAL
Status: DISPENSED
Start: 2025-08-18

## (undated) RX ORDER — ONDANSETRON 2 MG/ML
INJECTION INTRAMUSCULAR; INTRAVENOUS
Status: DISPENSED
Start: 2022-11-18

## (undated) RX ORDER — TRIAMCINOLONE ACETONIDE 40 MG/ML
INJECTION, SUSPENSION INTRA-ARTICULAR; INTRAMUSCULAR
Status: DISPENSED
Start: 2021-07-15

## (undated) RX ORDER — BETAMETHASONE SODIUM PHOSPHATE AND BETAMETHASONE ACETATE 3; 3 MG/ML; MG/ML
INJECTION, SUSPENSION INTRA-ARTICULAR; INTRALESIONAL; INTRAMUSCULAR; SOFT TISSUE
Status: DISPENSED
Start: 2025-08-18

## (undated) RX ORDER — LORAZEPAM 2 MG/ML
INJECTION INTRAMUSCULAR
Status: DISPENSED
Start: 2022-11-18

## (undated) RX ORDER — PROPOFOL 10 MG/ML
INJECTION, EMULSION INTRAVENOUS
Status: DISPENSED
Start: 2022-12-12

## (undated) RX ORDER — FENTANYL CITRATE 50 UG/ML
INJECTION, SOLUTION INTRAMUSCULAR; INTRAVENOUS
Status: DISPENSED
Start: 2022-11-18

## (undated) RX ORDER — HYDROMORPHONE HYDROCHLORIDE 1 MG/ML
INJECTION, SOLUTION INTRAMUSCULAR; INTRAVENOUS; SUBCUTANEOUS
Status: DISPENSED
Start: 2025-08-27

## (undated) RX ORDER — KETOROLAC TROMETHAMINE 30 MG/ML
INJECTION, SOLUTION INTRAMUSCULAR; INTRAVENOUS
Status: DISPENSED
Start: 2021-04-28

## (undated) RX ORDER — HYDROMORPHONE HYDROCHLORIDE 1 MG/ML
INJECTION, SOLUTION INTRAMUSCULAR; INTRAVENOUS; SUBCUTANEOUS
Status: DISPENSED
Start: 2025-08-26

## (undated) RX ORDER — LIDOCAINE HYDROCHLORIDE 10 MG/ML
INJECTION, SOLUTION INFILTRATION; PERINEURAL
Status: DISPENSED
Start: 2021-07-15

## (undated) RX ORDER — DEXAMETHASONE SODIUM PHOSPHATE 4 MG/ML
INJECTION, SOLUTION INTRA-ARTICULAR; INTRALESIONAL; INTRAMUSCULAR; INTRAVENOUS; SOFT TISSUE
Status: DISPENSED
Start: 2025-03-12

## (undated) RX ORDER — METHYLPREDNISOLONE SODIUM SUCCINATE 125 MG/2ML
INJECTION INTRAMUSCULAR; INTRAVENOUS
Status: DISPENSED
Start: 2025-08-26

## (undated) RX ORDER — LIDOCAINE HYDROCHLORIDE 10 MG/ML
INJECTION, SOLUTION INFILTRATION; PERINEURAL
Status: DISPENSED
Start: 2021-08-20

## (undated) RX ORDER — ONDANSETRON 2 MG/ML
INJECTION INTRAMUSCULAR; INTRAVENOUS
Status: DISPENSED
Start: 2025-08-26

## (undated) RX ORDER — ACETAMINOPHEN 325 MG/1
TABLET ORAL
Status: DISPENSED
Start: 2022-11-18

## (undated) RX ORDER — LIDOCAINE HYDROCHLORIDE 10 MG/ML
INJECTION, SOLUTION INFILTRATION; PERINEURAL
Status: DISPENSED
Start: 2022-12-26

## (undated) RX ORDER — CIPROFLOXACIN 2 MG/ML
INJECTION, SOLUTION INTRAVENOUS
Status: DISPENSED
Start: 2022-11-18

## (undated) RX ORDER — ONDANSETRON 2 MG/ML
INJECTION INTRAMUSCULAR; INTRAVENOUS
Status: DISPENSED
Start: 2023-08-10

## (undated) RX ORDER — LIDOCAINE HYDROCHLORIDE 10 MG/ML
INJECTION, SOLUTION INFILTRATION; PERINEURAL
Status: DISPENSED
Start: 2021-11-23

## (undated) RX ORDER — DEXAMETHASONE SODIUM PHOSPHATE 10 MG/ML
INJECTION, SOLUTION INTRAMUSCULAR; INTRAVENOUS
Status: DISPENSED
Start: 2021-08-20

## (undated) RX ORDER — METRONIDAZOLE 500 MG/100ML
INJECTION, SOLUTION INTRAVENOUS
Status: DISPENSED
Start: 2022-11-18

## (undated) RX ORDER — BETAMETHASONE SODIUM PHOSPHATE AND BETAMETHASONE ACETATE 3; 3 MG/ML; MG/ML
INJECTION, SUSPENSION INTRA-ARTICULAR; INTRALESIONAL; INTRAMUSCULAR; SOFT TISSUE
Status: DISPENSED
Start: 2021-11-23